# Patient Record
Sex: MALE | Race: WHITE | NOT HISPANIC OR LATINO | Employment: OTHER | URBAN - METROPOLITAN AREA
[De-identification: names, ages, dates, MRNs, and addresses within clinical notes are randomized per-mention and may not be internally consistent; named-entity substitution may affect disease eponyms.]

---

## 2017-01-17 ENCOUNTER — LAB VISIT (OUTPATIENT)
Dept: LAB | Facility: HOSPITAL | Age: 67
End: 2017-01-17
Attending: INTERNAL MEDICINE
Payer: COMMERCIAL

## 2017-01-17 DIAGNOSIS — I25.10 CORONARY ARTERY DISEASE INVOLVING NATIVE CORONARY ARTERY OF NATIVE HEART WITHOUT ANGINA PECTORIS: ICD-10-CM

## 2017-01-17 DIAGNOSIS — E78.00 HYPERCHOLESTEREMIA: Chronic | ICD-10-CM

## 2017-01-17 LAB
ALT SERPL W/O P-5'-P-CCNC: 24 U/L
ANION GAP SERPL CALC-SCNC: 8 MMOL/L
AST SERPL-CCNC: 17 U/L
BUN SERPL-MCNC: 26 MG/DL
CALCIUM SERPL-MCNC: 9.1 MG/DL
CHLORIDE SERPL-SCNC: 106 MMOL/L
CHOLEST/HDLC SERPL: 3.8 {RATIO}
CO2 SERPL-SCNC: 24 MMOL/L
CREAT SERPL-MCNC: 1 MG/DL
EST. GFR  (AFRICAN AMERICAN): >60 ML/MIN/1.73 M^2
EST. GFR  (NON AFRICAN AMERICAN): >60 ML/MIN/1.73 M^2
GLUCOSE SERPL-MCNC: 164 MG/DL
HDL/CHOLESTEROL RATIO: 26.5 %
HDLC SERPL-MCNC: 113 MG/DL
HDLC SERPL-MCNC: 30 MG/DL
LDLC SERPL CALC-MCNC: 68.2 MG/DL
NONHDLC SERPL-MCNC: 83 MG/DL
POTASSIUM SERPL-SCNC: 4.4 MMOL/L
SODIUM SERPL-SCNC: 138 MMOL/L
TRIGL SERPL-MCNC: 74 MG/DL

## 2017-01-17 PROCEDURE — 36415 COLL VENOUS BLD VENIPUNCTURE: CPT | Mod: PO

## 2017-01-17 PROCEDURE — 80061 LIPID PANEL: CPT

## 2017-01-17 PROCEDURE — 84450 TRANSFERASE (AST) (SGOT): CPT

## 2017-01-17 PROCEDURE — 84460 ALANINE AMINO (ALT) (SGPT): CPT

## 2017-01-17 PROCEDURE — 80048 BASIC METABOLIC PNL TOTAL CA: CPT

## 2017-01-24 ENCOUNTER — OFFICE VISIT (OUTPATIENT)
Dept: CARDIOLOGY | Facility: CLINIC | Age: 67
End: 2017-01-24
Payer: COMMERCIAL

## 2017-01-24 VITALS
DIASTOLIC BLOOD PRESSURE: 84 MMHG | SYSTOLIC BLOOD PRESSURE: 122 MMHG | WEIGHT: 220.25 LBS | HEIGHT: 69 IN | HEART RATE: 72 BPM | BODY MASS INDEX: 32.62 KG/M2

## 2017-01-24 DIAGNOSIS — R73.02 GLUCOSE INTOLERANCE (IMPAIRED GLUCOSE TOLERANCE): ICD-10-CM

## 2017-01-24 DIAGNOSIS — E78.00 HYPERCHOLESTEREMIA: ICD-10-CM

## 2017-01-24 DIAGNOSIS — I25.10 CORONARY ARTERY DISEASE INVOLVING NATIVE CORONARY ARTERY OF NATIVE HEART WITHOUT ANGINA PECTORIS: Primary | ICD-10-CM

## 2017-01-24 DIAGNOSIS — I10 ESSENTIAL HYPERTENSION: Chronic | ICD-10-CM

## 2017-01-24 PROCEDURE — 1157F ADVNC CARE PLAN IN RCRD: CPT | Mod: S$GLB,,, | Performed by: INTERNAL MEDICINE

## 2017-01-24 PROCEDURE — 99999 PR PBB SHADOW E&M-EST. PATIENT-LVL III: CPT | Mod: PBBFAC,,, | Performed by: INTERNAL MEDICINE

## 2017-01-24 PROCEDURE — 1126F AMNT PAIN NOTED NONE PRSNT: CPT | Mod: S$GLB,,, | Performed by: INTERNAL MEDICINE

## 2017-01-24 PROCEDURE — 3079F DIAST BP 80-89 MM HG: CPT | Mod: S$GLB,,, | Performed by: INTERNAL MEDICINE

## 2017-01-24 PROCEDURE — 99214 OFFICE O/P EST MOD 30 MIN: CPT | Mod: S$GLB,,, | Performed by: INTERNAL MEDICINE

## 2017-01-24 PROCEDURE — 3074F SYST BP LT 130 MM HG: CPT | Mod: S$GLB,,, | Performed by: INTERNAL MEDICINE

## 2017-01-24 PROCEDURE — 1159F MED LIST DOCD IN RCRD: CPT | Mod: S$GLB,,, | Performed by: INTERNAL MEDICINE

## 2017-01-24 PROCEDURE — 1160F RVW MEDS BY RX/DR IN RCRD: CPT | Mod: S$GLB,,, | Performed by: INTERNAL MEDICINE

## 2017-01-24 RX ORDER — OXYCODONE AND ACETAMINOPHEN 5; 325 MG/1; MG/1
1 TABLET ORAL
Refills: 0 | Status: ON HOLD | COMMUNITY
Start: 2017-01-04 | End: 2020-11-10 | Stop reason: HOSPADM

## 2017-01-24 RX ORDER — EZETIMIBE 10 MG/1
10 TABLET ORAL DAILY
Qty: 30 TABLET | Refills: 11 | Status: SHIPPED | OUTPATIENT
Start: 2017-01-24 | End: 2018-01-09 | Stop reason: SDUPTHER

## 2017-01-24 NOTE — PROGRESS NOTES
Subjective:   Patient ID:  Den Rick is a 66 y.o. male who presents for follow-up of Coronary Artery Disease      Problem List:  CAD   NSTEMI - OM1 YUE 10/11/12   YUE ostial and prox RCA 10/17/12   YUE mid LAD 1/18/07   YUE prox and mid PDA 1/18/07   Hypercholesterolemia   (arthralgias with Lipitor)   HTN  GI bleed 2014      HPI:   Den Rick feels generally well. He underwent shoulder surgery for torn rotator cuff surgery in 12/16. He remained on aspirin. No perioperative complications except oozing from the incision site 1-2 days after surgery.  He does not report angina or shortness of breath with exertion. He has not required S/L NTG.    He has not been exercising lately.  LDL is ~70 on 20 mg of rosuvastatin. HDL remains in the low 30s.   BP is within normal limits.      Review of Systems   Constitution: Negative for weakness, malaise/fatigue, weight gain and weight loss.   HENT: Positive for headaches.    Cardiovascular: Negative for chest pain, claudication, dyspnea on exertion, irregular heartbeat, leg swelling, orthopnea, palpitations, paroxysmal nocturnal dyspnea and syncope.   Respiratory: Negative for cough, sputum production and wheezing.    Musculoskeletal: Positive for arthritis and back pain. Negative for falls, joint pain, muscle cramps, muscle weakness and myalgias.   Gastrointestinal: Negative for abdominal pain, heartburn and melena.   Genitourinary: Negative for frequency, hematuria and nocturia.   Neurological: Negative for dizziness, light-headedness, loss of balance and paresthesias.   Psychiatric/Behavioral: Negative for depression.       Current Outpatient Prescriptions   Medication Sig    alfuzosin (UROXATRAL) 10 mg Tb24 Take 1 tablet (10 mg total) by mouth every evening.    aspirin (ECOTRIN) 81 MG EC tablet Take 81 mg by mouth once daily.    AXIRON 30 mg/actuation (1.5 mL) SlPm APPLY 1 PUMP TO SKIN EVERY DAY    CELEBREX 200 mg capsule Take 1 capsule by mouth as needed.     "CORDRAN TAPE LARGE ROLL 4 mcg/cm2 Tape Apply 1 application topically every evening. Apply to affected area    fesoterodine (TOVIAZ) 8 mg Tb24 Take 1 tablet by mouth once daily.    fish oil-omega-3 fatty acids 300-1,000 mg capsule Take 1 g by mouth once daily.    methocarbamol (ROBAXIN) 750 MG Tab Take 750 mg by mouth continuous prn.      methylsulfonylmethane (MSM) 500 mg Cap Take 1 capsule by mouth Daily.      metoprolol succinate (TOPROL-XL) 100 MG 24 hr tablet Take 1 tablet (100 mg total) by mouth once daily.    multivitamin (THERAGRAN) per tablet Take 1 tablet by mouth Daily.      nitroGLYCERIN (NITROSTAT) 0.4 MG SL tablet Place 1 tablet (0.4 mg total) under the tongue every 5 (five) minutes as needed.    oxycodone-acetaminophen (PERCOCET) 5-325 mg per tablet Take 1 tablet by mouth as needed.    pantoprazole (PROTONIX) 40 MG tablet Take 40 mg by mouth once daily.    PENNSAID 20 mg/gram /actuation(2 %) sopm APPLY TWO PUMPS TO AFFECTED AREA(S) TWICE DAILY    rosuvastatin (CRESTOR) 20 MG tablet Take 1 tablet (20 mg total) by mouth every evening.    ARGININE/ORNITHINE/LYSINE (ARGININE-LYSINE-ORNITHINE ORAL) Take by mouth once daily.         Social History   Substance Use Topics    Smoking status: Never Smoker    Smokeless tobacco: Never Used      Comment: smoked weed 27 yrs ago    Alcohol use No      Comment: quit at 25 yrs old         Objective:     Physical Exam   Constitutional: He is oriented to person, place, and time. He appears well-developed and well-nourished.   /84  Pulse 72  Ht 5' 9" (1.753 m)  Wt 99.9 kg (220 lb 3.8 oz)  BMI 32.52 kg/m2     HENT:   Head: Normocephalic.   Neck: No JVD present. Carotid bruit is not present.   Cardiovascular: Normal rate, regular rhythm, S1 normal and S2 normal.    No murmur heard.  Pulses:       Radial pulses are 2+ on the right side, and 2+ on the left side.        Posterior tibial pulses are 2+ on the right side, and 2+ on the left side. "   Pulmonary/Chest: Breath sounds normal. Chest wall is not dull to percussion.   Abdominal: Soft. He exhibits no mass. There is no splenomegaly or hepatomegaly.   Musculoskeletal:        Right lower leg: He exhibits no edema.        Left lower leg: He exhibits no edema.   Neurological: He is alert and oriented to person, place, and time. Gait normal.   Skin: No bruising noted. Nails show no clubbing.   Psychiatric: He has a normal mood and affect. His speech is normal and behavior is normal.           Lab Results   Component Value Date    CHOL 113 (L) 01/17/2017    HDL 30 (L) 01/17/2017    LDLCALC 68.2 01/17/2017    TRIG 74 01/17/2017    CHOLHDL 26.5 01/17/2017     Lab Results   Component Value Date     (H) 01/17/2017    CREATININE 1.0 01/17/2017    BUN 26 (H) 01/17/2017     01/17/2017    K 4.4 01/17/2017     01/17/2017    CO2 24 01/17/2017     Lab Results   Component Value Date    ALT 24 01/17/2017    AST 17 01/17/2017    GGT 35 05/28/2014    ALKPHOS 62 01/12/2016    BILITOT 0.7 01/12/2016         Assessment:     1. Coronary artery disease without angina pectoris    2. Hypercholesteremia    3. Glucose intolerance (impaired glucose tolerance)    4. Essential hypertension          Plan:     CAD (coronary artery disease)  Stable.  Continue same meds.    Hypercholesteremia  Cholesterol education.  LDL ~70, but HDL in the 30s. Add ezetimibe 10 mg.  Lipids in 3 months.    Glucose intolerance (impaired glucose tolerance)  Nutritional counseling. Detailed diet plan outlined.  Exercise counseling.  HbA1C in 3 months.    HTN (hypertension)  Stable.  Continue same meds.    RTC 1 yr.

## 2017-01-24 NOTE — MR AVS SNAPSHOT
[facility]   [facility]  [place] [address]  Phone: [phone]                  Den Rick   2017 9:30 AM   Office Visit    Description:  Male : 1950   Provider:  Jennifer Barlow MD   Department:  [facility]           Reason for Visit     Coronary Artery Disease           Diagnoses this Visit        Comments    Coronary artery disease involving native coronary artery of native heart without angina pectoris    -  Primary     Hypercholesteremia         Glucose intolerance (impaired glucose tolerance)                To Do List           Goals (5 Years of Data)     None      Follow-Up and Disposition     Return in about 1 year (around 2018).       These Medications        Disp Refills Start End    ezetimibe (ZETIA) 10 mg tablet 30 tablet 11 2017    Take 1 tablet (10 mg total) by mouth once daily. - Oral    Pharmacy: [facility]/pharm[facility] #[id number] - [place] - [address] [address] [facility] #: [phone]         [facility]     [facility] On Call Nurse Care Line -  Assistance  Registered nurses in the [facility] Center provide clinical advisement, health education, appointment booking, and other advisory services.  Call for this free service at [phone].             Medications           Message regarding Medications     Verify the changes and/or additions to your medication regime listed below are the same as discussed with your clinician today.  If any of these changes or additions are incorrect, please notify your healthcare provider.        START taking these NEW medications        Refills    ezetimibe (ZETIA) 10 mg tablet 11    Sig: Take 1 tablet (10 mg total) by mouth once daily.    Class: Normal    Route: Oral           Verify that the below list of medications is an accurate representation of the medications you are currently taking.  If none reported, the list may be blank. If incorrect, please contact your  "healthcare provider. Carry this list with you in case of emergency.           Current Medications     alfuzosin (UROXATRAL) 10 mg Tb24 Take 1 tablet (10 mg total) by mouth every evening.    aspirin (ECOTRIN) 81 MG EC tablet Take 81 mg by mouth once daily.    AXIRON 30 mg/actuation (1.5 mL) SlPm APPLY 1 PUMP TO SKIN EVERY DAY    CELEBREX 200 mg capsule Take 1 capsule by mouth as needed.    CORDRAN TAPE LARGE ROLL 4 mcg/cm2 Tape Apply 1 application topically every evening. Apply to affected area    fesoterodine (TOVIAZ) 8 mg Tb24 Take 1 tablet by mouth once daily.    fish oil-omega-3 fatty acids 300-1,000 mg capsule Take 1 g by mouth once daily.    methocarbamol (ROBAXIN) 750 MG Tab Take 750 mg by mouth continuous prn.      methylsulfonylmethane (MSM) 500 mg Cap Take 1 capsule by mouth Daily.      metoprolol succinate (TOPROL-XL) 100 MG 24 hr tablet Take 1 tablet (100 mg total) by mouth once daily.    multivitamin (THERAGRAN) per tablet Take 1 tablet by mouth Daily.      nitroGLYCERIN (NITROSTAT) 0.4 MG SL tablet Place 1 tablet (0.4 mg total) under the tongue every 5 (five) minutes as needed.    oxycodone-acetaminophen (PERCOCET) 5-325 mg per tablet Take 1 tablet by mouth as needed.    pantoprazole (PROTONIX) 40 MG tablet Take 40 mg by mouth once daily.    PENNSAID 20 mg/gram /actuation(2 %) sopm APPLY TWO PUMPS TO AFFECTED AREA(S) TWICE DAILY    rosuvastatin (CRESTOR) 20 MG tablet Take 1 tablet (20 mg total) by mouth every evening.    ARGININE/ORNITHINE/LYSINE (ARGININE-LYSINE-ORNITHINE ORAL) Take by mouth once daily.    ezetimibe (ZETIA) 10 mg tablet Take 1 tablet (10 mg total) by mouth once daily.           Clinical Reference Information           Vital Signs - Last Recorded  Most recent update: 1/24/2017 10:04 AM by Annia Francis LPN    BP Pulse Ht Wt BMI    122/84 72 5' 9" (1.753 m) 99.9 kg (220 lb 3.8 oz) 32.52 kg/m2      Blood Pressure          Most Recent Value    BP  122/84      Allergies as of 1/24/2017     " Atorvastatin    Niacin      Immunizations Administered on Date of Encounter - 1/24/2017     None      Orders Placed During Today's Visit      Normal Orders This Visit    EKG 12-lead     Future Labs/Procedures Expected by Expires    ALT (SGPT)  1/24/2017 1/31/2017    AST (SGOT)  1/24/2017 3/14/2017    Basic metabolic panel  1/24/2017 1/31/2017    Hemoglobin A1c  1/24/2017 3/25/2018    Lipid panel  As directed 1/31/2017      Instructions    Drink extra fluids while taking Celebrex or NSIADs  Start taking Zetia. I'd like to see your LDL ~50.    ============    LDL - bad type - improves with diet and medications: typically statins; most other medications can lower LDL but have not been proven to prevent heart attacks.             May not improve significantly with exercise alone.             Ideally less than 70     HDL - good type - improves with exercise             Ideally greater than 50    TGs (triglycerides) - also bad - can change very quickly and considerably with food - improves with diet and exercise            In some cases a low carbohydrate diet will lower TGs better than a low fat diet.            Ideal range       Sugar, fat and cholesterol in food:     Current dietary guidelines recommend drastically reducing our intake of sugar. There is less emphasis on fat. And cholesterol in our food is no longer a significant concern. However please do not confuse this with the role of cholesterol in our blood and arteries. It is still cholesterol that clogs up our arteries whether it comes from our food or is manufactured by our bodies.       Most foods that are high in cholesterol are also high in saturated fat. But there is way more saturated fat than cholesterol in these foods. On the other hand there are a handful of foods that are high in cholesterol but do not contain much saturated fat: eggs, shrimp, crab legs and crawfish; these are OK to eat.       Saturated fat is the bad fat - you should limit your  intake of this. Deep fried foods, meats and other animal fats are high in saturated fat. Cookies, donuts and most dessert and cakes are usually high in both saturated fat and sugar.       Unsaturated fat is the good fat. It contains the same number of calories as saturated fat but does not get deposited in our arteries. The Mediterranean style diet encourages the intake of unsaturated fat - olive oil, avocado and unsalted nuts.      You should eat a few servings of vegetables (and fruit as long as you are not diabetic) everyday. Substitute some plant proteins in place of meat: soy, beans, lentils, quinoa and oatmeal.     Do not use stick butter or stick margarine. Butter that comes in a tub is soft butter. It consists of 1/2 butter and 1/2 canola or another type of vegetable oil. It is fine to use that.       Trans fats should also be avoided. Most foods that are labelled as containing 0 gms of trans fat can still contain several hundred milligrams of trans fat: creamer, margarine, refrigerator dough, deep fried foods, ready made frosting, potato, corn and torilla chips, cakes, cookies, pie crusts and crackers containing shortening made with hydrogenated vegetable oil.    ==============  ================  ===============    <5 gm of sugar or <5 gm of saturated fat

## 2017-01-24 NOTE — PATIENT INSTRUCTIONS
Drink extra fluids while taking Celebrex or NSIADs  Start taking Zetia. I'd like to see your LDL ~50.    ============    LDL - bad type - improves with diet and medications: typically statins; most other medications can lower LDL but have not been proven to prevent heart attacks.             May not improve significantly with exercise alone.             Ideally less than 70     HDL - good type - improves with exercise             Ideally greater than 50    TGs (triglycerides) - also bad - can change very quickly and considerably with food - improves with diet and exercise            In some cases a low carbohydrate diet will lower TGs better than a low fat diet.            Ideal range       Sugar, fat and cholesterol in food:     Current dietary guidelines recommend drastically reducing our intake of sugar. There is less emphasis on fat. And cholesterol in our food is no longer a significant concern. However please do not confuse this with the role of cholesterol in our blood and arteries. It is still cholesterol that clogs up our arteries whether it comes from our food or is manufactured by our bodies.       Most foods that are high in cholesterol are also high in saturated fat. But there is way more saturated fat than cholesterol in these foods. On the other hand there are a handful of foods that are high in cholesterol but do not contain much saturated fat: eggs, shrimp, crab legs and crawfish; these are OK to eat.       Saturated fat is the bad fat - you should limit your intake of this. Deep fried foods, meats and other animal fats are high in saturated fat. Cookies, donuts and most dessert and cakes are usually high in both saturated fat and sugar.       Unsaturated fat is the good fat. It contains the same number of calories as saturated fat but does not get deposited in our arteries. The Mediterranean style diet encourages the intake of unsaturated fat - olive oil, avocado and unsalted nuts.      You  should eat a few servings of vegetables (and fruit as long as you are not diabetic) everyday. Substitute some plant proteins in place of meat: soy, beans, lentils, quinoa and oatmeal.     Do not use stick butter or stick margarine. Butter that comes in a tub is soft butter. It consists of 1/2 butter and 1/2 canola or another type of vegetable oil. It is fine to use that.       Trans fats should also be avoided. Most foods that are labelled as containing 0 gms of trans fat can still contain several hundred milligrams of trans fat: creamer, margarine, refrigerator dough, deep fried foods, ready made frosting, potato, corn and torilla chips, cakes, cookies, pie crusts and crackers containing shortening made with hydrogenated vegetable oil.    ==============  ================  ===============    <5 gm of sugar or <5 gm of saturated fat

## 2017-06-16 DIAGNOSIS — N31.8 FREQUENCY-URGENCY SYNDROME: ICD-10-CM

## 2017-06-16 NOTE — TELEPHONE ENCOUNTER
Patient of dr. Shahid -he is on the recall to see him in august. Can you approve #[phone] with no refills of toviaz, thanks

## 2017-06-19 RX ORDER — FESOTERODINE FUMARATE 8 MG/1
1 TABLET, EXTENDED RELEASE ORAL DAILY
Qty: 90 TABLET | Refills: 0 | Status: SHIPPED | OUTPATIENT
Start: 2017-06-19 | End: 2018-01-26 | Stop reason: SDUPTHER

## 2017-09-15 ENCOUNTER — TELEPHONE (OUTPATIENT)
Dept: UROLOGY | Facility: CLINIC | Age: 67
End: 2017-09-15

## 2017-09-15 DIAGNOSIS — N32.81 OAB (OVERACTIVE BLADDER): Primary | ICD-10-CM

## 2017-09-15 DIAGNOSIS — N31.8 FREQUENCY-URGENCY SYNDROME: ICD-10-CM

## 2017-09-15 RX ORDER — FESOTERODINE FUMARATE 8 MG/1
1 TABLET, FILM COATED, EXTENDED RELEASE ORAL DAILY
Qty: 90 TABLET | Refills: 0 | OUTPATIENT
Start: 2017-09-15 | End: 2018-09-15

## 2017-09-18 RX ORDER — FESOTERODINE FUMARATE 8 MG/1
1 TABLET, EXTENDED RELEASE ORAL DAILY
Qty: 30 TABLET | Refills: 11 | Status: SHIPPED | OUTPATIENT
Start: 2017-09-18 | End: 2018-02-19 | Stop reason: SDUPTHER

## 2017-09-18 NOTE — TELEPHONE ENCOUNTER
Diagnoses and all orders for this visit:    OAB (overactive bladder)  -     fesoterodine 8 mg Tb24; Take 1 tablet by mouth once daily.

## 2017-11-14 ENCOUNTER — TELEPHONE (OUTPATIENT)
Dept: CARDIOLOGY | Facility: CLINIC | Age: 67
End: 2017-11-14

## 2017-11-14 DIAGNOSIS — N40.1 BENIGN PROSTATIC HYPERPLASIA WITH URINARY OBSTRUCTION: ICD-10-CM

## 2017-11-14 DIAGNOSIS — N13.8 BENIGN PROSTATIC HYPERPLASIA WITH URINARY OBSTRUCTION: ICD-10-CM

## 2017-11-14 RX ORDER — ALFUZOSIN HYDROCHLORIDE 10 MG/1
10 TABLET, EXTENDED RELEASE ORAL NIGHTLY
Qty: 90 TABLET | Refills: 3 | Status: SHIPPED | OUTPATIENT
Start: 2017-11-14 | End: 2018-02-19 | Stop reason: SDUPTHER

## 2017-11-15 ENCOUNTER — PATIENT MESSAGE (OUTPATIENT)
Dept: CARDIOLOGY | Facility: CLINIC | Age: 67
End: 2017-11-15

## 2017-11-23 RX ORDER — METOPROLOL SUCCINATE 100 MG/1
TABLET, EXTENDED RELEASE ORAL
Qty: 90 TABLET | Refills: 3 | Status: SHIPPED | OUTPATIENT
Start: 2017-11-23 | End: 2018-11-16 | Stop reason: SDUPTHER

## 2017-11-27 ENCOUNTER — CLINICAL SUPPORT (OUTPATIENT)
Dept: CARDIOLOGY | Facility: CLINIC | Age: 67
End: 2017-11-27
Payer: COMMERCIAL

## 2017-11-27 DIAGNOSIS — R69 DIAGNOSIS DEFERRED: ICD-10-CM

## 2017-11-27 DIAGNOSIS — I25.10 CORONARY ARTERY DISEASE INVOLVING NATIVE CORONARY ARTERY OF NATIVE HEART WITHOUT ANGINA PECTORIS: ICD-10-CM

## 2017-11-27 PROCEDURE — 93000 ELECTROCARDIOGRAM COMPLETE: CPT | Mod: S$GLB,,, | Performed by: INTERNAL MEDICINE

## 2017-11-28 ENCOUNTER — PATIENT MESSAGE (OUTPATIENT)
Dept: CARDIOLOGY | Facility: CLINIC | Age: 67
End: 2017-11-28

## 2017-12-20 ENCOUNTER — OFFICE VISIT (OUTPATIENT)
Dept: INTERNAL MEDICINE | Facility: CLINIC | Age: 67
End: 2017-12-20
Payer: COMMERCIAL

## 2017-12-20 ENCOUNTER — PATIENT MESSAGE (OUTPATIENT)
Dept: INTERNAL MEDICINE | Facility: CLINIC | Age: 67
End: 2017-12-20

## 2017-12-20 ENCOUNTER — LAB VISIT (OUTPATIENT)
Dept: LAB | Facility: HOSPITAL | Age: 67
End: 2017-12-20
Attending: INTERNAL MEDICINE
Payer: COMMERCIAL

## 2017-12-20 VITALS
RESPIRATION RATE: 16 BRPM | SYSTOLIC BLOOD PRESSURE: 119 MMHG | HEART RATE: 57 BPM | DIASTOLIC BLOOD PRESSURE: 81 MMHG | TEMPERATURE: 99 F | WEIGHT: 212.31 LBS | HEIGHT: 69 IN | BODY MASS INDEX: 31.44 KG/M2

## 2017-12-20 DIAGNOSIS — E11.9 TYPE 2 DIABETES MELLITUS WITHOUT COMPLICATION, WITHOUT LONG-TERM CURRENT USE OF INSULIN: Primary | ICD-10-CM

## 2017-12-20 DIAGNOSIS — E11.9 TYPE 2 DIABETES MELLITUS WITHOUT COMPLICATION, WITHOUT LONG-TERM CURRENT USE OF INSULIN: ICD-10-CM

## 2017-12-20 LAB
ANION GAP SERPL CALC-SCNC: 5 MMOL/L
BUN SERPL-MCNC: 20 MG/DL
CALCIUM SERPL-MCNC: 8.8 MG/DL
CHLORIDE SERPL-SCNC: 106 MMOL/L
CO2 SERPL-SCNC: 24 MMOL/L
CREAT SERPL-MCNC: 0.9 MG/DL
EST. GFR  (AFRICAN AMERICAN): >60 ML/MIN/1.73 M^2
EST. GFR  (NON AFRICAN AMERICAN): >60 ML/MIN/1.73 M^2
ESTIMATED AVG GLUCOSE: 295 MG/DL
GLUCOSE SERPL-MCNC: 244 MG/DL
HBA1C MFR BLD HPLC: 11.9 %
POTASSIUM SERPL-SCNC: 4.4 MMOL/L
SODIUM SERPL-SCNC: 135 MMOL/L

## 2017-12-20 PROCEDURE — 36415 COLL VENOUS BLD VENIPUNCTURE: CPT | Mod: PO

## 2017-12-20 PROCEDURE — 99999 PR PBB SHADOW E&M-EST. PATIENT-LVL III: CPT | Mod: PBBFAC,,, | Performed by: INTERNAL MEDICINE

## 2017-12-20 PROCEDURE — 83036 HEMOGLOBIN GLYCOSYLATED A1C: CPT

## 2017-12-20 PROCEDURE — 80048 BASIC METABOLIC PNL TOTAL CA: CPT

## 2017-12-20 PROCEDURE — 99213 OFFICE O/P EST LOW 20 MIN: CPT | Mod: S$GLB,,, | Performed by: INTERNAL MEDICINE

## 2017-12-20 RX ORDER — METFORMIN HYDROCHLORIDE 500 MG/1
500 TABLET, EXTENDED RELEASE ORAL
Qty: 30 TABLET | Refills: 6 | Status: SHIPPED | OUTPATIENT
Start: 2017-12-20 | End: 2018-01-16 | Stop reason: SDUPTHER

## 2017-12-20 RX ORDER — EZETIMIBE 10 MG/1
10 TABLET ORAL DAILY
Refills: 11 | COMMUNITY
Start: 2017-11-11 | End: 2018-12-27 | Stop reason: SDUPTHER

## 2017-12-20 RX ORDER — CLINDAMYCIN PHOSPHATE 10 MG/G
GEL TOPICAL
Refills: 1 | Status: ON HOLD | COMMUNITY
Start: 2017-12-05 | End: 2020-11-10 | Stop reason: HOSPADM

## 2017-12-20 RX ORDER — MUPIROCIN 20 MG/G
OINTMENT TOPICAL
Refills: 3 | Status: ON HOLD | COMMUNITY
Start: 2017-12-05 | End: 2020-11-10 | Stop reason: HOSPADM

## 2017-12-20 NOTE — PROGRESS NOTES
Subjective:       Patient ID: Den Rick is a 67 y.o. male.    Chief Complaint: blood sugar was high    Patient presents for urgent visit because of having high blood glucose at [facility] yesterday and last week.  He was to have rotator cuff surgery yesterday but it was cancelled because of high blood glucose.  Patient has a history of glucose intolerance but not diagnosed with diabetes.  He reports blood sugar of over 300 random and 336 yesterday fasting.   His last glucose here in January, 2017 was 164.  He has lost some weight in effort to control the sugar.    Stable from cardiology standpoint.  He describes polyuria, polydipsia.    Grandmother with Type 2 diabetes.    Generally feels okay today.      Review of Systems   Constitutional: Negative for activity change, chills, fever and unexpected weight change.   HENT: Negative for hearing loss, rhinorrhea and trouble swallowing.    Eyes: Negative for discharge and visual disturbance.   Respiratory: Negative for chest tightness and wheezing.    Cardiovascular: Negative for chest pain and palpitations.   Gastrointestinal: Negative for blood in stool, constipation, diarrhea and vomiting.   Endocrine: Negative for polydipsia and polyuria.   Genitourinary: Positive for frequency and urgency. Negative for difficulty urinating and hematuria.   Musculoskeletal: Negative for arthralgias, joint swelling and neck pain.   Neurological: Negative for weakness and headaches.   Psychiatric/Behavioral: Negative for confusion and dysphoric mood.       Objective:      Physical Exam   Constitutional: He appears well-developed and well-nourished. No distress.   Eyes: No scleral icterus.   Cardiovascular: Normal rate, regular rhythm and normal heart sounds.    Pulmonary/Chest: Effort normal and breath sounds normal. No respiratory distress.   Abdominal: Soft. Bowel sounds are normal. He exhibits no distension. There is no tenderness.   Vitals reviewed.      Assessment:        1. Type 2 diabetes mellitus without complication, without long-term current use of insulin        Plan:   1. Will check BMP and A1C and let patient know results via email.  2. Start Metformin- mgm daily after breakfast.  Side effects discussed at length.  3. Continue all regular meds.  4. Cannot have surgery until blood glucose is under control.  5. Needs to see regular PCP in 2 weeks in followup.

## 2017-12-20 NOTE — PATIENT INSTRUCTIONS
1. Start Metformin-xr 500 mgm daily at breakfast time.  2. See Dr. Friedman in followup in 2 weeks.  3. Continue all regular meds.  4. A1C test today and I will let you know the results via email.

## 2017-12-21 RX ORDER — ROSUVASTATIN CALCIUM 20 MG/1
20 TABLET, COATED ORAL NIGHTLY
Qty: 90 TABLET | Refills: 3 | Status: SHIPPED | OUTPATIENT
Start: 2017-12-21 | End: 2018-12-08 | Stop reason: SDUPTHER

## 2018-01-09 RX ORDER — EZETIMIBE 10 MG/1
TABLET ORAL
Qty: 30 TABLET | Refills: 11 | Status: SHIPPED | OUTPATIENT
Start: 2018-01-09 | End: 2018-01-26 | Stop reason: SDUPTHER

## 2018-01-16 ENCOUNTER — OFFICE VISIT (OUTPATIENT)
Dept: INTERNAL MEDICINE | Facility: CLINIC | Age: 68
End: 2018-01-16
Payer: COMMERCIAL

## 2018-01-16 VITALS
BODY MASS INDEX: 31.15 KG/M2 | TEMPERATURE: 98 F | SYSTOLIC BLOOD PRESSURE: 130 MMHG | HEIGHT: 69 IN | RESPIRATION RATE: 18 BRPM | DIASTOLIC BLOOD PRESSURE: 77 MMHG | HEART RATE: 58 BPM | WEIGHT: 210.31 LBS

## 2018-01-16 DIAGNOSIS — M75.122 COMPLETE TEAR OF LEFT ROTATOR CUFF: ICD-10-CM

## 2018-01-16 DIAGNOSIS — N13.8 BENIGN PROSTATIC HYPERPLASIA WITH URINARY OBSTRUCTION: ICD-10-CM

## 2018-01-16 DIAGNOSIS — E78.00 HYPERCHOLESTEREMIA: ICD-10-CM

## 2018-01-16 DIAGNOSIS — Z98.61 S/P PTCA (PERCUTANEOUS TRANSLUMINAL CORONARY ANGIOPLASTY): ICD-10-CM

## 2018-01-16 DIAGNOSIS — E34.9 TESTOSTERONE DEFICIENCY: ICD-10-CM

## 2018-01-16 DIAGNOSIS — N40.1 BENIGN PROSTATIC HYPERPLASIA WITH URINARY OBSTRUCTION: ICD-10-CM

## 2018-01-16 DIAGNOSIS — E11.9 TYPE 2 DIABETES MELLITUS WITHOUT COMPLICATION, WITHOUT LONG-TERM CURRENT USE OF INSULIN: Primary | ICD-10-CM

## 2018-01-16 DIAGNOSIS — D75.1 POLYCYTHEMIA: ICD-10-CM

## 2018-01-16 DIAGNOSIS — I25.10 CORONARY ARTERY DISEASE INVOLVING NATIVE CORONARY ARTERY OF NATIVE HEART WITHOUT ANGINA PECTORIS: ICD-10-CM

## 2018-01-16 DIAGNOSIS — I10 ESSENTIAL HYPERTENSION: ICD-10-CM

## 2018-01-16 PROCEDURE — 99397 PER PM REEVAL EST PAT 65+ YR: CPT | Mod: S$GLB,,, | Performed by: INTERNAL MEDICINE

## 2018-01-16 PROCEDURE — 99999 PR PBB SHADOW E&M-EST. PATIENT-LVL IV: CPT | Mod: PBBFAC,,, | Performed by: INTERNAL MEDICINE

## 2018-01-16 RX ORDER — METFORMIN HYDROCHLORIDE 500 MG/1
500 TABLET, EXTENDED RELEASE ORAL
Qty: 30 TABLET | Refills: 6 | Status: SHIPPED | OUTPATIENT
Start: 2018-01-16 | End: 2018-02-05 | Stop reason: SDUPTHER

## 2018-01-20 NOTE — PROGRESS NOTES
Subjective:       Patient ID: Den Rick is a 67 y.o. male.    Chief Complaint: Follow-up (New Dx Diabetes)  HISTORY OF PRESENT ILLNESS:  A 67-year-old white male patient of mine who comes   in having a planned repair of his rotator cuff by Dr. Garland M.D. at [place], and   was found to have a high sugar.  Dr. Weller saw him on 12/20/2017 found that   his blood sugar was 244 and placed him on metformin.  He has not been monitoring   his blood sugars.  The patient also sees Dr. Shahid.  The patient says his   grandmother had diabetes, but onset late in life.  His blood sugars have been   mildly elevated.  For some time we talked about him being on a diet and the need   to be on medication if it went up but it has been in the low to mid 100s.  The   patient was seen by ophthalmologist Dr. Alford.  The patient is overdue his   annual physical, so that was done at the time of this visit, he has no other   complaints.  He also sees Dr. Barlow, Dr. William Ramirez for polycythemia and Dr. Shahid.    PHYSICAL EXAMINATION:  VITAL SIGNS:  Normal.  SKIN:  Fair and healthy.  HEENT:  Clear.  NECK:  Shows no stiffness, adenopathy or thyroid enlargement.  CHEST:  Clear.  HEART:  There is no murmur or gallop.  ABDOMEN:  Obese, nontender, no organomegaly.  RECTAL:  Not done.  EXTREMITIES:  Show normal muscles, joints, pulses.  We looked at that in detail.    He has intact tactile sensation in his feet, but he has decreased vibration in   his left foot.  His right is okay.  He has good pulses in both feet.  Good   capillary filling.  No break in the skin.  NEUROLOGIC:  Otherwise normal.    IMPRESSION:  1.  Diabetes with poor control.  I have arranged for him to get lab, which he   wants to do at [place].  2.  Torn rotator cuff on the left with delayed surgery to be done by Dr. Haque.  3.  Polycythemia, is seeing Dr. William Ramirez.  4.  Coronary artery disease, seeing Dr. Barlow.  5.  Prostatism, seeing Dr. Shahid.    I have  encouraged him to see his eye doctor, which he has not done the last year   and a half.      I made arrangements for him to see diabetic management to get a better   understanding of what he is dealing with, to get him on a testing program and   get the testing materials so he can learn how to use them.      I will see him again in one month.      JDC/HN  dd: 01/20/2018 14:42:49 (CST)  td: 01/20/2018 22:11:23 (CST)  Doc ID   #[id number]  Job ID #[id number]    CC:     Dict [id number]  HPI  Review of Systems   Constitutional: Negative for activity change, appetite change, fatigue and unexpected weight change.   HENT: Negative for dental problem, hearing loss, mouth sores, postnasal drip and sinus pressure.    Eyes: Negative for discharge and visual disturbance.   Respiratory: Negative for cough and shortness of breath.    Cardiovascular: Negative for chest pain and palpitations.   Gastrointestinal: Negative for abdominal pain, blood in stool, constipation, diarrhea and nausea.   Endocrine: Negative for polydipsia, polyphagia and polyuria.   Genitourinary: Negative for dysuria, hematuria and testicular pain.   Musculoskeletal: Positive for arthralgias. Negative for back pain, joint swelling and neck pain.   Skin: Negative for pallor and rash.   Neurological: Positive for headaches. Negative for dizziness, tremors, seizures, speech difficulty and weakness.   Psychiatric/Behavioral: Negative for agitation, confusion and sleep disturbance. The patient is not nervous/anxious.        Objective:      Physical Exam   Constitutional: He is oriented to person, place, and time. He appears well-developed and well-nourished.   HENT:   Head: Normocephalic.   Right Ear: External ear normal.   Left Ear: External ear normal.   Mouth/Throat: Oropharynx is clear and moist.   Eyes: EOM are normal. Pupils are equal, round, and reactive to light. Right eye exhibits no discharge.   Neck: Normal range of motion. No JVD present. No tracheal deviation  present. No thyromegaly present.   Cardiovascular: Normal rate, regular rhythm, normal heart sounds and intact distal pulses.  Exam reveals no gallop.    No murmur heard.  Pulmonary/Chest: Effort normal and breath sounds normal. He has no wheezes. He has no rales.   Abdominal: Soft. Bowel sounds are normal. He exhibits no mass. There is no tenderness.   Genitourinary: Prostate normal and penis normal. Rectal exam shows guaiac negative stool.   Musculoskeletal: Normal range of motion. He exhibits no edema.   Lymphadenopathy:     He has no cervical adenopathy.   Neurological: He is oriented to person, place, and time. He displays normal reflexes. A sensory deficit is present. No cranial nerve deficit. Coordination normal.   Skin: Skin is warm. No rash noted. No pallor.   Psychiatric: He has a normal mood and affect.       Assessment:       1. Type 2 diabetes mellitus without complication, without long-term current use of insulin    2. Polycythemia    3. Complete tear of left rotator cuff    4. Coronary artery disease involving native coronary artery of native heart without angina pectoris    5. Hypercholesteremia    6. Testosterone deficiency    7. S/P PTCA     8. Essential hypertension    9. Benign prostatic hyperplasia with urinary obstruction        Plan:

## 2018-01-22 ENCOUNTER — LAB VISIT (OUTPATIENT)
Dept: LAB | Facility: HOSPITAL | Age: 68
End: 2018-01-22
Attending: INTERNAL MEDICINE
Payer: COMMERCIAL

## 2018-01-22 DIAGNOSIS — I10 ESSENTIAL HYPERTENSION: ICD-10-CM

## 2018-01-22 DIAGNOSIS — N13.8 BENIGN PROSTATIC HYPERPLASIA WITH URINARY OBSTRUCTION: ICD-10-CM

## 2018-01-22 DIAGNOSIS — E78.00 HYPERCHOLESTEREMIA: ICD-10-CM

## 2018-01-22 DIAGNOSIS — N40.1 BENIGN PROSTATIC HYPERPLASIA WITH URINARY OBSTRUCTION: ICD-10-CM

## 2018-01-22 DIAGNOSIS — E11.9 TYPE 2 DIABETES MELLITUS WITHOUT COMPLICATION, WITHOUT LONG-TERM CURRENT USE OF INSULIN: ICD-10-CM

## 2018-01-22 DIAGNOSIS — I25.10 CORONARY ARTERY DISEASE INVOLVING NATIVE CORONARY ARTERY OF NATIVE HEART WITHOUT ANGINA PECTORIS: ICD-10-CM

## 2018-01-22 LAB
ALBUMIN SERPL BCP-MCNC: 3.5 G/DL
ALP SERPL-CCNC: 68 U/L
ALT SERPL W/O P-5'-P-CCNC: 45 U/L
ANION GAP SERPL CALC-SCNC: 6 MMOL/L
AST SERPL-CCNC: 26 U/L
BILIRUB SERPL-MCNC: 0.9 MG/DL
BUN SERPL-MCNC: 22 MG/DL
CALCIUM SERPL-MCNC: 8.8 MG/DL
CHLORIDE SERPL-SCNC: 104 MMOL/L
CHOLEST SERPL-MCNC: 70 MG/DL
CHOLEST/HDLC SERPL: 2.9 {RATIO}
CO2 SERPL-SCNC: 24 MMOL/L
COMPLEXED PSA SERPL-MCNC: 0.24 NG/ML
CREAT SERPL-MCNC: 0.9 MG/DL
EST. GFR  (AFRICAN AMERICAN): >60 ML/MIN/1.73 M^2
EST. GFR  (NON AFRICAN AMERICAN): >60 ML/MIN/1.73 M^2
ESTIMATED AVG GLUCOSE: 275 MG/DL
GLUCOSE SERPL-MCNC: 292 MG/DL
HBA1C MFR BLD HPLC: 11.2 %
HDLC SERPL-MCNC: 24 MG/DL
HDLC SERPL: 34.3 %
LDLC SERPL CALC-MCNC: 31 MG/DL
NONHDLC SERPL-MCNC: 46 MG/DL
POTASSIUM SERPL-SCNC: 4.4 MMOL/L
PROT SERPL-MCNC: 6 G/DL
SODIUM SERPL-SCNC: 134 MMOL/L
T4 FREE SERPL-MCNC: 0.93 NG/DL
TRIGL SERPL-MCNC: 75 MG/DL
TSH SERPL DL<=0.005 MIU/L-ACNC: 3.16 UIU/ML

## 2018-01-22 PROCEDURE — 83036 HEMOGLOBIN GLYCOSYLATED A1C: CPT

## 2018-01-22 PROCEDURE — 84443 ASSAY THYROID STIM HORMONE: CPT

## 2018-01-22 PROCEDURE — 80061 LIPID PANEL: CPT | Mod: 91

## 2018-01-22 PROCEDURE — 80053 COMPREHEN METABOLIC PANEL: CPT

## 2018-01-22 PROCEDURE — 84153 ASSAY OF PSA TOTAL: CPT

## 2018-01-22 PROCEDURE — 84439 ASSAY OF FREE THYROXINE: CPT

## 2018-01-22 PROCEDURE — 85025 COMPLETE CBC W/AUTO DIFF WBC: CPT

## 2018-01-23 LAB
BASOPHILS # BLD AUTO: 0.04 K/UL
BASOPHILS NFR BLD: 1 %
DIFFERENTIAL METHOD: ABNORMAL
EOSINOPHIL # BLD AUTO: 0.2 K/UL
EOSINOPHIL NFR BLD: 3.8 %
ERYTHROCYTE [DISTWIDTH] IN BLOOD BY AUTOMATED COUNT: 13.2 %
HCT VFR BLD AUTO: 52.5 %
HGB BLD-MCNC: 17.6 G/DL
IMM GRANULOCYTES # BLD AUTO: 0.02 K/UL
IMM GRANULOCYTES NFR BLD AUTO: 0.5 %
LYMPHOCYTES # BLD AUTO: 0.8 K/UL
LYMPHOCYTES NFR BLD: 19.6 %
MCH RBC QN AUTO: 27.1 PG
MCHC RBC AUTO-ENTMCNC: 33.5 G/DL
MCV RBC AUTO: 81 FL
MONOCYTES # BLD AUTO: 0.6 K/UL
MONOCYTES NFR BLD: 15.1 %
NEUTROPHILS # BLD AUTO: 2.5 K/UL
NEUTROPHILS NFR BLD: 60 %
NRBC BLD-RTO: 0 /100 WBC
PLATELET # BLD AUTO: 169 K/UL
PMV BLD AUTO: 9.7 FL
RBC # BLD AUTO: 6.49 M/UL
WBC # BLD AUTO: 4.18 K/UL

## 2018-01-26 ENCOUNTER — CLINICAL SUPPORT (OUTPATIENT)
Dept: DIABETES | Facility: CLINIC | Age: 68
End: 2018-01-26
Payer: COMMERCIAL

## 2018-01-26 ENCOUNTER — LAB VISIT (OUTPATIENT)
Dept: LAB | Facility: HOSPITAL | Age: 68
End: 2018-01-26
Attending: INTERNAL MEDICINE
Payer: COMMERCIAL

## 2018-01-26 VITALS
BODY MASS INDEX: 31.68 KG/M2 | DIASTOLIC BLOOD PRESSURE: 78 MMHG | HEIGHT: 69 IN | HEART RATE: 83 BPM | SYSTOLIC BLOOD PRESSURE: 122 MMHG | WEIGHT: 213.88 LBS

## 2018-01-26 DIAGNOSIS — E66.9 OBESITY (BMI 30-39.9): Chronic | ICD-10-CM

## 2018-01-26 DIAGNOSIS — E11.65 TYPE 2 DIABETES MELLITUS WITH HYPERGLYCEMIA, WITHOUT LONG-TERM CURRENT USE OF INSULIN: Primary | Chronic | ICD-10-CM

## 2018-01-26 DIAGNOSIS — E11.65 TYPE 2 DIABETES MELLITUS WITH HYPERGLYCEMIA, WITHOUT LONG-TERM CURRENT USE OF INSULIN: Chronic | ICD-10-CM

## 2018-01-26 DIAGNOSIS — E11.9 TYPE 2 DIABETES MELLITUS WITHOUT COMPLICATION, WITHOUT LONG-TERM CURRENT USE OF INSULIN: ICD-10-CM

## 2018-01-26 DIAGNOSIS — I10 ESSENTIAL HYPERTENSION: Chronic | ICD-10-CM

## 2018-01-26 DIAGNOSIS — K90.9 FATTY STOOL: ICD-10-CM

## 2018-01-26 DIAGNOSIS — E78.00 HYPERCHOLESTEREMIA: Chronic | ICD-10-CM

## 2018-01-26 LAB
C PEPTIDE SERPL-MCNC: 4 NG/ML
GLUCOSE SERPL-MCNC: 358 MG/DL

## 2018-01-26 PROCEDURE — 86341 ISLET CELL ANTIBODY: CPT

## 2018-01-26 PROCEDURE — 99204 OFFICE O/P NEW MOD 45 MIN: CPT | Mod: S$GLB,,, | Performed by: NURSE PRACTITIONER

## 2018-01-26 PROCEDURE — 82947 ASSAY GLUCOSE BLOOD QUANT: CPT

## 2018-01-26 PROCEDURE — 36415 COLL VENOUS BLD VENIPUNCTURE: CPT

## 2018-01-26 PROCEDURE — 84681 ASSAY OF C-PEPTIDE: CPT

## 2018-01-26 PROCEDURE — 99999 PR PBB SHADOW E&M-EST. PATIENT-LVL V: CPT | Mod: PBBFAC,,, | Performed by: NURSE PRACTITIONER

## 2018-01-26 PROCEDURE — 86341 ISLET CELL ANTIBODY: CPT | Mod: 91

## 2018-01-26 PROCEDURE — G0108 DIAB MANAGE TRN  PER INDIV: HCPCS | Mod: S$GLB,,, | Performed by: DIETITIAN, REGISTERED

## 2018-01-26 RX ORDER — INSULIN GLARGINE 100 [IU]/ML
20 INJECTION, SOLUTION SUBCUTANEOUS NIGHTLY
Qty: 15 ML | Refills: 6 | Status: SHIPPED | OUTPATIENT
Start: 2018-01-26 | End: 2018-02-05 | Stop reason: SDUPTHER

## 2018-01-26 RX ORDER — LANCETS
EACH MISCELLANEOUS
Qty: 350 EACH | Refills: 3 | Status: SHIPPED | OUTPATIENT
Start: 2018-01-26 | End: 2018-12-04

## 2018-01-26 RX ORDER — PEN NEEDLE, DIABETIC 31 GX5/16"
NEEDLE, DISPOSABLE MISCELLANEOUS
Qty: 100 EACH | Refills: 3 | Status: SHIPPED | OUTPATIENT
Start: 2018-01-26 | End: 2019-08-07

## 2018-01-26 RX ORDER — INSULIN PUMP SYRINGE, 3 ML
EACH MISCELLANEOUS
Qty: 1 EACH | Refills: 0 | Status: ON HOLD | OUTPATIENT
Start: 2018-01-26 | End: 2020-11-10

## 2018-01-26 NOTE — PROGRESS NOTES
CC:  Diabetes.     HPI: Den Rick is a 67 y.o. female presents for visit. The patient has had diabetes along with associated comorbidities indicated in the Visit Diagnosis section of this encounter. The patient was diagnosed with Type 2 (??) diabetes in 2017.     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 Pt diagnosed with DM in 2017 after A1c >11%. Reports his A1c was <7% at a health fair that he attended in July 2017. Was started on Metformin by PCP in December. No significant change in weight, mild weight loss but did change some things about diet and is walking. Does report intermittent fatty stools and bloating, unsure exactly when this started. No significantly high carb diet choices noted between July 2017 and January to result in significant A1c increase    DIABETES COMPLICATIONS: none     STANDARDS of CARE:  Statin:  Yes - crestor 20 mg   ACEI or ARB:  No  ASA:  Yes - 81 mg dialy   Last eye exam 1/2018 non [facility] MD denies retinopathy   Microalbumin/Creatinine ratio:  Lab Results   Component Value Date    MICALBCREAT 19.0 01/22/2018       CURRENT A1C:    Hemoglobin A1C   Date Value Ref Range Status   01/22/2018 11.2 (H) 4.0 - 5.6 % Final     Comment:     According to ADA guidelines, hemoglobin A1c <7.0% represents  optimal control in non-pregnant diabetic patients. Different  metrics may apply to specific patient populations.   Standards of Medical Care in Diabetes-2016.  For the purpose of screening for the presence of diabetes:  <5.7%     Consistent with the absence of diabetes  5.7-6.4%  Consistent with increasing risk for diabetes   (prediabetes)  >or=6.5%  Consistent with diabetes  Currently, no consensus exists for use of hemoglobin A1c  for diagnosis of diabetes for children.  This Hemoglobin A1c assay has significant interference with fetal   hemoglobin   (HbF). The results are invalid for patients with abnormal amounts of    HbF,   including those with known Hereditary Persistence   of Fetal Hemoglobin. Heterozygous hemoglobin variants (HbAS, HbAC,   HbAD, HbAE, HbA2) do not significantly interfere with this assay;   however, presence of multiple variants in a sample may impact the %   interference.     12/20/2017 11.9 (H) 4.0 - 5.6 % Final     Comment:     According to ADA guidelines, hemoglobin A1c <7.0% represents  optimal control in non-pregnant diabetic patients. Different  metrics may apply to specific patient populations.   Standards of Medical Care in Diabetes-2016.  For the purpose of screening for the presence of diabetes:  <5.7%     Consistent with the absence of diabetes  5.7-6.4%  Consistent with increasing risk for diabetes   (prediabetes)  >or=6.5%  Consistent with diabetes  Currently, no consensus exists for use of hemoglobin A1c  for diagnosis of diabetes for children.  This Hemoglobin A1c assay has significant interference with fetal   hemoglobin   (HbF). The results are invalid for patients with abnormal amounts of   HbF,   including those with known Hereditary Persistence   of Fetal Hemoglobin. Heterozygous hemoglobin variants (HbAS, HbAC,   HbAD, HbAE, HbA2) do not significantly interfere with this assay;   however, presence of multiple variants in a sample may impact the %   interference.     12/21/2015 6.2 4.5 - 6.2 % Final       GOAL A1C: <7%    DM MEDICATIONS USED IN THE PAST: Metformin    CURRENT DIABETES MEDICATIONS: Metformin 500 XR mg daily   Denies missing any doses of medication     BLOOD GLUCOSE MONITORING:  BG in office 350s    HYPOGLYCEMIA:  No    MEALS:   Eating 3 meals daily, wife follows ideal protein diet so patient mostly following same high protein, lower carb regiemn     Protein and vegetables for breakfast and dinner  Eating out for lunch - carb heavier (sushi 2x week)    Was previously eating fun size small candy approx 3 times per week - stopped altogether in December    CURRENT EXERCISE:   "Yes - walking dog 1.5 miles daily     MEDICATIONS, ALLERGIES, LABS, VS's, MEDICAL, SURGICAL, SOCIAL, AND FAMILY HISTORY reviewed and updated in the appropriate sections during this encounter    ROS:     Constitutional: + mild weight loss  Endocrine:  Denies polyuria, polydipsia, nocturia.  HENT: Denies neck swelling, lumps, horseness or trouble swallowing. Denies any personal or family history of thyroid cancer.    Eyes: no blurry vision.   Respiratory: Negative for cough or shortness or breath.  Cardiovascular: Negative for chest pain or claudication.   Gastrointestinal: Negative for nausea, diarrhea, vomiting.  No history of pancreatitis or gastroparesis. +GERD, hiatal hernia, intermittent loose stools with intermittent fatty stools, intermittent bloating   Genitourinary: Negative for urgency, frequency, frequent urinary tract infections.  Skin: Denies prolonged wound healing.  Neurological: Negative for syncope, numbness/burning of extremities.  Psychiatric/Behavioral: Negative for depression or anxiety      All other systems reviewed and are negative.    PE:  Constitutional: /78 (BP Location: Left arm, Patient Position: Sitting)   Pulse 83   Ht 5' 9" (1.753 m)   Wt 97 kg (213 lb 13.5 oz)   BMI 31.58 kg/m²    Well developed, well nourished, NAD.    HENT:   External ears, nose: no masses. No significant findings.   Hearing: normal     Neck:  No trachial deviation present, No neck masses noticed   Thyroid:  No thyromegaly present.  No thyroid tenderness.      Cardiovascular:    Auscultation:  No murmurs or abnormal sounds   Lower extremities:  No edema or cyanosis.     Respiratory:    Effort:  Normal, no use of accessory muscles.   Auscultation: breath sounds normal, no adventitious sounds.  Abdomen:     Exam:  Soft, non-tender, no masses.      No hernia noted.  Skin:    Inspection: no rashes, lesion or ulcers, no acanthosis nigracans.   Palpation: no induration or nodules.     Psychiatric:  Judgement and " insight good with normal mood and affect.  Musculoskeletal:  Gait steady. No gross abnormalities.        FOOT EXAMINATION:   Protective Sensation (w/ 10 gram monofilament):  Right: Intact  Left: Intact    Visual Inspection:  Callus -  Bilateral    Pedal Pulses:   Right: Present  Left: Present    Posterior tibialis:   Right:Present  Left: Present    Normal vibratory response to 128 Hz tuning fork bilaterally.       Lab Results   Component Value Date    TSH 3.159 01/22/2018         ______________________________________________________________________     ASSESSMENT and PLAN:    1- Type 2 diabetes (???) versus EDIE versus pancreatic insufficiency (unsure of etiology) - A1c elevated, BG markedly elevated in office. No significant dietary changes from July 2017 to present to justify significant elevation in A1c little/no improvement in BG control with diet changes. Reviewed A1c and BG goals.  Discussed diagnosis of DM, progression of disease, long term complications and tx options, including insulin due to BG significantly elevated.      Will test for T1DM/EDIE. If negative, will refer to PCP for evaluation for possible pancreatic etiology, given some s/s of exocrine pancreatic insufficiency noted (intermittent fatty stools, bloating)    ADDENDUM 2/5/18: All EDIE labs negative, concerning for significant increase in A1c without clear diet etiology since July. Also + intermittent fatty stools and bloating, message sent to PCP with recommendation to image pancreas to r/o any pancreatic etiology of accelerating DM     Instructed to monitor BG twice daily, document on BG logs, and bring complete BG logs to all visits - notify me for any BG <70 or consistently >200.     Follow-up in about 1 month (around 2/26/2018). with any provider with below labs before    Orders Placed This Encounter   Procedures    Basic metabolic panel    Hemoglobin A1c       2- Intermittent fatty stool, bloating - see above, message sent to PCP,  recommend imaging of pancreas to r/o any abnormality/cause increasing BG within short time frame without negative lifestyle changes    3- Hypertension - goal < 140/90 mmHg - at goal, recommend addition of ACE-I or ARB by PCP given new diabetes diagnosis  BP Readings from Last 3 Encounters:   01/26/18 122/78   01/16/18 130/77   12/20/17 119/81       4- Hyperlipidemia -  LFT's WNL. Continue statin.     Lab Results   Component Value Date    LDLCALC 31.0 (L) 01/22/2018    LDLCALC 29.6 (L) 01/22/2018       5- Body mass index is 31.58 kg/m². = Obesity. Modest weight loss (5-10%) may greatly improve BG control

## 2018-01-26 NOTE — Clinical Note
Dr. Friedman,   I saw Mr. Rick in diabetes clinic last week. I do have some concerns about the etiology/cause of his worsening DM/BG. There was no clear worsening diet or lifestyle that can contribute to why his A1c jeremiah signficantly from <7% in July to A1c of >11% now with BG in 350s. I have tested him for autoimmune diabetes and that was normal. There have been rare cases of worsening DM/new onset due to pancreatic abnormalities (i.e. Masses, cysts).  I do have some concerns and recommend to you that imaging of his pancreas be done, sharon with his markedly elevated BG and some exocrine pancreatic s/s (bloating, intermittent loose, fatty stools).   Thanks MAGED Walsh Endocrine Nurse Practitioner

## 2018-01-26 NOTE — PATIENT INSTRUCTIONS
Check your blood sugar before breakfast and before dinner    Continue Metformin 500 mg daily    Start Lantus 20 units nightly at the same time e very night    I will email you with your lab results next week

## 2018-01-30 ENCOUNTER — PATIENT MESSAGE (OUTPATIENT)
Dept: ENDOCRINOLOGY | Facility: HOSPITAL | Age: 68
End: 2018-01-30

## 2018-01-30 ENCOUNTER — TELEPHONE (OUTPATIENT)
Dept: INTERNAL MEDICINE | Facility: CLINIC | Age: 68
End: 2018-01-30

## 2018-01-30 LAB — PANC ISLET CELL IGG SER-ACNC: NORMAL

## 2018-01-31 PROBLEM — R73.02 GLUCOSE INTOLERANCE (IMPAIRED GLUCOSE TOLERANCE): Status: RESOLVED | Noted: 2017-01-24 | Resolved: 2018-01-31

## 2018-01-31 PROBLEM — E66.9 OBESITY (BMI 30-39.9): Chronic | Status: ACTIVE | Noted: 2018-01-31

## 2018-02-01 ENCOUNTER — OFFICE VISIT (OUTPATIENT)
Dept: CARDIOLOGY | Facility: CLINIC | Age: 68
End: 2018-02-01
Payer: COMMERCIAL

## 2018-02-01 VITALS
SYSTOLIC BLOOD PRESSURE: 127 MMHG | WEIGHT: 213.94 LBS | DIASTOLIC BLOOD PRESSURE: 71 MMHG | HEART RATE: 72 BPM | BODY MASS INDEX: 31.69 KG/M2 | HEIGHT: 69 IN

## 2018-02-01 DIAGNOSIS — I10 ESSENTIAL HYPERTENSION: ICD-10-CM

## 2018-02-01 DIAGNOSIS — I25.2 OLD MYOCARDIAL INFARCTION: ICD-10-CM

## 2018-02-01 DIAGNOSIS — E78.00 HYPERCHOLESTEREMIA: ICD-10-CM

## 2018-02-01 DIAGNOSIS — I25.10 CORONARY ARTERY DISEASE INVOLVING NATIVE CORONARY ARTERY OF NATIVE HEART WITHOUT ANGINA PECTORIS: Primary | ICD-10-CM

## 2018-02-01 PROCEDURE — 1125F AMNT PAIN NOTED PAIN PRSNT: CPT | Mod: S$GLB,,, | Performed by: INTERNAL MEDICINE

## 2018-02-01 PROCEDURE — 99214 OFFICE O/P EST MOD 30 MIN: CPT | Mod: S$GLB,,, | Performed by: INTERNAL MEDICINE

## 2018-02-01 PROCEDURE — 3008F BODY MASS INDEX DOCD: CPT | Mod: S$GLB,,, | Performed by: INTERNAL MEDICINE

## 2018-02-01 PROCEDURE — 99999 PR PBB SHADOW E&M-EST. PATIENT-LVL III: CPT | Mod: PBBFAC,,, | Performed by: INTERNAL MEDICINE

## 2018-02-01 PROCEDURE — 1159F MED LIST DOCD IN RCRD: CPT | Mod: S$GLB,,, | Performed by: INTERNAL MEDICINE

## 2018-02-01 RX ORDER — LANCETS 28 GAUGE
EACH MISCELLANEOUS
Refills: 3 | COMMUNITY
Start: 2018-01-26 | End: 2018-02-01 | Stop reason: SDUPTHER

## 2018-02-01 NOTE — PATIENT INSTRUCTIONS
Check BP in evening a few times a month  Check cholesterol levels in 3 months.  I may stop Zetia.  LDL goal <55  Interval training: intermittent acceleration during exercise - accelerate or turn up the resistance for about 1/2 a minute to a minute every few minutes.

## 2018-02-01 NOTE — PROGRESS NOTES
Subjective:   Patient ID:  Den Rick is a 67 y.o. male who presents for follow-up of Coronary Artery Disease      Problem List:  CAD   NSTEMI - OM1 YUE 10/11/12   YUE ostial and prox RCA 10/17/12   YUE mid LAD 1/18/07   YUE prox and mid PDA 1/18/07   Hypercholesterolemia   (arthralgias with Lipitor)   HTN  GI bleed 2014  DM - new onset 12/17    HPI:   Den Rick feels generally well. He does not report chest discomfort or shortness breath with exertion.    He recvd a steroid injection in the lower back in 12/17 and later found that he had developed diabetes. HbA1C was 11.9%.  He is scheduled to meet w Endocrinology.  On rosuvastatin 20 mg and ezetimibe 10 mg his total chol is <100, non-HDL chol is 45 mg/dl. Hepatic transaminases are within normal limits.     Review of Systems   Constitution: Negative for weakness, malaise/fatigue, weight gain and weight loss.   HENT: Negative for hearing loss and nosebleeds.    Eyes: Negative for visual disturbance.   Cardiovascular: Negative for chest pain, claudication, dyspnea on exertion, irregular heartbeat, leg swelling, orthopnea, palpitations, paroxysmal nocturnal dyspnea and syncope.   Respiratory: Negative for cough, sputum production and wheezing.    Hematologic/Lymphatic: Does not bruise/bleed easily.   Musculoskeletal: Positive for arthritis and back pain. Negative for falls, joint pain, muscle cramps, muscle weakness and myalgias.   Gastrointestinal: Negative for abdominal pain, heartburn and melena.   Genitourinary: Negative for frequency, hematuria and nocturia.   Neurological: Negative for dizziness, headaches, light-headedness, loss of balance, numbness and paresthesias.   Psychiatric/Behavioral: Negative for depression. The patient is not nervous/anxious.        Current Outpatient Prescriptions   Medication Sig    alfuzosin (UROXATRAL) 10 mg Tb24 TAKE 1 TABLET (10 MG TOTAL) BY MOUTH EVERY EVENING.    aspirin (ECOTRIN) 81 MG EC tablet Take 81 mg by  "mouth once daily.    BD ULTRA-FINE RICHARD PEN NEEDLES 32 gauge x 5/32" Ndle Uses daily, daily insulin injections    blood sugar diagnostic Strp To check BG 4 times daily, to use with insurance preferred meter    blood-glucose meter kit To check BG 4 times daily, to use with insurance preferred meter    CELEBREX 200 mg capsule Take 1 capsule by mouth as needed.    clindamycin phosphate 1% (CLINDAGEL) 1 % gel APPLY TWICE A DAY TO FACE    CORDRAN TAPE LARGE ROLL 4 mcg/cm2 Tape Apply 1 application topically every evening. Apply to affected area    ezetimibe (ZETIA) 10 mg tablet Take 10 mg by mouth once daily.    fish oil-omega-3 fatty acids 300-1,000 mg capsule Take 1 g by mouth once daily.    insulin glargine (LANTUS SOLOSTAR) 100 unit/mL (3 mL) InPn pen Inject 20 Units into the skin every evening. At the same time every night    lancets Misc To check BG 4 times daily, to use with insurance preferred meter    metFORMIN (GLUCOPHAGE-XR) 500 MG 24 hr tablet Take 1 tablet (500 mg total) by mouth daily with breakfast.    methocarbamol (ROBAXIN) 750 MG Tab Take 750 mg by mouth continuous prn.      methylsulfonylmethane (MSM) 500 mg Cap Take 1 capsule by mouth Daily.      metoprolol succinate (TOPROL-XL) 100 MG 24 hr tablet TAKE 1 TABLET EVERY DAY    multivitamin (THERAGRAN) per tablet Take 1 tablet by mouth Daily.      mupirocin (BACTROBAN) 2 % ointment APPLY TO AFFECTED AREA DAILY AS NEEDED    nitroGLYCERIN (NITROSTAT) 0.4 MG SL tablet Place 1 tablet (0.4 mg total) under the tongue every 5 (five) minutes as needed.    oxycodone-acetaminophen (PERCOCET) 5-325 mg per tablet Take 1 tablet by mouth as needed.    pantoprazole (PROTONIX) 40 MG tablet Take 40 mg by mouth once daily.    PENNSAID 20 mg/gram /actuation(2 %) sopm APPLY TWO PUMPS TO AFFECTED AREA(S) TWICE DAILY AS NEEDED    rosuvastatin (CRESTOR) 20 MG tablet TAKE 1 TABLET (20 MG TOTAL) BY MOUTH EVERY EVENING.    AXIRON 30 mg/actuation (1.5 mL) SlPm " "APPLY 1 PUMP TO SKIN EVERY DAY    fesoterodine 8 mg Tb24 Take 1 tablet by mouth once daily.         Social History   Substance Use Topics    Smoking status: Never Smoker    Smokeless tobacco: Never Used      Comment: smoked weed 27 yrs ago    Alcohol use No      Comment: quit at 25 yrs old         Objective:     Physical Exam   Constitutional: He is oriented to person, place, and time. He appears well-developed and well-nourished.   /71   Pulse 72   Ht 5' 9" (1.753 m)   Wt 97 kg (213 lb 15.3 oz)   BMI 31.60 kg/m²      HENT:   Head: Normocephalic.   Neck: No JVD present.   Cardiovascular: Normal rate, regular rhythm, S1 normal and S2 normal.    No murmur heard.  Pulses:       Radial pulses are 2+ on the right side, and 2+ on the left side.        Posterior tibial pulses are 2+ on the right side, and 2+ on the left side.   Pulmonary/Chest: Breath sounds normal. Chest wall is not dull to percussion.   Abdominal: Soft. He exhibits no mass. There is no splenomegaly or hepatomegaly.   Musculoskeletal:        Right lower leg: He exhibits no edema.        Left lower leg: He exhibits no edema.   Neurological: He is alert and oriented to person, place, and time. Gait normal.   Skin: No bruising noted. Nails show no clubbing.   Psychiatric: He has a normal mood and affect. His speech is normal and behavior is normal.     Component      Latest Ref Rng & Units 1/22/2018 1/17/2017 3/24/2016           7:50 AM     Cholesterol      120 - 199 mg/dL 71 (L) 113 (L) 118 (L)   Triglycerides      30 - 150 mg/dL 77 74 71   HDL      40 - 75 mg/dL 26 (L) 30 (L) 29 (L)   LDL Cholesterol      63.0 - 159.0 mg/dL 29.6 (L) 68.2 74.8   HDL/Chol Ratio      20.0 - 50.0 % 36.6 26.5 24.6   Total Cholesterol/HDL Ratio      2.0 - 5.0 2.7 3.8 4.1   Non-HDL Cholesterol      mg/dL 45 83 89       Component      Latest Ref Rng & Units 1/22/2018 12/20/2017           7:50 AM    BUN, Bld      8 - 23 mg/dL 21 20   Sodium      136 - 145 mmol/L 136 " 135 (L)   Potassium      3.5 - 5.1 mmol/L 4.6 4.4   Chloride      95 - 110 mmol/L 105 106   CO2      23 - 29 mmol/L 23 24   Glucose      70 - 110 mg/dL 294 (H) 244 (H)   Creatinine      0.5 - 1.4 mg/dL 0.9 0.9   Calcium      8.7 - 10.5 mg/dL 8.8 8.8   Anion Gap      8 - 16 mmol/L 8 5 (L)   eGFR if non African American      >60 mL/min/1.73 m:2 >60.0 >60.0             Assessment and Plan:     Coronary artery disease without angina pectoris  Comments:  Stable. Exercise counseling.  Continue same meds.  Orders:  -     Lipid panel; Future    Old myocardial infarction    Hypercholesteremia  Comments:  Continue same dose of rosuvastatin.  Will obtain a lipid profile in 3 months.  If LDL is unchanged consider discontinuing ezetimibe.  Orders:  -     Lipid panel; Future    Essential hypertension  Comments:  Stable. New AHA guidelines discussed.  No change in meds.    Uncontrolled diabetes mellitus type 2 without complications - new onset        Follow-up in about 7 months (around 9/10/2018).

## 2018-02-02 ENCOUNTER — PATIENT MESSAGE (OUTPATIENT)
Dept: ENDOCRINOLOGY | Facility: CLINIC | Age: 68
End: 2018-02-02

## 2018-02-03 NOTE — PROGRESS NOTES
"Diabetes Education  Author: Tammy Colindres RD  Date: 2/3/2018    Diabetes Education Visit  Diabetes Education Record Assessment/Progress: Initial    Diabetes Type  Diabetes Type : Type II (question of EDIE; will go to empowerment and do Type 1 labs today)    Diabetes History  Diabetes Diagnosis: 0-1 year    Nutrition  Meal Planning: 3 meals per day, eats out often  What type of sweetener do you use?: Splenda    Meal Plan 24 Hour Recall - Breakfast:  (boiled eggs with mckeon; coffee with splenda and half n half)  Meal Plan 24 Hour Recall - Lunch:  (oysters; crawfish bisque; 3" [place] bread; little rice)  Meal Plan 24 Hour Recall - Dinner:  (ideal protein: protein and veggies)  Meal Plan 24 Hour Recall - Snack:  (Quest bar)    Monitoring   Self Monitoring :  (not checking/ no meter)  Blood Glucose Logs: No    Exercise   Exercise Type: walking (walks dog daily)    Current Diabetes Treatment   Current Treatment: Oral Medication (metformin 500 mg daily started 1 month ago)    Social History  Preferred Learning Method: Face to Face  Primary Support: Self, Spouse (wife present)  Smoking Status: Never a Smoker    Barriers to Change  Barriers to Change: None  Learning Challenges : None    Readiness to Learn   Readiness to Learn : Acceptance    Cultural Influences  Cultural Influences: No      Diabetes Education Assessment/Progress  Diabetes Disease Process (diabetes disease process and treatment options): Discussion, Instructed, Individual Session, Written Materials Provided, Comprehends Key Points (With drastic increase in A1c in very short amount of time, in congruence with no dietary/lifestyle changes, question whether possible EDIE; will send to [facility] today. Discussed type 1 vs type 2 diabetes. Reviewed possible causes and etiologies of both types.)    Nutrition (Incorporating nutritional management into one's lifestyle): Discussion, Instructed, Individual Session, Written Materials Provided, Comprehends Key Points " (Dietary very carb controlled already; partly due to wife, who follows ideal protein diet and prepares these kinds of meals at home. Discussed carb vs non-carb foods. Reviewed appropriate amount of carb to have at meals; recommended no more than 60 gm carb at meals, no more than 20 gm carb at snacks; if type 1 confirmed, 0-5 gm carb at snacks.)    Physical Activity (incorporating physical activity into one's lifestyle): Discussion, Individual Session, Comprehends Key Points, Written Materials Provided (Currently walks dog about 1-1.5 miles daily. Encouraged continued routine PA.)    Medications (states correct name, dose, onset, peak, duration, side effects & timing of meds): Discussion, Instructed, Individual Session, Comprehends Key Points (Discussed timing and MOA of metformin. Will send to [facility] today for further medication additons and type 1 labs.)    ADDENDUM (post-empowerment appt 1/26/18) Starting basal insulin. Trained on pen use and long-acting insulin management.    Prescribed:  Lantus, 20 Units, subcutaneously daily.      Educated the patient on the following:  · Proper storage of the medication  · Attaching a pen needle to the pen.  The needle must be changed every injection.  · Dialing the prescribed dose of medication  · Subcutaneous injections   · Preferred sites  · Skin preparation  · Site rotation  · Proper administration technique  · Disposal of pen needles  · Signs/symptoms of hypoglycemia  · Treatment of hypoglycemia    Demonstrated:  · Attaching a pen needle to the pen  · Priming the pen  · Dialing the prescribed dose of medication   · Subcutaneous injection with demo pen    Patient performed successful return demonstration of preparation and administration of medication.        Monitoring (monitoring blood glucose/other parameters & using results): Discussion, Instructed, Individual Session, Comprehends Key Points (Rx for meter today. Trained on how to use meter. Discussed goal BG's.  Instructed to test BG's at least twice daily.)    Acute Complications (preventing, detecting, and treating acute complications): Discussion, Instructed, Individual Session, Written Materials Provided, Comprehends Key Points (Discussed hypo vs hyper symptoms. Reviewed appropriate hypo treatment.)    Chronic Complications (preventing, detecting, and treating chronic complications): Discussion, Demonstrates Understanding/Competency (verbalizes/demonstrates)    Cognitive (knowledge of self-management skills, functional health literacy): Discussion, Instructed, Individual Session, Demonstrates Understanding/Competency (verbalizes/demonstrates)    Psychosocial (emotional response to diabetes): Discussion (No shock when introduced with idea that possibly type 1; could need insulin. Seems capable of managing emotional side of diabetes care.)    Diabetes Distress and Support Systems: Discussion (Wife very supportive)    Behavioral (readiness for change, lifestyle practices, self-care behaviors): Discussion, Demonstrates Understanding/Competency (verbalizes/demonstrates)        Goals  Patient has selected/evaluated goals during today's session: Yes, selected    Monitoring: Set (SMBG 2 times daily)  Start Date: 01/26/18         Diabetes Care Plan/Intervention  Education Plan/Intervention: Individual Follow-Up DSMT, Endocrine Provider Visit Set Up, Diabetes Empowerment Program      Diabetes Meal Plan  Restrictions: Restricted Carbohydrate  Carbohydrate Per Meal: 45-60g  Carbohydrate Per Snack : 7-15g      Education Units of Time   Time Spent: 90 min      Health Maintenance Topics with due status: Not Due       Topic Last Completion Date    TETANUS VACCINE 05/28/2014    Colonoscopy 10/10/2016    Lipid Panel 01/22/2018    Hemoglobin A1c 01/22/2018    Urine Microalbumin 01/22/2018    Foot Exam 01/26/2018    High Dose Statin 02/01/2018     Health Maintenance Due   Topic Date Due    Hepatitis C Screening  1950    Zoster  Vaccine  03/02/2010    Pneumococcal (65+) (2 of 2 - PPSV23) 12/21/2016    Influenza Vaccine  08/01/2017    Eye Exam  12/19/2017

## 2018-02-05 ENCOUNTER — TELEPHONE (OUTPATIENT)
Dept: INTERNAL MEDICINE | Facility: CLINIC | Age: 68
End: 2018-02-05

## 2018-02-05 ENCOUNTER — TELEPHONE (OUTPATIENT)
Dept: ENDOCRINOLOGY | Facility: CLINIC | Age: 68
End: 2018-02-05

## 2018-02-05 ENCOUNTER — PATIENT MESSAGE (OUTPATIENT)
Dept: ENDOCRINOLOGY | Facility: CLINIC | Age: 68
End: 2018-02-05

## 2018-02-05 ENCOUNTER — PATIENT MESSAGE (OUTPATIENT)
Dept: ENDOCRINOLOGY | Facility: HOSPITAL | Age: 68
End: 2018-02-05

## 2018-02-05 DIAGNOSIS — E11.65 TYPE 2 DIABETES MELLITUS WITH HYPERGLYCEMIA, WITHOUT LONG-TERM CURRENT USE OF INSULIN: Chronic | ICD-10-CM

## 2018-02-05 DIAGNOSIS — R14.0 ABDOMINAL BLOATING: ICD-10-CM

## 2018-02-05 DIAGNOSIS — E11.9 TYPE 2 DIABETES MELLITUS WITHOUT COMPLICATION, WITHOUT LONG-TERM CURRENT USE OF INSULIN: ICD-10-CM

## 2018-02-05 DIAGNOSIS — E11.9 TYPE 2 DIABETES MELLITUS WITHOUT COMPLICATION, WITHOUT LONG-TERM CURRENT USE OF INSULIN: Primary | ICD-10-CM

## 2018-02-05 LAB — GAD65 AB SER-SCNC: 0 NMOL/L

## 2018-02-05 RX ORDER — METFORMIN HYDROCHLORIDE 500 MG/1
1000 TABLET, EXTENDED RELEASE ORAL DAILY
Qty: 60 TABLET | Refills: 6 | Status: SHIPPED | OUTPATIENT
Start: 2018-02-05 | End: 2018-02-21 | Stop reason: SDUPTHER

## 2018-02-05 RX ORDER — INSULIN GLARGINE 100 [IU]/ML
16 INJECTION, SOLUTION SUBCUTANEOUS NIGHTLY
Qty: 15 ML | Refills: 6
Start: 2018-02-05 | End: 2018-05-16

## 2018-02-05 NOTE — TELEPHONE ENCOUNTER
Returned pt call, BG was 88 this AM. Decrease Lantus 16 units daily. Increase Metformin XR to 1000 mg daily (goal for 2000 mg , to discuss at f/u visit). F/u with PCP in 2 weeks and then endo

## 2018-02-05 NOTE — TELEPHONE ENCOUNTER
----- Message from Kelsey Jackson sent at 2/5/2018 11:08 AM CST -----  Contact: pt  Please call pt   Concerning the recent emails   Needs to speak with Evita Kaufman  Ph    [phone]   Thanks

## 2018-02-07 NOTE — TELEPHONE ENCOUNTER
He is agreeable to getting ct done.  I told him to stop metformin 2 days ahead and arrive 2 hours early for ct.    I told him swapna farias will call him to set up.  He is agreeable to going to [facility].

## 2018-02-15 ENCOUNTER — LAB VISIT (OUTPATIENT)
Dept: LAB | Facility: HOSPITAL | Age: 68
End: 2018-02-15
Attending: NURSE PRACTITIONER
Payer: COMMERCIAL

## 2018-02-15 DIAGNOSIS — E11.65 TYPE 2 DIABETES MELLITUS WITH HYPERGLYCEMIA, WITHOUT LONG-TERM CURRENT USE OF INSULIN: Chronic | ICD-10-CM

## 2018-02-15 LAB
ANION GAP SERPL CALC-SCNC: 8 MMOL/L
BUN SERPL-MCNC: 28 MG/DL
CALCIUM SERPL-MCNC: 9 MG/DL
CHLORIDE SERPL-SCNC: 109 MMOL/L
CO2 SERPL-SCNC: 22 MMOL/L
CREAT SERPL-MCNC: 0.8 MG/DL
EST. GFR  (AFRICAN AMERICAN): >60 ML/MIN/1.73 M^2
EST. GFR  (NON AFRICAN AMERICAN): >60 ML/MIN/1.73 M^2
ESTIMATED AVG GLUCOSE: 240 MG/DL
GLUCOSE SERPL-MCNC: 191 MG/DL
HBA1C MFR BLD HPLC: 10 %
POTASSIUM SERPL-SCNC: 4.3 MMOL/L
SODIUM SERPL-SCNC: 139 MMOL/L

## 2018-02-15 PROCEDURE — 80048 BASIC METABOLIC PNL TOTAL CA: CPT

## 2018-02-15 PROCEDURE — 36415 COLL VENOUS BLD VENIPUNCTURE: CPT | Mod: PO

## 2018-02-15 PROCEDURE — 83036 HEMOGLOBIN GLYCOSYLATED A1C: CPT

## 2018-02-19 ENCOUNTER — OFFICE VISIT (OUTPATIENT)
Dept: UROLOGY | Facility: CLINIC | Age: 68
End: 2018-02-19
Payer: COMMERCIAL

## 2018-02-19 ENCOUNTER — OFFICE VISIT (OUTPATIENT)
Dept: INTERNAL MEDICINE | Facility: CLINIC | Age: 68
End: 2018-02-19
Payer: COMMERCIAL

## 2018-02-19 ENCOUNTER — LAB VISIT (OUTPATIENT)
Dept: LAB | Facility: HOSPITAL | Age: 68
End: 2018-02-19
Attending: UROLOGY
Payer: COMMERCIAL

## 2018-02-19 VITALS
HEART RATE: 73 BPM | SYSTOLIC BLOOD PRESSURE: 115 MMHG | HEIGHT: 69 IN | DIASTOLIC BLOOD PRESSURE: 69 MMHG | WEIGHT: 214.94 LBS | BODY MASS INDEX: 31.84 KG/M2

## 2018-02-19 VITALS
DIASTOLIC BLOOD PRESSURE: 73 MMHG | BODY MASS INDEX: 30.62 KG/M2 | WEIGHT: 213.88 LBS | HEART RATE: 68 BPM | TEMPERATURE: 98 F | SYSTOLIC BLOOD PRESSURE: 107 MMHG | HEIGHT: 70 IN | RESPIRATION RATE: 18 BRPM

## 2018-02-19 DIAGNOSIS — E34.9 TESTOSTERONE DEFICIENCY: ICD-10-CM

## 2018-02-19 DIAGNOSIS — E11.9 TYPE 2 DIABETES MELLITUS WITHOUT COMPLICATION, WITHOUT LONG-TERM CURRENT USE OF INSULIN: Primary | ICD-10-CM

## 2018-02-19 DIAGNOSIS — N32.81 OAB (OVERACTIVE BLADDER): ICD-10-CM

## 2018-02-19 DIAGNOSIS — N31.8 FREQUENCY-URGENCY SYNDROME: ICD-10-CM

## 2018-02-19 DIAGNOSIS — I25.10 CORONARY ARTERY DISEASE INVOLVING NATIVE CORONARY ARTERY OF NATIVE HEART WITHOUT ANGINA PECTORIS: ICD-10-CM

## 2018-02-19 DIAGNOSIS — E78.00 HYPERCHOLESTEREMIA: ICD-10-CM

## 2018-02-19 DIAGNOSIS — E66.9 OBESITY (BMI 30-39.9): ICD-10-CM

## 2018-02-19 DIAGNOSIS — E29.1 HYPOGONADISM IN MALE: ICD-10-CM

## 2018-02-19 DIAGNOSIS — D75.1 POLYCYTHEMIA: ICD-10-CM

## 2018-02-19 DIAGNOSIS — N40.1 BENIGN PROSTATIC HYPERPLASIA WITH URINARY OBSTRUCTION: Primary | ICD-10-CM

## 2018-02-19 DIAGNOSIS — N13.8 BENIGN PROSTATIC HYPERPLASIA WITH URINARY OBSTRUCTION: Primary | ICD-10-CM

## 2018-02-19 LAB — TESTOST SERPL-MCNC: 345 NG/DL

## 2018-02-19 PROCEDURE — 99999 PR PBB SHADOW E&M-EST. PATIENT-LVL III: CPT | Mod: PBBFAC,,, | Performed by: INTERNAL MEDICINE

## 2018-02-19 PROCEDURE — 99214 OFFICE O/P EST MOD 30 MIN: CPT | Mod: S$GLB,,, | Performed by: UROLOGY

## 2018-02-19 PROCEDURE — 3078F DIAST BP <80 MM HG: CPT | Mod: CPTII,S$GLB,, | Performed by: INTERNAL MEDICINE

## 2018-02-19 PROCEDURE — 1126F AMNT PAIN NOTED NONE PRSNT: CPT | Mod: S$GLB,,, | Performed by: UROLOGY

## 2018-02-19 PROCEDURE — 84403 ASSAY OF TOTAL TESTOSTERONE: CPT

## 2018-02-19 PROCEDURE — 3008F BODY MASS INDEX DOCD: CPT | Mod: S$GLB,,, | Performed by: UROLOGY

## 2018-02-19 PROCEDURE — 99214 OFFICE O/P EST MOD 30 MIN: CPT | Mod: S$GLB,,, | Performed by: INTERNAL MEDICINE

## 2018-02-19 PROCEDURE — 1159F MED LIST DOCD IN RCRD: CPT | Mod: S$GLB,,, | Performed by: UROLOGY

## 2018-02-19 PROCEDURE — 36415 COLL VENOUS BLD VENIPUNCTURE: CPT | Mod: PO

## 2018-02-19 PROCEDURE — 3074F SYST BP LT 130 MM HG: CPT | Mod: CPTII,S$GLB,, | Performed by: INTERNAL MEDICINE

## 2018-02-19 PROCEDURE — 99999 PR PBB SHADOW E&M-EST. PATIENT-LVL III: CPT | Mod: PBBFAC,,, | Performed by: UROLOGY

## 2018-02-19 RX ORDER — ALFUZOSIN HYDROCHLORIDE 10 MG/1
10 TABLET, EXTENDED RELEASE ORAL NIGHTLY
Qty: 30 TABLET | Refills: 11 | Status: SHIPPED | OUTPATIENT
Start: 2018-02-19 | End: 2018-10-29 | Stop reason: SDUPTHER

## 2018-02-19 RX ORDER — TESTOSTERONE 30 MG/1.5ML
2 SOLUTION TOPICAL DAILY
Qty: 1 BOTTLE | Refills: 5 | Status: SHIPPED | OUTPATIENT
Start: 2018-02-19 | End: 2018-02-21

## 2018-02-19 RX ORDER — FESOTERODINE FUMARATE 8 MG/1
1 TABLET, EXTENDED RELEASE ORAL DAILY
Qty: 30 TABLET | Refills: 11 | Status: SHIPPED | OUTPATIENT
Start: 2018-02-19 | End: 2018-11-07 | Stop reason: SDUPTHER

## 2018-02-19 NOTE — PROGRESS NOTES
CC:  BPH, OAB, low T    Den Rick is a 67 y.o. man who is here for the Follow-up  s/p resection of diverticulitis by Dr. Hopkins. Since then, his IC and bladder pain problems are resolved.  Currently he is on uroxatral and toviaz for LUTS.  Had an Abdominal U/S in Jan 2015, which showed a fat-containing anterior abdominal wall hernia just above the umbilicus.   He is also using Axiron for testosterone replacement therapy.  Nocturia x 1.  No voiding problems.    Last seen by me on 6/24/16.  Since then, he was diagnosed with Type II DM and is on Metformin.    Past Medical History:   Diagnosis Date    Anticoagulant long-term use     Arthritis     Atrial tachycardia, paroxysmal 1/12    during Cardiac Rehab    Colon polyp     Coronary artery disease     Diverticulitis     Diverticulosis     GERD (gastroesophageal reflux disease)     Glucose intolerance (impaired glucose tolerance) 1/24/2017    Hyperlipidemia     Hypertension     IFG (impaired fasting glucose) 12/21/2015    NSTEMI (non-ST elevated myocardial infarction) 10/11/12    Obesity (BMI 30-39.9) 1/31/2018    Post PTCA     Sleep apnea      Past Surgical History:   Procedure Laterality Date    boewl resection      CATARACT EXTRACTION, BILATERAL  8/2011    COLONOSCOPY  2012    COLONOSCOPY N/A 10/10/2016    Procedure: COLONOSCOPY;  Surgeon: Main Lehman MD;  Location: [facility] ([address] [place] [address]);  Service: Endoscopy;  Laterality: N/A;    COLONOSCOPY W/ POLYPECTOMY      CORONARY ANGIOPLASTY WITH STENT PLACEMENT      ESOPHAGOGASTRODUODENOSCOPY      HERNIA REPAIR      KNEE SURGERY  2003    needle bx on chest      SHOULDER SURGERY      SIGMOIDECTOMY       Social History   Substance Use Topics    Smoking status: Never Smoker    Smokeless tobacco: Never Used      Comment: smoked weed 27 yrs ago    Alcohol use No      Comment: quit at 25 yrs old     Family History   Problem Relation Age of Onset    Cancer Mother      uterine    Asthma  "Sister     Heart disease Father      valve    Diabetes Maternal Grandmother      Allergy:  Review of patient's allergies indicates:   Allergen Reactions    Atorvastatin      Other reaction(s):(lipitor) Muscle pain    Niacin      Other reaction(s): Flushing (skin)     Outpatient Encounter Prescriptions as of 2/19/2018   Medication Sig Dispense Refill    alfuzosin (UROXATRAL) 10 mg Tb24 Take 1 tablet (10 mg total) by mouth every evening. 30 tablet 11    aspirin (ECOTRIN) 81 MG EC tablet Take 81 mg by mouth once daily.      BD ULTRA-FINE RICHARD PEN NEEDLES 32 gauge x 5/32" Ndle Uses daily, daily insulin injections 100 each 3    blood sugar diagnostic Strp To check BG 4 times daily, to use with insurance preferred meter 350 strip 3    blood-glucose meter kit To check BG 4 times daily, to use with insurance preferred meter 1 each 0    CELEBREX 200 mg capsule Take 1 capsule by mouth as needed.  2    clindamycin phosphate 1% (CLINDAGEL) 1 % gel APPLY TWICE A DAY TO FACE  1    CORDRAN TAPE LARGE ROLL 4 mcg/cm2 Tape Apply 1 application topically every evening. Apply to affected area  1    ezetimibe (ZETIA) 10 mg tablet Take 10 mg by mouth once daily.  11    fesoterodine 8 mg Tb24 Take 1 tablet by mouth once daily. 30 tablet 11    fish oil-omega-3 fatty acids 300-1,000 mg capsule Take 1 g by mouth once daily.      insulin glargine (LANTUS SOLOSTAR) 100 unit/mL (3 mL) InPn pen Inject 16 Units into the skin every evening. At the same time every night. DOSE CHANGE 15 mL 6    lancets Misc To check BG 4 times daily, to use with insurance preferred meter 350 each 3    metFORMIN (GLUCOPHAGE-XR) 500 MG 24 hr tablet Take 2 tablets (1,000 mg total) by mouth once daily. 60 tablet 6    methocarbamol (ROBAXIN) 750 MG Tab Take 750 mg by mouth continuous prn.        methylsulfonylmethane (MSM) 500 mg Cap Take 1 capsule by mouth Daily.        metoprolol succinate (TOPROL-XL) 100 MG 24 hr tablet TAKE 1 TABLET EVERY DAY 90 " tablet 3    multivitamin (THERAGRAN) per tablet Take 1 tablet by mouth Daily.        mupirocin (BACTROBAN) 2 % ointment APPLY TO AFFECTED AREA DAILY AS NEEDED  3    nitroGLYCERIN (NITROSTAT) 0.4 MG SL tablet Place 1 tablet (0.4 mg total) under the tongue every 5 (five) minutes as needed. 100 tablet 3    oxycodone-acetaminophen (PERCOCET) 5-325 mg per tablet Take 1 tablet by mouth as needed.  0    pantoprazole (PROTONIX) 40 MG tablet Take 40 mg by mouth once daily.  2    PENNSAID 20 mg/gram /actuation(2 %) sopm APPLY TWO PUMPS TO AFFECTED AREA(S) TWICE DAILY AS NEEDED  3    rosuvastatin (CRESTOR) 20 MG tablet TAKE 1 TABLET (20 MG TOTAL) BY MOUTH EVERY EVENING. 90 tablet 3    testosterone (AXIRON) 30 mg/actuation (1.5 mL) SlPm Apply 2 Pump topically once daily. 1 Bottle 5    [DISCONTINUED] alfuzosin (UROXATRAL) 10 mg Tb24 TAKE 1 TABLET (10 MG TOTAL) BY MOUTH EVERY EVENING. 90 tablet 3    [DISCONTINUED] AXIRON 30 mg/actuation (1.5 mL) SlPm APPLY 1 PUMP TO SKIN EVERY DAY 3 Bottle 2    [DISCONTINUED] fesoterodine 8 mg Tb24 Take 1 tablet by mouth once daily. 30 tablet 11     No facility-administered encounter medications on file as of 2/19/2018.      Review of Systems   General ROS: GENERAL:  No weight gain or loss  SKIN:  No rashes or lacerations  HEAD:  No headaches  EYES:  No exophthalmos, jaundice or blindness  EARS:  No dizziness, tinnitus or hearing loss  NOSE:  No changes in smell  MOUTH & THROAT:  No dyskinetic movements or obvious goiter  CHEST:  No shortness of breath, hyperventilation or cough  CARDIOVASCULAR:  No tachycardia or chest pain  ABDOMEN:  No nausea, vomiting, pain, constipation or diarrhea  URINARY:  No frequency, dysuria or sexual dysfunction  ENDOCRINE:  No polydipsia, polyuria  MUSCULOSKELETAL:  No pain or stiffness of the joints  NEUROLOGIC:  No weakness, sensory changes, seizures, confusion, memory loss, tremor or other abnormal movements  Physical Exam     Vitals:    02/19/18 0928    BP: 115/69   Pulse: 73     General Appearance:  Alert, cooperative, no distress, appears stated age   Head:  Normocephalic, without obvious abnormality, atraumatic   Eyes:  PERRL, conjunctiva/corneas clear, EOM's intact, fundi benign, both eyes   Ears:  Normal TM's and external ear canals, both ears   Nose: Nares normal, septum midline, mucosa normal, no drainage or sinus tenderness   Throat: Lips, mucosa, and tongue normal; teeth and gums normal   Neck: Supple, symmetrical, trachea midline, no adenopathy, thyroid: not enlarged, symmetric, no tenderness/mass/nodules, no carotid bruit or JVD   Back:   Symmetric, no curvature, ROM normal, no CVA tenderness   Lungs:   Clear to auscultation bilaterally, respirations unlabored   Chest Wall:  No tenderness or deformity   Heart:  Regular rate and rhythm, S1, S2 normal, no murmur, rub or gallop   Abdomen:   Soft, non-tender, bowel sounds active all four quadrants,  no masses, no organomegaly   Genitalia:  Normal male, normal testicles, normal epididymis, normal vas palpated.   Rectal:  Normal tone, no masses or tenderness;   Extremities: Extremities normal, atraumatic, no cyanosis or edema   Pulses: 2+ and symmetric   Skin: Skin color, texture, turgor normal, no rashes or lesions   Lymph nodes: Cervical, supraclavicular, and axillary nodes normal   Neurologic: Normal     Prostate 35 grams smooth with no nodule or tenderness.    LABS:  Lab Results   Component Value Date    PSA 0.24 01/22/2018    PSA 0.28 07/18/2013    PSA 0.24 12/28/2012    PSA 0.27 12/15/2011    PSA 0.28 06/21/2011    PSA 0.32 01/04/2010    PSA 0.29 06/29/2009    PSA 0.23 08/04/2008    PSA 0.3 01/05/2007    PSADIAG 0.31 06/24/2016    PSADIAG 0.31 04/23/2015    PSADIAG 0.29 09/23/2014    PSADIAG 0.26 02/04/2014     Results for orders placed or performed in visit on 04/23/15   Prostate Specific Antigen, Diagnostic   Result Value Ref Range    PSA DIAGNOSTIC 0.31 0.00-4.00 ng/mL   Results for orders placed or  performed in visit on 09/23/14   Prostate Specific Antigen, Diagnostic   Result Value Ref Range    PSA DIAGNOSTIC 0.29 0.00-4.00 ng/mL   Results for orders placed or performed in visit on 02/04/14   Prostate Specific Antigen, Diagnostic   Result Value Ref Range    PSA DIAGNOSTIC 0.26 0.00-4.00 ng/mL     Lab Results   Component Value Date    CREATININE 0.8 02/15/2018    CREATININE 0.9 01/22/2018    CREATININE 0.9 01/22/2018     Results for orders placed or performed in visit on 04/23/15   Testosterone   Result Value Ref Range    Testosterone, Total 700 195.0-1138.0 ng/dL   Results for orders placed or performed in visit on 09/23/14   Testosterone   Result Value Ref Range    Testosterone, Total 581 195.0-1138.0 ng/dL   Results for orders placed or performed in visit on 02/04/14   Testosterone   Result Value Ref Range    Testosterone, Total 364 195.0-1138.0 ng/dL     Urine Culture, Routine   Date Value Ref Range Status   03/31/2014 No growth  Final     Assessment and Plan:  Den was seen today for follow-up.    Diagnoses and all orders for this visit:    Benign prostatic hyperplasia with urinary obstruction  -     alfuzosin (UROXATRAL) 10 mg Tb24; Take 1 tablet (10 mg total) by mouth every evening.  -     Prostate Specific Antigen, Diagnostic; Future    Frequency-urgency syndrome    OAB (overactive bladder)  -     fesoterodine 8 mg Tb24; Take 1 tablet by mouth once daily.    Hypogonadism in male  -     testosterone (AXIRON) 30 mg/actuation (1.5 mL) SlPm; Apply 2 Pump topically once daily.  -     Testosterone; Future  -     Testosterone; Future  -     CBC Without Differential; Future    continue uroxatral and toviaz for his LUTS.  30 days refills given.  Continue Axiron but if his testosterone level is within normal limit, I advise not to use Axiron anymore due to polycythermial  Will continue to monitor his H/H closely.    Recommend blood donation unless it is contra-indicated, in order to avoid possible polycythemia  while he is on testosterone replacement therapy.  I spent 25 minutes with the patient of which more than half was spent in direct consultation with the patient in regards to our treatment and plan.      Follow-up:  Follow-up in about 6 months (around 8/19/2018) for testosterone, CBC, PSA.

## 2018-02-19 NOTE — PATIENT INSTRUCTIONS
Lab Results   Component Value Date    PSA 0.24 01/22/2018    PSA 0.28 07/18/2013    PSA 0.24 12/28/2012    PSADIAG 0.31 06/24/2016    PSADIAG 0.31 04/23/2015    PSADIAG 0.29 09/23/2014

## 2018-02-19 NOTE — LETTER
February 19, 2018      Kieran Friedman MD  [address]  [place] [address]           [place] [facility]  [address]  [place] [address]  Phone: [phone]          Patient: Den Rick   MR Number: [id number]   YOB: 1950   Date of Visit: 2/19/2018       Dear Dr. Kieran Friedman:    Thank you for referring Den Rick to me for evaluation. Attached you will find relevant portions of my assessment and plan of care.    If you have questions, please do not hesitate to call me. I look forward to following Den Rick along with you.    Sincerely,    Adam Shahid MD    Enclosure  CC:  No Recipients    If you would like to receive this communication electronically, please contact [email or web address] or [phone] to request more information on [employer] Link access.    For providers and/or their staff who would like to refer a patient to [facility], please contact us through our one-stop-shop provider referral line, [facility], at [phone].    If you feel you have received this communication in error or would no longer like to receive these types of communications, please e-mail [email or web address]

## 2018-02-21 ENCOUNTER — OFFICE VISIT (OUTPATIENT)
Dept: ENDOCRINOLOGY | Facility: CLINIC | Age: 68
End: 2018-02-21
Payer: COMMERCIAL

## 2018-02-21 ENCOUNTER — TELEPHONE (OUTPATIENT)
Dept: INTERNAL MEDICINE | Facility: CLINIC | Age: 68
End: 2018-02-21

## 2018-02-21 ENCOUNTER — CLINICAL SUPPORT (OUTPATIENT)
Dept: DIABETES | Facility: CLINIC | Age: 68
End: 2018-02-21
Payer: COMMERCIAL

## 2018-02-21 VITALS
SYSTOLIC BLOOD PRESSURE: 98 MMHG | BODY MASS INDEX: 31.81 KG/M2 | DIASTOLIC BLOOD PRESSURE: 60 MMHG | HEART RATE: 66 BPM | HEIGHT: 69 IN | WEIGHT: 214.75 LBS

## 2018-02-21 DIAGNOSIS — E11.65 TYPE 2 DIABETES MELLITUS WITH HYPERGLYCEMIA, WITH LONG-TERM CURRENT USE OF INSULIN: ICD-10-CM

## 2018-02-21 DIAGNOSIS — Z79.4 TYPE 2 DIABETES MELLITUS WITH HYPERGLYCEMIA, WITH LONG-TERM CURRENT USE OF INSULIN: ICD-10-CM

## 2018-02-21 DIAGNOSIS — E11.65 TYPE 2 DIABETES MELLITUS WITH HYPERGLYCEMIA, WITHOUT LONG-TERM CURRENT USE OF INSULIN: Chronic | ICD-10-CM

## 2018-02-21 DIAGNOSIS — E29.1 HYPOGONADISM IN MALE: ICD-10-CM

## 2018-02-21 DIAGNOSIS — I25.10 CORONARY ARTERY DISEASE INVOLVING NATIVE CORONARY ARTERY OF NATIVE HEART WITHOUT ANGINA PECTORIS: Primary | Chronic | ICD-10-CM

## 2018-02-21 PROCEDURE — G0108 DIAB MANAGE TRN  PER INDIV: HCPCS | Mod: S$GLB,,, | Performed by: DIETITIAN, REGISTERED

## 2018-02-21 PROCEDURE — 99999 PR PBB SHADOW E&M-EST. PATIENT-LVL V: CPT | Mod: PBBFAC,,, | Performed by: INTERNAL MEDICINE

## 2018-02-21 PROCEDURE — 99214 OFFICE O/P EST MOD 30 MIN: CPT | Mod: S$GLB,,, | Performed by: INTERNAL MEDICINE

## 2018-02-21 PROCEDURE — 1159F MED LIST DOCD IN RCRD: CPT | Mod: S$GLB,,, | Performed by: INTERNAL MEDICINE

## 2018-02-21 PROCEDURE — 3008F BODY MASS INDEX DOCD: CPT | Mod: S$GLB,,, | Performed by: INTERNAL MEDICINE

## 2018-02-21 PROCEDURE — 1126F AMNT PAIN NOTED NONE PRSNT: CPT | Mod: S$GLB,,, | Performed by: INTERNAL MEDICINE

## 2018-02-21 RX ORDER — METFORMIN HYDROCHLORIDE 500 MG/1
1500 TABLET, EXTENDED RELEASE ORAL DAILY
Qty: 90 TABLET | Refills: 6 | Status: SHIPPED | OUTPATIENT
Start: 2018-02-21 | End: 2018-05-16 | Stop reason: SDUPTHER

## 2018-02-21 NOTE — ASSESSMENT & PLAN NOTE
On proper secondary prevention medications.    Will revisit changing to a diabetes medication with cardiovascular benefit (GLP-1, SGLT-2i) at next visit.

## 2018-02-21 NOTE — PROGRESS NOTES
Subjective:      Chief Complaint: Type 2 Diabetes mellitus    HPI: Den Rick is a 67 y.o. male who is here for a follow-up evaluation for type 2 diabetes mellitus    With regards to the diabetes:    Current symptoms/problems include none. He has been eating better, but still hasn't gotten started with an exercise routine. He notes his blood glucose has been significantly better than previous to starting insulin.    The patient was initially diagnosed with Type 2 diabetes mellitus based on the following criteria: Elevated fasting glucose and HbA1c - December, 2017    Known diabetic complications: None  Cardiovascular risk factors: advanced age (older than 55 for men, 65 for women), diabetes mellitus, dyslipidemia, hypertension, male gender, obesity (BMI >= 30 kg/m2) and previous history of CAD (MI in 2012 - PCI x 7)  Current diabetic medications include:   Metformin XR 1000 mg with supper - denies any GI symptoms related to metformin use.  Lantus 16 units nightly.    Prior visit with dietician: yes - going back to education today  Current diet: Does well with breakfast and dinner - low carb, high protein, fruits/vegetables. Sometimes has high carb options at lunch (sushi with rice, poyboys), but more recently has been cutting carb intake at lunch. He has noticed improved glycemic control with this diet change.  Current exercise: Nothing strenuous. Walks his dog a few times per week. He knows he needs to start exercising, but just hasn't started yet.    Current monitoring regimen: home blood tests - ~3 times daily  Home blood sugar records: AM Fasting - 90-140s; Pre-meal 100-170s mostly.  Any episodes of hypoglycemia? no    Diabetic Health Maintenance:        HbA1c < 7.0%: No, due in 3 months.        Eye exam within last year: Yes        Foot exam within last year: Yes        Urine microalbumin/creatinine within last year: Yes        Blood pressure <130/80:  Yes   Use of ACE/ARB: No, but will be next agent if  needed. On metoprolol due to history of CAD.        Checked Weight: Yes   Weight trend: stable        Checked lipids within last year: Yes   Statin drug:  Yes        Low dose ASA?: Yes    With regards to hypogonadism:    Had previously been on topical testosterone replacement per urology for treatment of ED. Was taken off and repeat levels a few months later were in the normal range.     Reviewed past medical, family, social history and updated as appropriate.    Review of Systems   Constitutional: Negative for activity change and unexpected weight change.   Respiratory: Negative for shortness of breath.    Cardiovascular: Negative for chest pain.   Gastrointestinal: Negative for abdominal pain.     Objective:     Vitals:    02/21/18 1039   BP: 98/60   Pulse: 66       Physical Exam   Constitutional: He is oriented to person, place, and time. He appears well-developed.   HENT:   Head: Normocephalic and atraumatic.   Eyes: Conjunctivae are normal. Right eye exhibits no discharge. Left eye exhibits no discharge.   Cardiovascular: Normal rate.    No murmur heard.  Pulmonary/Chest: Effort normal. No respiratory distress.   Musculoskeletal: He exhibits no edema.   No digital clubbing or extremity cyanosis   Neurological: He is alert and oriented to person, place, and time. Coordination normal.   Skin: Skin is warm and dry.   Nursing note and vitals reviewed.      Wt Readings from Last 10 Encounters:   02/21/18 1039 97.4 kg (214 lb 11.7 oz)   02/19/18 1019 97 kg (213 lb 13.5 oz)   02/19/18 0928 97.5 kg (214 lb 15.2 oz)   02/01/18 0929 97 kg (213 lb 15.3 oz)   01/26/18 1400 97 kg (213 lb 13.5 oz)   01/16/18 1112 95.4 kg (210 lb 5.1 oz)   12/20/17 0906 96.3 kg (212 lb 4.9 oz)   01/24/17 0958 99.9 kg (220 lb 3.8 oz)   11/30/16 1020 98.9 kg (218 lb)   11/30/16 0848 98.9 kg (218 lb 0.6 oz)       Lab Results   Component Value Date    HGBA1C 10.0 (H) 02/15/2018     Lab Results   Component Value Date    CHOL 70 (L) 01/22/2018     CHOL 71 (L) 01/22/2018    HDL 24 (L) 01/22/2018    HDL 26 (L) 01/22/2018    LDLCALC 31.0 (L) 01/22/2018    LDLCALC 29.6 (L) 01/22/2018    TRIG 75 01/22/2018    TRIG 77 01/22/2018    CHOLHDL 34.3 01/22/2018    CHOLHDL 36.6 01/22/2018     Lab Results   Component Value Date     02/15/2018    K 4.3 02/15/2018     02/15/2018    CO2 22 (L) 02/15/2018     (H) 02/15/2018    BUN 28 (H) 02/15/2018    CREATININE 0.8 02/15/2018    CALCIUM 9.0 02/15/2018    PROT 6.0 01/22/2018    ALBUMIN 3.5 01/22/2018    BILITOT 0.9 01/22/2018    ALKPHOS 68 01/22/2018    AST 26 01/22/2018    AST 27 01/22/2018    ALT 45 (H) 01/22/2018    ALT 45 (H) 01/22/2018    ANIONGAP 8 02/15/2018    ESTGFRAFRICA >60.0 02/15/2018    EGFRNONAA >60.0 02/15/2018    TSH 3.159 01/22/2018        Assessment/Plan:     Type 2 diabetes mellitus with hyperglycemia, with long-term current use of insulin  Reviewed goals of therapy are to get the best control we can without hypoglycemia    Education appointment this afternoon.    Medication changes:   Continue: Lantus 16 units nightly  Increase metformin XR to 1500 mg daily - told he can try to take them all at once, but if he has GI side-effects, try splitting 1 with breakfast and 2 with supper.    Ultimately, I'd like to get him off insulin and on to cardiovascular and weight-beneficial diabetes medications (GLP-1, SGLT-2i). I have a low suspicion for pancreatic endocrine deficiency given robust C-peptide response to hyperglycemia. However, will wait until after imaging of the pancreas before deciding on alternative non-insulin therapies. We will visit this more at the next visit in 3 months.     Reviewed patient's current insulin regimen. Will continue with same insulin dose. Instructed to send me logs through [facility] for titration of insulin (may need to decrease when further uptitrating metformin). Told to message me immediately for hypoglycemia so I can make prompt adjustments.    Advised  frequent self blood glucose monitoring.  Patient encouraged to document glucose results and bring them to every clinic visit      Hypoglycemia precautions discussed.    Close adherence to lifestyle changes recommended. Encouraged to continue to limit carb intake, especially at lunch, which is usually highest in carbs.    Eyes: UTD - No DR  Kidneys: Normal urine microalb/Cr  Lipids: UTD  Foot exam: UTD - normal sensation    Hypogonadism in male  Unsure if he was truly hypogonad initially or just had alterations in SHBG related to obesity, age. He is currently off testosterone (stopped several months ago due to increase in hematocrit), and a recent total testosterone was in the normal range.    Recommend continuing to hold testosterone replacement.    CAD (coronary artery disease)  On proper secondary prevention medications.    Will revisit changing to a diabetes medication with cardiovascular benefit (GLP-1, SGLT-2i) at next visit.      RTC in 3 months    I discussed the case with Dr. Galo and she is in agreement with the plan.

## 2018-02-21 NOTE — ASSESSMENT & PLAN NOTE
Reviewed goals of therapy are to get the best control we can without hypoglycemia    Education appointment this afternoon.    Medication changes:   Continue: Lantus 16 units nightly  Increase metformin XR to 1500 mg daily - told he can try to take them all at once, but if he has GI side-effects, try splitting 1 with breakfast and 2 with supper.    Ultimately, I'd like to get him off insulin and on to cardiovascular and weight-beneficial diabetes medications (GLP-1, SGLT-2i). I have a low suspicion for pancreatic endocrine deficiency given robust C-peptide response to hyperglycemia. However, will wait until after imaging of the pancreas before deciding on alternative non-insulin therapies. We will visit this more at the next visit in 3 months.     Reviewed patient's current insulin regimen. Will continue with same insulin dose. Instructed to send me logs through [facility] for titration of insulin (may need to decrease when further uptitrating metformin). Told to message me immediately for hypoglycemia so I can make prompt adjustments.    Advised frequent self blood glucose monitoring.  Patient encouraged to document glucose results and bring them to every clinic visit      Hypoglycemia precautions discussed.    Close adherence to lifestyle changes recommended. Encouraged to continue to limit carb intake, especially at lunch, which is usually highest in carbs.    Eyes: UTD - No DR  Kidneys: Normal urine microalb/Cr  Lipids: UTD  Foot exam: UTD - normal sensation

## 2018-02-21 NOTE — TELEPHONE ENCOUNTER
Dr healy put in external ct order when saw patient Monday.  Patient said has to pay out of pocket and [facility] was too expensive.    I called him to confirm I need to fax orders to [facility].  He said to hold that and he will confirm that is where he wants to go and get back with me.    I explained we need to schedule in next 30 days while recent lab/kidney function still good for ct testing.  We also need to set up labs in a month to recheck glucose (bmp and aic orders are in)

## 2018-02-21 NOTE — ASSESSMENT & PLAN NOTE
Unsure if he was truly hypogonad initially or just had alterations in SHBG related to obesity, age. He is currently off testosterone (stopped several months ago due to increase in hematocrit), and a recent total testosterone was in the normal range.    Recommend continuing to hold testosterone replacement.

## 2018-02-21 NOTE — PROGRESS NOTES
I have reviewed and concur with the fellow's history, physical, assessment, and plan.  I have personally interviewed and examined the patient.        Steffi Galo MD

## 2018-03-03 NOTE — PROGRESS NOTES
"Diabetes Education  Author: Tammy Colindres RD  Date: 3/3/2018    Diabetes Education Visit  Diabetes Education Record Assessment/Progress: Comprehensive/Group    Diabetes Type  Diabetes Type : Type II    Diabetes History  Diabetes Diagnosis: 0-1 year    Nutrition  What type of sweetener do you use?: Splenda  Meal Plan 24 Hour Recall - Breakfast:  (boiled eggs with mckeon; coffee with splenda and half n half)  Meal Plan 24 Hour Recall - Lunch:  (oysters; crawfish bisque; 3" Georgian bread; little rice)  Meal Plan 24 Hour Recall - Dinner:  (ideal protein: protein and veggies)  Meal Plan 24 Hour Recall - Snack:  (Quest bar)    Monitoring   Self Monitoring :  (SMBG 3 times daily)  Blood Glucose Logs: Yes (Fasting - 90-140s; Pre-meal 100-170s )  In the last month, how often have you had a low blood sugar reaction?: never    Exercise   Exercise Type: walking (walks dog 1-2 miles daily)    Current Diabetes Treatment   Current Treatment: Oral Medication, Insulin (lantus 16 units daily; metformin 1000 mg daily (to increase to 1500 per MD instructions today))    Social History  Preferred Learning Method: Face to Face  Primary Support: Self, Spouse (wife present)  Smoking Status: Never a Smoker    DDS-2 Score  ( > 3 = SIGNIFICANT DISTRESS): 1     Barriers to Change  Barriers to Change: None  Learning Challenges : None    Readiness to Learn   Readiness to Learn : Acceptance    Cultural Influences  Cultural Influences: No    Diabetes Education Assessment/Progress  Diabetes Disease Process (diabetes disease process and treatment options): Discussion, Individual Session, Comprehends Key Points (Type 2 diabetes confirmed; cpeptide and antibodies normal. Discussed causes of insulin resistance. Reviewed ways to help decrease insulin resistance and reviewed methods of long-term diabetes managment.)    Nutrition (Incorporating nutritional management into one's lifestyle): Discussion, Individual Session, Comprehends Key Points, Written " Materials Provided, Instructed (Breakfast and dinner were previously already low carb. Pt has cut out snacking on candy. He is trying to limit carbs at lunchtime. Reviewed appropriate amount of carbs to have at mealtimes. Pt and wife would like to attend diabetes class in [place] in the future.)    Physical Activity (incorporating physical activity into one's lifestyle): Discussion, Individual Session, Comprehends Key Points, Instructed (Currently walks dog about 1-1.5 miles daily. Encouraged continued routine PA. Discussed need to increase intensity of exercise and how this will help with insulin resistance.)    Medications (states correct name, dose, onset, peak, duration, side effects & timing of meds): Discussion, Instructed, Individual Session, Comprehends Key Points, Written Materials Provided (Reviewed timing and MOA of metformin and Lantus. [facility] Will likely try GLP or SLGT2 in the future to hopefully d/c insulin.)    Monitoring (monitoring blood glucose/other parameters & using results): Discussion, Individual Session, Comprehends Key Points (Reviewed goal BG's. Encouraged to continue monitoring BG 3-4 times daily.)    Acute Complications (preventing, detecting, and treating acute complications): Discussion, Instructed, Individual Session, Written Materials Provided, Comprehends Key Points (Discussed hypo vs hyper symptoms. Reviewed appropriate hypo treatment. Pt has not had any hypos since starting insulin.)    Chronic Complications (preventing, detecting, and treating chronic complications): Discussion, Demonstrates Understanding/Competency (verbalizes/demonstrates)    Diabetes Distress and Support Systems: Discussion (Wife very supportive)    Behavioral (readiness for change, lifestyle practices, self-care behaviors): Discussion, Demonstrates Understanding/Competency (verbalizes/demonstrates)        Goals  Patient has selected/evaluated goals during today's  session: Yes, selected    Monitoring: % Met (SMBG 2 times daily)  Start Date: 01/26/18  Target Date: 02/21/18            Diabetes Meal Plan  Restrictions: Restricted Carbohydrate      Education Units of Time   Time Spent: 60 min      Health Maintenance was reviewed today with patient. Discussed with patient importance of routine eye exams, foot exams/foot care, blood work (i.e.: A1c, microalbumin, and lipid), dental visits, yearly flu vaccine, and pneumonia vaccine as indicated by PCP. Patient verbalized understanding.     Health Maintenance Topics with due status: Not Due       Topic Last Completion Date    TETANUS VACCINE 05/28/2014    Colonoscopy 10/10/2016    Lipid Panel 01/22/2018    Urine Microalbumin 01/22/2018    Foot Exam 01/26/2018    Hemoglobin A1c 02/15/2018    High Dose Statin 02/21/2018     Health Maintenance Due   Topic Date Due    Hepatitis C Screening  1950    Zoster Vaccine  03/02/2010    Pneumococcal (65+) (2 of 2 - PPSV23) 12/21/2016    Influenza Vaccine  08/01/2017    Eye Exam  12/19/2017

## 2018-03-12 ENCOUNTER — TELEPHONE (OUTPATIENT)
Dept: INTERNAL MEDICINE | Facility: CLINIC | Age: 68
End: 2018-03-12

## 2018-03-12 DIAGNOSIS — E11.9 DIABETES MELLITUS WITHOUT COMPLICATION: Primary | ICD-10-CM

## 2018-03-12 DIAGNOSIS — K86.89 OTHER SPECIFIED DISEASES OF PANCREAS: ICD-10-CM

## 2018-03-12 NOTE — TELEPHONE ENCOUNTER
----- Message from Dustin Robin sent at 3/12/2018  3:57 PM CDT -----  Contact: self/[phone]  Pt is calling to reschedule his MRI. He states that he needs to talk to someone about it as well and did not want to give further information. Please call and advise.        Thank You

## 2018-03-13 ENCOUNTER — LAB VISIT (OUTPATIENT)
Dept: LAB | Facility: HOSPITAL | Age: 68
End: 2018-03-13
Attending: INTERNAL MEDICINE
Payer: COMMERCIAL

## 2018-03-13 DIAGNOSIS — E78.00 HYPERCHOLESTEREMIA: ICD-10-CM

## 2018-03-13 DIAGNOSIS — E11.9 TYPE 2 DIABETES MELLITUS WITHOUT COMPLICATION, WITHOUT LONG-TERM CURRENT USE OF INSULIN: ICD-10-CM

## 2018-03-13 LAB
ANION GAP SERPL CALC-SCNC: 6 MMOL/L
BUN SERPL-MCNC: 32 MG/DL
CALCIUM SERPL-MCNC: 9 MG/DL
CHLORIDE SERPL-SCNC: 108 MMOL/L
CO2 SERPL-SCNC: 26 MMOL/L
CREAT SERPL-MCNC: 0.9 MG/DL
EST. GFR  (AFRICAN AMERICAN): >60 ML/MIN/1.73 M^2
EST. GFR  (NON AFRICAN AMERICAN): >60 ML/MIN/1.73 M^2
ESTIMATED AVG GLUCOSE: 194 MG/DL
GLUCOSE SERPL-MCNC: 132 MG/DL
HBA1C MFR BLD HPLC: 8.4 %
POTASSIUM SERPL-SCNC: 4.7 MMOL/L
SODIUM SERPL-SCNC: 140 MMOL/L

## 2018-03-13 PROCEDURE — 80048 BASIC METABOLIC PNL TOTAL CA: CPT

## 2018-03-13 PROCEDURE — 83036 HEMOGLOBIN GLYCOSYLATED A1C: CPT

## 2018-03-13 PROCEDURE — 36415 COLL VENOUS BLD VENIPUNCTURE: CPT | Mod: PO

## 2018-03-22 ENCOUNTER — HOSPITAL ENCOUNTER (OUTPATIENT)
Dept: RADIOLOGY | Facility: HOSPITAL | Age: 68
Discharge: HOME | End: 2018-03-22
Attending: INTERNAL MEDICINE
Payer: COMMERCIAL

## 2018-03-22 DIAGNOSIS — K86.89 OTHER SPECIFIED DISEASES OF PANCREAS: ICD-10-CM

## 2018-03-22 DIAGNOSIS — E11.9 DIABETES MELLITUS WITHOUT COMPLICATION: ICD-10-CM

## 2018-03-22 PROCEDURE — 74150 CT ABDOMEN W/O CONTRAST: CPT | Mod: TC

## 2018-03-22 PROCEDURE — 74150 CT ABDOMEN W/O CONTRAST: CPT | Mod: 26,,, | Performed by: RADIOLOGY

## 2018-03-23 ENCOUNTER — CLINICAL SUPPORT (OUTPATIENT)
Dept: DIABETES | Facility: CLINIC | Age: 68
End: 2018-03-23
Payer: COMMERCIAL

## 2018-03-23 DIAGNOSIS — Z79.4 TYPE 2 DIABETES MELLITUS WITH HYPERGLYCEMIA, WITH LONG-TERM CURRENT USE OF INSULIN: ICD-10-CM

## 2018-03-23 DIAGNOSIS — E11.65 TYPE 2 DIABETES MELLITUS WITH HYPERGLYCEMIA, WITH LONG-TERM CURRENT USE OF INSULIN: ICD-10-CM

## 2018-03-23 PROCEDURE — G0109 DIAB MANAGE TRN IND/GROUP: HCPCS | Mod: S$GLB,,, | Performed by: INTERNAL MEDICINE

## 2018-03-23 NOTE — PROGRESS NOTES
Diabetes Education  Author: Austin Llanos RN  Date: 3/23/2018    Diabetes Education Visit  Diabetes Education Record Assessment/Progress: Comprehensive/Group  Diabetes Type  Diabetes Type : Type II                                                                             Diabetes Education Assessment/Progress  Diabetes Disease Process (diabetes disease process and treatment options): Discussion, Lecture, Instructed, Class, Demonstrates Understanding/Competency(verbalizes/demonstrates), Written Materials Provided  Nutrition (Incorporating nutritional management into one's lifestyle): Discussion, Demonstration, Lecture, Instructed, Class, Demonstrates Understanding/Competency (verbalizes/demonstrates), Written Materials Provided  Physical Activity (incorporating physical activity into one's lifestyle): Discussion, Lecture, Instructed, Class, Demonstrates Understanding/Competency (verbalizes/demonstrates), Written Materials Provided  Medications (states correct name, dose, onset, peak, duration, side effects & timing of meds): Not Covered/Deferred  Monitoring (monitoring blood glucose/other parameters & using results): Not Covered/Deferred  Chronic Complications (preventing, detecting, and treating chronic complications): Discussion, Lecture, Instructed, Class, Demonstrates Understanding/Competency (verbalizes/demonstrates), Written Materials Provided  Clinical (diabetes, other pertinent medical history, and relevant comorbidities reviewed during visit): Not Covered/Deferred  Cognitive (knowledge of self-management skills, functional health literacy): Instructed, Class, Demonstrates Understanding/Competency (verbalizes/demonstrates)  Psychosocial (emotional response to diabetes): Not Covered/Deferred  Diabetes Distress and Support Systems: Lecture, Discussion, Instructed, Class, Demonstrates Understanding/Competency (verbalizes/demonstrates)  Behavioral (readiness for change, lifestyle practices, self-care  behaviors): Discussion, Instructed, Class, Demonstrates Understanding/Competency (verbalizes/demonstrates)  Post test score 92  Education Units of Time   Time Spent: 180 min    Health Maintenance was reviewed today with patient. Discussed with patient importance of routine eye exams, foot exams/foot care, blood work (i.e.: A1c, microalbumin, and lipid), dental visits, yearly flu vaccine, and pneumonia vaccine as indicated by PCP. Patient verbalized understanding.     Health Maintenance Topics with due status: Not Due       Topic Last Completion Date    TETANUS VACCINE 05/28/2014    Colonoscopy 10/10/2016    Lipid Panel 01/22/2018    Urine Microalbumin 01/22/2018    Foot Exam 01/26/2018    High Dose Statin 02/21/2018    Hemoglobin A1c 03/13/2018     Health Maintenance Due   Topic Date Due    Hepatitis C Screening  1950    Zoster Vaccine  03/02/2010    Pneumococcal (65+) (2 of 2 - PPSV23) 12/21/2016    Influenza Vaccine  08/01/2017    Eye Exam  12/19/2017

## 2018-04-01 ENCOUNTER — TELEPHONE (OUTPATIENT)
Dept: INTERNAL MEDICINE | Facility: CLINIC | Age: 68
End: 2018-04-01

## 2018-04-01 NOTE — TELEPHONE ENCOUNTER
He has scarring of pancreas, plaques in lung base like exposure to asbestos?, ans he has some kidney stones.  His lab looked ok as well. Should see him is he is well in 2 mo

## 2018-04-03 ENCOUNTER — PATIENT MESSAGE (OUTPATIENT)
Dept: INTERNAL MEDICINE | Facility: CLINIC | Age: 68
End: 2018-04-03

## 2018-04-04 NOTE — TELEPHONE ENCOUNTER
Left msg I would send 's comments on test results by [facility] and call or msg me if any questions or concerns.  [place] sent.

## 2018-05-01 ENCOUNTER — LAB VISIT (OUTPATIENT)
Dept: LAB | Facility: HOSPITAL | Age: 68
End: 2018-05-01
Attending: INTERNAL MEDICINE
Payer: COMMERCIAL

## 2018-05-01 DIAGNOSIS — I25.10 CORONARY ARTERY DISEASE INVOLVING NATIVE CORONARY ARTERY OF NATIVE HEART WITHOUT ANGINA PECTORIS: ICD-10-CM

## 2018-05-01 DIAGNOSIS — E78.00 HYPERCHOLESTEREMIA: ICD-10-CM

## 2018-05-01 LAB
CHOLEST SERPL-MCNC: 74 MG/DL
CHOLEST/HDLC SERPL: 2.7 {RATIO}
HDLC SERPL-MCNC: 27 MG/DL
HDLC SERPL: 36.5 %
LDLC SERPL CALC-MCNC: 32.6 MG/DL
NONHDLC SERPL-MCNC: 47 MG/DL
TRIGL SERPL-MCNC: 72 MG/DL

## 2018-05-01 PROCEDURE — 80061 LIPID PANEL: CPT

## 2018-05-01 PROCEDURE — 36415 COLL VENOUS BLD VENIPUNCTURE: CPT | Mod: PO

## 2018-05-16 ENCOUNTER — OFFICE VISIT (OUTPATIENT)
Dept: ENDOCRINOLOGY | Facility: CLINIC | Age: 68
End: 2018-05-16
Payer: COMMERCIAL

## 2018-05-16 VITALS
HEART RATE: 64 BPM | BODY MASS INDEX: 32.52 KG/M2 | WEIGHT: 219.56 LBS | DIASTOLIC BLOOD PRESSURE: 68 MMHG | HEIGHT: 69 IN | SYSTOLIC BLOOD PRESSURE: 106 MMHG

## 2018-05-16 DIAGNOSIS — E34.9 TESTOSTERONE DEFICIENCY: ICD-10-CM

## 2018-05-16 DIAGNOSIS — E11.65 TYPE 2 DIABETES MELLITUS WITH HYPERGLYCEMIA, WITH LONG-TERM CURRENT USE OF INSULIN: Primary | Chronic | ICD-10-CM

## 2018-05-16 DIAGNOSIS — E66.9 OBESITY (BMI 30-39.9): Chronic | ICD-10-CM

## 2018-05-16 DIAGNOSIS — Z79.4 TYPE 2 DIABETES MELLITUS WITH HYPERGLYCEMIA, WITH LONG-TERM CURRENT USE OF INSULIN: Primary | Chronic | ICD-10-CM

## 2018-05-16 DIAGNOSIS — I25.10 CORONARY ARTERY DISEASE INVOLVING NATIVE CORONARY ARTERY OF NATIVE HEART WITHOUT ANGINA PECTORIS: Chronic | ICD-10-CM

## 2018-05-16 DIAGNOSIS — E11.65 TYPE 2 DIABETES MELLITUS WITH HYPERGLYCEMIA, WITHOUT LONG-TERM CURRENT USE OF INSULIN: Chronic | ICD-10-CM

## 2018-05-16 PROCEDURE — 99214 OFFICE O/P EST MOD 30 MIN: CPT | Mod: S$GLB,,, | Performed by: INTERNAL MEDICINE

## 2018-05-16 PROCEDURE — 3074F SYST BP LT 130 MM HG: CPT | Mod: CPTII,S$GLB,, | Performed by: INTERNAL MEDICINE

## 2018-05-16 PROCEDURE — 99999 PR PBB SHADOW E&M-EST. PATIENT-LVL V: CPT | Mod: PBBFAC,,, | Performed by: INTERNAL MEDICINE

## 2018-05-16 PROCEDURE — 3078F DIAST BP <80 MM HG: CPT | Mod: CPTII,S$GLB,, | Performed by: INTERNAL MEDICINE

## 2018-05-16 PROCEDURE — 3045F PR MOST RECENT HEMOGLOBIN A1C LEVEL 7.0-9.0%: CPT | Mod: CPTII,S$GLB,, | Performed by: INTERNAL MEDICINE

## 2018-05-16 RX ORDER — METFORMIN HYDROCHLORIDE 500 MG/1
2000 TABLET, EXTENDED RELEASE ORAL NIGHTLY
Qty: 360 TABLET | Refills: 3 | Status: SHIPPED | OUTPATIENT
Start: 2018-05-16 | End: 2019-05-07 | Stop reason: SDUPTHER

## 2018-05-16 NOTE — ASSESSMENT & PLAN NOTE
Reviewed goals of therapy are to get the best control we can without hypoglycemia    Medication changes:   Stop: Lantus 16 units nightly  Increase: Metformin XR to 2000 mg daily - told he can try to take them all at once, but if he has GI side-effects, try splitting 2 with breakfast and 2 with supper.  Start: Jardiance 10 mg daily    Discussed need for frequent hydration with jardiance. Discussed benefits (weight reduction, CVD risk reduction) and side-effects (frequent urination, dehydration, UTI), and he is agreeable to start the jardiance.    Advised frequent self blood glucose monitoring.  Patient encouraged to document glucose results and bring them to every clinic visit. He was advised to send me an email if his AM glucose is consistently >140.    Close adherence to lifestyle changes recommended. Encouraged to continue to limit carb intake, especially at lunch, which is usually highest in carbs.    Eyes: UTD - No DR  Kidneys: Normal urine microalb/Cr  Lipids: UTD  Foot exam: UTD at last visit.

## 2018-05-16 NOTE — ASSESSMENT & PLAN NOTE
Stop insulin and start jardiance, which should help with his weight. He is dieting appropriately. Needs more exercise.

## 2018-05-16 NOTE — ASSESSMENT & PLAN NOTE
On appropriate secondary prevention.    Jardiance has good data for reducing CVD risk in high risk patients.

## 2018-05-16 NOTE — PROGRESS NOTES
I have reviewed the case, examined the patient, discussed with the fellow, and concur with the fellow's history, physical, assessment, and plan.    Steffi Galo MD

## 2018-05-16 NOTE — PROGRESS NOTES
"Subjective:      Chief Complaint: Follow-up    HPI: Den Rick is a 67 y.o. male who is here for a follow-up evaluation for type 2 diabetes mellitus     With regards to the diabetes:     Current symptoms/problems include none. He has been eating better, but still hasn't gotten started with an exercise routine. He notes his blood glucose has been significantly better than previous to starting insulin.     The patient was initially diagnosed with Type 2 diabetes mellitus based on the following criteria: Elevated fasting glucose and HbA1c - December, 2017     Known diabetic complications: None   Cardiovascular risk factors: advanced age (older than 55 for men, 65 for women), diabetes mellitus, dyslipidemia, hypertension, male gender, obesity (BMI >= 30 kg/m2) and previous history of CAD (MI in 2012 - PCI x 7)  Current diabetic medications include:  Metformin XR 1500 mg with supper - denies any GI symptoms related to metformin use.  Lantus 16 units nightly.     Prior visit with dietician: yes  Current diet: Has been avoiding high carb foods, including bread (wheat); "I don't eat much at all"; plenty fluids.  Current exercise: Nothing strenuous. Walks his dog a few times per week.     Current monitoring regimen: home blood tests - ~3 times daily  Home blood sugar records: AM Fasting - 80-120s mostly - highest 140s; Pre-meal 90-130s mostly.  Any episodes of hypoglycemia? no     Diabetic Health Maintenance:        HbA1c < 7.0%: No, but gradually improving. Last one done outside [facility] 4-6 weeks ago was 7.6% at a health fair.         Eye exam within last year: Yes, outside provider        Foot exam within last year: Yes        Urine microalbumin/creatinine within last year: Yes        Blood pressure <130/80:  Yes              Use of ACE/ARB: No, but will be next agent if needed. On metoprolol due to history of CAD.        Checked Weight: Yes              Weight trend: gained ~6 pounds since last visit.        Checked " lipids within last year: Yes              Statin drug:  Yes        Low dose ASA?: Yes     With regards to hypogonadism:  Had previously been on topical testosterone replacement per urology for treatment of ED. Was taken off and repeat levels a few months later were in the normal range. Followed by urology for this issue.    Reviewed past medical, family, social history and updated as appropriate.    Review of Systems   Respiratory: Negative for shortness of breath.    Cardiovascular: Negative for chest pain.   Gastrointestinal: Negative for abdominal pain.   All other systems reviewed and are negative.    Objective:     Vitals:    05/16/18 0855   BP: 106/68   Pulse: 64      Physical Exam   Constitutional: He is oriented to person, place, and time. He appears well-developed and well-nourished. No distress.   HENT:   Head: Normocephalic and atraumatic.   Eyes: Conjunctivae are normal. Right eye exhibits no discharge. Left eye exhibits no discharge.   Cardiovascular: Normal rate.    No murmur heard.  Pulmonary/Chest: Effort normal and breath sounds normal. No respiratory distress.   Abdominal: Soft. There is no tenderness.   Musculoskeletal: He exhibits no edema.   No digital clubbing or extremity cyanosis   Neurological: He is alert and oriented to person, place, and time. Coordination normal.   Skin: Skin is warm and dry.   Psychiatric: He has a normal mood and affect. His behavior is normal.   Nursing note and vitals reviewed.    Wt Readings from Last 10 Encounters:   05/16/18 0855 99.6 kg (219 lb 9.3 oz)   02/21/18 1039 97.4 kg (214 lb 11.7 oz)   02/19/18 1019 97 kg (213 lb 13.5 oz)   02/19/18 0928 97.5 kg (214 lb 15.2 oz)   02/01/18 0929 97 kg (213 lb 15.3 oz)   01/26/18 1400 97 kg (213 lb 13.5 oz)   01/16/18 1112 95.4 kg (210 lb 5.1 oz)   12/20/17 0906 96.3 kg (212 lb 4.9 oz)   01/24/17 0958 99.9 kg (220 lb 3.8 oz)   11/30/16 1020 98.9 kg (218 lb)     Lab Results   Component Value Date    HGBA1C 8.4 (H)  03/13/2018     Lab Results   Component Value Date    CHOL 74 (L) 05/01/2018    HDL 27 (L) 05/01/2018    LDLCALC 32.6 (L) 05/01/2018    TRIG 72 05/01/2018    CHOLHDL 36.5 05/01/2018     Lab Results   Component Value Date     03/13/2018    K 4.7 03/13/2018     03/13/2018    CO2 26 03/13/2018     (H) 03/13/2018    BUN 32 (H) 03/13/2018    CREATININE 0.9 03/13/2018    CALCIUM 9.0 03/13/2018    PROT 6.0 01/22/2018    ALBUMIN 3.5 01/22/2018    BILITOT 0.9 01/22/2018    ALKPHOS 68 01/22/2018    AST 26 01/22/2018    AST 27 01/22/2018    ALT 45 (H) 01/22/2018    ALT 45 (H) 01/22/2018    ANIONGAP 6 (L) 03/13/2018    ESTGFRAFRICA >60.0 03/13/2018    EGFRNONAA >60.0 03/13/2018    TSH 3.159 01/22/2018      Lab Results   Component Value Date    MICALBCREAT 19.0 01/22/2018       Assessment/Plan:     Type 2 diabetes mellitus with hyperglycemia, with long-term current use of insulin  Reviewed goals of therapy are to get the best control we can without hypoglycemia    Medication changes:   Stop: Lantus 16 units nightly  Increase: Metformin XR to 2000 mg daily - told he can try to take them all at once, but if he has GI side-effects, try splitting 2 with breakfast and 2 with supper.  Start: Jardiance 10 mg daily    Discussed need for frequent hydration with jardiance. Discussed benefits (weight reduction, CVD risk reduction) and side-effects (frequent urination, dehydration, UTI), and he is agreeable to start the jardiance.    Advised frequent self blood glucose monitoring.  Patient encouraged to document glucose results and bring them to every clinic visit. He was advised to send me an email if his AM glucose is consistently >140.    Close adherence to lifestyle changes recommended. Encouraged to continue to limit carb intake, especially at lunch, which is usually highest in carbs.    Eyes: UTD - No DR  Kidneys: Normal urine microalb/Cr  Lipids: UTD  Foot exam: UTD at last visit.    Testosterone deficiency  Defer  to urology.    Last level was WNL.    Obesity (BMI 30-39.9)  Stop insulin and start jardiance, which should help with his weight. He is dieting appropriately. Needs more exercise.    CAD (coronary artery disease)  On appropriate secondary prevention.    Jardiance has good data for reducing CVD risk in high risk patients.    RTC in 6 months. Recheck A1c in 3 months.    I discussed the case with Dr. Galo and she is in agreement with the plan.

## 2018-07-23 ENCOUNTER — TELEPHONE (OUTPATIENT)
Dept: ENDOCRINOLOGY | Facility: CLINIC | Age: 68
End: 2018-07-23

## 2018-07-23 DIAGNOSIS — E11.65 TYPE 2 DIABETES MELLITUS WITH HYPERGLYCEMIA, WITH LONG-TERM CURRENT USE OF INSULIN: Primary | Chronic | ICD-10-CM

## 2018-07-23 DIAGNOSIS — Z79.4 TYPE 2 DIABETES MELLITUS WITH HYPERGLYCEMIA, WITH LONG-TERM CURRENT USE OF INSULIN: Primary | Chronic | ICD-10-CM

## 2018-07-23 DIAGNOSIS — E34.9 TESTOSTERONE DEFICIENCY: ICD-10-CM

## 2018-07-26 ENCOUNTER — PATIENT MESSAGE (OUTPATIENT)
Dept: ENDOCRINOLOGY | Facility: CLINIC | Age: 68
End: 2018-07-26

## 2018-08-10 ENCOUNTER — OFFICE VISIT (OUTPATIENT)
Dept: URGENT CARE | Facility: CLINIC | Age: 68
End: 2018-08-10
Payer: COMMERCIAL

## 2018-08-10 VITALS — SYSTOLIC BLOOD PRESSURE: 137 MMHG | TEMPERATURE: 99 F | HEART RATE: 94 BPM | DIASTOLIC BLOOD PRESSURE: 78 MMHG

## 2018-08-10 DIAGNOSIS — J00 ACUTE IRRITANT RHINITIS: ICD-10-CM

## 2018-08-10 DIAGNOSIS — T59.91XA TOXIC EFFECT OF GAS EXPOSURE, ACCIDENTAL OR UNINTENTIONAL, INITIAL ENCOUNTER: Primary | ICD-10-CM

## 2018-08-10 PROCEDURE — 99203 OFFICE O/P NEW LOW 30 MIN: CPT | Mod: S$GLB,,,

## 2018-08-10 RX ORDER — LANCETS 28 GAUGE
EACH MISCELLANEOUS
Refills: 3 | Status: ON HOLD | COMMUNITY
Start: 2018-05-21 | End: 2020-11-10 | Stop reason: SDUPTHER

## 2018-08-10 RX ORDER — PROMETHAZINE HYDROCHLORIDE AND CODEINE PHOSPHATE 6.25; 1 MG/5ML; MG/5ML
SOLUTION ORAL
Refills: 0 | COMMUNITY
Start: 2018-06-18 | End: 2018-12-27 | Stop reason: ALTCHOICE

## 2018-08-10 RX ORDER — DESOXIMETASONE 2.5 MG/G
CREAM TOPICAL
Refills: 5 | Status: ON HOLD | COMMUNITY
Start: 2018-07-18 | End: 2020-11-10 | Stop reason: SDUPTHER

## 2018-08-10 RX ORDER — PREDNISONE 20 MG/1
TABLET ORAL
Refills: 0 | COMMUNITY
Start: 2018-06-18 | End: 2018-12-27 | Stop reason: ALTCHOICE

## 2018-08-10 RX ORDER — LEVOFLOXACIN 500 MG/1
500 TABLET, FILM COATED ORAL DAILY
Refills: 0 | COMMUNITY
Start: 2018-06-18 | End: 2018-12-27 | Stop reason: ALTCHOICE

## 2018-08-10 NOTE — LETTER
[facility] [place]  [address], [address]  [place] [address]  Phone: [phone]  Fax: [phone]    Pt Name: Den Rick  Injury Date: 08/07/2018   Employee ID: [id number] Date of First Treatment: 08/10/2018   Company: [employer]            Appointment Time:  Arrived:  1:44 PM CDT   Physician: Shaka Hamilton MD Time out: 3:30 PM       Office Treatment: Den was seen today for chemical exposure.    Diagnoses and all orders for this visit:    Toxic effect of gas exposure, accidental or unintentional, initial encounter  Acute irritant rhinitis  -     sodium chloride (OCEAN NASAL) 0.65 % nasal spray; 1 spray by Nasal route as needed for Congestion.       Restrictions: NONE  Regular Duty  Patient is discharged from Occupational Health.

## 2018-08-10 NOTE — PROGRESS NOTES
Subjective:       Patient ID: Den Rick is a 68 y.o. male.    Chief Complaint: Chemical Exposure    New work injury (8/7/2018) Pt reports a possible exposure to hydrogen sulfide. Pt c/o daily nose bleeds, post nasal drip, and throat irritation. Pt reports being at a certain land field on 7[place]31 and experienced a nose bleed, but thought nothing of it. He returned to the same land[place], and began experiencing the same nose bleeds with additional symptoms. grecia cc of nosebleeds. [employer] industrial hygienist. 3 separate site visits:     7/31 went to [facility] landfil and [email or web address] landfill and gas reclamation area. Experienced no onsite symptoms. Had a nosebleed that evening, no other symptoms.    8/2 went to [facility] again \    8/7 [facility] landfill, 1.5 hours of exposure to landfill gases. Had a post nasal drip that afternoon, then nose bled. 10PM that evening, bright red blood. Applied Afrin (instructions from his ENT for previous nose bleed) and this stopped the  Bleed.    Past history of perennial allergies, sees ENT annually for steroid and antibiotics.    According to the patient, the [facility]  landfill gas was measured on 7/31 at 110-1300 ppm H2S (?), [employer] 150ppm    Landfill gas is usually 50-60% methane and the rest is CO2 with 0.1% NH3 and some H2S.      Review of Systems   Constitution: Negative for chills and fever.   HENT: Positive for nosebleeds and sore throat.    Eyes: Negative for blurred vision.   Cardiovascular: Negative for chest pain.   Respiratory: Negative for shortness of breath.    Skin: Negative for rash.   Musculoskeletal: Negative for back pain and joint pain.   Gastrointestinal: Negative for abdominal pain, diarrhea, nausea and vomiting.   Neurological: Negative for headaches.   Psychiatric/Behavioral: The patient is not nervous/anxious.    All other systems reviewed and are negative.      Objective:      Physical Exam   Constitutional: He is oriented to person, place,  and time. He appears well-developed and well-nourished. He is cooperative.  Non-toxic appearance. He does not appear ill. No distress.   HENT:   Head: Normocephalic and atraumatic.   Right Ear: Hearing, tympanic membrane, external ear and ear canal normal.   Left Ear: Hearing, tympanic membrane, external ear and ear canal normal.   Nose: Nose normal. No mucosal edema, rhinorrhea or nasal deformity. No epistaxis. Right sinus exhibits no maxillary sinus tenderness and no frontal sinus tenderness. Left sinus exhibits no maxillary sinus tenderness and no frontal sinus tenderness.   Mouth/Throat: Uvula is midline, oropharynx is clear and moist and mucous membranes are normal. No trismus in the jaw. Normal dentition. No uvula swelling. No posterior oropharyngeal erythema.   Nasal exam revealed no bleeding or obstruction. Superficial anterior nasal mucosa hyperemia/irritation   Eyes: Conjunctivae and lids are normal. Right eye exhibits no discharge. Left eye exhibits no discharge. No scleral icterus.   Sclera clear bilat   Neck: Trachea normal, normal range of motion, full passive range of motion without pain and phonation normal. Neck supple.   Cardiovascular: Normal rate, regular rhythm, normal heart sounds, intact distal pulses and normal pulses.    Pulmonary/Chest: Effort normal and breath sounds normal. No respiratory distress.   Abdominal: Soft. Normal appearance and bowel sounds are normal. He exhibits no distension, no pulsatile midline mass and no mass. There is no tenderness.   Midline infraumbilical scar   Musculoskeletal: He exhibits no edema or deformity.        Right shoulder: He exhibits decreased range of motion and tenderness.        Left shoulder: He exhibits decreased range of motion and tenderness.   Neurological: He is alert and oriented to person, place, and time. He has normal strength and normal reflexes. No cranial nerve deficit or sensory deficit. He exhibits normal muscle tone. He displays a  negative Romberg sign. Coordination normal.   Skin: Skin is warm, dry and intact. He is not diaphoretic. No pallor.   Psychiatric: He has a normal mood and affect. His speech is normal and behavior is normal. Judgment and thought content normal. Cognition and memory are normal.   Nursing note and vitals reviewed.      Assessment:       1. Toxic effect of gas exposure, accidental or unintentional, initial encounter    2. Acute irritant rhinitis        Plan:       CBC, U/A, CMP    Medications Ordered This Encounter      sodium chloride (OCEAN NASAL) 0.65 % nasal spray          Si spray by Nasal route as needed for Congestion.          Dispense:  45 mL          Refill:  3      Restrictions: Regular Duty  Follow-up if symptoms worsen or fail to improve.

## 2018-08-12 ENCOUNTER — HOSPITAL ENCOUNTER (EMERGENCY)
Facility: HOSPITAL | Age: 68
Discharge: HOME | End: 2018-08-13
Attending: EMERGENCY MEDICINE
Payer: COMMERCIAL

## 2018-08-12 DIAGNOSIS — R04.0 EPISTAXIS: Primary | ICD-10-CM

## 2018-08-12 PROCEDURE — 30901 CONTROL OF NOSEBLEED: CPT | Mod: RT

## 2018-08-12 PROCEDURE — 25000003 PHARM REV CODE 250: Performed by: EMERGENCY MEDICINE

## 2018-08-12 PROCEDURE — 30901 CONTROL OF NOSEBLEED: CPT | Mod: RT,,, | Performed by: EMERGENCY MEDICINE

## 2018-08-12 PROCEDURE — 99283 EMERGENCY DEPT VISIT LOW MDM: CPT | Mod: 25

## 2018-08-12 PROCEDURE — 99283 EMERGENCY DEPT VISIT LOW MDM: CPT | Mod: 25,,, | Performed by: EMERGENCY MEDICINE

## 2018-08-12 RX ORDER — OXYMETAZOLINE HCL 0.05 %
1 SPRAY, NON-AEROSOL (ML) NASAL
Status: COMPLETED | OUTPATIENT
Start: 2018-08-12 | End: 2018-08-12

## 2018-08-12 RX ADMIN — OXYMETAZOLINE HYDROCHLORIDE 1 SPRAY: 5 SPRAY NASAL at 11:08

## 2018-08-13 VITALS
HEIGHT: 69 IN | SYSTOLIC BLOOD PRESSURE: 116 MMHG | HEART RATE: 80 BPM | TEMPERATURE: 98 F | OXYGEN SATURATION: 92 % | WEIGHT: 210 LBS | DIASTOLIC BLOOD PRESSURE: 83 MMHG | RESPIRATION RATE: 21 BRPM | BODY MASS INDEX: 31.1 KG/M2

## 2018-08-13 NOTE — ED TRIAGE NOTES
68 yr old male pt presents to the ed with complaints of a nosebleed x  2 weeks after being exposed to sulfur gas. Pt is aox 4. Pt denies dizziness, headache, nausea, or chest pain/vision changes.

## 2018-08-13 NOTE — ED PROVIDER NOTES
Encounter Date: 8/12/2018    SCRIBE #1 NOTE: I, Blanca Flaherty, am scribing for, and in the presence of,  Dr. Mullen. I have scribed the entire note.       History     Chief Complaint   Patient presents with    Epistaxis     Pt states bilateral nostril nose bleed started last Wed. Was seen by PCP and ENT on Friday. No cauterization done. He takes 81 mg asa daily.      Time seen by provider: 10:46 PM    This is a 68 y.o. male with medical conditions including HTN, HLD, CAD, NSTEMI, diverticulitis, diverticulosis, GERD, and colon polyp, who presents with complaint of epistaxis. 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 Patient states he inhaled the fumes and [place] to notice epist[place], and has been experiencing random episodes since. Patient states he noticed congestion a week prior to epistaxis episodes. Patient states he also noticed his rhinorrhea contained traces of blood prior to episodes. Patient reports tonights episode began when he leaned over to retrieve an item from his laundry basket. Patient states he has not had bleeding for the past 3 days but sx suddenly returned. Patient had an appointment with ENT Frriday afternoon after leaving [place], where he was given some sort of saline regiment, which did not help. Patient endorses feeling clots in the back of his throat a few days ago. Patient denies cp, SOB, diaphoresis, n/v/d. Patient reports similar sx a long time ago, where he was clotorized. Patient reports hx of stents.      The history is provided by the patient.     Review of patient's allergies indicates:   Allergen Reactions    Atorvastatin      Other reaction(s):(lipitor) Muscle pain    Niacin      Other reaction(s): Flushing (skin)     Past Medical History:   Diagnosis Date    Anticoagulant long-term use     Arthritis     Atrial tachycardia, paroxysmal 1/12    during Cardiac Rehab     Colon polyp     Coronary artery disease     Diverticulitis     Diverticulosis     GERD (gastroesophageal reflux disease)     Glucose intolerance (impaired glucose tolerance) 1/24/2017    Hyperlipidemia     Hypertension     IFG (impaired fasting glucose) 12/21/2015    NSTEMI (non-ST elevated myocardial infarction) 10/11/12    Obesity (BMI 30-39.9) 1/31/2018    Post PTCA     Sleep apnea      Past Surgical History:   Procedure Laterality Date    boewl resection      CATARACT EXTRACTION, BILATERAL  8/2011    COLONOSCOPY  2012    COLONOSCOPY W/ POLYPECTOMY      CORONARY ANGIOPLASTY WITH STENT PLACEMENT      ESOPHAGOGASTRODUODENOSCOPY      HERNIA REPAIR      KNEE SURGERY  2003    needle bx on chest      SHOULDER SURGERY      SIGMOIDECTOMY       Family History   Problem Relation Age of Onset    Cancer Mother         uterine    Asthma Sister     Heart disease Father         valve    Diabetes Maternal Grandmother      Social History     Tobacco Use    Smoking status: Never Smoker    Smokeless tobacco: Never Used    Tobacco comment: smoked weed 27 yrs ago   Substance Use Topics    Alcohol use: No     Alcohol/week: 0.0 oz     Comment: quit at 25 yrs old    Drug use: No     Review of Systems   Constitutional: Negative for chills, diaphoresis, fatigue and fever.   HENT: Positive for nosebleeds. Negative for facial swelling, rhinorrhea, trouble swallowing and voice change.    Eyes: Negative for visual disturbance.   Respiratory: Negative for cough, chest tightness and shortness of breath.    Cardiovascular: Negative for chest pain.   Gastrointestinal: Negative for abdominal pain, diarrhea, nausea and vomiting.   Genitourinary: Negative for dysuria, flank pain and hematuria.   Musculoskeletal: Negative for back pain and gait problem.   Neurological: Negative for weakness, light-headedness, numbness and headaches.   Psychiatric/Behavioral: Negative for behavioral problems and confusion.        Physical Exam     Initial Vitals [08/12/18 2233]   BP Pulse Resp Temp SpO2   (!) 145/85 95 20 97.6 °F (36.4 °C) (!) 92 %      MAP       --         Physical Exam    Nursing note and vitals reviewed.  Constitutional: He appears well-developed and well-nourished. He is not diaphoretic. No distress.   HENT:   Head: Normocephalic and atraumatic.   Mouth/Throat: Oropharynx is clear and moist.   Mucosal erosion of the nasal septum.   No active bleeding   Neck: Normal range of motion. Neck supple. No JVD present.   Cardiovascular: Normal rate, regular rhythm, normal heart sounds and intact distal pulses.   Pulmonary/Chest: Breath sounds normal. No respiratory distress. He has no wheezes. He has no rhonchi. He has no rales.   Abdominal: Soft. He exhibits no distension. There is no tenderness.   Musculoskeletal: Normal range of motion. He exhibits no edema.   Lymphadenopathy:     He has no cervical adenopathy.   Neurological: He is alert and oriented to person, place, and time. He has normal strength. No cranial nerve deficit or sensory deficit.   Skin: Skin is warm and dry.         ED Course   Epistaxis Mgmt  Date/Time: 8/15/2018 12:32 PM  Performed by: Nadja Mullen MD  Authorized by: Nadja Mullen MD   Consent Done: Not Needed  Patient sedated: no  Treatment site: right anterior  Repair method: Rhino Rocket  Post-procedure assessment: bleeding stopped  Treatment complexity: simple  Patient tolerance: Patient tolerated the procedure well with no immediate complications        Labs Reviewed - No data to display       Imaging Results    None          Medical Decision Making:   History:   Old Medical Records: I decided to obtain old medical records.  Initial Assessment:   Urgent evaluation of 68 y.o.male presenting with bilateral epistaxis.   Differential Diagnosis:   Anterior bleed, posterior bleed, septal hematoma  ED Management:  Bleeding currently resolved. Will observe for further bleeding.     11:45 PM  Pt  re-evaluated. Brisk bleeding from right nare.  Afrin attempted without success.  Pressure placed for 10 mins without success.  Rhino rocket inserted.  Bleeding controlled.  Will monitor.     12:30 AM  Bleeding now controlled.  Pt stable for discharge to follow up with ENT in 2-3 days.   Dictation #1  MRN:[id number]  CSN:[id number]            Scribe Attestation:   Scribe #1: I performed the above scribed service and the documentation accurately describes the services I performed. I attest to the accuracy of the note.    Attending Attestation:           Physician Attestation for Scribe:      Comments: I, Dr. Nadja Mullen, personally performed the services described in this documentation. All medical record entries made by the scribe were at my direction and in my presence.  I have reviewed the chart and agree that the record reflects my personal performance and is accurate and complete. Nadja Mullen MD.                 Clinical Impression:   The encounter diagnosis was Epistaxis.      Disposition:   Disposition: Discharged  Condition: Stable                        Nadja Mullen MD  08/15/18 1233

## 2018-08-14 ENCOUNTER — PATIENT MESSAGE (OUTPATIENT)
Dept: URGENT CARE | Facility: CLINIC | Age: 68
End: 2018-08-14

## 2018-08-15 PROCEDURE — 30901 CONTROL OF NOSEBLEED: CPT | Mod: RT

## 2018-08-16 ENCOUNTER — LAB VISIT (OUTPATIENT)
Dept: LAB | Facility: HOSPITAL | Age: 68
End: 2018-08-16
Attending: INTERNAL MEDICINE
Payer: COMMERCIAL

## 2018-08-16 DIAGNOSIS — E11.65 TYPE 2 DIABETES MELLITUS WITH HYPERGLYCEMIA, WITHOUT LONG-TERM CURRENT USE OF INSULIN: Chronic | ICD-10-CM

## 2018-08-16 DIAGNOSIS — E34.9 TESTOSTERONE DEFICIENCY: ICD-10-CM

## 2018-08-16 DIAGNOSIS — Z79.4 TYPE 2 DIABETES MELLITUS WITH HYPERGLYCEMIA, WITH LONG-TERM CURRENT USE OF INSULIN: Chronic | ICD-10-CM

## 2018-08-16 DIAGNOSIS — E11.65 TYPE 2 DIABETES MELLITUS WITH HYPERGLYCEMIA, WITH LONG-TERM CURRENT USE OF INSULIN: Chronic | ICD-10-CM

## 2018-08-16 LAB
ESTIMATED AVG GLUCOSE: 137 MG/DL
HBA1C MFR BLD HPLC: 6.4 %

## 2018-08-16 PROCEDURE — 83036 HEMOGLOBIN GLYCOSYLATED A1C: CPT

## 2018-08-16 PROCEDURE — 36415 COLL VENOUS BLD VENIPUNCTURE: CPT | Mod: PO

## 2018-08-16 PROCEDURE — 84305 ASSAY OF SOMATOMEDIN: CPT

## 2018-08-20 LAB
IGF-I SERPL-MCNC: 64 NG/ML (ref 32–209)
IGF-I Z-SCORE SERPL: -1.04 SD

## 2018-08-30 ENCOUNTER — PATIENT MESSAGE (OUTPATIENT)
Dept: ENDOCRINOLOGY | Facility: CLINIC | Age: 68
End: 2018-08-30

## 2018-08-30 DIAGNOSIS — Z79.4 TYPE 2 DIABETES MELLITUS WITH HYPERGLYCEMIA, WITH LONG-TERM CURRENT USE OF INSULIN: Primary | Chronic | ICD-10-CM

## 2018-08-30 DIAGNOSIS — E11.65 TYPE 2 DIABETES MELLITUS WITH HYPERGLYCEMIA, WITH LONG-TERM CURRENT USE OF INSULIN: Primary | Chronic | ICD-10-CM

## 2018-09-20 RX ORDER — FESOTERODINE FUMARATE 8 MG/1
8 TABLET, EXTENDED RELEASE ORAL DAILY
Qty: 30 EACH | Refills: 6 | Status: SHIPPED | OUTPATIENT
Start: 2018-09-20 | End: 2018-12-04 | Stop reason: SDUPTHER

## 2018-09-20 RX ORDER — FESOTERODINE FUMARATE 8 MG/1
8 TABLET, EXTENDED RELEASE ORAL DAILY
COMMUNITY
End: 2018-09-20 | Stop reason: SDUPTHER

## 2018-10-29 DIAGNOSIS — N40.1 BENIGN PROSTATIC HYPERPLASIA WITH URINARY OBSTRUCTION: ICD-10-CM

## 2018-10-29 DIAGNOSIS — N13.8 BENIGN PROSTATIC HYPERPLASIA WITH URINARY OBSTRUCTION: ICD-10-CM

## 2018-10-29 RX ORDER — ALFUZOSIN HYDROCHLORIDE 10 MG/1
10 TABLET, EXTENDED RELEASE ORAL NIGHTLY
Qty: 90 TABLET | Refills: 3 | Status: SHIPPED | OUTPATIENT
Start: 2018-10-29 | End: 2018-12-04 | Stop reason: SDUPTHER

## 2018-11-02 ENCOUNTER — LAB VISIT (OUTPATIENT)
Dept: LAB | Facility: HOSPITAL | Age: 68
End: 2018-11-02
Attending: NURSE PRACTITIONER
Payer: COMMERCIAL

## 2018-11-02 DIAGNOSIS — Z79.4 TYPE 2 DIABETES MELLITUS WITH HYPERGLYCEMIA, WITH LONG-TERM CURRENT USE OF INSULIN: Chronic | ICD-10-CM

## 2018-11-02 DIAGNOSIS — E11.65 TYPE 2 DIABETES MELLITUS WITH HYPERGLYCEMIA, WITH LONG-TERM CURRENT USE OF INSULIN: Chronic | ICD-10-CM

## 2018-11-02 LAB
ESTIMATED AVG GLUCOSE: 140 MG/DL
HBA1C MFR BLD HPLC: 6.5 %

## 2018-11-02 PROCEDURE — 84305 ASSAY OF SOMATOMEDIN: CPT

## 2018-11-02 PROCEDURE — 83036 HEMOGLOBIN GLYCOSYLATED A1C: CPT

## 2018-11-05 NOTE — PROGRESS NOTES
"Subjective:      Patient ID: Den Rick is a 68 y.o. male.    Chief Complaint:  Hypogonadism      History of Present Illness  Den Rick presents today for follow up of type 2 DM. Previous patient of Dr. Galo. Last seen in 5/2018. This is his first visit with me.     With regards to the diabetes:     Current symptoms/problems include none. He has been eating better, but still hasn't gotten started with an exercise routine. He notes his blood glucose has been significantly better than previous to starting insulin.     The patient was initially diagnosed with Type 2 diabetes mellitus based on the following criteria: Elevated fasting glucose and HbA1c - December, 2017     Known diabetic complications: None   Cardiovascular risk factors: advanced age (older than 55 for men, 65 for women), diabetes mellitus, dyslipidemia, hypertension, male gender, obesity (BMI >= 30 kg/m2) and previous history of CAD (MI in 2012 - PCI x 7)    At his last visit Dr. Ennis stopped his basal insulin.     Current diabetic medications include:  Metformin XR 2000 mg daily - denies any GI symptoms related to metformin use.  Jardiance 10 mg daily   - stays hydrated     Prior visit with dietician: yes  Current diet: Has been avoiding high carb foods, including bread (wheat); "I don't eat much at all"; plenty fluids.  Current exercise: Nothing strenuous. Walks his dog a few times per week.     Current monitoring regimen: home blood tests - ~3 times daily  Home blood sugar records: AM Fasting - 105-120s mostly  Any episodes of hypoglycemia? No  Knows how to correct with 15 grams of carbs- juice, coke, or a peppermint.      Diabetes Management Status  Statin: Taking  ACE/ARB: Not taking  Screening or Prevention Patient's value Goal Complete/Controlled?   HgA1C Testing and Control   Lab Results   Component Value Date    HGBA1C 6.5 (H) 11/02/2018      Annually/Less than 8% Yes   Lipid profile : 05/01/2018 Annually Yes   LDL control Lab " Results   Component Value Date    LDLCALC 32.6 (L) 05/01/2018    Annually/Less than 100 mg/dl  Yes   Nephropathy screening Lab Results   Component Value Date    LABMICR 23.0 01/22/2018     Lab Results   Component Value Date    PROTEINUA Negative 01/22/2018    Annually Yes   Blood pressure BP Readings from Last 1 Encounters:   11/07/18 120/60    Less than 140/90 Yes   Dilated retinal exam : 01/29/2018 Annually Yes   Foot exam   : 01/26/2018 Annually Yes     Review of Systems   Constitutional: Negative for unexpected weight change.   Eyes: Negative for visual disturbance.   Respiratory: Negative for shortness of breath.    Cardiovascular: Negative for chest pain.   Gastrointestinal: Negative for abdominal pain.   Endocrine: Negative for cold intolerance, heat intolerance, polydipsia, polyphagia and polyuria.   Musculoskeletal: Negative for arthralgias.   Skin: Negative for wound.   Neurological: Negative for headaches.   Hematological: Does not bruise/bleed easily.   Psychiatric/Behavioral: Negative for sleep disturbance.     Objective:   Physical Exam   Neck: No thyromegaly present.   Cardiovascular: Normal rate.   No edema present   Pulmonary/Chest: Effort normal.   Abdominal: Soft.   Vitals reviewed.  Appropriate footwear, Foot exam deferred, done 1/2018.     Body mass index is 31.03 kg/m².    Lab Review:   Lab Results   Component Value Date    HGBA1C 6.5 (H) 11/02/2018     Lab Results   Component Value Date    CHOL 74 (L) 05/01/2018    HDL 27 (L) 05/01/2018    LDLCALC 32.6 (L) 05/01/2018    TRIG 72 05/01/2018    CHOLHDL 36.5 05/01/2018     Lab Results   Component Value Date     03/13/2018    K 4.7 03/13/2018     03/13/2018    CO2 26 03/13/2018     (H) 03/13/2018    BUN 32 (H) 03/13/2018    CREATININE 0.9 03/13/2018    CALCIUM 9.0 03/13/2018    PROT 6.0 01/22/2018    ALBUMIN 3.5 01/22/2018    BILITOT 0.9 01/22/2018    ALKPHOS 68 01/22/2018    AST 26 01/22/2018    AST 27 01/22/2018    ALT 45 (H)  01/22/2018    ALT 45 (H) 01/22/2018    ANIONGAP 6 (L) 03/13/2018    ESTGFRAFRICA >60.0 03/13/2018    EGFRNONAA >60.0 03/13/2018    TSH 3.159 01/22/2018     Assessment and Plan     1. Type 2 diabetes mellitus with hyperglycemia, with long-term current use of insulin  Comprehensive metabolic panel    Hemoglobin A1c    Microalbumin/creatinine urine ratio   2. Essential hypertension     3. Hypercholesteremia     4. Hypogonadism in male     5. Obesity (BMI 30-39.9)       Type 2 diabetes mellitus with hyperglycemia, with long-term current use of insulin  -- Reviewed goals of therapy are to get the best control we can without hypoglycemia  Continue current medication regimen   -- Reviewed patient's current insulin regimen. Clarified proper insulin dose and timing in relation to meals, etc. Insulin injection sites and proper rotation instructed.    -- Advised frequent self blood glucose monitoring.  Patient encouraged to document glucose results and bring them to every clinic visit    -- Hypoglycemia precautions discussed. Instructed on precautions before driving.    -- Call for Bg repeatedly < 90 or > 180.   -- Close adherence to lifestyle changes recommended.   -- Periodic follow ups for eye evaluations, foot care and dental care suggested.      Essential hypertension  -- Controlled on current medication regimen   -- Blood pressure goals discussed with patient    Hypercholesteremia  Controlled   On statin per ADA recommendations      Hypogonadism in male  Defer to urology.    Last level was WNL.    Obesity (BMI 30-39.9)  -- encouraged dietary and lifestyle modifications   -- emphasized weight loss goals   -- weight loss will help insulin resistance         Follow-up in about 6 months (around 5/7/2019).  Labs prior to next appointment with me

## 2018-11-07 ENCOUNTER — OFFICE VISIT (OUTPATIENT)
Dept: ENDOCRINOLOGY | Facility: CLINIC | Age: 68
End: 2018-11-07
Payer: COMMERCIAL

## 2018-11-07 VITALS
DIASTOLIC BLOOD PRESSURE: 60 MMHG | HEIGHT: 69 IN | HEART RATE: 68 BPM | SYSTOLIC BLOOD PRESSURE: 120 MMHG | BODY MASS INDEX: 31.12 KG/M2 | WEIGHT: 210.13 LBS

## 2018-11-07 DIAGNOSIS — E66.9 OBESITY (BMI 30-39.9): Chronic | ICD-10-CM

## 2018-11-07 DIAGNOSIS — I10 ESSENTIAL HYPERTENSION: Chronic | ICD-10-CM

## 2018-11-07 DIAGNOSIS — Z79.4 TYPE 2 DIABETES MELLITUS WITH HYPERGLYCEMIA, WITH LONG-TERM CURRENT USE OF INSULIN: Primary | Chronic | ICD-10-CM

## 2018-11-07 DIAGNOSIS — E78.00 HYPERCHOLESTEREMIA: Chronic | ICD-10-CM

## 2018-11-07 DIAGNOSIS — E29.1 HYPOGONADISM IN MALE: ICD-10-CM

## 2018-11-07 DIAGNOSIS — E11.65 TYPE 2 DIABETES MELLITUS WITH HYPERGLYCEMIA, WITH LONG-TERM CURRENT USE OF INSULIN: Primary | Chronic | ICD-10-CM

## 2018-11-07 LAB
IGF-I SERPL-MCNC: 57 NG/ML (ref 32–209)
IGF-I Z-SCORE SERPL: -1.28 SD

## 2018-11-07 PROCEDURE — 1101F PT FALLS ASSESS-DOCD LE1/YR: CPT | Mod: CPTII,S$GLB,, | Performed by: NURSE PRACTITIONER

## 2018-11-07 PROCEDURE — 3074F SYST BP LT 130 MM HG: CPT | Mod: CPTII,S$GLB,, | Performed by: NURSE PRACTITIONER

## 2018-11-07 PROCEDURE — 3078F DIAST BP <80 MM HG: CPT | Mod: CPTII,S$GLB,, | Performed by: NURSE PRACTITIONER

## 2018-11-07 PROCEDURE — 99999 PR PBB SHADOW E&M-EST. PATIENT-LVL III: CPT | Mod: PBBFAC,,, | Performed by: NURSE PRACTITIONER

## 2018-11-07 PROCEDURE — 3044F HG A1C LEVEL LT 7.0%: CPT | Mod: CPTII,S$GLB,, | Performed by: NURSE PRACTITIONER

## 2018-11-07 PROCEDURE — 99214 OFFICE O/P EST MOD 30 MIN: CPT | Mod: S$GLB,,, | Performed by: NURSE PRACTITIONER

## 2018-11-07 NOTE — ASSESSMENT & PLAN NOTE
-- encouraged dietary and lifestyle modifications   -- emphasized weight loss goals   -- weight loss will help insulin resistance

## 2018-11-07 NOTE — ASSESSMENT & PLAN NOTE
-- Reviewed goals of therapy are to get the best control we can without hypoglycemia  Continue current medication regimen   -- Reviewed patient's current insulin regimen. Clarified proper insulin dose and timing in relation to meals, etc. Insulin injection sites and proper rotation instructed.    -- Advised frequent self blood glucose monitoring.  Patient encouraged to document glucose results and bring them to every clinic visit    -- Hypoglycemia precautions discussed. Instructed on precautions before driving.    -- Call for Bg repeatedly < 90 or > 180.   -- Close adherence to lifestyle changes recommended.   -- Periodic follow ups for eye evaluations, foot care and dental care suggested.

## 2018-11-16 RX ORDER — METOPROLOL SUCCINATE 100 MG/1
TABLET, EXTENDED RELEASE ORAL
Qty: 90 TABLET | Refills: 3 | Status: SHIPPED | OUTPATIENT
Start: 2018-11-16 | End: 2019-11-08 | Stop reason: SDUPTHER

## 2018-12-04 ENCOUNTER — OFFICE VISIT (OUTPATIENT)
Dept: UROLOGY | Facility: CLINIC | Age: 68
End: 2018-12-04
Payer: COMMERCIAL

## 2018-12-04 VITALS
BODY MASS INDEX: 29.98 KG/M2 | HEIGHT: 70 IN | HEART RATE: 66 BPM | SYSTOLIC BLOOD PRESSURE: 110 MMHG | WEIGHT: 209.44 LBS | DIASTOLIC BLOOD PRESSURE: 65 MMHG

## 2018-12-04 DIAGNOSIS — N40.1 BENIGN PROSTATIC HYPERPLASIA WITH URINARY OBSTRUCTION: Primary | ICD-10-CM

## 2018-12-04 DIAGNOSIS — N13.8 BENIGN PROSTATIC HYPERPLASIA WITH URINARY OBSTRUCTION: Primary | ICD-10-CM

## 2018-12-04 DIAGNOSIS — N32.81 OAB (OVERACTIVE BLADDER): ICD-10-CM

## 2018-12-04 DIAGNOSIS — E34.9 TESTOSTERONE DEFICIENCY: ICD-10-CM

## 2018-12-04 DIAGNOSIS — N52.9 ED (ERECTILE DYSFUNCTION) OF ORGANIC ORIGIN: ICD-10-CM

## 2018-12-04 PROCEDURE — 99214 OFFICE O/P EST MOD 30 MIN: CPT | Mod: S$GLB,,, | Performed by: UROLOGY

## 2018-12-04 PROCEDURE — 3078F DIAST BP <80 MM HG: CPT | Mod: CPTII,S$GLB,, | Performed by: UROLOGY

## 2018-12-04 PROCEDURE — 1101F PT FALLS ASSESS-DOCD LE1/YR: CPT | Mod: CPTII,S$GLB,, | Performed by: UROLOGY

## 2018-12-04 PROCEDURE — 3074F SYST BP LT 130 MM HG: CPT | Mod: CPTII,S$GLB,, | Performed by: UROLOGY

## 2018-12-04 PROCEDURE — 99999 PR PBB SHADOW E&M-EST. PATIENT-LVL V: CPT | Mod: PBBFAC,,, | Performed by: UROLOGY

## 2018-12-04 RX ORDER — FESOTERODINE FUMARATE 8 MG/1
8 TABLET, EXTENDED RELEASE ORAL DAILY
Qty: 90 EACH | Refills: 3 | Status: SHIPPED | OUTPATIENT
Start: 2018-12-04 | End: 2019-12-17 | Stop reason: SDUPTHER

## 2018-12-04 RX ORDER — TADALAFIL 20 MG/1
20 TABLET ORAL
Qty: 6 TABLET | Refills: 12 | Status: SHIPPED | OUTPATIENT
Start: 2018-12-04 | End: 2018-12-11 | Stop reason: SDUPTHER

## 2018-12-04 RX ORDER — ALFUZOSIN HYDROCHLORIDE 10 MG/1
10 TABLET, EXTENDED RELEASE ORAL NIGHTLY
Qty: 90 TABLET | Refills: 3 | Status: SHIPPED | OUTPATIENT
Start: 2018-12-04 | End: 2020-02-17

## 2018-12-04 NOTE — PROGRESS NOTES
CC:  BPH, OAB, low T    Den Rick is a 68 y.o. man who is here for the follow up.  Has not taken Axiron testosterone supplement over 1 year.  It did help him when he had low energy due to low T.  Nowaday, he feels fine with no problem with energy level.    Has been on uroxatral and toviaz for his LUTS.  s/p resection of diverticulitis by Dr. Hopkins. Since then, his IC and bladder pain problems are resolved.  Currently he is on uroxatral and toviaz for LUTS.  Had an Abdominal U/S in Jan 2015, which showed a fat-containing anterior abdominal wall hernia just above the umbilicus.     Nocturia x 1, usually 4 hours after he goes to bed.  No voiding problems.    Since 2016, he was diagnosed with Type II DM and is on Metformin.    Past Medical History:   Diagnosis Date    Anticoagulant long-term use     Arthritis     Atrial tachycardia, paroxysmal 1/12    during Cardiac Rehab    Colon polyp     Coronary artery disease     Diverticulitis     Diverticulosis     GERD (gastroesophageal reflux disease)     Glucose intolerance (impaired glucose tolerance) 1/24/2017    Hyperlipidemia     Hypertension     IFG (impaired fasting glucose) 12/21/2015    NSTEMI (non-ST elevated myocardial infarction) 10/11/12    Obesity (BMI 30-39.9) 1/31/2018    Post PTCA     Sleep apnea      Past Surgical History:   Procedure Laterality Date    ASPIRATION-FINE NEEDLE (FNA) Left 6/6/2013    Performed by [facility] Diagnostic Provider at [facility] OR 2ND FLR    boewl resection      CATARACT EXTRACTION, BILATERAL  8/2011    CATHETERIZATION, URETER Left 3/19/2014    Performed by Adam Shahid MD at [facility] OR 2ND FLR    COLECTOMY, SIGMOID, LAPAROSCOPIC N/A 3/19/2014    Performed by Main Lehman MD at [facility] OR 2ND FLR    COLONOSCOPY  2012    COLONOSCOPY N/A 10/10/2016    Procedure: COLONOSCOPY;  Surgeon: Main Lehman MD;  [facility]: [facility] ([facility]);  Service: Endoscopy;  Laterality: N/A;    COLONOSCOPY N/A 10/10/2016     "Performed by Main Lehman MD at [facility] ENDO (4TH FLR)    COLONOSCOPY N/A 3/19/2014    Performed by Main Lehman MD at [facility] OR 2ND FLR    COLONOSCOPY W/ POLYPECTOMY      CORONARY ANGIOPLASTY WITH STENT PLACEMENT      EGD (ESOPHAGOGASTRODUODENOSCOPY) N/A 4/3/2014    Performed by Kieran Nguyen MD at [facility] ENDO (2ND FLR)    EGD (ESOPHAGOGASTRODUODENOSCOPY) Left 4/1/2014    Performed by Kieran Nguyen MD at [facility] ENDO (2ND FLR)    ESOPHAGOGASTRODUODENOSCOPY      HERNIA REPAIR      KNEE SURGERY  2003    needle bx on chest      REPAIR-HERNIA-LAPAROSCOPIC-INCISIONAL N/A 2/17/2016    Performed by Main Lehman MD at [facility] OR 2ND FLR    SHOULDER SURGERY      SIGMOIDECTOMY       Social History     Tobacco Use    Smoking status: Never Smoker    Smokeless tobacco: Never Used    Tobacco comment: smoked weed 27 yrs ago   Substance Use Topics    Alcohol use: No     Alcohol/week: 0.0 oz     Comment: quit at 25 yrs old    Drug use: No     Family History   Problem Relation Age of Onset    Cancer Mother         uterine    Asthma Sister     Heart disease Father         valve    Diabetes Maternal Grandmother      Allergy:  Review of patient's allergies indicates:   Allergen Reactions    Atorvastatin      Other reaction(s):(lipitor) Muscle pain    Niacin      Other reaction(s): Flushing (skin)     Outpatient Encounter Medications as of 12/4/2018   Medication Sig Dispense Refill    alfuzosin (UROXATRAL) 10 mg Tb24 Take 1 tablet (10 mg total) by mouth every evening. 90 tablet 3    aspirin (ECOTRIN) 81 MG EC tablet Take 81 mg by mouth once daily.      BD ULTRA-FINE RICHARD PEN NEEDLES 32 gauge x 5/32" Ndle Uses daily, daily insulin injections 100 each 3    blood sugar diagnostic Strp To check BG 4 times daily, to use with insurance preferred meter 350 strip 3    blood-glucose meter kit To check BG 4 times daily, to use with insurance preferred meter 1 each 0    CELEBREX 200 mg capsule Take 1 capsule " by mouth as needed.  2    clindamycin phosphate 1% (CLINDAGEL) 1 % gel APPLY TWICE A DAY TO FACE  1    CORDRAN TAPE LARGE ROLL 4 mcg/cm2 Tape Apply 1 application topically every evening. Apply to affected area  1    desoximetasone (TOPICORT) 0.25 % cream APPLY TO AFFECTED AREA TWICE A DAY FOR PSORIASIS  5    empagliflozin (JARDIANCE) 10 mg Tab Take 1 tablet by mouth once daily. 90 tablet 3    ezetimibe (ZETIA) 10 mg tablet Take 10 mg by mouth once daily.  11    fesoterodine (TOVIAZ) 8 mg Tb24 Take 8 mg by mouth once daily. 90 each 3    fish oil-omega-3 fatty acids 300-1,000 mg capsule Take 1 g by mouth once daily.      FREESTYLE LANCETS 28 gauge lancets TO CHECK BG 4 TIMES DAILY, TO USE WITH INSURANCE PREFERRED METER  3    levoFLOXacin (LEVAQUIN) 500 MG tablet Take 500 mg by mouth once daily.  0    metFORMIN (GLUCOPHAGE-XR) 500 MG 24 hr tablet Take 4 tablets (2,000 mg total) by mouth every evening. 360 tablet 3    methocarbamol (ROBAXIN) 750 MG Tab Take 750 mg by mouth continuous prn.        methylsulfonylmethane (MSM) 500 mg Cap Take 1 capsule by mouth Daily.        metoprolol succinate (TOPROL-XL) 100 MG 24 hr tablet TAKE 1 TABLET EVERY DAY 90 tablet 3    multivitamin (THERAGRAN) per tablet Take 1 tablet by mouth Daily.        mupirocin (BACTROBAN) 2 % ointment APPLY TO AFFECTED AREA DAILY AS NEEDED  3    nitroGLYCERIN (NITROSTAT) 0.4 MG SL tablet Place 1 tablet (0.4 mg total) under the tongue every 5 (five) minutes as needed. 100 tablet 3    oxycodone-acetaminophen (PERCOCET) 5-325 mg per tablet Take 1 tablet by mouth as needed.  0    pantoprazole (PROTONIX) 40 MG tablet Take 40 mg by mouth once daily.  2    PENNSAID 20 mg/gram /actuation(2 %) sopm APPLY TWO PUMPS TO AFFECTED AREA(S) TWICE DAILY AS NEEDED  3    predniSONE (DELTASONE) 20 MG tablet TAKE 2 TABLET DAY 1, 1 & 1/2 TABS DAILY FOR 2 DAYS, 1 TAB DAILY FOR 2 DAYS, 1/2 TAB DAILY FOR 2 DAYS  0    promethazine-codeine 6.25-10 mg/5 ml  (PHENERGAN WITH CODEINE) 6.25-10 mg/5 mL syrup TAKE 5-10 ML BY MOUTH EVERY 8 HOURS  0    rosuvastatin (CRESTOR) 20 MG tablet TAKE 1 TABLET (20 MG TOTAL) BY MOUTH EVERY EVENING. 90 tablet 3    sodium chloride (OCEAN NASAL) 0.65 % nasal spray 1 spray by Nasal route as needed for Congestion. 45 mL 3    [DISCONTINUED] alfuzosin (UROXATRAL) 10 mg Tb24 TAKE 1 TABLET (10 MG TOTAL) BY MOUTH EVERY EVENING. 90 tablet 3    [DISCONTINUED] fesoterodine (TOVIAZ) 8 mg Tb24 Take 8 mg by mouth once daily. 30 each 6    tadalafil (CIALIS) 20 MG Tab Take 1 tablet (20 mg total) by mouth as needed. 6 tablet 12    [DISCONTINUED] lancets Misc To check BG 4 times daily, to use with insurance preferred meter 350 each 3     No facility-administered encounter medications on file as of 12/4/2018.      Review of Systems   General ROS: GENERAL:  No weight gain or loss  SKIN:  No rashes or lacerations  HEAD:  No headaches  EYES:  No exophthalmos, jaundice or blindness  EARS:  No dizziness, tinnitus or hearing loss  NOSE:  No changes in smell  MOUTH & THROAT:  No dyskinetic movements or obvious goiter  CHEST:  No shortness of breath, hyperventilation or cough  CARDIOVASCULAR:  No tachycardia or chest pain  ABDOMEN:  No nausea, vomiting, pain, constipation or diarrhea  URINARY:  No frequency, dysuria or sexual dysfunction  ENDOCRINE:  No polydipsia, polyuria  MUSCULOSKELETAL:  No pain or stiffness of the joints  NEUROLOGIC:  No weakness, sensory changes, seizures, confusion, memory loss, tremor or other abnormal movements  Physical Exam     Vitals:    12/04/18 0856   BP: 110/65   Pulse: 66     General Appearance:  Alert, cooperative, no distress, appears stated age   Head:  Normocephalic, without obvious abnormality, atraumatic   Eyes:  PERRL, conjunctiva/corneas clear, EOM's intact, fundi benign, both eyes   Ears:  Normal TM's and external ear canals, both ears   Nose: Nares normal, septum midline, mucosa normal, no drainage or sinus  tenderness   Throat: Lips, mucosa, and tongue normal; teeth and gums normal   Neck: Supple, symmetrical, trachea midline, no adenopathy, thyroid: not enlarged, symmetric, no tenderness/mass/nodules, no carotid bruit or JVD   Back:   Symmetric, no curvature, ROM normal, no CVA tenderness   Lungs:   Clear to auscultation bilaterally, respirations unlabored   Chest Wall:  No tenderness or deformity   Heart:  Regular rate and rhythm, S1, S2 normal, no murmur, rub or gallop   Abdomen:   Soft, non-tender, bowel sounds active all four quadrants,  no masses, no organomegaly   Genitalia:  Normal male, normal testicles, normal epididymis, normal vas palpated.   Rectal:  Normal tone, no masses or tenderness;   Extremities: Extremities normal, atraumatic, no cyanosis or edema   Pulses: 2+ and symmetric   Skin: Skin color, texture, turgor normal, no rashes or lesions   Lymph nodes: Cervical, supraclavicular, and axillary nodes normal   Neurologic: Normal     Prostate 35 grams smooth with no nodule or tenderness.    LABS:  Lab Results   Component Value Date    PSA 0.24 01/22/2018    PSA 0.28 07/18/2013    PSA 0.24 12/28/2012    PSA 0.27 12/15/2011    PSA 0.28 06/21/2011    PSA 0.32 01/04/2010    PSA 0.29 06/29/2009    PSA 0.23 08/04/2008    PSA 0.3 01/05/2007    PSADIAG 0.31 06/24/2016    PSADIAG 0.31 04/23/2015    PSADIAG 0.29 09/23/2014    PSADIAG 0.26 02/04/2014     Results for orders placed or performed in visit on 04/23/15   Prostate Specific Antigen, Diagnostic   Result Value Ref Range    PSA DIAGNOSTIC 0.31 0.00-4.00 ng/mL   Results for orders placed or performed in visit on 09/23/14   Prostate Specific Antigen, Diagnostic   Result Value Ref Range    PSA DIAGNOSTIC 0.29 0.00-4.00 ng/mL   Results for orders placed or performed in visit on 02/04/14   Prostate Specific Antigen, Diagnostic   Result Value Ref Range    PSA DIAGNOSTIC 0.26 0.00-4.00 ng/mL     Lab Results   Component Value Date    CREATININE 0.9 03/13/2018     CREATININE 0.8 02/15/2018    CREATININE 0.9 01/22/2018    CREATININE 0.9 01/22/2018     Results for orders placed or performed in visit on 04/23/15   Testosterone   Result Value Ref Range    Testosterone, Total 700 195.0-1138.0 ng/dL   Results for orders placed or performed in visit on 09/23/14   Testosterone   Result Value Ref Range    Testosterone, Total 581 195.0-1138.0 ng/dL   Results for orders placed or performed in visit on 02/04/14   Testosterone   Result Value Ref Range    Testosterone, Total 364 195.0-1138.0 ng/dL     Urine Culture, Routine   Date Value Ref Range Status   03/31/2014 No growth  Final     Assessment and Plan:  Diagnoses and all orders for this visit:    Benign prostatic hyperplasia with urinary obstruction  -     alfuzosin (UROXATRAL) 10 mg Tb24; Take 1 tablet (10 mg total) by mouth every evening.    ED (erectile dysfunction) of organic origin  -     tadalafil (CIALIS) 20 MG Tab; Take 1 tablet (20 mg total) by mouth as needed.    Testosterone deficiency  -     Testosterone; Future  -     CBC Without Differential; Future    OAB (overactive bladder)  -     fesoterodine (TOVIAZ) 8 mg Tb24; Take 8 mg by mouth once daily.    no more TRT.    I won't check his T level today.  He is taking 9 meds daily and wants to cut down the number of meds he is taking.  Discussed that he can stop Toviaz first and see whether or not his OAB symptoms are stable.  If so, he can stop Toviaz and continue uroxatral only.    discussed Rezum therapy as an alternative therapy for BPH with obstruction.  Its brochure given along with its website.   He watched the educational video.  He understands that his urinary symptoms may not be better for the first month, and even  up to 3 months when the denaturing and absorption of the prostate tissues will be completed.  Expect to keep an indwelling catheter up to 1 wk.  He needs to stop blood thinning medications 1 wk before the procedure.  Needs to have someone to drive in and  out for your procedure.  Nothing by mouth for anesthesia for 8 hours before the procedure.    I spent 25 minutes with the patient of which more than half was spent in direct consultation with the patient in regards to our treatment and plan.      Follow-up:  Follow-up in about 1 year (around 12/4/2019) for PSA with an CAROLYN.

## 2018-12-04 NOTE — PATIENT INSTRUCTIONS
[email or web address]    discussed Rezum therapy as an alternative therapy for BPH with obstruction.  Its brochure given along with its website.   He watched the educational video.  He understands that his urinary symptoms may not be better for the first month, and even  up to 3 months when the denaturing and absorption of the prostate tissues will be completed.  Expect to keep an indwelling catheter up to 1 wk.  He needs to stop blood thinning medications 1 wk before the procedure.  Needs to have someone to drive in and out for your procedure.  Nothing by mouth for anesthesia for 8 hours before the procedure.

## 2018-12-11 ENCOUNTER — PATIENT MESSAGE (OUTPATIENT)
Dept: UROLOGY | Facility: CLINIC | Age: 68
End: 2018-12-11

## 2018-12-11 DIAGNOSIS — N52.9 ED (ERECTILE DYSFUNCTION) OF ORGANIC ORIGIN: ICD-10-CM

## 2018-12-11 RX ORDER — TADALAFIL 20 MG/1
20 TABLET ORAL
Qty: 6 TABLET | Refills: 12 | Status: SHIPPED | OUTPATIENT
Start: 2018-12-11 | End: 2019-11-07

## 2018-12-11 NOTE — TELEPHONE ENCOUNTER
[facility]

## 2018-12-12 RX ORDER — ROSUVASTATIN CALCIUM 20 MG/1
20 TABLET, COATED ORAL NIGHTLY
Qty: 90 TABLET | Refills: 3 | Status: SHIPPED | OUTPATIENT
Start: 2018-12-12 | End: 2019-12-12 | Stop reason: SDUPTHER

## 2018-12-18 ENCOUNTER — LAB VISIT (OUTPATIENT)
Dept: LAB | Facility: HOSPITAL | Age: 68
End: 2018-12-18
Attending: INTERNAL MEDICINE
Payer: COMMERCIAL

## 2018-12-18 DIAGNOSIS — I10 ESSENTIAL HYPERTENSION: ICD-10-CM

## 2018-12-18 DIAGNOSIS — E78.00 HYPERCHOLESTEREMIA: ICD-10-CM

## 2018-12-18 LAB
ALT SERPL W/O P-5'-P-CCNC: 38 U/L
ANION GAP SERPL CALC-SCNC: 8 MMOL/L
AST SERPL-CCNC: 30 U/L
BUN SERPL-MCNC: 22 MG/DL
CALCIUM SERPL-MCNC: 9.1 MG/DL
CHLORIDE SERPL-SCNC: 109 MMOL/L
CHOLEST SERPL-MCNC: 65 MG/DL
CHOLEST/HDLC SERPL: 2.2 {RATIO}
CO2 SERPL-SCNC: 21 MMOL/L
CREAT SERPL-MCNC: 0.9 MG/DL
EST. GFR  (AFRICAN AMERICAN): >60 ML/MIN/1.73 M^2
EST. GFR  (NON AFRICAN AMERICAN): >60 ML/MIN/1.73 M^2
GLUCOSE SERPL-MCNC: 123 MG/DL
HDLC SERPL-MCNC: 29 MG/DL
HDLC SERPL: 44.6 %
LDLC SERPL CALC-MCNC: 22.2 MG/DL
NONHDLC SERPL-MCNC: 36 MG/DL
POTASSIUM SERPL-SCNC: 5 MMOL/L
SODIUM SERPL-SCNC: 138 MMOL/L
TRIGL SERPL-MCNC: 69 MG/DL

## 2018-12-18 PROCEDURE — 84460 ALANINE AMINO (ALT) (SGPT): CPT

## 2018-12-18 PROCEDURE — 80061 LIPID PANEL: CPT

## 2018-12-18 PROCEDURE — 80048 BASIC METABOLIC PNL TOTAL CA: CPT

## 2018-12-18 PROCEDURE — 84450 TRANSFERASE (AST) (SGOT): CPT

## 2018-12-18 PROCEDURE — 36415 COLL VENOUS BLD VENIPUNCTURE: CPT | Mod: PO

## 2018-12-24 RX ORDER — EZETIMIBE 10 MG/1
TABLET ORAL
Qty: 30 TABLET | Refills: 11 | Status: SHIPPED | OUTPATIENT
Start: 2018-12-24 | End: 2018-12-27

## 2018-12-27 ENCOUNTER — OFFICE VISIT (OUTPATIENT)
Dept: CARDIOLOGY | Facility: CLINIC | Age: 68
End: 2018-12-27
Payer: COMMERCIAL

## 2018-12-27 VITALS
SYSTOLIC BLOOD PRESSURE: 118 MMHG | DIASTOLIC BLOOD PRESSURE: 76 MMHG | WEIGHT: 208.75 LBS | HEIGHT: 69 IN | HEART RATE: 70 BPM | BODY MASS INDEX: 30.92 KG/M2

## 2018-12-27 DIAGNOSIS — Q21.10 ASD (ATRIAL SEPTAL DEFECT): ICD-10-CM

## 2018-12-27 DIAGNOSIS — E66.9 OBESITY (BMI 30-39.9): Chronic | ICD-10-CM

## 2018-12-27 DIAGNOSIS — I10 ESSENTIAL HYPERTENSION: Chronic | ICD-10-CM

## 2018-12-27 DIAGNOSIS — I21.3 ST ELEVATION MYOCARDIAL INFARCTION (STEMI), UNSPECIFIED ARTERY: Chronic | ICD-10-CM

## 2018-12-27 DIAGNOSIS — E78.00 HYPERCHOLESTEREMIA: Chronic | ICD-10-CM

## 2018-12-27 DIAGNOSIS — Z98.61 S/P PTCA (PERCUTANEOUS TRANSLUMINAL CORONARY ANGIOPLASTY): Chronic | ICD-10-CM

## 2018-12-27 DIAGNOSIS — I25.10 CORONARY ARTERY DISEASE INVOLVING NATIVE CORONARY ARTERY OF NATIVE HEART WITHOUT ANGINA PECTORIS: Primary | ICD-10-CM

## 2018-12-27 DIAGNOSIS — Z79.4 TYPE 2 DIABETES MELLITUS WITH HYPERGLYCEMIA, WITH LONG-TERM CURRENT USE OF INSULIN: Chronic | ICD-10-CM

## 2018-12-27 DIAGNOSIS — E11.65 TYPE 2 DIABETES MELLITUS WITH HYPERGLYCEMIA, WITH LONG-TERM CURRENT USE OF INSULIN: Chronic | ICD-10-CM

## 2018-12-27 PROCEDURE — 99214 OFFICE O/P EST MOD 30 MIN: CPT | Mod: S$GLB,,, | Performed by: NURSE PRACTITIONER

## 2018-12-27 PROCEDURE — 1101F PT FALLS ASSESS-DOCD LE1/YR: CPT | Mod: CPTII,S$GLB,, | Performed by: NURSE PRACTITIONER

## 2018-12-27 PROCEDURE — 93005 ELECTROCARDIOGRAM TRACING: CPT | Mod: S$GLB,,, | Performed by: INTERNAL MEDICINE

## 2018-12-27 PROCEDURE — 3078F DIAST BP <80 MM HG: CPT | Mod: CPTII,S$GLB,, | Performed by: NURSE PRACTITIONER

## 2018-12-27 PROCEDURE — 3044F HG A1C LEVEL LT 7.0%: CPT | Mod: CPTII,S$GLB,, | Performed by: NURSE PRACTITIONER

## 2018-12-27 PROCEDURE — 3074F SYST BP LT 130 MM HG: CPT | Mod: CPTII,S$GLB,, | Performed by: NURSE PRACTITIONER

## 2018-12-27 PROCEDURE — 99999 PR PBB SHADOW E&M-EST. PATIENT-LVL III: CPT | Mod: PBBFAC,,, | Performed by: NURSE PRACTITIONER

## 2018-12-27 PROCEDURE — 93010 ELECTROCARDIOGRAM REPORT: CPT | Mod: S$GLB,,, | Performed by: INTERNAL MEDICINE

## 2018-12-27 RX ORDER — EZETIMIBE 10 MG/1
5 TABLET ORAL DAILY
Qty: 90 TABLET | Refills: 3 | Status: ON HOLD | OUTPATIENT
Start: 2018-12-27 | End: 2020-11-10 | Stop reason: HOSPADM

## 2018-12-27 RX ORDER — NITROGLYCERIN 0.4 MG/1
0.4 TABLET SUBLINGUAL EVERY 5 MIN PRN
Qty: 100 TABLET | Refills: 3 | Status: SHIPPED | OUTPATIENT
Start: 2018-12-27 | End: 2021-03-09

## 2018-12-27 NOTE — LETTER
December 27, 2018      Jennifer Barlow MD  [address]  8th Floor - Cardiology  [place] [address]           [place] - Cardiology  [address]  [place] [address]  Phone: [phone]          Patient: Den Rick   MR Number: [id number]   YOB: 1950   Date of Visit: 12/27/2018       Dear Dr. Jennifer Barlow:    Thank you for referring Den Rick to me for evaluation. Attached you will find relevant portions of my assessment and plan of care.    If you have questions, please do not hesitate to call me. I look forward to following Den Rick along with you.    Sincerely,    Latisha Tavares, NP    Enclosure  CC:  No Recipients    If you would like to receive this communication electronically, please contact [email or web address] or [phone] to request more information on [employer] access.    For providers and/or their staff who would like to refer a patient to [facility], please contact us through our one-stop-shop provider referral line, [facility], at [phone].    If you feel you have received this communication in error or would no longer like to receive these types of communications, please e-mail [email or web address]

## 2018-12-27 NOTE — PROGRESS NOTES
Mr. Rick is a patient of Dr. Barlow and was last seen in [facility] [place] [facility] Cardiology Visit 2/1/18.    Subjective:   Patient ID:  Den Rick is a 68 y.o. male who presents for follow-up of Coronary Artery Disease    Problem List:  CAD   NSTEMI - OM1 YUE 10/11/12   YUE ostial and prox RCA 10/17/12   YUE mid LAD 1/18/07   YUE prox and mid PDA 1/18/07   Hypercholesterolemia   (arthralgias with Lipitor)   HTN  GI bleed 2014 (on DAPT)  DM - new onset 12/17    HPI:   Den Rick is in clinic today for routine follow up.  Patient denies chest pain with exertion or at rest, palpitations, SOB, VALENCIA, dizziness, syncope, edema, orthopnea, PND, or claudication.  Reports routine exercise, walking about a mile a day.  He is treated with low dose ASA and high intensity statin.  Patient is taking rosuvastatin 20mg and zetia 10mg. LDL is 22.     Review of Systems   Constitution: Negative for decreased appetite, diaphoresis, weakness, malaise/fatigue, weight gain and weight loss.   Eyes: Negative for visual disturbance.   Cardiovascular: Negative for chest pain, claudication, dyspnea on exertion, irregular heartbeat, leg swelling, near-syncope, orthopnea, palpitations, paroxysmal nocturnal dyspnea and syncope.        Denies chest pressure   Respiratory: Negative for cough, hemoptysis, shortness of breath, sleep disturbances due to breathing and snoring.    Endocrine: Negative for cold intolerance and heat intolerance.   Hematologic/Lymphatic: Negative for bleeding problem. Does not bruise/bleed easily.   Musculoskeletal: Negative for myalgias.   Gastrointestinal: Negative for bloating, abdominal pain, anorexia, change in bowel habit, constipation, diarrhea, nausea and vomiting.   Neurological: Negative for difficulty with concentration, disturbances in coordination, excessive daytime sleepiness, dizziness, headaches, light-headedness, loss of balance and numbness.   Psychiatric/Behavioral: The patient does not have insomnia.   "      Allergies and current medications updated and reviewed:  Review of patient's allergies indicates:   Allergen Reactions    Atorvastatin      Other reaction(s):(lipitor) Muscle pain    Niacin      Other reaction(s): Flushing (skin)     Current Outpatient Medications   Medication Sig    alfuzosin (UROXATRAL) 10 mg Tb24 Take 1 tablet (10 mg total) by mouth every evening.    aspirin (ECOTRIN) 81 MG EC tablet Take 81 mg by mouth once daily.    BD ULTRA-FINE RICHARD PEN NEEDLES 32 gauge x 5/32" Ndle Uses daily, daily insulin injections    blood sugar diagnostic Strp To check BG 4 times daily, to use with insurance preferred meter    blood-glucose meter kit To check BG 4 times daily, to use with insurance preferred meter    CELEBREX 200 mg capsule Take 1 capsule by mouth as needed.    clindamycin phosphate 1% (CLINDAGEL) 1 % gel APPLY TWICE A DAY TO FACE    CORDRAN TAPE LARGE ROLL 4 mcg/cm2 Tape Apply 1 application topically every evening. Apply to affected area    desoximetasone (TOPICORT) 0.25 % cream APPLY TO AFFECTED AREA TWICE A DAY FOR PSORIASIS    empagliflozin (JARDIANCE) 10 mg Tab Take 1 tablet by mouth once daily.    ezetimibe (ZETIA) 10 mg tablet TAKE 1 TABLET EVERY DAY    fesoterodine (TOVIAZ) 8 mg Tb24 Take 8 mg by mouth once daily.    fish oil-omega-3 fatty acids 300-1,000 mg capsule Take 1 g by mouth once daily.    FREESTYLE LANCETS 28 gauge lancets TO CHECK BG 4 TIMES DAILY, TO USE WITH INSURANCE PREFERRED METER    metFORMIN (GLUCOPHAGE-XR) 500 MG 24 hr tablet Take 4 tablets (2,000 mg total) by mouth every evening.    methocarbamol (ROBAXIN) 750 MG Tab Take 750 mg by mouth continuous prn.      methylsulfonylmethane (MSM) 500 mg Cap Take 1 capsule by mouth Daily.      metoprolol succinate (TOPROL-XL) 100 MG 24 hr tablet TAKE 1 TABLET EVERY DAY    multivitamin (THERAGRAN) per tablet Take 1 tablet by mouth Daily.      mupirocin (BACTROBAN) 2 % ointment APPLY TO AFFECTED AREA DAILY AS " "NEEDED    nitroGLYCERIN (NITROSTAT) 0.4 MG SL tablet Place 1 tablet (0.4 mg total) under the tongue every 5 (five) minutes as needed.    oxycodone-acetaminophen (PERCOCET) 5-325 mg per tablet Take 1 tablet by mouth as needed.    pantoprazole (PROTONIX) 40 MG tablet Take 40 mg by mouth daily as needed.     PENNSAID 20 mg/gram /actuation(2 %) sopm APPLY TWO PUMPS TO AFFECTED AREA(S) TWICE DAILY AS NEEDED    rosuvastatin (CRESTOR) 20 MG tablet TAKE 1 TABLET (20 MG TOTAL) BY MOUTH EVERY EVENING.    sodium chloride (OCEAN NASAL) 0.65 % nasal spray 1 spray by Nasal route as needed for Congestion.    tadalafil (CIALIS) 20 MG Tab Take 1 tablet (20 mg total) by mouth as needed.     No current facility-administered medications for this visit.      Objective:        /76 (BP Location: Right arm, Patient Position: Sitting, BP Method: Medium (Manual))   Pulse 70   Ht 5' 9" (1.753 m)   Wt 94.7 kg (208 lb 12.4 oz)   BMI 30.83 kg/m²       Physical Exam   Constitutional: He is oriented to person, place, and time. Vital signs are normal. He appears well-developed and well-nourished. He is active. No distress.   HENT:   Head: Normocephalic and atraumatic.   Eyes: Conjunctivae and lids are normal. No scleral icterus.   Neck: Neck supple. Normal carotid pulses, no hepatojugular reflux and no JVD present. Carotid bruit is not present.   Cardiovascular: Normal rate, regular rhythm, S1 normal, S2 normal and intact distal pulses. PMI is not displaced. Exam reveals no gallop and no friction rub.   No murmur heard.  Pulses:       Carotid pulses are 2+ on the right side, and 2+ on the left side.       Radial pulses are 2+ on the right side, and 2+ on the left side.        Dorsalis pedis pulses are 2+ on the right side, and 2+ on the left side.        Posterior tibial pulses are 1+ on the right side, and 1+ on the left side.   Pulmonary/Chest: Effort normal and breath sounds normal. No respiratory distress. He has no decreased " breath sounds. He has no wheezes. He has no rhonchi. He has no rales. He exhibits no tenderness.   Abdominal: Soft. Normal appearance and bowel sounds are normal. He exhibits no distension, no fluid wave, no abdominal bruit, no ascites and no pulsatile midline mass. There is no hepatosplenomegaly. There is no tenderness.   Musculoskeletal: He exhibits no edema.   Neurological: He is alert and oriented to person, place, and time. Gait normal.   Skin: Skin is warm, dry and intact. No rash noted. He is not diaphoretic. Nails show no clubbing.   Psychiatric: He has a normal mood and affect. His speech is normal and behavior is normal. Judgment and thought content normal. Cognition and memory are normal.   Nursing note and vitals reviewed.      Chemistry        Component Value Date/Time     12/18/2018 0931    K 5.0 12/18/2018 0931     12/18/2018 0931    CO2 21 (L) 12/18/2018 0931    BUN 22 12/18/2018 0931    CREATININE 0.9 12/18/2018 0931     (H) 12/18/2018 0931        Component Value Date/Time    CALCIUM 9.1 12/18/2018 0931    ALKPHOS 68 01/22/2018 0750    AST 30 12/18/2018 0931    ALT 38 12/18/2018 0931    BILITOT 0.9 01/22/2018 0750    ESTGFRAFRICA >60.0 12/18/2018 0931    EGFRNONAA >60.0 12/18/2018 0931        Lab Results   Component Value Date    HGBA1C 6.5 (H) 11/02/2018     Recent Labs   Lab 01/22/18  0750  12/18/18  0931   WHITE BLOOD CELL COUNT 4.18  --   --    HEMOGLOBIN 17.6  --   --    HEMATOCRIT 52.5  --   --    MCV 81 L  --   --    PLATELETS 169  --   --    TSH 3.159  --   --    CHOLESTEROL 70 L  71 L   < > 65 L   HDL 24 L  26 L   < > 29 L   LDL CHOLESTEROL 31.0 L  29.6 L   < > 22.2 L   TRIGLYCERIDES 75  77   < > 69   HDL/CHOLESTEROL RATIO 34.3  36.6   < > 44.6    < > = values in this interval not displayed.          Lab Results   Component Value Date    IRON 112 11/01/2016    TIBC 234 (L) 11/01/2016    FERRITIN 141 11/01/2016       Test(s) Reviewed  I have reviewed the following in  detail:  [x] Stress test   [x] Angiography   [x] Echocardiogram   [x] Labs   [] Other:         Assessment/Plan:   1. Coronary artery disease without angina pectoris  Asymptomatic. Taking high intensity statin and ASA. No changes.    - EKG 12-lead  - nitroGLYCERIN (NITROSTAT) 0.4 MG SL tablet; Place 1 tablet (0.4 mg total) under the tongue every 5 (five) minutes as needed.  Dispense: 100 tablet; Refill: 3    2. S/P PTCA       3. ST elevation myocardial infarction (STEMI), unspecified artery      4. Hypercholesteremia  LDL at goal <70. Decrease zetia to 5mg daily. Repeat lipids at next visit.     5. ASD (atrial septal defect) - small vs PFO      6. Essential hypertension  BP at goal <130/80. Continue current regimen.      7. Type 2 diabetes mellitus with hyperglycemia, with long-term current use of insulin  A1C at goal <7. F/U with PCP as planned      8. Obesity (BMI 30-39.9)  BMI 30.8 Encouraged increased CV exercise to 30 minutes a day for 5 days a week.        Patient was discussed with but not examined by Dr. Barlow    Follow-up in about 8 months (around 8/27/2019).

## 2019-02-25 LAB
LEFT EYE DM RETINOPATHY: NEGATIVE
RIGHT EYE DM RETINOPATHY: NEGATIVE

## 2019-05-07 DIAGNOSIS — E11.65 TYPE 2 DIABETES MELLITUS WITH HYPERGLYCEMIA, WITHOUT LONG-TERM CURRENT USE OF INSULIN: Chronic | ICD-10-CM

## 2019-05-07 DIAGNOSIS — E11.65 TYPE 2 DIABETES MELLITUS WITH HYPERGLYCEMIA, WITH LONG-TERM CURRENT USE OF INSULIN: Chronic | ICD-10-CM

## 2019-05-07 DIAGNOSIS — Z79.4 TYPE 2 DIABETES MELLITUS WITH HYPERGLYCEMIA, WITH LONG-TERM CURRENT USE OF INSULIN: Chronic | ICD-10-CM

## 2019-05-07 RX ORDER — METFORMIN HYDROCHLORIDE 500 MG/1
2000 TABLET, EXTENDED RELEASE ORAL NIGHTLY
Qty: 360 TABLET | Refills: 3 | Status: SHIPPED | OUTPATIENT
Start: 2019-05-07 | End: 2020-04-29 | Stop reason: SDUPTHER

## 2019-05-08 DIAGNOSIS — E11.65 TYPE 2 DIABETES MELLITUS WITH HYPERGLYCEMIA, WITHOUT LONG-TERM CURRENT USE OF INSULIN: Chronic | ICD-10-CM

## 2019-05-08 DIAGNOSIS — E11.65 TYPE 2 DIABETES MELLITUS WITH HYPERGLYCEMIA, WITH LONG-TERM CURRENT USE OF INSULIN: Chronic | ICD-10-CM

## 2019-05-08 DIAGNOSIS — Z79.4 TYPE 2 DIABETES MELLITUS WITH HYPERGLYCEMIA, WITH LONG-TERM CURRENT USE OF INSULIN: Chronic | ICD-10-CM

## 2019-05-08 RX ORDER — METFORMIN HYDROCHLORIDE 500 MG/1
2000 TABLET, EXTENDED RELEASE ORAL NIGHTLY
Qty: 360 TABLET | Refills: 3 | Status: CANCELLED | OUTPATIENT
Start: 2019-05-08

## 2019-07-25 ENCOUNTER — LAB VISIT (OUTPATIENT)
Dept: LAB | Facility: HOSPITAL | Age: 69
End: 2019-07-25
Attending: INTERNAL MEDICINE
Payer: COMMERCIAL

## 2019-07-25 ENCOUNTER — OFFICE VISIT (OUTPATIENT)
Dept: INTERNAL MEDICINE | Facility: CLINIC | Age: 69
End: 2019-07-25
Payer: COMMERCIAL

## 2019-07-25 VITALS
WEIGHT: 207.69 LBS | TEMPERATURE: 98 F | RESPIRATION RATE: 16 BRPM | HEIGHT: 69 IN | BODY MASS INDEX: 30.76 KG/M2 | HEART RATE: 87 BPM | DIASTOLIC BLOOD PRESSURE: 70 MMHG | SYSTOLIC BLOOD PRESSURE: 114 MMHG

## 2019-07-25 DIAGNOSIS — I25.10 CORONARY ARTERY DISEASE INVOLVING NATIVE CORONARY ARTERY OF NATIVE HEART WITHOUT ANGINA PECTORIS: ICD-10-CM

## 2019-07-25 DIAGNOSIS — Z00.00 ANNUAL PHYSICAL EXAM: Primary | ICD-10-CM

## 2019-07-25 DIAGNOSIS — E11.9 TYPE 2 DIABETES MELLITUS WITHOUT COMPLICATION, WITHOUT LONG-TERM CURRENT USE OF INSULIN: ICD-10-CM

## 2019-07-25 DIAGNOSIS — Z98.61 S/P PTCA (PERCUTANEOUS TRANSLUMINAL CORONARY ANGIOPLASTY): ICD-10-CM

## 2019-07-25 DIAGNOSIS — I10 ESSENTIAL HYPERTENSION: ICD-10-CM

## 2019-07-25 DIAGNOSIS — N13.8 BENIGN PROSTATIC HYPERPLASIA WITH URINARY OBSTRUCTION: ICD-10-CM

## 2019-07-25 DIAGNOSIS — Z00.00 ANNUAL PHYSICAL EXAM: ICD-10-CM

## 2019-07-25 DIAGNOSIS — E34.9 TESTOSTERONE DEFICIENCY: ICD-10-CM

## 2019-07-25 DIAGNOSIS — E78.00 HYPERCHOLESTEREMIA: ICD-10-CM

## 2019-07-25 DIAGNOSIS — M79.2 NEUROPATHIC PAIN OF THIGH, UNSPECIFIED LATERALITY: ICD-10-CM

## 2019-07-25 DIAGNOSIS — D75.1 POLYCYTHEMIA: ICD-10-CM

## 2019-07-25 DIAGNOSIS — N40.1 BENIGN PROSTATIC HYPERPLASIA WITH URINARY OBSTRUCTION: ICD-10-CM

## 2019-07-25 DIAGNOSIS — Q21.10 ASD (ATRIAL SEPTAL DEFECT): ICD-10-CM

## 2019-07-25 LAB
BASOPHILS # BLD AUTO: 0.03 K/UL (ref 0–0.2)
BASOPHILS NFR BLD: 0.6 % (ref 0–1.9)
DIFFERENTIAL METHOD: ABNORMAL
EOSINOPHIL # BLD AUTO: 0.1 K/UL (ref 0–0.5)
EOSINOPHIL NFR BLD: 2.4 % (ref 0–8)
ERYTHROCYTE [DISTWIDTH] IN BLOOD BY AUTOMATED COUNT: 17.8 % (ref 11.5–14.5)
ERYTHROCYTE [SEDIMENTATION RATE] IN BLOOD BY WESTERGREN METHOD: 3 MM/HR (ref 0–23)
HCT VFR BLD AUTO: 58.2 % (ref 40–54)
HGB BLD-MCNC: 18.4 G/DL (ref 14–18)
IMM GRANULOCYTES # BLD AUTO: 0.04 K/UL (ref 0–0.04)
IMM GRANULOCYTES NFR BLD AUTO: 0.8 % (ref 0–0.5)
LYMPHOCYTES # BLD AUTO: 0.9 K/UL (ref 1–4.8)
LYMPHOCYTES NFR BLD: 18.1 % (ref 18–48)
MCH RBC QN AUTO: 26.9 PG (ref 27–31)
MCHC RBC AUTO-ENTMCNC: 31.6 G/DL (ref 32–36)
MCV RBC AUTO: 85 FL (ref 82–98)
MONOCYTES # BLD AUTO: 0.6 K/UL (ref 0.3–1)
MONOCYTES NFR BLD: 10.8 % (ref 4–15)
NEUTROPHILS # BLD AUTO: 3.4 K/UL (ref 1.8–7.7)
NEUTROPHILS NFR BLD: 67.3 % (ref 38–73)
NRBC BLD-RTO: 0 /100 WBC
PLATELET # BLD AUTO: 188 K/UL (ref 150–350)
PMV BLD AUTO: 9.5 FL (ref 9.2–12.9)
RBC # BLD AUTO: 6.85 M/UL (ref 4.6–6.2)
T4 FREE SERPL-MCNC: 1.07 NG/DL (ref 0.71–1.51)
TSH SERPL DL<=0.005 MIU/L-ACNC: 1.8 UIU/ML (ref 0.4–4)
WBC # BLD AUTO: 5.08 K/UL (ref 3.9–12.7)

## 2019-07-25 PROCEDURE — 85652 RBC SED RATE AUTOMATED: CPT

## 2019-07-25 PROCEDURE — 85025 COMPLETE CBC W/AUTO DIFF WBC: CPT

## 2019-07-25 PROCEDURE — 84153 ASSAY OF PSA TOTAL: CPT

## 2019-07-25 PROCEDURE — 90471 PNEUMOCOCCAL POLYSACCHARIDE VACCINE 23-VALENT =>2YO SQ IM: ICD-10-PCS | Mod: S$GLB,,, | Performed by: INTERNAL MEDICINE

## 2019-07-25 PROCEDURE — 3078F PR MOST RECENT DIASTOLIC BLOOD PRESSURE < 80 MM HG: ICD-10-PCS | Mod: CPTII,S$GLB,, | Performed by: INTERNAL MEDICINE

## 2019-07-25 PROCEDURE — 86803 HEPATITIS C AB TEST: CPT

## 2019-07-25 PROCEDURE — 90732 PNEUMOCOCCAL POLYSACCHARIDE VACCINE 23-VALENT =>2YO SQ IM: ICD-10-PCS | Mod: S$GLB,,, | Performed by: INTERNAL MEDICINE

## 2019-07-25 PROCEDURE — 3044F PR MOST RECENT HEMOGLOBIN A1C LEVEL <7.0%: ICD-10-PCS | Mod: CPTII,S$GLB,, | Performed by: INTERNAL MEDICINE

## 2019-07-25 PROCEDURE — 99397 PER PM REEVAL EST PAT 65+ YR: CPT | Mod: S$GLB,,, | Performed by: INTERNAL MEDICINE

## 2019-07-25 PROCEDURE — 99999 PR PBB SHADOW E&M-EST. PATIENT-LVL V: CPT | Mod: PBBFAC,,, | Performed by: INTERNAL MEDICINE

## 2019-07-25 PROCEDURE — 82607 VITAMIN B-12: CPT

## 2019-07-25 PROCEDURE — 3044F HG A1C LEVEL LT 7.0%: CPT | Mod: CPTII,S$GLB,, | Performed by: INTERNAL MEDICINE

## 2019-07-25 PROCEDURE — 99397 PR PREVENTIVE VISIT,EST,65 & OVER: ICD-10-PCS | Mod: S$GLB,,, | Performed by: INTERNAL MEDICINE

## 2019-07-25 PROCEDURE — 3074F PR MOST RECENT SYSTOLIC BLOOD PRESSURE < 130 MM HG: ICD-10-PCS | Mod: CPTII,S$GLB,, | Performed by: INTERNAL MEDICINE

## 2019-07-25 PROCEDURE — 3078F DIAST BP <80 MM HG: CPT | Mod: CPTII,S$GLB,, | Performed by: INTERNAL MEDICINE

## 2019-07-25 PROCEDURE — 90732 PPSV23 VACC 2 YRS+ SUBQ/IM: CPT | Mod: S$GLB,,, | Performed by: INTERNAL MEDICINE

## 2019-07-25 PROCEDURE — 84439 ASSAY OF FREE THYROXINE: CPT

## 2019-07-25 PROCEDURE — 36415 COLL VENOUS BLD VENIPUNCTURE: CPT | Mod: PO

## 2019-07-25 PROCEDURE — 84443 ASSAY THYROID STIM HORMONE: CPT

## 2019-07-25 PROCEDURE — 3074F SYST BP LT 130 MM HG: CPT | Mod: CPTII,S$GLB,, | Performed by: INTERNAL MEDICINE

## 2019-07-25 PROCEDURE — 90471 IMMUNIZATION ADMIN: CPT | Mod: S$GLB,,, | Performed by: INTERNAL MEDICINE

## 2019-07-25 PROCEDURE — 99999 PR PBB SHADOW E&M-EST. PATIENT-LVL V: ICD-10-PCS | Mod: PBBFAC,,, | Performed by: INTERNAL MEDICINE

## 2019-07-25 NOTE — PROGRESS NOTES
Subjective:       Patient ID: Den Rick is a 69 y.o. male.    Chief Complaint: Annual Exam  HISTORY OF PRESENT ILLNESS:  A 69-year-old white male comes in for annual review   and has had several problems, but overall he is feeling well.  He had sinusitis   a week ago, saw Dr. Stephens, received injections and antibiotics and he is now   better.  The patient has had two months of headaches prior to that in the   temples that is now better as well with the treatment.  The patient is followed   by a number of doctors including Cardiology, Dr. Shahid and Urology, Endocrine.    The patient has history of polycythemia, had a full workup done, which was   negative for underlying illness and was felt to be due to his testosterone   treatment for his testosterone deficiency.  The patient had x-rays done of his   spine showing bulging disks and several levels in the lower spine.  He otherwise   is feeling well.  We did a chart review.  He is up-to-date on colonoscopy, eye   exam.  He had a bout of shingles when he was 40 years old, does not want to get   the shingles vaccine.  He was given tetanus and Pneumovax at the time of this   visit, which were due.  The patient also needs hepatitis C, which was ordered   and will be done with the next lab.  He had lab following this visit, which   showed negative microalbumin.  Hepatitis C was negative.  Normal thyroid, PSA,   B12, sed rate of 3.  CBC once again showed polycythemia with hematocrit of 58,   but his white count is 5000 and his platelet count is 188,000.  The patient had   his work up by Dr. William Ramirez in 2016 for polycythemia.    PHYSICAL EXAMINATION:  VITAL SIGNS:  Normal.  SKIN:  Fair, healthy.  HEENT:  Clear.  NECK:  Shows no stiffness, adenopathy or thyroid enlargement.  CHEST:  Clear.  HEART:  There is no murmur or gallop.  ABDOMEN:  Nontender without any organomegaly.  RECTAL:  Not done since he sees Urology.  EXTREMITIES:  Show normal muscles, joints, pulses.   Feet were examined.  He has   intact sensation to his foot, but decreased sensation to about the mid foot,   right a little bit better than the left.  NEUROLOGIC:  Otherwise normal.    IMPRESSION:  1.  Diabetes, controlled.  2.  Polycythemia, previously evaluated.  3.  Coronary artery disease.  4.  Hyperlipidemia, on medication.  5.  History of testosterone deficiency.  6.  Status post PTCA.  7.  BPH, on medication.  8.  Prior ASD.  9.  Hypertension, controlled.  10.  Diabetic polyneuropathy involving his feet.  I will see him again in six   months.      JAGATA/VIRGILIO  dd: 07/31/2019 14:25:28 (CDT)  td: 08/01/2019 03:06:09 (CDT)  Doc ID   #[id number]  Job ID #[id number]    CC:     Dict [id number]  HPI  Review of Systems   Constitutional: Negative for activity change, appetite change, fatigue and unexpected weight change.   HENT: Positive for congestion, postnasal drip and sinus pressure. Negative for dental problem, hearing loss and mouth sores.    Eyes: Negative for discharge and visual disturbance.   Respiratory: Negative for cough and shortness of breath.    Cardiovascular: Negative for chest pain and palpitations.   Gastrointestinal: Negative for abdominal pain, blood in stool, constipation, diarrhea and nausea.   Genitourinary: Negative for dysuria, hematuria and testicular pain.   Musculoskeletal: Positive for back pain. Negative for arthralgias, joint swelling and neck pain.   Skin: Negative for rash.   Neurological: Positive for numbness and headaches. Negative for weakness.   Psychiatric/Behavioral: Negative for agitation and sleep disturbance.       Objective:      Physical Exam   Constitutional: He is oriented to person, place, and time. He appears well-developed and well-nourished.   HENT:   Head: Normocephalic.   Right Ear: External ear normal.   Left Ear: External ear normal.   Mouth/Throat: Oropharynx is clear and moist.   Eyes: Pupils are equal, round, and reactive to light. EOM are normal. Right eye exhibits no  discharge.   Neck: Normal range of motion. No JVD present. No tracheal deviation present. No thyromegaly present.   Cardiovascular: Normal rate, regular rhythm, normal heart sounds and intact distal pulses. Exam reveals no gallop.   No murmur heard.  Pulses:       Dorsalis pedis pulses are 3+ on the right side, and 3+ on the left side.        Posterior tibial pulses are 3+ on the right side, and 3+ on the left side.   Pulmonary/Chest: Effort normal and breath sounds normal. He has no wheezes. He has no rales.   Abdominal: Soft. Bowel sounds are normal. He exhibits no mass. There is no tenderness.   Genitourinary: Prostate normal and penis normal. Rectal exam shows guaiac negative stool.   Musculoskeletal: Normal range of motion. He exhibits no edema.   Feet:   Right Foot:   Protective Sensation: 6 sites tested. 6 sites sensed.   Skin Integrity: Negative for ulcer, blister, skin breakdown, erythema, warmth, callus or dry skin.   Left Foot:   Protective Sensation: 8 sites tested. 8 sites sensed.   Skin Integrity: Negative for ulcer, blister, skin breakdown, erythema, warmth, callus or dry skin.   Lymphadenopathy:     He has no cervical adenopathy.   Neurological: He is oriented to person, place, and time. He displays normal reflexes. No cranial nerve deficit. Coordination normal.   Skin: Skin is warm. No rash noted. No pallor.   Psychiatric: He has a normal mood and affect.       Assessment:       1. Annual physical exam    2. Type 2 diabetes mellitus without complication, without long-term current use of insulin    3. Polycythemia    4. Coronary artery disease involving native coronary artery of native heart without angina pectoris    5. Hypercholesteremia    6. Testosterone deficiency    7. S/P PTCA     8. Benign prostatic hyperplasia with urinary obstruction    9. ASD (atrial septal defect) - small vs PFO    10. Essential hypertension    11. Neuropathic pain of thigh, unspecified laterality        Plan:

## 2019-07-26 LAB
COMPLEXED PSA SERPL-MCNC: 0.22 NG/ML (ref 0–4)
HCV AB SERPL QL IA: NEGATIVE
VIT B12 SERPL-MCNC: 668 PG/ML (ref 210–950)

## 2019-07-31 ENCOUNTER — TELEPHONE (OUTPATIENT)
Dept: INTERNAL MEDICINE | Facility: CLINIC | Age: 69
End: 2019-07-31

## 2019-07-31 NOTE — TELEPHONE ENCOUNTER
His lab is good but still has elevated RBC for which he was evaluated before, and hepatitis is neg

## 2019-08-01 ENCOUNTER — PATIENT MESSAGE (OUTPATIENT)
Dept: INTERNAL MEDICINE | Facility: CLINIC | Age: 69
End: 2019-08-01

## 2019-08-01 DIAGNOSIS — D75.1 POLYCYTHEMIA: Primary | ICD-10-CM

## 2019-08-02 NOTE — TELEPHONE ENCOUNTER
He was last seen by William Ramirez for his polycythemia and phlebotomy is appropriate and for that he needs to get connected with hematology again.  So ordered

## 2019-08-05 ENCOUNTER — TELEPHONE (OUTPATIENT)
Dept: HEMATOLOGY/ONCOLOGY | Facility: CLINIC | Age: 69
End: 2019-08-05

## 2019-08-05 NOTE — TELEPHONE ENCOUNTER
----- Message from Jose De Jesus Ramirez RN sent at 8/5/2019 11:35 AM CDT -----  Contact: pt      ----- Message -----  From: Niki Bird  Sent: 8/5/2019  10:17 AM  To: , #    Type:  Sooner Apoointment Request    Caller is requesting a sooner appointment.  Caller declined first available appointment listed below.  Caller will not accept being placed on the waitlist and is requesting a message be sent to doctor.  Name of Caller Pt  When is the first available appointment?Symptoms:Would the patient rather a call back or a response via [facility]? [facility]  Best Call Back Number [phone].  Additional Information: pt  would like a call back trying to schedule appt

## 2019-08-06 NOTE — PROGRESS NOTES
Hematology and Medical Oncology   New Patient Consult     08/07/2019  Referred by:  Dr. Kieran Friedman    Primary Hematologic Diagnosis: Polycythemia    History of Present Ilness:   Den Rick (Den) is a pleasant 69 y.o.male with known polycythemia, he was previously under the care of Dr. William Ramirez prior to his [facility].  A review of his blood counts shows that he occasionally had hematocrits slightly above 50% in 2012 and 2013.    He had abdominal surgery in 2014, and he became anemic.  Since that time, his   hematocrit measurements have been in the range of 52%-58%.     He was placed on testosterone therapy many years ago.  His blood testosterone  level was in the lower portion of the normal range and he tried several   testosterone preparations, but since 2013, he has been using Axiron cream daily.     He does not have generalized itching, although occasionally his ankle inches.    He had peptic ulcer disease in 2012 and required red cell transfusions for   bleeding associated with this.  He has coronary artery disease.  He had 3 stents   placed in 2007.  In 2012, he had a myocardial infarction and had four   additional stents placed.     He has not had venous thrombotic disease and he has not had peripheral artery   disease symptoms.  He has had some pulmonary nodules followed in the past.  It   was thought that these were likely due to a fungal infection.  He has no history of   hepatic or renal disease.      Interval History:  Mr. Rick continues to work full time for the [employer] as a . He did one phlebotomy several years ago with a good response, but has not had another performed in 2 + years. Recently he began noticing a severe constant headache as well as body itching when he is in a hot environment.      PAST MEDICAL HISTORY:   Past Medical History:   Diagnosis Date    Anticoagulant long-term use     Arthritis     Atrial tachycardia, paroxysmal 1/12     during Cardiac Rehab    Colon polyp     Coronary artery disease     Diverticulitis     Diverticulosis     GERD (gastroesophageal reflux disease)     Glucose intolerance (impaired glucose tolerance) 1/24/2017    Hyperlipidemia     Hypertension     IFG (impaired fasting glucose) 12/21/2015    NSTEMI (non-ST elevated myocardial infarction) 10/11/12    Obesity (BMI 30-39.9) 1/31/2018    Post PTCA     Sleep apnea        PAST SURGICAL HISTORY:   Past Surgical History:   Procedure Laterality Date    ASPIRATION-FINE NEEDLE (FNA) Left 6/6/2013    Performed by [facility] at [facility] OR 2ND [facility]    boewl resection      CATARACT EXTRACTION, BILATERAL  8/2011    CATHETERIZATION, URETER Left 3/19/2014    Performed by Adam Shahid MD at [facility] OR 2ND [facility]    COLECTOMY, SIGMOID, LAPAROSCOPIC N/A 3/19/2014    Performed by Main Lehman MD at [facility] OR 2ND [facility]    COLONOSCOPY  2012    COLONOSCOPY N/A 10/10/2016    Performed by [facility]hman [facility] at [facility] ENDO (4TH FLR)    COLONOSCOPY N/A 3/19/2014    Performed by Main [facility]hman [facility] at [facility] OR 2ND FLR    COLONOSCOPY W/ POLYPECTOMY      CORONARY ANGIOPLASTY WITH STENT PLACEMENT      EGD (ESOPHAGOGASTRODUODENOSCOPY) N/A 4/3/2014    Performed by Kieran Nguyen MD at [facility] ENDO (2ND FLR)    EGD (ESOPHAGOGASTRODUODENOSCOPY) Left 4/1/2014    Performed by [facility]an Nguyen [facility] at [facility] ENDO (2ND FLR)    ESOPHAGOGASTRODUODENOSCOPY      HERNIA REPAIR      KNEE SURGERY  2003    needle bx on chest      REPAIR-HERNIA-LAPAROSCOPIC-INCISIONAL N/A 2/17/2016    Performed by Main Lehman MD at [facility] OR 2ND FLR    SHOULDER SURGERY      SIGMOIDECTOMY         PAST SOCIAL HISTORY:   reports that he has never smoked. He has never used smokeless tobacco. He reports that he does not drink alcohol or use drugs.    FAMILY HISTORY:  Family History   Problem Relation Age of Onset    Cancer Mother         uterine    Asthma Sister     Heart disease Father          "valve    Diabetes Maternal Grandmother        CURRENT MEDICATIONS:   Current Outpatient Medications   Medication Sig    alfuzosin (UROXATRAL) 10 mg Tb24 Take 1 tablet (10 mg total) by mouth every evening.    aspirin (ECOTRIN) 81 MG EC tablet Take 81 mg by mouth once daily.    BD ULTRA-FINE RICHARD PEN NEEDLES 32 gauge x 5/32" Ndle Uses daily, daily insulin injections    blood sugar diagnostic Strp To check BG 4 times daily, to use with insurance preferred meter    blood-glucose meter kit To check BG 4 times daily, to use with insurance preferred meter    CELEBREX 200 mg capsule Take 1 capsule by mouth as needed.    clindamycin phosphate 1% (CLINDAGEL) 1 % gel APPLY TWICE A DAY TO FACE    CORDRAN TAPE LARGE ROLL 4 mcg/cm2 Tape Apply 1 application topically every evening. Apply to affected area    desoximetasone (TOPICORT) 0.25 % cream APPLY TO AFFECTED AREA TWICE A DAY FOR PSORIASIS    empagliflozin (JARDIANCE) 10 mg Tab Take 1 tablet by mouth once daily.    ezetimibe (ZETIA) 10 mg tablet Take 0.5 tablets (5 mg total) by mouth once daily.    fesoterodine (TOVIAZ) 8 mg Tb24 Take 8 mg by mouth once daily.    fish oil-omega-3 fatty acids 300-1,000 mg capsule Take 1 g by mouth once daily.    FREESTYLE LANCETS 28 gauge lancets TO CHECK BG 4 TIMES DAILY, TO USE WITH INSURANCE PREFERRED METER    metFORMIN (GLUCOPHAGE-XR) 500 MG 24 hr tablet Take 4 tablets (2,000 mg total) by mouth every evening.    methocarbamol (ROBAXIN) 750 MG Tab Take 750 mg by mouth continuous prn.      methylsulfonylmethane (MSM) 500 mg Cap Take 1 capsule by mouth Daily.      metoprolol succinate (TOPROL-XL) 100 MG 24 hr tablet TAKE 1 TABLET EVERY DAY    multivitamin (THERAGRAN) per tablet Take 1 tablet by mouth Daily.      mupirocin (BACTROBAN) 2 % ointment APPLY TO AFFECTED AREA DAILY AS NEEDED    nitroGLYCERIN (NITROSTAT) 0.4 MG SL tablet Place 1 tablet (0.4 mg total) under the tongue every 5 (five) minutes as needed.    " oxycodone-acetaminophen (PERCOCET) 5-325 mg per tablet Take 1 tablet by mouth as needed.    pantoprazole (PROTONIX) 40 MG tablet Take 40 mg by mouth daily as needed.     PENNSAID 20 mg/gram /actuation(2 %) sopm APPLY TWO PUMPS TO AFFECTED AREA(S) TWICE DAILY AS NEEDED    rosuvastatin (CRESTOR) 20 MG tablet TAKE 1 TABLET (20 MG TOTAL) BY MOUTH EVERY EVENING.    sodium chloride (OCEAN NASAL) 0.65 % nasal spray 1 spray by Nasal route as needed for Congestion.    tadalafil (CIALIS) 20 MG Tab Take 1 tablet (20 mg total) by mouth as needed.     No current facility-administered medications for this visit.      ALLERGIES:   Review of patient's allergies indicates:   Allergen Reactions    Atorvastatin      Other reaction(s):(lipitor) Muscle pain    Niacin      Other reaction(s): Flushing (skin)         Review of Systems:     Review of Systems   Constitutional: Negative for appetite change and unexpected weight change.   Respiratory: Negative for cough and shortness of breath.    Cardiovascular: Negative for chest pain.   Gastrointestinal: Negative for abdominal pain and diarrhea.   Genitourinary: Negative for frequency.    Musculoskeletal: Negative for back pain.   Skin: Positive for itching. Negative for rash.   Neurological: Positive for headaches.   Hematological: Negative for adenopathy.   Psychiatric/Behavioral: The patient is not nervous/anxious.           Physical Exam:     Vitals:    08/07/19 0906   BP: 119/70   Pulse: 74   Resp: 16   Temp: 97.9 °F (36.6 °C)     Physical Exam   Constitutional: He is oriented to person, place, and time. He appears well-developed and well-nourished. No distress.   HENT:   Head: Normocephalic and atraumatic.   Mouth/Throat: Oropharynx is clear and moist. No oropharyngeal exudate.   Eyes: Pupils are equal, round, and reactive to light. Conjunctivae and EOM are normal.   Neck: Normal range of motion. Neck supple. No JVD present. No tracheal deviation present. No thyromegaly  present.   Cardiovascular: Normal rate, regular rhythm and normal heart sounds. Exam reveals no friction rub.   No murmur heard.  Pulmonary/Chest: Effort normal and breath sounds normal. No stridor. No respiratory distress. He has no wheezes. He has no rales. He exhibits no tenderness.   Abdominal: Soft. Bowel sounds are normal. He exhibits no distension. There is no tenderness. There is no rebound and no guarding.   Musculoskeletal: Normal range of motion. He exhibits no edema, tenderness or deformity.   Lymphadenopathy:     He has no axillary adenopathy.   Neurological: He is alert and oriented to person, place, and time. He displays normal reflexes. No cranial nerve deficit or sensory deficit. He exhibits normal muscle tone. Coordination normal.   Skin: Skin is warm and dry. Capillary refill takes less than 2 seconds. No rash noted. He is not diaphoretic. No erythema. No pallor.   Numerous papules on upper extremities   Psychiatric: He has a normal mood and affect. His behavior is normal. Judgment and thought content normal.       ECOG Performance Status: (foot note - ECOG PS provided by Eastern Cooperative Oncology Group) 0 - Asymptomatic    Karnofsky Performance Score:  100%- Normal, No Complaints, No Evidence of Disease    Labs:   Lab Results   Component Value Date    WBC 5.08 07/25/2019    HGB 18.4 (H) 07/25/2019    HCT 58.2 (H) 07/25/2019     07/25/2019    CHOL 65 (L) 12/18/2018    TRIG 69 12/18/2018    HDL 29 (L) 12/18/2018    ALT 38 12/18/2018    AST 30 12/18/2018     12/18/2018    K 5.0 12/18/2018     12/18/2018    CREATININE 0.9 12/18/2018    BUN 22 12/18/2018    CO2 21 (L) 12/18/2018    TSH 1.804 07/25/2019    PSA 0.22 07/25/2019    INR 1.1 04/01/2014    GLUF 111 (H) 03/07/2013    HGBA1C 6.5 (H) 11/02/2018         Imaging: Previous imaging has been reviewed     Assessment and Plan:     Mr. Rick is a 69 year old male with polycytemia    Polycythemia  --Initially presented to   [facility] and underwent an extensive workup 2-3 years ago. Testing was negative for P.Vera  --A bone marrow was deferred at that time  --We will schedule 4 weekly therapeutic phlebotomies in an attempt to lower his hematocrit.  --Recheck labs in 5 weeks to assess progress    60 minutes were spent face to face with the patient to discuss the disease, natural history, treatment options. I have provided the patient with an opportunity to ask questions and have all questions answered to his satisfaction.       he will return to clinic in 5-6 weeks, but knows to call in the interim if symptoms change or should a problem arise.        Марина Nogueira MD  Hematology and Medical Oncology  Bone Marrow Transplant  [facility]

## 2019-08-07 ENCOUNTER — TELEPHONE (OUTPATIENT)
Dept: HEMATOLOGY/ONCOLOGY | Facility: CLINIC | Age: 69
End: 2019-08-07

## 2019-08-07 ENCOUNTER — OFFICE VISIT (OUTPATIENT)
Dept: HEMATOLOGY/ONCOLOGY | Facility: CLINIC | Age: 69
End: 2019-08-07
Payer: COMMERCIAL

## 2019-08-07 VITALS
TEMPERATURE: 98 F | DIASTOLIC BLOOD PRESSURE: 70 MMHG | BODY MASS INDEX: 30.96 KG/M2 | HEIGHT: 69 IN | HEART RATE: 74 BPM | RESPIRATION RATE: 16 BRPM | WEIGHT: 209 LBS | SYSTOLIC BLOOD PRESSURE: 119 MMHG | OXYGEN SATURATION: 88 %

## 2019-08-07 DIAGNOSIS — D75.1 POLYCYTHEMIA: Primary | ICD-10-CM

## 2019-08-07 DIAGNOSIS — I10 ESSENTIAL HYPERTENSION: Chronic | ICD-10-CM

## 2019-08-07 DIAGNOSIS — I25.10 CORONARY ARTERY DISEASE INVOLVING NATIVE CORONARY ARTERY OF NATIVE HEART WITHOUT ANGINA PECTORIS: Chronic | ICD-10-CM

## 2019-08-07 PROCEDURE — 99245 OFF/OP CONSLTJ NEW/EST HI 55: CPT | Mod: S$GLB,,, | Performed by: INTERNAL MEDICINE

## 2019-08-07 PROCEDURE — 99999 PR PBB SHADOW E&M-EST. PATIENT-LVL V: CPT | Mod: PBBFAC,,, | Performed by: INTERNAL MEDICINE

## 2019-08-07 PROCEDURE — 99245 PR OFFICE CONSULTATION,LEVEL V: ICD-10-PCS | Mod: S$GLB,,, | Performed by: INTERNAL MEDICINE

## 2019-08-07 PROCEDURE — 99999 PR PBB SHADOW E&M-EST. PATIENT-LVL V: ICD-10-PCS | Mod: PBBFAC,,, | Performed by: INTERNAL MEDICINE

## 2019-08-07 RX ORDER — PREDNISONE 20 MG/1
TABLET ORAL
Refills: 0 | COMMUNITY
Start: 2019-07-15 | End: 2019-11-07 | Stop reason: ALTCHOICE

## 2019-08-07 NOTE — Clinical Note
1. Please schedule therapeutic phlebotomies weekly x 4 [he would like to start Friday if possible]2. See me in 5-6 weeks with cbc,cmp, ferritin

## 2019-08-07 NOTE — TELEPHONE ENCOUNTER
----- Message from Kamala Bansal, RN sent at 8/7/2019 11:13 AM CDT -----  I think I put them in correctly. If not, let me know. I put it in x4 standing so they can release it 4 times when he is scheduled.   ----- Message -----  From: Regina Dejesus  Sent: 8/7/2019  10:45 AM  To: [facility] Staff    Please put in the orders for the 4 therapeutic phlebotomies and I will call to schedule    Message Contents   MD Vilma Wright    1. Please schedule therapeutic phlebotomies weekly x 4 [he would like to start Friday if possible]   2. See me in 5-6 weeks with cbc,cmp, ferritin    
Called patient to schedule his phlebotomies, no answer left message asking him to call back   
OBHx:            2013 RADHA-Max@ 37wks           2011- PLTCS- IUGR           3/31/2008- NVD          sABx 1

## 2019-08-07 NOTE — TELEPHONE ENCOUNTER
----- Message from Regina Dejesus sent at 8/7/2019 11:51 AM CDT -----  Regarding: THERAPEUTIC PHLEB.  Message   Received: Today   Message Contents   Kamala Bansal, KAYY Dejesus         I think I put them in correctly. If not, let me know. I put it in x4 standing so they can release it 4 times when he is scheduled.   Previous Messages        ----- Message -----   From: Regina Dejesus   Sent: 8/7/2019  10:45 AM   To: [facility] Staff     Please put in the orders for the 4 therapeutic phlebotomies and I will call to schedule     Message Contents   MD Vilma Wright     1. Please schedule therapeutic phlebotomies weekly x 4 [he would like to start Friday if possible]   2. See me in 5-6 weeks with cbc,cmp, ferritin

## 2019-08-07 NOTE — LETTER
August 7, 2019      Kieran Friedman MD  [address] [facility] [address] [place] [address]           [facility] Hematology Oncology  [address]  [place] [address]  Phone: [phone]          Patient: Den Rick   MR Number: [id number]   YOB: 1950   Date of Visit: 8/7/2019       Dear Dr. Kieran Friedman:    Thank you for referring Den Rick to me for evaluation. Attached you will find relevant portions of my assessment and plan of care.    If you have questions, please do not hesitate to call me. I look forward to following Den Rick along with you.    Sincerely,    Марина Nogueira MD    Enclosure  CC:  No Recipients    If you would like to receive this communication electronically, please contact [email or web address] or [phone] to request more information on [employer] access.    For providers and/or their staff who would like to refer a patient to [facility], please contact us through our one-stop-shop provider referral line, [facility] , at [phone].    If you feel you have received this communication in error or would no longer like to receive these types of communications, please e-mail [email or web address]

## 2019-08-14 ENCOUNTER — HOSPITAL ENCOUNTER (OUTPATIENT)
Dept: TRANSFUSION MEDICINE | Facility: HOSPITAL | Age: 69
Discharge: HOME | End: 2019-08-14
Attending: INTERNAL MEDICINE
Payer: COMMERCIAL

## 2019-08-14 PROCEDURE — 99195 PHLEBOTOMY: CPT

## 2019-08-21 ENCOUNTER — HOSPITAL ENCOUNTER (OUTPATIENT)
Dept: TRANSFUSION MEDICINE | Facility: HOSPITAL | Age: 69
Discharge: HOME | End: 2019-08-21
Attending: INTERNAL MEDICINE
Payer: COMMERCIAL

## 2019-08-28 ENCOUNTER — HOSPITAL ENCOUNTER (OUTPATIENT)
Dept: TRANSFUSION MEDICINE | Facility: HOSPITAL | Age: 69
Discharge: HOME | End: 2019-08-28
Attending: INTERNAL MEDICINE
Payer: COMMERCIAL

## 2019-08-28 PROCEDURE — 99195 PHLEBOTOMY: CPT

## 2019-08-31 NOTE — PROGRESS NOTES
Pt presented to donor room ambulatory for a therapeutic phlebotomy.  Vital signs; b/p 107/72, pulse 84, temp 97.2, and hematocrit 50.  490 mls of whole blood was drawn from right ac vein.  Pressure dressing applied after hemostasis was achieved.  Tolerated well.  Pt exited dept ambulatory by himself.

## 2019-09-04 ENCOUNTER — HOSPITAL ENCOUNTER (OUTPATIENT)
Dept: TRANSFUSION MEDICINE | Facility: HOSPITAL | Age: 69
Discharge: HOME | End: 2019-09-04
Attending: INTERNAL MEDICINE
Payer: COMMERCIAL

## 2019-09-04 PROCEDURE — 99195 PHLEBOTOMY: CPT

## 2019-09-06 NOTE — PROGRESS NOTES
Pt to Blood Bank for therapeutic phlebotomy.  /64  P 66  T 97.5  Hct 41.  1 unit WB removed from  L arm.  Tolerated well.  Written/verbal instructions given.  Pressure wrap applied when hemostasis achieved.  Refreshments accepted.  Ambulatory to and from BB per self.

## 2019-09-12 ENCOUNTER — LAB VISIT (OUTPATIENT)
Dept: LAB | Facility: HOSPITAL | Age: 69
End: 2019-09-12
Attending: INTERNAL MEDICINE
Payer: COMMERCIAL

## 2019-09-12 DIAGNOSIS — D75.1 POLYCYTHEMIA: ICD-10-CM

## 2019-09-12 LAB
ALBUMIN SERPL BCP-MCNC: 3.7 G/DL (ref 3.5–5.2)
ALP SERPL-CCNC: 55 U/L (ref 55–135)
ALT SERPL W/O P-5'-P-CCNC: 28 U/L (ref 10–44)
ANION GAP SERPL CALC-SCNC: 7 MMOL/L (ref 8–16)
AST SERPL-CCNC: 24 U/L (ref 10–40)
BASOPHILS # BLD AUTO: 0.03 K/UL (ref 0–0.2)
BASOPHILS NFR BLD: 0.7 % (ref 0–1.9)
BILIRUB SERPL-MCNC: 0.9 MG/DL (ref 0.1–1)
BUN SERPL-MCNC: 28 MG/DL (ref 8–23)
CALCIUM SERPL-MCNC: 9.1 MG/DL (ref 8.7–10.5)
CHLORIDE SERPL-SCNC: 109 MMOL/L (ref 95–110)
CO2 SERPL-SCNC: 23 MMOL/L (ref 23–29)
CREAT SERPL-MCNC: 0.9 MG/DL (ref 0.5–1.4)
DIFFERENTIAL METHOD: ABNORMAL
EOSINOPHIL # BLD AUTO: 0.1 K/UL (ref 0–0.5)
EOSINOPHIL NFR BLD: 2.9 % (ref 0–8)
ERYTHROCYTE [DISTWIDTH] IN BLOOD BY AUTOMATED COUNT: 14.3 % (ref 11.5–14.5)
EST. GFR  (AFRICAN AMERICAN): >60 ML/MIN/1.73 M^2
EST. GFR  (NON AFRICAN AMERICAN): >60 ML/MIN/1.73 M^2
FERRITIN SERPL-MCNC: 7 NG/ML (ref 20–300)
GLUCOSE SERPL-MCNC: 108 MG/DL (ref 70–110)
HCT VFR BLD AUTO: 44.2 % (ref 40–54)
HGB BLD-MCNC: 13.5 G/DL (ref 14–18)
IMM GRANULOCYTES # BLD AUTO: 0.01 K/UL (ref 0–0.04)
IMM GRANULOCYTES NFR BLD AUTO: 0.2 % (ref 0–0.5)
LYMPHOCYTES # BLD AUTO: 0.8 K/UL (ref 1–4.8)
LYMPHOCYTES NFR BLD: 18.5 % (ref 18–48)
MCH RBC QN AUTO: 25.9 PG (ref 27–31)
MCHC RBC AUTO-ENTMCNC: 30.5 G/DL (ref 32–36)
MCV RBC AUTO: 85 FL (ref 82–98)
MONOCYTES # BLD AUTO: 0.6 K/UL (ref 0.3–1)
MONOCYTES NFR BLD: 13.3 % (ref 4–15)
NEUTROPHILS # BLD AUTO: 2.7 K/UL (ref 1.8–7.7)
NEUTROPHILS NFR BLD: 64.4 % (ref 38–73)
NRBC BLD-RTO: 0 /100 WBC
PLATELET # BLD AUTO: 244 K/UL (ref 150–350)
PMV BLD AUTO: 9.3 FL (ref 9.2–12.9)
POTASSIUM SERPL-SCNC: 4.8 MMOL/L (ref 3.5–5.1)
PROT SERPL-MCNC: 5.9 G/DL (ref 6–8.4)
RBC # BLD AUTO: 5.22 M/UL (ref 4.6–6.2)
SODIUM SERPL-SCNC: 139 MMOL/L (ref 136–145)
WBC # BLD AUTO: 4.21 K/UL (ref 3.9–12.7)

## 2019-09-12 PROCEDURE — 80053 COMPREHEN METABOLIC PANEL: CPT

## 2019-09-12 PROCEDURE — 36415 COLL VENOUS BLD VENIPUNCTURE: CPT

## 2019-09-12 PROCEDURE — 85025 COMPLETE CBC W/AUTO DIFF WBC: CPT

## 2019-09-12 PROCEDURE — 82728 ASSAY OF FERRITIN: CPT

## 2019-09-16 ENCOUNTER — OFFICE VISIT (OUTPATIENT)
Dept: HEMATOLOGY/ONCOLOGY | Facility: CLINIC | Age: 69
End: 2019-09-16
Payer: COMMERCIAL

## 2019-09-16 VITALS
BODY MASS INDEX: 30.54 KG/M2 | RESPIRATION RATE: 16 BRPM | SYSTOLIC BLOOD PRESSURE: 117 MMHG | TEMPERATURE: 98 F | WEIGHT: 206.81 LBS | DIASTOLIC BLOOD PRESSURE: 77 MMHG | HEART RATE: 68 BPM

## 2019-09-16 DIAGNOSIS — E11.9 TYPE 2 DIABETES MELLITUS WITHOUT COMPLICATION, WITHOUT LONG-TERM CURRENT USE OF INSULIN: Chronic | ICD-10-CM

## 2019-09-16 DIAGNOSIS — D75.1 POLYCYTHEMIA: Primary | ICD-10-CM

## 2019-09-16 DIAGNOSIS — I10 ESSENTIAL HYPERTENSION: Chronic | ICD-10-CM

## 2019-09-16 DIAGNOSIS — E78.00 HYPERCHOLESTEREMIA: Chronic | ICD-10-CM

## 2019-09-16 PROCEDURE — 99999 PR PBB SHADOW E&M-EST. PATIENT-LVL III: CPT | Mod: PBBFAC,,, | Performed by: INTERNAL MEDICINE

## 2019-09-16 PROCEDURE — 99215 OFFICE O/P EST HI 40 MIN: CPT | Mod: S$GLB,,, | Performed by: INTERNAL MEDICINE

## 2019-09-16 PROCEDURE — 3044F HG A1C LEVEL LT 7.0%: CPT | Mod: CPTII,S$GLB,, | Performed by: INTERNAL MEDICINE

## 2019-09-16 PROCEDURE — 3078F DIAST BP <80 MM HG: CPT | Mod: CPTII,S$GLB,, | Performed by: INTERNAL MEDICINE

## 2019-09-16 PROCEDURE — 3074F SYST BP LT 130 MM HG: CPT | Mod: CPTII,S$GLB,, | Performed by: INTERNAL MEDICINE

## 2019-09-16 PROCEDURE — 1101F PT FALLS ASSESS-DOCD LE1/YR: CPT | Mod: CPTII,S$GLB,, | Performed by: INTERNAL MEDICINE

## 2019-09-16 PROCEDURE — 3044F PR MOST RECENT HEMOGLOBIN A1C LEVEL <7.0%: ICD-10-PCS | Mod: CPTII,S$GLB,, | Performed by: INTERNAL MEDICINE

## 2019-09-16 PROCEDURE — 3078F PR MOST RECENT DIASTOLIC BLOOD PRESSURE < 80 MM HG: ICD-10-PCS | Mod: CPTII,S$GLB,, | Performed by: INTERNAL MEDICINE

## 2019-09-16 PROCEDURE — 1101F PR PT FALLS ASSESS DOC 0-1 FALLS W/OUT INJ PAST YR: ICD-10-PCS | Mod: CPTII,S$GLB,, | Performed by: INTERNAL MEDICINE

## 2019-09-16 PROCEDURE — 99999 PR PBB SHADOW E&M-EST. PATIENT-LVL III: ICD-10-PCS | Mod: PBBFAC,,, | Performed by: INTERNAL MEDICINE

## 2019-09-16 PROCEDURE — 3074F PR MOST RECENT SYSTOLIC BLOOD PRESSURE < 130 MM HG: ICD-10-PCS | Mod: CPTII,S$GLB,, | Performed by: INTERNAL MEDICINE

## 2019-09-16 PROCEDURE — 99215 PR OFFICE/OUTPT VISIT, EST, LEVL V, 40-54 MIN: ICD-10-PCS | Mod: S$GLB,,, | Performed by: INTERNAL MEDICINE

## 2019-09-16 NOTE — Clinical Note
1. Cbc, iron, ferritin added on to labs on 10/302. Cbc, iron, ferritin end of nov3. Cbc, iron, ferritin, and see me end of december

## 2019-09-16 NOTE — PROGRESS NOTES
Hematology and Medical Oncology   Follow Up     09/16/2019    Primary Hematologic Diagnosis: Polycythemia    History of Present Ilness:   Den Mckinley) is a pleasant 69 y.o.male with known polycythemia, he was previously under the care of Dr. William Ramirez prior to his USP.  A review of his blood counts shows that he occasionally had hematocrits slightly above 50% in 2012 and 2013.    He had abdominal surgery in 2014, and he became anemic.  Since that time, his   hematocrit measurements have been in the range of 52%-58%.     He was placed on testosterone therapy many years ago.  His blood testosterone  level was in the lower portion of the normal range and he tried several   testosterone preparations, but since 2013, he has been using Axiron cream daily.     He does not have generalized itching, although occasionally his ankle inches.    He had peptic ulcer disease in 2012 and required red cell transfusions for   bleeding associated with this.  He has coronary artery disease.  He had 3 stents   placed in 2007.  In 2012, he had a myocardial infarction and had four   additional stents placed.     He has not had venous thrombotic disease and he has not had peripheral artery   disease symptoms.  He has had some pulmonary nodules followed in the past.  It   was thought that these were likely due to a fungal infection.  He has no history of   hepatic or renal disease.      Interval History:  Mr. Rick recently completed 4 therapeutic phlebotomies, with resolution of his symptoms. He did notice that on the last donation left him a bit light headed when he stood up.    He also endorses a recent bout of gout. He does not use medications, but prefers more natural solutions.     PAST MEDICAL HISTORY:   Past Medical History:   Diagnosis Date    Anticoagulant long-term use     Arthritis     Atrial tachycardia, paroxysmal 1/12    during Cardiac Rehab    Colon polyp     Coronary artery disease      Diverticulitis     Diverticulosis     GERD (gastroesophageal reflux disease)     Glucose intolerance (impaired glucose tolerance) 1/24/2017    Hyperlipidemia     Hypertension     IFG (impaired fasting glucose) 12/21/2015    NSTEMI (non-ST elevated myocardial infarction) 10/11/12    Obesity (BMI 30-39.9) 1/31/2018    Post PTCA     Sleep apnea        PAST SURGICAL HISTORY:   Past Surgical History:   Procedure Laterality Date    ASPIRATION-FINE NEEDLE (FNA) Left 6/6/2013    Performed by [facility] at [facility] OR 2ND FLR    boewl resection      CATARACT EXTRACTION, BILATERAL  8/2011    CATHETERIZATION, URETER Left 3/19/2014    Performed by Adam Shahid MD at [facility] OR 2ND [facility]    COLECTOMY, SIGMOID, LAPAROSCOPIC N/A 3/19/2014    Performed by Main Lehman MD at [facility] OR 2ND [facility]    COLONOSCOPY  2012    COLONOSCOPY N/A 10/10/2016    Performed by Main Lehman MD at [facility] ENDO (4TH FLR)    COLONOSCOPY N/A 3/19/2014    Performed by Main Lehman MD at [facility] OR 2ND FLR    COLONOSCOPY W/ POLYPECTOMY      CORONARY ANGIOPLASTY WITH STENT PLACEMENT      EGD (ESOPHAGOGASTRODUODENOSCOPY) N/A 4/3/2014    Performed by Kieran Nguyen MD at [facility] ENDO (2ND FLR)    EGD (ESOPHAGOGASTRODUODENOSCOPY) Left 4/1/2014    Performed by Kieran Nguyen MD at [facility] ENDO (2ND FLR)    ESOPHAGOGASTRODUODENOSCOPY      HERNIA REPAIR      KNEE SURGERY  2003    needle bx on chest      REPAIR-HERNIA-LAPAROSCOPIC-INCISIONAL N/A 2/17/2016    Performed by Main Lehman MD at [facility] OR 2ND FLR    SHOULDER SURGERY      SIGMOIDECTOMY         PAST SOCIAL HISTORY:   reports that he has never smoked. He has never used smokeless tobacco. He reports that he does not drink alcohol or use drugs.    FAMILY HISTORY:  Family History   Problem Relation Age of Onset    Cancer Mother         uterine    Asthma Sister     Heart disease Father         valve    Diabetes Maternal Grandmother        CURRENT MEDICATIONS:    Current Outpatient Medications   Medication Sig    alfuzosin (UROXATRAL) 10 mg Tb24 Take 1 tablet (10 mg total) by mouth every evening.    aspirin (ECOTRIN) 81 MG EC tablet Take 81 mg by mouth once daily.    blood sugar diagnostic Strp To check BG 4 times daily, to use with insurance preferred meter    blood-glucose meter kit To check BG 4 times daily, to use with insurance preferred meter    CELEBREX 200 mg capsule Take 1 capsule by mouth as needed.    clindamycin phosphate 1% (CLINDAGEL) 1 % gel APPLY TWICE A DAY TO FACE    CORDRAN TAPE LARGE ROLL 4 mcg/cm2 Tape Apply 1 application topically every evening. Apply to affected area    desoximetasone (TOPICORT) 0.25 % cream APPLY TO AFFECTED AREA TWICE A DAY FOR PSORIASIS    empagliflozin (JARDIANCE) 10 mg Tab Take 1 tablet by mouth once daily.    ezetimibe (ZETIA) 10 mg tablet Take 0.5 tablets (5 mg total) by mouth once daily.    fesoterodine (TOVIAZ) 8 mg Tb24 Take 8 mg by mouth once daily.    fish oil-omega-3 fatty acids 300-1,000 mg capsule Take 1 g by mouth once daily.    FREESTYLE LANCETS 28 gauge lancets TO CHECK BG 4 TIMES DAILY, TO USE WITH INSURANCE PREFERRED METER    metFORMIN (GLUCOPHAGE-XR) 500 MG 24 hr tablet Take 4 tablets (2,000 mg total) by mouth every evening.    methocarbamol (ROBAXIN) 750 MG Tab Take 750 mg by mouth continuous prn.      methylsulfonylmethane (MSM) 500 mg Cap Take 1 capsule by mouth Daily.      metoprolol succinate (TOPROL-XL) 100 MG 24 hr tablet TAKE 1 TABLET EVERY DAY    multivitamin (THERAGRAN) per tablet Take 1 tablet by mouth Daily.      mupirocin (BACTROBAN) 2 % ointment APPLY TO AFFECTED AREA DAILY AS NEEDED    nitroGLYCERIN (NITROSTAT) 0.4 MG SL tablet Place 1 tablet (0.4 mg total) under the tongue every 5 (five) minutes as needed.    oxycodone-acetaminophen (PERCOCET) 5-325 mg per tablet Take 1 tablet by mouth as needed.    pantoprazole (PROTONIX) 40 MG tablet Take 40 mg by mouth daily as needed.      PENNSAID 20 mg/gram /actuation(2 %) sopm APPLY TWO PUMPS TO AFFECTED AREA(S) TWICE DAILY AS NEEDED    predniSONE (DELTASONE) 20 MG tablet TAKE 2 TABS ON DAY 1, THEN 1 1/2 TAB ON DAYS 2-3,THEN 1 TAB ON DAYS 4-5, THEN 1/2 TAB ON DAY 6    rosuvastatin (CRESTOR) 20 MG tablet TAKE 1 TABLET (20 MG TOTAL) BY MOUTH EVERY EVENING.    tadalafil (CIALIS) 20 MG Tab Take 1 tablet (20 mg total) by mouth as needed.     No current facility-administered medications for this visit.      ALLERGIES:   Review of patient's allergies indicates:   Allergen Reactions    Atorvastatin      Other reaction(s):(lipitor) Muscle pain    Niacin      Other reaction(s): Flushing (skin)         Review of Systems:     Review of Systems   Constitutional: Negative for appetite change and unexpected weight change.   Respiratory: Negative for cough and shortness of breath.    Cardiovascular: Negative for chest pain.   Gastrointestinal: Negative for abdominal pain and diarrhea.   Genitourinary: Negative for frequency.    Musculoskeletal: Positive for arthralgias (gout around the achiles tendon). Negative for back pain.   Skin: Negative for itching and rash.   Neurological: Negative for headaches.   Hematological: Negative for adenopathy.   Psychiatric/Behavioral: The patient is not nervous/anxious.           Physical Exam:     Vitals:    09/16/19 1139   BP: 117/77   Pulse: 68   Resp: 16   Temp: 97.7 °F (36.5 °C)     Physical Exam   Constitutional: He is oriented to person, place, and time. He appears well-developed and well-nourished. No distress.   HENT:   Head: Normocephalic and atraumatic.   Mouth/Throat: Oropharynx is clear and moist. No oropharyngeal exudate.   Eyes: Pupils are equal, round, and reactive to light. Conjunctivae and EOM are normal.   Neck: Normal range of motion. Neck supple. No JVD present. No tracheal deviation present. No thyromegaly present.   Cardiovascular: Normal rate, regular rhythm and normal heart sounds. Exam reveals  no friction rub.   No murmur heard.  Pulmonary/Chest: Effort normal and breath sounds normal. No stridor. No respiratory distress. He has no wheezes. He has no rales. He exhibits no tenderness.   Abdominal: Soft. Bowel sounds are normal. He exhibits no distension. There is no tenderness. There is no rebound and no guarding.   Musculoskeletal: Normal range of motion. He exhibits no edema, tenderness or deformity.   Lymphadenopathy:     He has no axillary adenopathy.   Neurological: He is alert and oriented to person, place, and time. He displays normal reflexes. No cranial nerve deficit or sensory deficit. He exhibits normal muscle tone. Coordination normal.   Skin: Skin is warm and dry. Capillary refill takes less than 2 seconds. No rash noted. He is not diaphoretic. No erythema. No pallor.   Numerous papules on upper extremities   Psychiatric: He has a normal mood and affect. His behavior is normal. Judgment and thought content normal.       ECOG Performance Status: (foot note - ECOG PS provided by Eastern Cooperative Oncology Group) 0 - Asymptomatic    Karnofsky Performance Score:  100%- Normal, No Complaints, No Evidence of Disease    Labs:   Lab Results   Component Value Date    WBC 4.21 09/12/2019    HGB 13.5 (L) 09/12/2019    HCT 44.2 09/12/2019     09/12/2019    CHOL 65 (L) 12/18/2018    TRIG 69 12/18/2018    HDL 29 (L) 12/18/2018    ALT 28 09/12/2019    AST 24 09/12/2019     09/12/2019    K 4.8 09/12/2019     09/12/2019    CREATININE 0.9 09/12/2019    BUN 28 (H) 09/12/2019    CO2 23 09/12/2019    TSH 1.804 07/25/2019    PSA 0.22 07/25/2019    INR 1.1 04/01/2014    GLUF 111 (H) 03/07/2013    HGBA1C 6.5 (H) 11/02/2018     Ferritin: 7    Imaging: Previous imaging has been reviewed     Assessment and Plan:     Mr. Rick is a 69 year old male with polycytemia    Polycythemia  --Initially presented to Dr. Ramirez and underwent an extensive workup 2-3 years ago. Testing was negative for  P.Vera  --A bone marrow was deferred at that time  --Completed 4 weekly therapeutic phlebotomies   --Will follow monthly cbc x 3 months to evaluate duration of response      30 minutes were spent face to face with the patient to discuss the disease, natural history, treatment options. I have provided the patient with an opportunity to ask questions and have all questions answered to his satisfaction.       he will return to clinic in 3 months with monthly labs checks, but knows to call in the interim if symptoms change or should a problem arise.        Марина Nogueira MD  Hematology and Medical Oncology  Bone Marrow Transplant  [facility]

## 2019-09-22 DIAGNOSIS — D75.1 POLYCYTHEMIA: Primary | ICD-10-CM

## 2019-10-10 ENCOUNTER — PATIENT OUTREACH (OUTPATIENT)
Dept: ADMINISTRATIVE | Facility: OTHER | Age: 69
End: 2019-10-10

## 2019-10-14 ENCOUNTER — OFFICE VISIT (OUTPATIENT)
Dept: ENDOCRINOLOGY | Facility: CLINIC | Age: 69
End: 2019-10-14
Payer: COMMERCIAL

## 2019-10-14 VITALS
HEART RATE: 75 BPM | DIASTOLIC BLOOD PRESSURE: 68 MMHG | BODY MASS INDEX: 30.7 KG/M2 | SYSTOLIC BLOOD PRESSURE: 120 MMHG | WEIGHT: 207.25 LBS | HEIGHT: 69 IN

## 2019-10-14 DIAGNOSIS — E78.00 HYPERCHOLESTEREMIA: Chronic | ICD-10-CM

## 2019-10-14 DIAGNOSIS — I25.10 CORONARY ARTERY DISEASE INVOLVING NATIVE CORONARY ARTERY OF NATIVE HEART WITHOUT ANGINA PECTORIS: Chronic | ICD-10-CM

## 2019-10-14 DIAGNOSIS — E11.65 TYPE 2 DIABETES MELLITUS WITH HYPERGLYCEMIA, WITHOUT LONG-TERM CURRENT USE OF INSULIN: Primary | ICD-10-CM

## 2019-10-14 PROCEDURE — 3078F PR MOST RECENT DIASTOLIC BLOOD PRESSURE < 80 MM HG: ICD-10-PCS | Mod: CPTII,S$GLB,, | Performed by: INTERNAL MEDICINE

## 2019-10-14 PROCEDURE — 99999 PR PBB SHADOW E&M-EST. PATIENT-LVL IV: ICD-10-PCS | Mod: PBBFAC,,, | Performed by: INTERNAL MEDICINE

## 2019-10-14 PROCEDURE — 99213 OFFICE O/P EST LOW 20 MIN: CPT | Mod: S$GLB,,, | Performed by: INTERNAL MEDICINE

## 2019-10-14 PROCEDURE — 3078F DIAST BP <80 MM HG: CPT | Mod: CPTII,S$GLB,, | Performed by: INTERNAL MEDICINE

## 2019-10-14 PROCEDURE — 3044F PR MOST RECENT HEMOGLOBIN A1C LEVEL <7.0%: ICD-10-PCS | Mod: CPTII,S$GLB,, | Performed by: INTERNAL MEDICINE

## 2019-10-14 PROCEDURE — 1101F PT FALLS ASSESS-DOCD LE1/YR: CPT | Mod: CPTII,S$GLB,, | Performed by: INTERNAL MEDICINE

## 2019-10-14 PROCEDURE — 3074F SYST BP LT 130 MM HG: CPT | Mod: CPTII,S$GLB,, | Performed by: INTERNAL MEDICINE

## 2019-10-14 PROCEDURE — 3044F HG A1C LEVEL LT 7.0%: CPT | Mod: CPTII,S$GLB,, | Performed by: INTERNAL MEDICINE

## 2019-10-14 PROCEDURE — 1101F PR PT FALLS ASSESS DOC 0-1 FALLS W/OUT INJ PAST YR: ICD-10-PCS | Mod: CPTII,S$GLB,, | Performed by: INTERNAL MEDICINE

## 2019-10-14 PROCEDURE — 99999 PR PBB SHADOW E&M-EST. PATIENT-LVL IV: CPT | Mod: PBBFAC,,, | Performed by: INTERNAL MEDICINE

## 2019-10-14 PROCEDURE — 99213 PR OFFICE/OUTPT VISIT, EST, LEVL III, 20-29 MIN: ICD-10-PCS | Mod: S$GLB,,, | Performed by: INTERNAL MEDICINE

## 2019-10-14 PROCEDURE — 3074F PR MOST RECENT SYSTOLIC BLOOD PRESSURE < 130 MM HG: ICD-10-PCS | Mod: CPTII,S$GLB,, | Performed by: INTERNAL MEDICINE

## 2019-10-14 NOTE — ASSESSMENT & PLAN NOTE
Reviewed goals of therapy are to get the best control we can without hypoglycemia    Medication changes:   No changes    Doing well with Jardiance. Staying hydrated. No side-effects.    Advised frequent self blood glucose monitoring.  Patient encouraged to document glucose results and bring them to every clinic visit.     Close adherence to lifestyle changes recommended. Admits to some dietary indiscretions, but overall has been doing well.    Eyes: UTD - No DR  Kidneys: Normal urine microalb/Cr  Lipids: UTD; ordered 10/30  Foot exam: UTD  ASA: Yes

## 2019-10-14 NOTE — PROGRESS NOTES
"Subjective:      Chief Complaint: Diabetes    HPI: Den Rick is a 67 y.o. male who is here for a follow-up evaluation for type 2 diabetes mellitus    Last seen by ROCK Godwin in 11/2018.     With regards to the diabetes:     Current symptoms/problems include none currently. Had an upper respiratory infection requiring a short course of steroids - completed about a week ago. Had some higher sugars during the steroids, but back to normal now.     The patient was initially diagnosed with Type 2 diabetes mellitus based on the following criteria: Elevated fasting glucose and HbA1c - December, 2017     Known diabetic complications: None   Cardiovascular risk factors: advanced age (older than 55 for men, 65 for women), diabetes mellitus, dyslipidemia, hypertension, male gender, obesity (BMI >= 30 kg/m2) and previous history of CAD (MI in 2012 - PCI x 7)  Current diabetic medications include:  Metformin XR 2000 mg with supper - denies any GI symptoms related to metformin use.  Jardiance 10 mg daily     Prior visit with dietician: yes  Current diet: Has been avoiding high carb foods, including bread (wheat); "I don't eat much at all"; plenty fluids.  Current exercise: Nothing strenuous. Walks his dog a few times per week.     Current monitoring regimen: home blood tests - ~3 times daily  Home blood sugar records: AM Fasting - 80-120s mostly - highest 140s; Pre-meal 90-130s mostly.  Any episodes of hypoglycemia? no     Diabetic Health Maintenance:        HbA1c < 7.0%: Last in our system in 2018 was 6.5; self-[facility] 3 [facility] ago was 6.7%; scheduled 10/30 for repeat.        Blood pressure <130/80:  Yes              Use of ACE/ARB: No, but will be next agent if needed. On metoprolol due to history of CAD.         Checked Weight: Yes              Weight trend: Steadily trending down.        Checked lipids within last year: Yes              Statin drug:  Yes        Low dose ASA?: Yes    Diabetes " Management Status    Statin: Crestor 20 + Zetia 5  ACE/ARB: Not taking    Screening or Prevention Patient's value Goal Complete/Controlled?   HgA1C Testing and Control   Lab Results   Component Value Date    HGBA1C 6.5 (H) 11/02/2018      Annually/Less than 8% Yes   Lipid profile : 12/18/2018 Annually Yes   LDL control Lab Results   Component Value Date    LDLCALC 22.2 (L) 12/18/2018    Annually/Less than 100 mg/dl  Yes   Nephropathy screening Lab Results   Component Value Date    LABMICR 6.0 07/25/2019     Lab Results   Component Value Date    PROTEINUA Negative 01/22/2018    Annually Yes   Blood pressure BP Readings from Last 1 Encounters:   10/14/19 120/68    Less than 140/90 Yes   Dilated retinal exam 1/2019 - goes every January Annually Yes   Foot exam   : 07/25/2019 Annually Yes     Reviewed past medical, family, social history and updated as appropriate.    Review of Systems   Respiratory: Negative for shortness of breath.    Cardiovascular: Negative for chest pain.   Gastrointestinal: Negative for abdominal pain.   All other systems reviewed and are negative.    Objective:     Vitals:    10/14/19 1321   BP: 120/68   Pulse: 75      Physical Exam   Constitutional: He is oriented to person, place, and time. He appears well-developed and well-nourished. No distress.   HENT:   Head: Normocephalic and atraumatic.   Eyes: Conjunctivae are normal. Right eye exhibits no discharge. Left eye exhibits no discharge.   Cardiovascular: Normal rate.   No murmur heard.  Pulmonary/Chest: Effort normal and breath sounds normal. No respiratory distress.   Abdominal: Soft. There is no tenderness.   Musculoskeletal: He exhibits no edema.   No digital clubbing or extremity cyanosis   Neurological: He is alert and oriented to person, place, and time. Coordination normal.   Skin: Skin is warm and dry.   Psychiatric: He has a normal mood and affect. His behavior is normal.   Nursing note and vitals reviewed.    Wt Readings from Last  10 Encounters:   10/14/19 1321 94 kg (207 lb 3.7 oz)   09/16/19 1139 93.8 kg (206 lb 12.7 oz)   08/07/19 0906 94.8 kg (208 lb 15.9 oz)   07/25/19 1307 94.2 kg (207 lb 10.8 oz)   12/27/18 1014 94.7 kg (208 lb 12.4 oz)   12/04/18 0856 95 kg (209 lb 7 oz)   11/07/18 0906 95.3 kg (210 lb 1.6 oz)   08/12/18 2233 95.3 kg (210 lb)   05/16/18 0855 99.6 kg (219 lb 9.3 oz)   02/21/18 1039 97.4 kg (214 lb 11.7 oz)     Lab Results   Component Value Date    HGBA1C 6.5 (H) 11/02/2018     Lab Results   Component Value Date    CHOL 65 (L) 12/18/2018    HDL 29 (L) 12/18/2018    LDLCALC 22.2 (L) 12/18/2018    TRIG 69 12/18/2018    CHOLHDL 44.6 12/18/2018     Lab Results   Component Value Date     09/12/2019    K 4.8 09/12/2019     09/12/2019    CO2 23 09/12/2019     09/12/2019    BUN 28 (H) 09/12/2019    CREATININE 0.9 09/12/2019    CALCIUM 9.1 09/12/2019    PROT 5.9 (L) 09/12/2019    ALBUMIN 3.7 09/12/2019    BILITOT 0.9 09/12/2019    ALKPHOS 55 09/12/2019    AST 24 09/12/2019    ALT 28 09/12/2019    ANIONGAP 7 (L) 09/12/2019    ESTGFRAFRICA >60.0 09/12/2019    EGFRNONAA >60.0 09/12/2019    TSH 1.804 07/25/2019      Lab Results   Component Value Date    MICALBCREAT 10.0 07/25/2019       Assessment/Plan:     Type 2 diabetes mellitus with hyperglycemia, without long-term current use of insulin  Reviewed goals of therapy are to get the best control we can without hypoglycemia    Medication changes:   No changes    Doing well with Jardiance. Staying hydrated. No side-effects.    Advised frequent self blood glucose monitoring.  Patient encouraged to document glucose results and bring them to every clinic visit.     Close adherence to lifestyle changes recommended. Admits to some dietary indiscretions, but overall has been doing well.    Eyes: UTD - No DR  Kidneys: Normal urine microalb/Cr  Lipids: UTD; ordered 10/30  Foot exam: UTD  ASA: Yes    Coronary artery disease involving native coronary artery of native heart  without angina pectoris  On statin + Zetia.    Jardiance has secondary prevention benefit in CAD.    Hypercholesteremia  See above.    I gave him the option of continuing to see us vs. getting diabetes care through his PCP. If he wants to keep seeing us, he can come back in 1 year.

## 2019-10-28 ENCOUNTER — PATIENT MESSAGE (OUTPATIENT)
Dept: HEMATOLOGY/ONCOLOGY | Facility: CLINIC | Age: 69
End: 2019-10-28

## 2019-10-31 ENCOUNTER — LAB VISIT (OUTPATIENT)
Dept: LAB | Facility: HOSPITAL | Age: 69
End: 2019-10-31
Attending: NURSE PRACTITIONER
Payer: COMMERCIAL

## 2019-10-31 DIAGNOSIS — I25.10 CORONARY ARTERY DISEASE INVOLVING NATIVE CORONARY ARTERY OF NATIVE HEART WITHOUT ANGINA PECTORIS: ICD-10-CM

## 2019-10-31 DIAGNOSIS — D75.1 POLYCYTHEMIA: ICD-10-CM

## 2019-10-31 LAB
ALBUMIN SERPL BCP-MCNC: 3.5 G/DL (ref 3.5–5.2)
ALBUMIN SERPL BCP-MCNC: 3.5 G/DL (ref 3.5–5.2)
ALP SERPL-CCNC: 61 U/L (ref 55–135)
ALP SERPL-CCNC: 61 U/L (ref 55–135)
ALT SERPL W/O P-5'-P-CCNC: 27 U/L (ref 10–44)
ALT SERPL W/O P-5'-P-CCNC: 27 U/L (ref 10–44)
ANION GAP SERPL CALC-SCNC: 4 MMOL/L (ref 8–16)
ANION GAP SERPL CALC-SCNC: 4 MMOL/L (ref 8–16)
AST SERPL-CCNC: 25 U/L (ref 10–40)
AST SERPL-CCNC: 25 U/L (ref 10–40)
BASOPHILS # BLD AUTO: 0.04 K/UL (ref 0–0.2)
BASOPHILS NFR BLD: 1.1 % (ref 0–1.9)
BILIRUB SERPL-MCNC: 0.7 MG/DL (ref 0.1–1)
BILIRUB SERPL-MCNC: 0.7 MG/DL (ref 0.1–1)
BUN SERPL-MCNC: 20 MG/DL (ref 8–23)
BUN SERPL-MCNC: 20 MG/DL (ref 8–23)
CALCIUM SERPL-MCNC: 8.6 MG/DL (ref 8.7–10.5)
CALCIUM SERPL-MCNC: 8.6 MG/DL (ref 8.7–10.5)
CHLORIDE SERPL-SCNC: 113 MMOL/L (ref 95–110)
CHLORIDE SERPL-SCNC: 113 MMOL/L (ref 95–110)
CHOLEST SERPL-MCNC: 69 MG/DL (ref 120–199)
CHOLEST/HDLC SERPL: 2.2 {RATIO} (ref 2–5)
CO2 SERPL-SCNC: 24 MMOL/L (ref 23–29)
CO2 SERPL-SCNC: 24 MMOL/L (ref 23–29)
CREAT SERPL-MCNC: 0.9 MG/DL (ref 0.5–1.4)
CREAT SERPL-MCNC: 0.9 MG/DL (ref 0.5–1.4)
DIFFERENTIAL METHOD: ABNORMAL
EOSINOPHIL # BLD AUTO: 0.1 K/UL (ref 0–0.5)
EOSINOPHIL NFR BLD: 3.4 % (ref 0–8)
ERYTHROCYTE [DISTWIDTH] IN BLOOD BY AUTOMATED COUNT: 15.9 % (ref 11.5–14.5)
EST. GFR  (AFRICAN AMERICAN): >60 ML/MIN/1.73 M^2
EST. GFR  (AFRICAN AMERICAN): >60 ML/MIN/1.73 M^2
EST. GFR  (NON AFRICAN AMERICAN): >60 ML/MIN/1.73 M^2
EST. GFR  (NON AFRICAN AMERICAN): >60 ML/MIN/1.73 M^2
FERRITIN SERPL-MCNC: 6 NG/ML (ref 20–300)
GLUCOSE SERPL-MCNC: 142 MG/DL (ref 70–110)
GLUCOSE SERPL-MCNC: 142 MG/DL (ref 70–110)
HCT VFR BLD AUTO: 47.4 % (ref 40–54)
HDLC SERPL-MCNC: 32 MG/DL (ref 40–75)
HDLC SERPL: 46.4 % (ref 20–50)
HGB BLD-MCNC: 13.6 G/DL (ref 14–18)
IMM GRANULOCYTES # BLD AUTO: 0.01 K/UL (ref 0–0.04)
IMM GRANULOCYTES NFR BLD AUTO: 0.3 % (ref 0–0.5)
IRON SERPL-MCNC: 31 UG/DL (ref 45–160)
LDLC SERPL CALC-MCNC: 24.6 MG/DL (ref 63–159)
LYMPHOCYTES # BLD AUTO: 0.7 K/UL (ref 1–4.8)
LYMPHOCYTES NFR BLD: 18.8 % (ref 18–48)
MAGNESIUM SERPL-MCNC: 1.9 MG/DL (ref 1.6–2.6)
MCH RBC QN AUTO: 23.4 PG (ref 27–31)
MCHC RBC AUTO-ENTMCNC: 28.7 G/DL (ref 32–36)
MCV RBC AUTO: 82 FL (ref 82–98)
MONOCYTES # BLD AUTO: 0.7 K/UL (ref 0.3–1)
MONOCYTES NFR BLD: 17.2 % (ref 4–15)
NEUTROPHILS # BLD AUTO: 2.2 K/UL (ref 1.8–7.7)
NEUTROPHILS NFR BLD: 59.2 % (ref 38–73)
NONHDLC SERPL-MCNC: 37 MG/DL
NRBC BLD-RTO: 0 /100 WBC
PLATELET # BLD AUTO: 212 K/UL (ref 150–350)
PMV BLD AUTO: 9.6 FL (ref 9.2–12.9)
POTASSIUM SERPL-SCNC: 4.1 MMOL/L (ref 3.5–5.1)
POTASSIUM SERPL-SCNC: 4.1 MMOL/L (ref 3.5–5.1)
PROT SERPL-MCNC: 5.7 G/DL (ref 6–8.4)
PROT SERPL-MCNC: 5.7 G/DL (ref 6–8.4)
RBC # BLD AUTO: 5.81 M/UL (ref 4.6–6.2)
SATURATED IRON: 9 % (ref 20–50)
SODIUM SERPL-SCNC: 141 MMOL/L (ref 136–145)
SODIUM SERPL-SCNC: 141 MMOL/L (ref 136–145)
TOTAL IRON BINDING CAPACITY: 327 UG/DL (ref 250–450)
TRANSFERRIN SERPL-MCNC: 221 MG/DL (ref 200–375)
TRIGL SERPL-MCNC: 62 MG/DL (ref 30–150)
WBC # BLD AUTO: 3.78 K/UL (ref 3.9–12.7)

## 2019-10-31 PROCEDURE — 36415 COLL VENOUS BLD VENIPUNCTURE: CPT | Mod: PO

## 2019-10-31 PROCEDURE — 83540 ASSAY OF IRON: CPT

## 2019-10-31 PROCEDURE — 82728 ASSAY OF FERRITIN: CPT

## 2019-10-31 PROCEDURE — 83735 ASSAY OF MAGNESIUM: CPT

## 2019-10-31 PROCEDURE — 80061 LIPID PANEL: CPT

## 2019-10-31 PROCEDURE — 85025 COMPLETE CBC W/AUTO DIFF WBC: CPT

## 2019-10-31 PROCEDURE — 80053 COMPREHEN METABOLIC PANEL: CPT

## 2019-11-04 ENCOUNTER — PATIENT OUTREACH (OUTPATIENT)
Dept: ADMINISTRATIVE | Facility: OTHER | Age: 69
End: 2019-11-04

## 2019-11-07 ENCOUNTER — PATIENT MESSAGE (OUTPATIENT)
Dept: ENDOCRINOLOGY | Facility: CLINIC | Age: 69
End: 2019-11-07

## 2019-11-07 ENCOUNTER — OFFICE VISIT (OUTPATIENT)
Dept: CARDIOLOGY | Facility: CLINIC | Age: 69
End: 2019-11-07
Payer: COMMERCIAL

## 2019-11-07 VITALS
WEIGHT: 208.56 LBS | HEART RATE: 72 BPM | DIASTOLIC BLOOD PRESSURE: 70 MMHG | BODY MASS INDEX: 30.89 KG/M2 | SYSTOLIC BLOOD PRESSURE: 124 MMHG | HEIGHT: 69 IN

## 2019-11-07 DIAGNOSIS — E78.00 HYPERCHOLESTEREMIA: Chronic | ICD-10-CM

## 2019-11-07 DIAGNOSIS — I15.2 HYPERTENSION ASSOCIATED WITH DIABETES: ICD-10-CM

## 2019-11-07 DIAGNOSIS — I25.2 OLD MYOCARDIAL INFARCTION: ICD-10-CM

## 2019-11-07 DIAGNOSIS — E11.59 HYPERTENSION ASSOCIATED WITH DIABETES: ICD-10-CM

## 2019-11-07 DIAGNOSIS — D75.1 POLYCYTHEMIA: ICD-10-CM

## 2019-11-07 DIAGNOSIS — I25.10 CORONARY ARTERY DISEASE INVOLVING NATIVE CORONARY ARTERY OF NATIVE HEART WITHOUT ANGINA PECTORIS: Primary | ICD-10-CM

## 2019-11-07 DIAGNOSIS — Z98.61 S/P PTCA (PERCUTANEOUS TRANSLUMINAL CORONARY ANGIOPLASTY): Chronic | ICD-10-CM

## 2019-11-07 DIAGNOSIS — Q21.10 ASD (ATRIAL SEPTAL DEFECT): ICD-10-CM

## 2019-11-07 PROCEDURE — 3074F SYST BP LT 130 MM HG: CPT | Mod: CPTII,S$GLB,, | Performed by: INTERNAL MEDICINE

## 2019-11-07 PROCEDURE — 99214 PR OFFICE/OUTPT VISIT, EST, LEVL IV, 30-39 MIN: ICD-10-PCS | Mod: S$GLB,,, | Performed by: INTERNAL MEDICINE

## 2019-11-07 PROCEDURE — 1101F PT FALLS ASSESS-DOCD LE1/YR: CPT | Mod: CPTII,S$GLB,, | Performed by: INTERNAL MEDICINE

## 2019-11-07 PROCEDURE — 3078F PR MOST RECENT DIASTOLIC BLOOD PRESSURE < 80 MM HG: ICD-10-PCS | Mod: CPTII,S$GLB,, | Performed by: INTERNAL MEDICINE

## 2019-11-07 PROCEDURE — 99214 OFFICE O/P EST MOD 30 MIN: CPT | Mod: S$GLB,,, | Performed by: INTERNAL MEDICINE

## 2019-11-07 PROCEDURE — 99999 PR PBB SHADOW E&M-EST. PATIENT-LVL III: ICD-10-PCS | Mod: PBBFAC,,, | Performed by: INTERNAL MEDICINE

## 2019-11-07 PROCEDURE — 99999 PR PBB SHADOW E&M-EST. PATIENT-LVL III: CPT | Mod: PBBFAC,,, | Performed by: INTERNAL MEDICINE

## 2019-11-07 PROCEDURE — 3074F PR MOST RECENT SYSTOLIC BLOOD PRESSURE < 130 MM HG: ICD-10-PCS | Mod: CPTII,S$GLB,, | Performed by: INTERNAL MEDICINE

## 2019-11-07 PROCEDURE — 3078F DIAST BP <80 MM HG: CPT | Mod: CPTII,S$GLB,, | Performed by: INTERNAL MEDICINE

## 2019-11-07 PROCEDURE — 1101F PR PT FALLS ASSESS DOC 0-1 FALLS W/OUT INJ PAST YR: ICD-10-PCS | Mod: CPTII,S$GLB,, | Performed by: INTERNAL MEDICINE

## 2019-11-07 NOTE — PATIENT INSTRUCTIONS
LDL - bad type - improves with diet and medications: typically statins; most other medications that lower LDL have not been proven to prevent heart attacks.  May not improve significantly with exercise alone.  Ideally less than 100 mg/dl.   But the lower the better. Statins (cholesterol lowering meds) lower LDL. If you have coronary artery disease or blockages anywhere else in the body, it should be well below 70 mg/dl.    HDL - good type - improves with exercise.  Ideally greater than 50 mg/dl. The proportion of HDL to the total cholesterol is more important than the absolute HDL.  This means a HDL of 45 out of a total cholesterol of 130 mg'dl is pretty good, but the same HDL out of a total of  200 mg/dl is not quite as good. A level of 30% or higher is ideal.    TGs (triglycerides) - also bad - can change very quickly and considerably with certain foods. Improve with diet, exercise and high dose fish oil.  In some cases a low carbohydrate diet will lower TGs better than a low fat diet.  Ideal range  mg/dl.    Sugar, fat and cholesterol in food:     A sensible diet that limits the intake of sugars, saturated (bad) fats and trans fats while increasing the intake of unsaturated (good) fats and plant proteins is the basis of the current dietary recommendations.      We now recommend drastically reducing the intake of sugar. There is less emphasis on excluding all fat and more emphasis on the types of different fats.    Cholesterol in our food is generally present in relatively small amounts. New dietary guidelines are less obsessed with the amount of cholesterol. However please do not confuse this with the role of cholesterol in our blood and arteries. The liver converts certain foods into cholesterol.  It is this cholesterol and other fats that clogs up our arteries.       Most foods that are high in cholesterol are also high in saturated fat. But there is much more saturated fat than cholesterol in these  foods. Researchers believe that the saturated fat content matters more than cholesterol. On the other hand, there are a handful of foods that are high in cholesterol but do not contain much saturated fat: eggs, shrimp, crab legs and crawfish are OK to eat in modest quantities as long as you do not deep salgado them. So a few eggs a week are fine the white and the yolk, but you can eat as many egg whites as you want.      Saturated fat is the bad fat - you should limit your intake of this. Deep fried foods, meats and other animal fats are high in saturated fat. Cookies, donuts and most dessert and cakes are usually high in both saturated fat and sugar.       Unsaturated fat is the good fat. It contains the same number of calories as saturated fat but these fats do not get deposited in our arteries. The Mediterranean style diet encourages the intake of unsaturated fat - olive oil, avocado and unsalted nuts. So instead of baking a piece of fish, consider pan-frying it using olive oil.     You should eat a few servings of vegetables (and fruit as long as you are not diabetic) everyday. Substitute some plant proteins in place of meat: beans, lentils, quinoa and oatmeal. They are lean proteins.     Do not use stick butter or stick margarine. Butter that comes in a tub is soft butter. It consists of 1/2 butter and 1/2 vegetable oil., either canola or olive oil. It is fine to use that.       Trans fats should definitely be avoided. Most foods that are labelled as containing 0 gms of trans fat may still contain several hundred milligrams of trans fat: creamer, margarine, dough, deep fried foods, ready made frosting, potato, corn and torilla chips, cakes, cookies, pie crusts and crackers containing shortening made with hydrogenated vegetable oil.

## 2019-11-07 NOTE — PROGRESS NOTES
Subjective:   Patient ID:  Den Rick is a 69 y.o. male who presents for follow-up of Coronary Artery Disease      Problem List:  CAD   NSTEMI - OM1 YUE 10/11/12   YUE ostial and prox RCA 10/17/12   YUE mid LAD 1/18/07   YUE prox and mid PDA 1/18/07   Hypercholesterolemia   (arthralgias with Lipitor)   HTN  GI bleed 2014  DM - onset 12/17    HPI:   Den Rick is here for fup of CAD. He feels well and does not report angina or shortness of breath with exertion.     He was diagnosed with primary polycythemia and had 4 phlebotomies.  On 20 mg of rosuvastatin his total chol is about 70 w a LDL(c) of 22-33 mg/dl.         Review of Systems   Constitution: Positive for malaise/fatigue. Negative for weight gain and weight loss.   Cardiovascular: Negative for chest pain, dyspnea on exertion, leg swelling and palpitations.   Respiratory: Negative for cough and hemoptysis.    Hematologic/Lymphatic: Does not bruise/bleed easily.   Musculoskeletal: Negative for arthritis and muscle cramps.   Gastrointestinal: Negative for abdominal pain, heartburn and melena.   Genitourinary: Negative for hematuria and nocturia.   Neurological: Negative for headaches, light-headedness and seizures.   Psychiatric/Behavioral: Negative for depression.       Current Outpatient Medications   Medication Sig    alfuzosin (UROXATRAL) 10 mg Tb24 Take 1 tablet (10 mg total) by mouth every evening.    aspirin (ECOTRIN) 81 MG EC tablet Take 81 mg by mouth once daily.    blood sugar diagnostic Strp To check BG 4 times daily, to use with insurance preferred meter    blood-glucose meter kit To check BG 4 times daily, to use with insurance preferred meter    CELEBREX 200 mg capsule Take 1 capsule by mouth as needed.    clindamycin phosphate 1% (CLINDAGEL) 1 % gel APPLY TWICE A DAY TO FACE    desoximetasone (TOPICORT) 0.25 % cream APPLY TO AFFECTED AREA TWICE A DAY FOR PSORIASIS    empagliflozin (JARDIANCE) 10 mg Tab Take 1 tablet by mouth  "once daily.    ezetimibe (ZETIA) 10 mg tablet Take 0.5 tablets (5 mg total) by mouth once daily.    fesoterodine (TOVIAZ) 8 mg Tb24 Take 8 mg by mouth once daily.    fish oil-omega-3 fatty acids 300-1,000 mg capsule Take 1 g by mouth once daily.    FREESTYLE LANCETS 28 gauge lancets TO CHECK BG 4 TIMES DAILY, TO USE WITH INSURANCE PREFERRED METER    metFORMIN (GLUCOPHAGE-XR) 500 MG 24 hr tablet Take 4 tablets (2,000 mg total) by mouth every evening.    methocarbamol (ROBAXIN) 750 MG Tab Take 750 mg by mouth continuous prn.      methylsulfonylmethane (MSM) 500 mg Cap Take 1 capsule by mouth Daily.      metoprolol succinate (TOPROL-XL) 100 MG 24 hr tablet TAKE 1 TABLET EVERY DAY    multivitamin (THERAGRAN) per tablet Take 1 tablet by mouth Daily.      mupirocin (BACTROBAN) 2 % ointment APPLY TO AFFECTED AREA DAILY AS NEEDED    nitroGLYCERIN (NITROSTAT) 0.4 MG SL tablet Place 1 tablet (0.4 mg total) under the tongue every 5 (five) minutes as needed.    oxycodone-acetaminophen (PERCOCET) 5-325 mg per tablet Take 1 tablet by mouth as needed.    pantoprazole (PROTONIX) 40 MG tablet Take 40 mg by mouth daily as needed.     rosuvastatin (CRESTOR) 20 MG tablet TAKE 1 TABLET (20 MG TOTAL) BY MOUTH EVERY EVENING.    CORDRAN TAPE LARGE ROLL 4 mcg/cm2 Tape Apply 1 application topically every evening. Apply to affected area    PENNSAID 20 mg/gram /actuation(2 %) sopm APPLY TWO PUMPS TO AFFECTED AREA(S) TWICE DAILY AS NEEDED         Social history:  Den Kochdedraálvaro  reports that he has never smoked. He has never used smokeless tobacco. He reports that he does not drink alcohol or use drugs.      Objective:     Physical Exam   Constitutional: He is oriented to person, place, and time. He appears well-developed and well-nourished.   /70   Pulse 72   Ht 5' 9" (1.753 m)   Wt 94.6 kg (208 lb 8.9 oz)   BMI 30.80 kg/m²      HENT:   Head: Normocephalic.   Neck: No JVD present.   Cardiovascular: Normal rate, " regular rhythm, S1 normal and S2 normal.   No murmur heard.  Pulses:       Radial pulses are 2+ on the right side, and 2+ on the left side.        Posterior tibial pulses are 1+ on the right side, and 1+ on the left side.   Pulmonary/Chest: Breath sounds normal. Chest wall is not dull to percussion.   Abdominal: Soft. He exhibits no mass. There is no splenomegaly or hepatomegaly.   Musculoskeletal:        Right lower leg: He exhibits no edema.        Left lower leg: He exhibits no edema.   Neurological: He is alert and oriented to person, place, and time. Gait normal.   Skin: No bruising noted. Nails show no clubbing.   Psychiatric: He has a normal mood and affect. His speech is normal and behavior is normal.           Lab Results   Component Value Date    CHOL 69 (L) 10/31/2019    HDL 32 (L) 10/31/2019    LDLCALC 24.6 (L) 10/31/2019    TRIG 62 10/31/2019    CHOLHDL 46.4 10/31/2019               Assessment and Plan:     Coronary artery disease involving native coronary artery of native heart without angina pectoris  Old myocardial infarction  S/P PTCA   Stable. Continue same meds.   Cholesterol education.  -     Lipid panel; Future; Expected date: 11/11/2020    Hypercholesteremia  Stable. Continue same meds.   -     Lipid panel; Future; Expected date: 11/11/2020    Hypertension associated with diabetes  Stable. Continue same meds.     -     Comprehensive metabolic panel; Future; Expected date: 11/10/2020    ASD (atrial septal defect) - small vs PFO    Polycythemia         Follow up in about 1 year (around 11/7/2020).

## 2019-11-10 RX ORDER — METOPROLOL SUCCINATE 100 MG/1
TABLET, EXTENDED RELEASE ORAL
Qty: 90 TABLET | Refills: 3 | Status: ON HOLD | OUTPATIENT
Start: 2019-11-10 | End: 2020-11-10 | Stop reason: HOSPADM

## 2019-11-15 DIAGNOSIS — E11.9 TYPE 2 DIABETES MELLITUS WITHOUT COMPLICATION: ICD-10-CM

## 2019-11-25 ENCOUNTER — PATIENT MESSAGE (OUTPATIENT)
Dept: ADMINISTRATIVE | Facility: HOSPITAL | Age: 69
End: 2019-11-25

## 2019-11-25 ENCOUNTER — PATIENT MESSAGE (OUTPATIENT)
Dept: HEMATOLOGY/ONCOLOGY | Facility: CLINIC | Age: 69
End: 2019-11-25

## 2019-11-25 ENCOUNTER — PATIENT MESSAGE (OUTPATIENT)
Dept: ENDOCRINOLOGY | Facility: CLINIC | Age: 69
End: 2019-11-25

## 2019-11-25 ENCOUNTER — PATIENT OUTREACH (OUTPATIENT)
Dept: ADMINISTRATIVE | Facility: HOSPITAL | Age: 69
End: 2019-11-25

## 2019-11-25 NOTE — LETTER
AUTHORIZATION FOR RELEASE OF   CONFIDENTIAL INFORMATION    Dear Dr. Alford,    We are seeing Den Rick, date of birth 1950, in the clinic at [facility]. Kieran Friedman MD is the patient's PCP. Den Rick has an outstanding lab/procedure at the time we reviewed his chart. In order to help keep his health information updated, he has authorized us to request the following medical record(s):        (  )  MAMMOGRAM                                      (  )  COLONOSCOPY      (  )  PAP SMEAR                                          (  )  OUTSIDE LAB RESULTS     (  )  DEXA SCAN                                          ( x )  EYE EXAM            (  )  FOOT EXAM                                          (  )  ENTIRE RECORD     (  )  OUTSIDE IMMUNIZATIONS                 (  )  _______________         Please fax records to [facility], James D Conway, MD, [phone]     If you have any questions, please contact KIRSTEN Ruggiero at [phone]          Patient Name: Den Rick  : 1950  Patient Phone #: [phone]

## 2019-11-27 ENCOUNTER — LAB VISIT (OUTPATIENT)
Dept: LAB | Facility: HOSPITAL | Age: 69
End: 2019-11-27
Attending: INTERNAL MEDICINE
Payer: COMMERCIAL

## 2019-11-27 ENCOUNTER — PATIENT OUTREACH (OUTPATIENT)
Dept: ADMINISTRATIVE | Facility: HOSPITAL | Age: 69
End: 2019-11-27

## 2019-11-27 DIAGNOSIS — D75.1 POLYCYTHEMIA: ICD-10-CM

## 2019-11-27 LAB
BASOPHILS # BLD AUTO: 0.03 K/UL (ref 0–0.2)
BASOPHILS NFR BLD: 0.8 % (ref 0–1.9)
DIFFERENTIAL METHOD: ABNORMAL
EOSINOPHIL # BLD AUTO: 0.1 K/UL (ref 0–0.5)
EOSINOPHIL NFR BLD: 2.2 % (ref 0–8)
ERYTHROCYTE [DISTWIDTH] IN BLOOD BY AUTOMATED COUNT: 17.8 % (ref 11.5–14.5)
FERRITIN SERPL-MCNC: 7 NG/ML (ref 20–300)
HCT VFR BLD AUTO: 47.5 % (ref 40–54)
HGB BLD-MCNC: 13.7 G/DL (ref 14–18)
IMM GRANULOCYTES # BLD AUTO: 0.01 K/UL (ref 0–0.04)
IMM GRANULOCYTES NFR BLD AUTO: 0.3 % (ref 0–0.5)
IRON SERPL-MCNC: 34 UG/DL (ref 45–160)
LYMPHOCYTES # BLD AUTO: 0.7 K/UL (ref 1–4.8)
LYMPHOCYTES NFR BLD: 18.8 % (ref 18–48)
MCH RBC QN AUTO: 22.4 PG (ref 27–31)
MCHC RBC AUTO-ENTMCNC: 28.8 G/DL (ref 32–36)
MCV RBC AUTO: 78 FL (ref 82–98)
MONOCYTES # BLD AUTO: 0.5 K/UL (ref 0.3–1)
MONOCYTES NFR BLD: 14.6 % (ref 4–15)
NEUTROPHILS # BLD AUTO: 2.3 K/UL (ref 1.8–7.7)
NEUTROPHILS NFR BLD: 63.3 % (ref 38–73)
NRBC BLD-RTO: 0 /100 WBC
PLATELET # BLD AUTO: 252 K/UL (ref 150–350)
PMV BLD AUTO: 10.2 FL (ref 9.2–12.9)
RBC # BLD AUTO: 6.12 M/UL (ref 4.6–6.2)
SATURATED IRON: 11 % (ref 20–50)
TOTAL IRON BINDING CAPACITY: 312 UG/DL (ref 250–450)
TRANSFERRIN SERPL-MCNC: 211 MG/DL (ref 200–375)
WBC # BLD AUTO: 3.62 K/UL (ref 3.9–12.7)

## 2019-11-27 PROCEDURE — 36415 COLL VENOUS BLD VENIPUNCTURE: CPT | Mod: PO

## 2019-11-27 PROCEDURE — 85025 COMPLETE CBC W/AUTO DIFF WBC: CPT

## 2019-11-27 PROCEDURE — 83540 ASSAY OF IRON: CPT

## 2019-11-27 PROCEDURE — 82728 ASSAY OF FERRITIN: CPT

## 2019-12-12 RX ORDER — ROSUVASTATIN CALCIUM 20 MG/1
20 TABLET, COATED ORAL NIGHTLY
Qty: 90 TABLET | Refills: 3 | Status: SHIPPED | OUTPATIENT
Start: 2019-12-12 | End: 2020-12-17 | Stop reason: SDUPTHER

## 2019-12-15 RX ORDER — EZETIMIBE 10 MG/1
TABLET ORAL
Qty: 90 TABLET | Refills: 3 | Status: ON HOLD | OUTPATIENT
Start: 2019-12-15 | End: 2020-11-10 | Stop reason: HOSPADM

## 2019-12-17 ENCOUNTER — PATIENT MESSAGE (OUTPATIENT)
Dept: INTERNAL MEDICINE | Facility: CLINIC | Age: 69
End: 2019-12-17

## 2019-12-17 ENCOUNTER — PATIENT MESSAGE (OUTPATIENT)
Dept: HEMATOLOGY/ONCOLOGY | Facility: CLINIC | Age: 69
End: 2019-12-17

## 2019-12-17 DIAGNOSIS — N32.81 OAB (OVERACTIVE BLADDER): ICD-10-CM

## 2019-12-17 RX ORDER — FESOTERODINE FUMARATE 8 MG/1
TABLET, FILM COATED, EXTENDED RELEASE ORAL
Qty: 90 TABLET | Refills: 3 | Status: SHIPPED | OUTPATIENT
Start: 2019-12-17 | End: 2020-12-17

## 2019-12-19 ENCOUNTER — LAB VISIT (OUTPATIENT)
Dept: LAB | Facility: HOSPITAL | Age: 69
End: 2019-12-19
Attending: INTERNAL MEDICINE
Payer: COMMERCIAL

## 2019-12-19 DIAGNOSIS — D75.1 POLYCYTHEMIA: ICD-10-CM

## 2019-12-19 DIAGNOSIS — E11.9 TYPE 2 DIABETES MELLITUS WITHOUT COMPLICATION: ICD-10-CM

## 2019-12-19 LAB
BASOPHILS # BLD AUTO: 0.04 K/UL (ref 0–0.2)
BASOPHILS NFR BLD: 0.8 % (ref 0–1.9)
DIFFERENTIAL METHOD: ABNORMAL
EOSINOPHIL # BLD AUTO: 0.1 K/UL (ref 0–0.5)
EOSINOPHIL NFR BLD: 2.3 % (ref 0–8)
ERYTHROCYTE [DISTWIDTH] IN BLOOD BY AUTOMATED COUNT: 19.5 % (ref 11.5–14.5)
ESTIMATED AVG GLUCOSE: 157 MG/DL (ref 68–131)
HBA1C MFR BLD HPLC: 7.1 % (ref 4–5.6)
HCT VFR BLD AUTO: 51.4 % (ref 40–54)
HGB BLD-MCNC: 14.4 G/DL (ref 14–18)
IMM GRANULOCYTES # BLD AUTO: 0.01 K/UL (ref 0–0.04)
IMM GRANULOCYTES NFR BLD AUTO: 0.2 % (ref 0–0.5)
LYMPHOCYTES # BLD AUTO: 0.9 K/UL (ref 1–4.8)
LYMPHOCYTES NFR BLD: 16.4 % (ref 18–48)
MCH RBC QN AUTO: 21.5 PG (ref 27–31)
MCHC RBC AUTO-ENTMCNC: 28 G/DL (ref 32–36)
MCV RBC AUTO: 77 FL (ref 82–98)
MONOCYTES # BLD AUTO: 0.7 K/UL (ref 0.3–1)
MONOCYTES NFR BLD: 12.5 % (ref 4–15)
NEUTROPHILS # BLD AUTO: 3.6 K/UL (ref 1.8–7.7)
NEUTROPHILS NFR BLD: 67.8 % (ref 38–73)
NRBC BLD-RTO: 0 /100 WBC
PLATELET # BLD AUTO: 260 K/UL (ref 150–350)
PMV BLD AUTO: 9.4 FL (ref 9.2–12.9)
RBC # BLD AUTO: 6.71 M/UL (ref 4.6–6.2)
WBC # BLD AUTO: 5.3 K/UL (ref 3.9–12.7)

## 2019-12-19 PROCEDURE — 82728 ASSAY OF FERRITIN: CPT

## 2019-12-19 PROCEDURE — 83540 ASSAY OF IRON: CPT

## 2019-12-19 PROCEDURE — 36415 COLL VENOUS BLD VENIPUNCTURE: CPT | Mod: PO

## 2019-12-19 PROCEDURE — 85025 COMPLETE CBC W/AUTO DIFF WBC: CPT

## 2019-12-19 PROCEDURE — 83036 HEMOGLOBIN GLYCOSYLATED A1C: CPT

## 2019-12-20 LAB
FERRITIN SERPL-MCNC: 2 NG/ML (ref 20–300)
IRON SERPL-MCNC: 20 UG/DL (ref 45–160)
SATURATED IRON: 6 % (ref 20–50)
TOTAL IRON BINDING CAPACITY: 348 UG/DL (ref 250–450)
TRANSFERRIN SERPL-MCNC: 235 MG/DL (ref 200–375)

## 2019-12-21 NOTE — PROGRESS NOTES
Hematology and Medical Oncology   Follow Up     12/23/19    Primary Hematologic Diagnosis: Polycythemia    History of Present Ilness:   Den Mckinley) is a pleasant 69 y.o.male with known polycythemia, he was previously under the care of Dr. William Ramirez prior to his shelter.  A review of his blood counts shows that he occasionally had hematocrits slightly above 50% in 2012 and 2013.    He had abdominal surgery in 2014, and he became anemic.  Since that time, his   hematocrit measurements have been in the range of 52%-58%.     He was placed on testosterone therapy many years ago.  His blood testosterone  level was in the lower portion of the normal range and he tried several   testosterone preparations, but since 2013, he has been using Axiron cream daily.     He does not have generalized itching, although occasionally his ankle inches.    He had peptic ulcer disease in 2012 and required red cell transfusions for   bleeding associated with this.  He has coronary artery disease.  He had 3 stents   placed in 2007.  In 2012, he had a myocardial infarction and had four   additional stents placed.     He has not had venous thrombotic disease and he has not had peripheral artery   disease symptoms.  He has had some pulmonary nodules followed in the past.  It   was thought that these were likely due to a fungal infection.  He has no history of   hepatic or renal disease.      Interval History:  Mr. Rick completed 4 therapeutic phlebotomies about months ago. He has been doing well. Continues to work a full job with no limitations..     He had one episode of brief itching while in the shower 3 weeks ago. .     PAST MEDICAL HISTORY:   Past Medical History:   Diagnosis Date    Anticoagulant long-term use     Arthritis     Atrial tachycardia, paroxysmal 1/12    during Cardiac Rehab    Colon polyp     Coronary artery disease     Diverticulitis     Diverticulosis     GERD (gastroesophageal reflux disease)      Glucose intolerance (impaired glucose tolerance) 1/24/2017    Hyperlipidemia     Hypertension     IFG (impaired fasting glucose) 12/21/2015    NSTEMI (non-ST elevated myocardial infarction) 10/11/12    Obesity (BMI 30-39.9) 1/31/2018    Post PTCA     Sleep apnea        PAST SURGICAL HISTORY:   Past Surgical History:   Procedure Laterality Date    boewl resection      CATARACT EXTRACTION, BILATERAL  8/2011    COLONOSCOPY  2012    COLONOSCOPY N/A 10/10/2016    Procedure: COLONOSCOPY;  Surgeon: Main Lehman MD;  Location: [facility] ([address] [place] [address]);  Service: Endoscopy;  Laterality: N/A;    COLONOSCOPY W/ POLYPECTOMY      CORONARY ANGIOPLASTY WITH STENT PLACEMENT      ESOPHAGOGASTRODUODENOSCOPY      HERNIA REPAIR      KNEE SURGERY  2003    needle bx on chest      SHOULDER SURGERY      SIGMOIDECTOMY         PAST SOCIAL HISTORY:   reports that he has never smoked. He has never used smokeless tobacco. He reports that he does not drink alcohol or use drugs.    FAMILY HISTORY:  Family History   Problem Relation Age of Onset    Cancer Mother         uterine    Asthma Sister     Heart disease Father         valve    Diabetes Maternal Grandmother        CURRENT MEDICATIONS:   Current Outpatient Medications   Medication Sig    alfuzosin (UROXATRAL) 10 mg Tb24 Take 1 tablet (10 mg total) by mouth every evening.    aspirin (ECOTRIN) 81 MG EC tablet Take 81 mg by mouth once daily.    blood sugar diagnostic Strp To check BG 4 times daily, to use with insurance preferred meter    blood-glucose meter kit To check BG 4 times daily, to use with insurance preferred meter    CELEBREX 200 mg capsule Take 1 capsule by mouth as needed.    clindamycin phosphate 1% (CLINDAGEL) 1 % gel APPLY TWICE A DAY TO FACE    CORDRAN TAPE LARGE ROLL 4 mcg/cm2 Tape Apply 1 application topically every evening. Apply to affected area    desoximetasone (TOPICORT) 0.25 % cream APPLY TO AFFECTED AREA TWICE A DAY FOR PSORIASIS     empagliflozin (JARDIANCE) 10 mg Tab Take 1 tablet by mouth once daily.    ezetimibe (ZETIA) 10 mg tablet Take 0.5 tablets (5 mg total) by mouth once daily.    ezetimibe (ZETIA) 10 mg tablet TAKE 1 TABLET EVERY DAY    fish oil-omega-3 fatty acids 300-1,000 mg capsule Take 1 g by mouth once daily.    FREESTYLE LANCETS 28 gauge lancets TO CHECK BG 4 TIMES DAILY, TO USE WITH INSURANCE PREFERRED METER    metFORMIN (GLUCOPHAGE-XR) 500 MG 24 hr tablet Take 4 tablets (2,000 mg total) by mouth every evening.    methocarbamol (ROBAXIN) 750 MG Tab Take 750 mg by mouth continuous prn.      methylsulfonylmethane (MSM) 500 mg Cap Take 1 capsule by mouth Daily.      metoprolol succinate (TOPROL-XL) 100 MG 24 hr tablet TAKE 1 TABLET EVERY DAY    multivitamin (THERAGRAN) per tablet Take 1 tablet by mouth Daily.      mupirocin (BACTROBAN) 2 % ointment APPLY TO AFFECTED AREA DAILY AS NEEDED    nitroGLYCERIN (NITROSTAT) 0.4 MG SL tablet Place 1 tablet (0.4 mg total) under the tongue every 5 (five) minutes as needed.    oxycodone-acetaminophen (PERCOCET) 5-325 mg per tablet Take 1 tablet by mouth as needed.    pantoprazole (PROTONIX) 40 MG tablet Take 40 mg by mouth daily as needed.     PENNSAID 20 mg/gram /actuation(2 %) sopm APPLY TWO PUMPS TO AFFECTED AREA(S) TWICE DAILY AS NEEDED    rosuvastatin (CRESTOR) 20 MG tablet TAKE 1 TABLET (20 MG TOTAL) BY MOUTH EVERY EVENING.    TOVIAZ 8 mg Tb24 TAKE ONE TABLET BY MOUTH ONCE DAILY.     No current facility-administered medications for this visit.      ALLERGIES:   Review of patient's allergies indicates:   Allergen Reactions    Atorvastatin      Other reaction(s):(lipitor) Muscle pain    Niacin      Other reaction(s): Flushing (skin)         Review of Systems:     Review of Systems   Constitutional: Negative for appetite change and unexpected weight change.   Respiratory: Negative for cough and shortness of breath.    Cardiovascular: Negative for chest pain.    Gastrointestinal: Negative for abdominal pain and diarrhea.   Genitourinary: Negative for frequency.    Musculoskeletal: Negative for arthralgias and back pain.   Skin: Negative for itching and rash.   Neurological: Negative for headaches.   Hematological: Negative for adenopathy.   Psychiatric/Behavioral: The patient is not nervous/anxious.           Physical Exam:     Vitals:    12/23/19 1132   BP: 131/70   Pulse: 70   Resp: 16   Temp: 98 °F (36.7 °C)     Physical Exam   Constitutional: He is oriented to person, place, and time. He appears well-developed and well-nourished. No distress.   HENT:   Head: Normocephalic and atraumatic.   Mouth/Throat: Oropharynx is clear and moist. No oropharyngeal exudate.   Eyes: Pupils are equal, round, and reactive to light. Conjunctivae and EOM are normal.   Neck: Normal range of motion. Neck supple. No JVD present. No tracheal deviation present. No thyromegaly present.   Cardiovascular: Normal rate, regular rhythm and normal heart sounds. Exam reveals no friction rub.   No murmur heard.  Pulmonary/Chest: Effort normal and breath sounds normal. No stridor. No respiratory distress. He has no wheezes. He has no rales. He exhibits no tenderness.   Abdominal: Soft. Bowel sounds are normal. He exhibits no distension. There is no tenderness. There is no rebound and no guarding.   Musculoskeletal: Normal range of motion. He exhibits no edema, tenderness or deformity.   Lymphadenopathy:     He has no axillary adenopathy.   Neurological: He is alert and oriented to person, place, and time. He displays normal reflexes. No cranial nerve deficit or sensory deficit. He exhibits normal muscle tone. Coordination normal.   Skin: Skin is warm and dry. Capillary refill takes less than 2 seconds. No rash noted. He is not diaphoretic. No erythema. No pallor.   Numerous papules on upper extremities   Psychiatric: He has a normal mood and affect. His behavior is normal. Judgment and thought content  normal.       ECOG Performance Status: (foot note - ECOG PS provided by Eastern Cooperative Oncology Group) 0 - Asymptomatic    Karnofsky Performance Score:  100%- Normal, No Complaints, No Evidence of Disease    Labs:   Lab Results   Component Value Date    WBC 5.30 12/19/2019    HGB 14.4 12/19/2019    HCT 51.4 12/19/2019     12/19/2019    CHOL 69 (L) 10/31/2019    TRIG 62 10/31/2019    HDL 32 (L) 10/31/2019    ALT 27 10/31/2019    ALT 27 10/31/2019    AST 25 10/31/2019    AST 25 10/31/2019     10/31/2019     10/31/2019    K 4.1 10/31/2019    K 4.1 10/31/2019     (H) 10/31/2019     (H) 10/31/2019    CREATININE 0.9 10/31/2019    CREATININE 0.9 10/31/2019    BUN 20 10/31/2019    BUN 20 10/31/2019    CO2 24 10/31/2019    CO2 24 10/31/2019    TSH 1.804 07/25/2019    PSA 0.22 07/25/2019    INR 1.1 04/01/2014    GLUF 111 (H) 03/07/2013    HGBA1C 7.1 (H) 12/19/2019     Iron: 20  TIBC: 348  Ferritin: 2    Imaging: Previous imaging has been reviewed     Assessment and Plan:     Mr. Rick is a 69 year old male with polycytemia    Polycythemia  --Initially presented to Dr. Ramirez and underwent an extensive workup 2-3 years ago. Testing was negative for P.Vera  --A bone marrow was deferred at that time  --Completed 4 weekly therapeutic phlebotomies   --Given slow rise in hct we will do every other month phlebotomy  --Will follow monthly cbc x 4 months to evaluate duration of response      30 minutes were spent face to face with the patient to discuss the disease, natural history, treatment options. I have provided the patient with an opportunity to ask questions and have all questions answered to his satisfaction.       he will return to clinic in 4 months with monthly labs checks, but knows to call in the interim if symptoms change or should a problem arise.        Марина Nogueira MD  Hematology and Medical Oncology  Bone Marrow Transplant  [facility]

## 2019-12-23 ENCOUNTER — OFFICE VISIT (OUTPATIENT)
Dept: HEMATOLOGY/ONCOLOGY | Facility: CLINIC | Age: 69
End: 2019-12-23
Payer: COMMERCIAL

## 2019-12-23 VITALS
RESPIRATION RATE: 16 BRPM | WEIGHT: 205.5 LBS | DIASTOLIC BLOOD PRESSURE: 70 MMHG | HEART RATE: 70 BPM | OXYGEN SATURATION: 86 % | HEIGHT: 70 IN | TEMPERATURE: 98 F | BODY MASS INDEX: 29.42 KG/M2 | SYSTOLIC BLOOD PRESSURE: 131 MMHG

## 2019-12-23 DIAGNOSIS — E11.65 TYPE 2 DIABETES MELLITUS WITH HYPERGLYCEMIA, WITHOUT LONG-TERM CURRENT USE OF INSULIN: Primary | ICD-10-CM

## 2019-12-23 DIAGNOSIS — I25.10 CORONARY ARTERY DISEASE INVOLVING NATIVE CORONARY ARTERY OF NATIVE HEART WITHOUT ANGINA PECTORIS: Chronic | ICD-10-CM

## 2019-12-23 DIAGNOSIS — D75.1 POLYCYTHEMIA: ICD-10-CM

## 2019-12-23 PROCEDURE — 99215 OFFICE O/P EST HI 40 MIN: CPT | Mod: S$GLB,,, | Performed by: INTERNAL MEDICINE

## 2019-12-23 PROCEDURE — 1126F AMNT PAIN NOTED NONE PRSNT: CPT | Mod: S$GLB,,, | Performed by: INTERNAL MEDICINE

## 2019-12-23 PROCEDURE — 3075F SYST BP GE 130 - 139MM HG: CPT | Mod: CPTII,S$GLB,, | Performed by: INTERNAL MEDICINE

## 2019-12-23 PROCEDURE — 1159F PR MEDICATION LIST DOCUMENTED IN MEDICAL RECORD: ICD-10-PCS | Mod: S$GLB,,, | Performed by: INTERNAL MEDICINE

## 2019-12-23 PROCEDURE — 99999 PR PBB SHADOW E&M-EST. PATIENT-LVL V: CPT | Mod: PBBFAC,,, | Performed by: INTERNAL MEDICINE

## 2019-12-23 PROCEDURE — 3078F DIAST BP <80 MM HG: CPT | Mod: CPTII,S$GLB,, | Performed by: INTERNAL MEDICINE

## 2019-12-23 PROCEDURE — 1101F PR PT FALLS ASSESS DOC 0-1 FALLS W/OUT INJ PAST YR: ICD-10-PCS | Mod: CPTII,S$GLB,, | Performed by: INTERNAL MEDICINE

## 2019-12-23 PROCEDURE — 1101F PT FALLS ASSESS-DOCD LE1/YR: CPT | Mod: CPTII,S$GLB,, | Performed by: INTERNAL MEDICINE

## 2019-12-23 PROCEDURE — 1126F PR PAIN SEVERITY QUANTIFIED, NO PAIN PRESENT: ICD-10-PCS | Mod: S$GLB,,, | Performed by: INTERNAL MEDICINE

## 2019-12-23 PROCEDURE — 99215 PR OFFICE/OUTPT VISIT, EST, LEVL V, 40-54 MIN: ICD-10-PCS | Mod: S$GLB,,, | Performed by: INTERNAL MEDICINE

## 2019-12-23 PROCEDURE — 3078F PR MOST RECENT DIASTOLIC BLOOD PRESSURE < 80 MM HG: ICD-10-PCS | Mod: CPTII,S$GLB,, | Performed by: INTERNAL MEDICINE

## 2019-12-23 PROCEDURE — 99999 PR PBB SHADOW E&M-EST. PATIENT-LVL V: ICD-10-PCS | Mod: PBBFAC,,, | Performed by: INTERNAL MEDICINE

## 2019-12-23 PROCEDURE — 3075F PR MOST RECENT SYSTOLIC BLOOD PRESS GE 130-139MM HG: ICD-10-PCS | Mod: CPTII,S$GLB,, | Performed by: INTERNAL MEDICINE

## 2019-12-23 PROCEDURE — 1159F MED LIST DOCD IN RCRD: CPT | Mod: S$GLB,,, | Performed by: INTERNAL MEDICINE

## 2019-12-23 NOTE — Clinical Note
1. Phlebotomy beginning of January and beginning of march2. Monthly labs x 4 cbc, cmp, iron, ferritin3. See me in 4 months with cbc,cmp, iron, ferritin

## 2019-12-24 ENCOUNTER — TELEPHONE (OUTPATIENT)
Dept: HEMATOLOGY/ONCOLOGY | Facility: CLINIC | Age: 69
End: 2019-12-24

## 2019-12-24 NOTE — TELEPHONE ENCOUNTER
Called patient there was no answer and no voice mail to leave a message. Mailed appt slips    ----- Message from Марина Nogueira MD sent at 12/24/2019 10:54 AM CST -----  1. Phlebotomy beginning of January and beginning of march  2. Monthly labs x 4 cbc, cmp, iron, ferritin  3. See me in 4 months with cbc,cmp, iron, ferritin

## 2019-12-30 ENCOUNTER — TELEPHONE (OUTPATIENT)
Dept: HEMATOLOGY/ONCOLOGY | Facility: CLINIC | Age: 69
End: 2019-12-30

## 2020-01-03 ENCOUNTER — HOSPITAL ENCOUNTER (OUTPATIENT)
Dept: TRANSFUSION MEDICINE | Facility: HOSPITAL | Age: 70
Discharge: HOME | End: 2020-01-03
Attending: INTERNAL MEDICINE
Payer: COMMERCIAL

## 2020-01-16 ENCOUNTER — HOSPITAL ENCOUNTER (OUTPATIENT)
Dept: RADIOLOGY | Facility: HOSPITAL | Age: 70
Discharge: HOME | End: 2020-01-16
Attending: ORTHOPAEDIC SURGERY
Payer: COMMERCIAL

## 2020-01-16 ENCOUNTER — OFFICE VISIT (OUTPATIENT)
Dept: SPORTS MEDICINE | Facility: CLINIC | Age: 70
End: 2020-01-16
Payer: COMMERCIAL

## 2020-01-16 VITALS
WEIGHT: 205 LBS | BODY MASS INDEX: 29.35 KG/M2 | HEART RATE: 67 BPM | HEIGHT: 70 IN | DIASTOLIC BLOOD PRESSURE: 64 MMHG | SYSTOLIC BLOOD PRESSURE: 104 MMHG

## 2020-01-16 DIAGNOSIS — M25.512 LEFT SHOULDER PAIN, UNSPECIFIED CHRONICITY: Primary | ICD-10-CM

## 2020-01-16 DIAGNOSIS — M25.512 LEFT SHOULDER PAIN, UNSPECIFIED CHRONICITY: ICD-10-CM

## 2020-01-16 PROCEDURE — 3074F PR MOST RECENT SYSTOLIC BLOOD PRESSURE < 130 MM HG: ICD-10-PCS | Mod: CPTII,S$GLB,, | Performed by: ORTHOPAEDIC SURGERY

## 2020-01-16 PROCEDURE — 1159F PR MEDICATION LIST DOCUMENTED IN MEDICAL RECORD: ICD-10-PCS | Mod: S$GLB,,, | Performed by: ORTHOPAEDIC SURGERY

## 2020-01-16 PROCEDURE — 99999 PR PBB SHADOW E&M-EST. PATIENT-LVL III: ICD-10-PCS | Mod: PBBFAC,,, | Performed by: ORTHOPAEDIC SURGERY

## 2020-01-16 PROCEDURE — 99999 PR PBB SHADOW E&M-EST. PATIENT-LVL III: CPT | Mod: PBBFAC,,, | Performed by: ORTHOPAEDIC SURGERY

## 2020-01-16 PROCEDURE — 3078F DIAST BP <80 MM HG: CPT | Mod: CPTII,S$GLB,, | Performed by: ORTHOPAEDIC SURGERY

## 2020-01-16 PROCEDURE — 99203 PR OFFICE/OUTPT VISIT, NEW, LEVL III, 30-44 MIN: ICD-10-PCS | Mod: S$GLB,,, | Performed by: ORTHOPAEDIC SURGERY

## 2020-01-16 PROCEDURE — 73030 X-RAY EXAM OF SHOULDER: CPT | Mod: 26,LT,, | Performed by: RADIOLOGY

## 2020-01-16 PROCEDURE — 1101F PR PT FALLS ASSESS DOC 0-1 FALLS W/OUT INJ PAST YR: ICD-10-PCS | Mod: CPTII,S$GLB,, | Performed by: ORTHOPAEDIC SURGERY

## 2020-01-16 PROCEDURE — 73030 X-RAY EXAM OF SHOULDER: CPT | Mod: TC,LT

## 2020-01-16 PROCEDURE — 1125F AMNT PAIN NOTED PAIN PRSNT: CPT | Mod: S$GLB,,, | Performed by: ORTHOPAEDIC SURGERY

## 2020-01-16 PROCEDURE — 1125F PR PAIN SEVERITY QUANTIFIED, PAIN PRESENT: ICD-10-PCS | Mod: S$GLB,,, | Performed by: ORTHOPAEDIC SURGERY

## 2020-01-16 PROCEDURE — 3078F PR MOST RECENT DIASTOLIC BLOOD PRESSURE < 80 MM HG: ICD-10-PCS | Mod: CPTII,S$GLB,, | Performed by: ORTHOPAEDIC SURGERY

## 2020-01-16 PROCEDURE — 73030 XR SHOULDER COMPLETE 2 OR MORE VIEWS LEFT: ICD-10-PCS | Mod: 26,LT,, | Performed by: RADIOLOGY

## 2020-01-16 PROCEDURE — 1159F MED LIST DOCD IN RCRD: CPT | Mod: S$GLB,,, | Performed by: ORTHOPAEDIC SURGERY

## 2020-01-16 PROCEDURE — 1101F PT FALLS ASSESS-DOCD LE1/YR: CPT | Mod: CPTII,S$GLB,, | Performed by: ORTHOPAEDIC SURGERY

## 2020-01-16 PROCEDURE — 99203 OFFICE O/P NEW LOW 30 MIN: CPT | Mod: S$GLB,,, | Performed by: ORTHOPAEDIC SURGERY

## 2020-01-16 PROCEDURE — 3074F SYST BP LT 130 MM HG: CPT | Mod: CPTII,S$GLB,, | Performed by: ORTHOPAEDIC SURGERY

## 2020-01-16 NOTE — PROGRESS NOTES
CC: Left shoulder pain     69 y.o. Male presents as a new patient to me. He  works as an environmental regulator. LEFT shoulder pain x many years with aggravation in the last 4 months. Atraumatic onset.  Pain localizes over ther the anterolateral shoulder. Worse with activity, lifting. He reports that the pain and weakness is worse with overhead activity. It also bothers him at night. Better with rest. Denies neck pain or radicular symptoms. Treatment thus far has included activity modifications, rest, and oral medication.  No recent injections or physical therapy on the left shoulder.  He has a history of rotator cuff repair and revision performed on the right shoulder many years ago.  No recent traumas or falls  Here today to discuss diagnosis and treatment options.     PMHx notable for significant cardiac history with stents however is not currently on blood thinners.   Negative for tobacco.   Positive for diabetes. Last A1C; 7.1 on 12/1919    Pain Score:   2    PAST MEDICAL HISTORY:   Past Medical History:   Diagnosis Date    Anticoagulant long-term use     Arthritis     Atrial tachycardia, paroxysmal 1/12    during Cardiac Rehab    Colon polyp     Coronary artery disease     Diverticulitis     Diverticulosis     GERD (gastroesophageal reflux disease)     Glucose intolerance (impaired glucose tolerance) 1/24/2017    Hyperlipidemia     Hypertension     IFG (impaired fasting glucose) 12/21/2015    NSTEMI (non-ST elevated myocardial infarction) 10/11/12    Obesity (BMI 30-39.9) 1/31/2018    Post PTCA     Sleep apnea        PAST SURGICAL HISTORY:  Past Surgical History:   Procedure Laterality Date    boewl resection      CATARACT EXTRACTION, BILATERAL  8/2011    COLONOSCOPY  2012    COLONOSCOPY N/A 10/10/2016    Procedure: COLONOSCOPY;  Surgeon: Main Lehman MD;  Location: [facility] ([address] [place] [address]);  Service: Endoscopy;  Laterality: N/A;    COLONOSCOPY W/ POLYPECTOMY      CORONARY ANGIOPLASTY  WITH STENT PLACEMENT      ESOPHAGOGASTRODUODENOSCOPY      HERNIA REPAIR      KNEE SURGERY  2003    needle bx on chest      SHOULDER SURGERY      SIGMOIDECTOMY         FAMILY HISTORY:  Family History   Problem Relation Age of Onset    Cancer Mother         uterine    Asthma Sister     Heart disease Father         valve    Diabetes Maternal Grandmother        MEDICATIONS:    Current Outpatient Medications:     alfuzosin (UROXATRAL) 10 mg Tb24, Take 1 tablet (10 mg total) by mouth every evening., Disp: 90 tablet, Rfl: 3    aspirin (ECOTRIN) 81 MG EC tablet, Take 81 mg by mouth once daily., Disp: , Rfl:     blood sugar diagnostic Strp, To check BG 4 times daily, to use with insurance preferred meter, Disp: 350 strip, Rfl: 3    CELEBREX 200 mg capsule, Take 1 capsule by mouth as needed., Disp: , Rfl: 2    clindamycin phosphate 1% (CLINDAGEL) 1 % gel, APPLY TWICE A DAY TO FACE, Disp: , Rfl: 1    CORDRAN TAPE LARGE ROLL 4 mcg/cm2 Tape, Apply 1 application topically every evening. Apply to affected area, Disp: , Rfl: 1    desoximetasone (TOPICORT) 0.25 % cream, APPLY TO AFFECTED AREA TWICE A DAY FOR PSORIASIS, Disp: , Rfl: 5    empagliflozin (JARDIANCE) 10 mg Tab, Take 1 tablet by mouth once daily., Disp: 90 tablet, Rfl: 3    ezetimibe (ZETIA) 10 mg tablet, Take 0.5 tablets (5 mg total) by mouth once daily., Disp: 90 tablet, Rfl: 3    ezetimibe (ZETIA) 10 mg tablet, TAKE 1 TABLET EVERY DAY, Disp: 90 tablet, Rfl: 3    fish oil-omega-3 fatty acids 300-1,000 mg capsule, Take 1 g by mouth once daily., Disp: , Rfl:     FREESTYLE LANCETS 28 gauge lancets, TO CHECK BG 4 TIMES DAILY, TO USE WITH INSURANCE PREFERRED METER, Disp: , Rfl: 3    metFORMIN (GLUCOPHAGE-XR) 500 MG 24 hr tablet, Take 4 tablets (2,000 mg total) by mouth every evening., Disp: 360 tablet, Rfl: 3    methocarbamol (ROBAXIN) 750 MG Tab, Take 750 mg by mouth continuous prn.  , Disp: , Rfl:     methylsulfonylmethane (MSM) 500 mg Cap, Take 1  "capsule by mouth Daily.  , Disp: , Rfl:     metoprolol succinate (TOPROL-XL) 100 MG 24 hr tablet, TAKE 1 TABLET EVERY DAY, Disp: 90 tablet, Rfl: 3    multivitamin (THERAGRAN) per tablet, Take 1 tablet by mouth Daily.  , Disp: , Rfl:     mupirocin (BACTROBAN) 2 % ointment, APPLY TO AFFECTED AREA DAILY AS NEEDED, Disp: , Rfl: 3    nitroGLYCERIN (NITROSTAT) 0.4 MG SL tablet, Place 1 tablet (0.4 mg total) under the tongue every 5 (five) minutes as needed., Disp: 100 tablet, Rfl: 3    oxycodone-acetaminophen (PERCOCET) 5-325 mg per tablet, Take 1 tablet by mouth as needed., Disp: , Rfl: 0    pantoprazole (PROTONIX) 40 MG tablet, Take 40 mg by mouth daily as needed. , Disp: , Rfl: 2    PENNSAID 20 mg/gram /actuation(2 %) sopm, APPLY TWO PUMPS TO AFFECTED AREA(S) TWICE DAILY AS NEEDED, Disp: , Rfl: 3    rosuvastatin (CRESTOR) 20 MG tablet, TAKE 1 TABLET (20 MG TOTAL) BY MOUTH EVERY EVENING., Disp: 90 tablet, Rfl: 3    TOVIAZ 8 mg Tb24, TAKE ONE TABLET BY MOUTH ONCE DAILY., Disp: 90 tablet, Rfl: 3    blood-glucose meter kit, To check BG 4 times daily, to use with insurance preferred meter, Disp: 1 each, Rfl: 0    ALLERGIES:  Review of patient's allergies indicates:   Allergen Reactions    Atorvastatin      Other reaction(s):(lipitor) Muscle pain    Niacin      Other reaction(s): Flushing (skin)     REVIEW OF SYSTEMS:  Constitution: Negative. Negative for chills, fever and night sweats.    Hematologic/Lymphatic: Negative for bleeding problem. Does not bruise/bleed easily.   Skin: Negative for dry skin, itching and rash.   Musculoskeletal: Negative for falls. Positive for left shoulder pain and  muscle weakness.     All other review of symptoms were reviewed and found to be noncontributory.    PHYSICAL EXAMINATION:  Vitals:  /64   Pulse 67   Ht 5' 9.5" (1.765 m)   Wt 93 kg (205 lb)   BMI 29.84 kg/m²    General: Well-developed well-nourished 69 y.o. malein no acute distress   Cardiovascular: Regular " rhythm by palpation of distal pulse, normal color and temperature, no concerning varicosities on symptomatic side   Lungs: No labored breathing or wheezing appreciated   Neuro: Alert and oriented ×3   Psychiatric: well oriented to person, place and time, demonstrates normal mood and affect   Skin: No rashes, lesions or ulcers, normal temperature, turgor, and texture on uninvolved extremity    Ortho/SPM Exam  Examination of the left shoulder demonstrates no swelling or effusion. +TTP about the shoulder.  degrees, ER 50, IR T10 symmetric. No appreciable force de-coupling.  4-/5 supraspinatus and ER strength, belly press negative. + Neer/Buck.  No midline neck tenderness.    IMAGING:  Xrays including AP, Outlet and Axillary Lateral of Left shoulder are ordered / images reviewed by me:   CHRISTIAN.  Proximal humeral migration      ASSESSMENT:      ICD-10-CM ICD-9-CM   1. Left shoulder pain, unspecified chronicity M25.512 719.41     L shoulder early CTA    PLAN:     Clinical and imaging evidence of a massive rotator cuff tear with chronicity.  MRI is indicated for further assessment.  Return to clinic thereafter to discuss results and treatment options.    Procedures

## 2020-01-21 ENCOUNTER — HOSPITAL ENCOUNTER (OUTPATIENT)
Dept: RADIOLOGY | Facility: HOSPITAL | Age: 70
Discharge: HOME | End: 2020-01-21
Attending: ORTHOPAEDIC SURGERY
Payer: COMMERCIAL

## 2020-01-21 DIAGNOSIS — M25.512 LEFT SHOULDER PAIN, UNSPECIFIED CHRONICITY: ICD-10-CM

## 2020-01-21 PROCEDURE — 73221 MRI SHOULDER WITHOUT CONTRAST LEFT: ICD-10-PCS | Mod: 26,LT,, | Performed by: RADIOLOGY

## 2020-01-21 PROCEDURE — 73221 MRI JOINT UPR EXTREM W/O DYE: CPT | Mod: TC,LT

## 2020-01-21 PROCEDURE — 73221 MRI JOINT UPR EXTREM W/O DYE: CPT | Mod: 26,LT,, | Performed by: RADIOLOGY

## 2020-01-23 ENCOUNTER — OFFICE VISIT (OUTPATIENT)
Dept: SPORTS MEDICINE | Facility: CLINIC | Age: 70
End: 2020-01-23
Payer: COMMERCIAL

## 2020-01-23 VITALS
BODY MASS INDEX: 29.35 KG/M2 | WEIGHT: 205 LBS | DIASTOLIC BLOOD PRESSURE: 71 MMHG | HEART RATE: 71 BPM | SYSTOLIC BLOOD PRESSURE: 103 MMHG | HEIGHT: 70 IN

## 2020-01-23 DIAGNOSIS — M75.122 NONTRAUMATIC COMPLETE TEAR OF LEFT ROTATOR CUFF: Primary | ICD-10-CM

## 2020-01-23 DIAGNOSIS — S46.219A TEAR OF BICEPS TENDON: ICD-10-CM

## 2020-01-23 PROCEDURE — 1159F PR MEDICATION LIST DOCUMENTED IN MEDICAL RECORD: ICD-10-PCS | Mod: S$GLB,,, | Performed by: ORTHOPAEDIC SURGERY

## 2020-01-23 PROCEDURE — 99213 PR OFFICE/OUTPT VISIT, EST, LEVL III, 20-29 MIN: ICD-10-PCS | Mod: 25,S$GLB,, | Performed by: ORTHOPAEDIC SURGERY

## 2020-01-23 PROCEDURE — 1159F MED LIST DOCD IN RCRD: CPT | Mod: S$GLB,,, | Performed by: ORTHOPAEDIC SURGERY

## 2020-01-23 PROCEDURE — 99999 PR PBB SHADOW E&M-EST. PATIENT-LVL III: CPT | Mod: PBBFAC,,, | Performed by: ORTHOPAEDIC SURGERY

## 2020-01-23 PROCEDURE — 1101F PR PT FALLS ASSESS DOC 0-1 FALLS W/OUT INJ PAST YR: ICD-10-PCS | Mod: CPTII,S$GLB,, | Performed by: ORTHOPAEDIC SURGERY

## 2020-01-23 PROCEDURE — 1101F PT FALLS ASSESS-DOCD LE1/YR: CPT | Mod: CPTII,S$GLB,, | Performed by: ORTHOPAEDIC SURGERY

## 2020-01-23 PROCEDURE — 1125F PR PAIN SEVERITY QUANTIFIED, PAIN PRESENT: ICD-10-PCS | Mod: S$GLB,,, | Performed by: ORTHOPAEDIC SURGERY

## 2020-01-23 PROCEDURE — 99213 OFFICE O/P EST LOW 20 MIN: CPT | Mod: 25,S$GLB,, | Performed by: ORTHOPAEDIC SURGERY

## 2020-01-23 PROCEDURE — 3074F PR MOST RECENT SYSTOLIC BLOOD PRESSURE < 130 MM HG: ICD-10-PCS | Mod: CPTII,S$GLB,, | Performed by: ORTHOPAEDIC SURGERY

## 2020-01-23 PROCEDURE — 20610 DRAIN/INJ JOINT/BURSA W/O US: CPT | Mod: LT,S$GLB,, | Performed by: ORTHOPAEDIC SURGERY

## 2020-01-23 PROCEDURE — 3074F SYST BP LT 130 MM HG: CPT | Mod: CPTII,S$GLB,, | Performed by: ORTHOPAEDIC SURGERY

## 2020-01-23 PROCEDURE — 1125F AMNT PAIN NOTED PAIN PRSNT: CPT | Mod: S$GLB,,, | Performed by: ORTHOPAEDIC SURGERY

## 2020-01-23 PROCEDURE — 99999 PR PBB SHADOW E&M-EST. PATIENT-LVL III: ICD-10-PCS | Mod: PBBFAC,,, | Performed by: ORTHOPAEDIC SURGERY

## 2020-01-23 PROCEDURE — 3078F PR MOST RECENT DIASTOLIC BLOOD PRESSURE < 80 MM HG: ICD-10-PCS | Mod: CPTII,S$GLB,, | Performed by: ORTHOPAEDIC SURGERY

## 2020-01-23 PROCEDURE — 3078F DIAST BP <80 MM HG: CPT | Mod: CPTII,S$GLB,, | Performed by: ORTHOPAEDIC SURGERY

## 2020-01-23 PROCEDURE — 20610 LARGE JOINT ASPIRATION/INJECTION: L SUBACROMIAL BURSA: ICD-10-PCS | Mod: LT,S$GLB,, | Performed by: ORTHOPAEDIC SURGERY

## 2020-01-23 RX ADMIN — TRIAMCINOLONE ACETONIDE 40 MG: 40 INJECTION, SUSPENSION INTRA-ARTICULAR; INTRAMUSCULAR at 09:01

## 2020-01-23 NOTE — PROGRESS NOTES
CC: Left shoulder pain     69 y.o. Male who returns today to review the MRI of his L Shoulder. No new complaints.    Previous History: He  works as an environmental regulator. LEFT shoulder pain x many years with aggravation in the last 4 months. Atraumatic onset.  Pain localizes over ther the anterolateral shoulder. Worse with activity, lifting. He reports that the pain and weakness is worse with overhead activity. It also bothers him at night. Better with rest. Denies neck pain or radicular symptoms. Treatment thus far has included activity modifications, rest, and oral medication.  No recent injections or physical therapy on the left shoulder.  He has a history of rotator cuff repair and revision performed on the right shoulder many years ago.  No recent traumas or falls  Here today to discuss diagnosis and treatment options.     PMHx notable for significant cardiac history with stents however is not currently on blood thinners.   Negative for tobacco.   Positive for diabetes. Last A1C; 7.1 on 12/1919    Pain Score:   2    PAST MEDICAL HISTORY:   Past Medical History:   Diagnosis Date    Anticoagulant long-term use     Arthritis     Atrial tachycardia, paroxysmal 1/12    during Cardiac Rehab    Colon polyp     Coronary artery disease     Diverticulitis     Diverticulosis     GERD (gastroesophageal reflux disease)     Glucose intolerance (impaired glucose tolerance) 1/24/2017    Hyperlipidemia     Hypertension     IFG (impaired fasting glucose) 12/21/2015    NSTEMI (non-ST elevated myocardial infarction) 10/11/12    Obesity (BMI 30-39.9) 1/31/2018    Post PTCA     Sleep apnea        PAST SURGICAL HISTORY:  Past Surgical History:   Procedure Laterality Date    boewl resection      CATARACT EXTRACTION, BILATERAL  8/2011    COLONOSCOPY  2012    COLONOSCOPY N/A 10/10/2016    Procedure: COLONOSCOPY;  Surgeon: Main eLhman MD;  Location: [facility] ([address] [place] [address]);  Service: Endoscopy;  Laterality:  N/A;    COLONOSCOPY W/ POLYPECTOMY      CORONARY ANGIOPLASTY WITH STENT PLACEMENT      ESOPHAGOGASTRODUODENOSCOPY      HERNIA REPAIR      KNEE SURGERY  2003    needle bx on chest      SHOULDER SURGERY      SIGMOIDECTOMY         FAMILY HISTORY:  Family History   Problem Relation Age of Onset    Cancer Mother         uterine    Asthma Sister     Heart disease Father         valve    Diabetes Maternal Grandmother        MEDICATIONS:    Current Outpatient Medications:     alfuzosin (UROXATRAL) 10 mg Tb24, Take 1 tablet (10 mg total) by mouth every evening., Disp: 90 tablet, Rfl: 3    aspirin (ECOTRIN) 81 MG EC tablet, Take 81 mg by mouth once daily., Disp: , Rfl:     blood sugar diagnostic Strp, To check BG 4 times daily, to use with insurance preferred meter, Disp: 350 strip, Rfl: 3    CELEBREX 200 mg capsule, Take 1 capsule by mouth as needed., Disp: , Rfl: 2    clindamycin phosphate 1% (CLINDAGEL) 1 % gel, APPLY TWICE A DAY TO FACE, Disp: , Rfl: 1    CORDRAN TAPE LARGE ROLL 4 mcg/cm2 Tape, Apply 1 application topically every evening. Apply to affected area, Disp: , Rfl: 1    desoximetasone (TOPICORT) 0.25 % cream, APPLY TO AFFECTED AREA TWICE A DAY FOR PSORIASIS, Disp: , Rfl: 5    empagliflozin (JARDIANCE) 10 mg Tab, Take 1 tablet by mouth once daily., Disp: 90 tablet, Rfl: 3    ezetimibe (ZETIA) 10 mg tablet, Take 0.5 tablets (5 mg total) by mouth once daily., Disp: 90 tablet, Rfl: 3    ezetimibe (ZETIA) 10 mg tablet, TAKE 1 TABLET EVERY DAY, Disp: 90 tablet, Rfl: 3    fish oil-omega-3 fatty acids 300-1,000 mg capsule, Take 1 g by mouth once daily., Disp: , Rfl:     FREESTYLE LANCETS 28 gauge lancets, TO CHECK BG 4 TIMES DAILY, TO USE WITH INSURANCE PREFERRED METER, Disp: , Rfl: 3    metFORMIN (GLUCOPHAGE-XR) 500 MG 24 hr tablet, Take 4 tablets (2,000 mg total) by mouth every evening., Disp: 360 tablet, Rfl: 3    methocarbamol (ROBAXIN) 750 MG Tab, Take 750 mg by mouth continuous prn.  ,  "Disp: , Rfl:     methylsulfonylmethane (MSM) 500 mg Cap, Take 1 capsule by mouth Daily.  , Disp: , Rfl:     metoprolol succinate (TOPROL-XL) 100 MG 24 hr tablet, TAKE 1 TABLET EVERY DAY, Disp: 90 tablet, Rfl: 3    multivitamin (THERAGRAN) per tablet, Take 1 tablet by mouth Daily.  , Disp: , Rfl:     mupirocin (BACTROBAN) 2 % ointment, APPLY TO AFFECTED AREA DAILY AS NEEDED, Disp: , Rfl: 3    nitroGLYCERIN (NITROSTAT) 0.4 MG SL tablet, Place 1 tablet (0.4 mg total) under the tongue every 5 (five) minutes as needed., Disp: 100 tablet, Rfl: 3    oxycodone-acetaminophen (PERCOCET) 5-325 mg per tablet, Take 1 tablet by mouth as needed., Disp: , Rfl: 0    pantoprazole (PROTONIX) 40 MG tablet, Take 40 mg by mouth daily as needed. , Disp: , Rfl: 2    PENNSAID 20 mg/gram /actuation(2 %) sopm, APPLY TWO PUMPS TO AFFECTED AREA(S) TWICE DAILY AS NEEDED, Disp: , Rfl: 3    rosuvastatin (CRESTOR) 20 MG tablet, TAKE 1 TABLET (20 MG TOTAL) BY MOUTH EVERY EVENING., Disp: 90 tablet, Rfl: 3    TOVIAZ 8 mg Tb24, TAKE ONE TABLET BY MOUTH ONCE DAILY., Disp: 90 tablet, Rfl: 3    blood-glucose meter kit, To check BG 4 times daily, to use with insurance preferred meter, Disp: 1 each, Rfl: 0    ALLERGIES:  Review of patient's allergies indicates:   Allergen Reactions    Atorvastatin      Other reaction(s):(lipitor) Muscle pain    Niacin      Other reaction(s): Flushing (skin)     REVIEW OF SYSTEMS:  Constitution: Negative. Negative for chills, fever and night sweats.    Hematologic/Lymphatic: Negative for bleeding problem. Does not bruise/bleed easily.   Skin: Negative for dry skin, itching and rash.   Musculoskeletal: Negative for falls. Positive for left shoulder pain and  muscle weakness.     All other review of symptoms were reviewed and found to be noncontributory.    PHYSICAL EXAMINATION:  Vitals:  /71   Pulse 71   Ht 5' 9.5" (1.765 m)   Wt 93 kg (205 lb)   BMI 29.84 kg/m²    General: Well-developed well-nourished " 69 y.o. malein no acute distress   Cardiovascular: Regular rhythm by palpation of distal pulse, normal color and temperature, no concerning varicosities on symptomatic side   Lungs: No labored breathing or wheezing appreciated   Neuro: Alert and oriented ×3   Psychiatric: well oriented to person, place and time, demonstrates normal mood and affect   Skin: No rashes, lesions or ulcers, normal temperature, turgor, and texture on uninvolved extremity    Ortho/SPM Exam  Examination of the left shoulder demonstrates no swelling or effusion. +TTP about the shoulder.  degrees with some shoulder hiking, ER 50, IR T10 symmetric. 4-/5 supraspinatus and ER strength, belly press negative. + Neer/Buck.  No midline neck tenderness.    IMAGING:  Xrays including AP, Outlet and Axillary Lateral of Left shoulder are ordered / images reviewed by me:   CHRISTIAN.  Proximal humeral migration    MRI L Shoulder:   1. Findings consistent with massive rotator cuff tear with full-thickness, complete tears of the supraspinatus and infraspinatus, and full-thickness tear of the superior subscapularis.   2. Moderate atrophy of the subscapularis and moderate atrophy of the supraspinatus and infraspinatus.   3. Complete tear and retraction of the long of the biceps tendon.   4. Moderate acromioclavicular joint arthrosis.    On my read:  Positive tangent sign.  Goutallier grade 2 of the infraspinatus.  Acetabularization of the acromion    ASSESSMENT:      ICD-10-CM ICD-9-CM   1. Nontraumatic complete tear of left rotator cuff M75.122 727.61   2. Tear of biceps tendon S46.219A 840.8     Massive rotator cuff tear with signs of significant muscular degenerative changes    PLAN:     -Findings and treatment options were discussed with the patient, both operative and non operative.  Patient has fairly well-maintained overhead range of motion and function despite his chronic tear. From a surgical perspective, this does appear repairable however the  patient has signs of significant muscular degenerative changes which can complicate healing. Reverse shoulder arthroplasty would also be a consideration.  -Reasonably well maintained motion with tolerable pain. Uninterested in surgery at this time.   -He elected for a SA steroid injection and HEP   -If he fails conservative treatment, next step would be to reconsider surgical options.  -RTC as needed     Large Joint Aspiration/Injection: L subacromial bursa  Date/Time: 1/23/2020 9:45 AM  Performed by: TAMMY Coy MD  Authorized by: TAMMY Coy MD     Consent Done?:  Yes (Verbal)  Indications:  Pain  Procedure site marked: Yes    Timeout: Prior to procedure the correct patient, procedure, and site was verified    Anesthesia  Local anesthesia used  Anesthesia: local infiltration  Local anesthetic: 0.2% Naropin.  Anesthetic total: 4mL    Location:  Shoulder  Site:  L subacromial bursa  Prep: Patient was prepped and draped in usual sterile fashion    Needle size:  22 G  Ultrasonic Guidance for needle placement: No  Approach:  Posterior  Medications:  40 mg triamcinolone acetonide 40 mg/mL  Patient tolerance:  Patient tolerated the procedure well with no immediate complications

## 2020-02-02 RX ORDER — TRIAMCINOLONE ACETONIDE 40 MG/ML
40 INJECTION, SUSPENSION INTRA-ARTICULAR; INTRAMUSCULAR
Status: DISCONTINUED | OUTPATIENT
Start: 2020-01-23 | End: 2020-02-02 | Stop reason: HOSPADM

## 2020-02-04 ENCOUNTER — TELEPHONE (OUTPATIENT)
Dept: INTERNAL MEDICINE | Facility: CLINIC | Age: 70
End: 2020-02-04

## 2020-02-04 PROBLEM — G89.29 CHRONIC LEFT SHOULDER PAIN: Status: ACTIVE | Noted: 2020-02-04

## 2020-02-04 PROBLEM — R29.898 SHOULDER WEAKNESS: Status: ACTIVE | Noted: 2020-02-04

## 2020-02-04 PROBLEM — M25.512 CHRONIC LEFT SHOULDER PAIN: Status: ACTIVE | Noted: 2020-02-04

## 2020-02-05 ENCOUNTER — PATIENT MESSAGE (OUTPATIENT)
Dept: SPORTS MEDICINE | Facility: CLINIC | Age: 70
End: 2020-02-05

## 2020-02-11 ENCOUNTER — PATIENT MESSAGE (OUTPATIENT)
Dept: SPORTS MEDICINE | Facility: CLINIC | Age: 70
End: 2020-02-11

## 2020-02-15 DIAGNOSIS — N40.1 BENIGN PROSTATIC HYPERPLASIA WITH URINARY OBSTRUCTION: ICD-10-CM

## 2020-02-15 DIAGNOSIS — N13.8 BENIGN PROSTATIC HYPERPLASIA WITH URINARY OBSTRUCTION: ICD-10-CM

## 2020-02-17 RX ORDER — ALFUZOSIN HYDROCHLORIDE 10 MG/1
TABLET, EXTENDED RELEASE ORAL
Qty: 90 TABLET | Refills: 3 | Status: ON HOLD | OUTPATIENT
Start: 2020-02-17 | End: 2020-11-10 | Stop reason: SDUPTHER

## 2020-02-20 ENCOUNTER — LAB VISIT (OUTPATIENT)
Dept: LAB | Facility: HOSPITAL | Age: 70
End: 2020-02-20
Attending: INTERNAL MEDICINE
Payer: COMMERCIAL

## 2020-02-20 DIAGNOSIS — D75.1 POLYCYTHEMIA: ICD-10-CM

## 2020-02-20 LAB
BASOPHILS # BLD AUTO: 0.02 K/UL (ref 0–0.2)
BASOPHILS NFR BLD: 0.6 % (ref 0–1.9)
DIFFERENTIAL METHOD: ABNORMAL
EOSINOPHIL # BLD AUTO: 0.1 K/UL (ref 0–0.5)
EOSINOPHIL NFR BLD: 2.6 % (ref 0–8)
ERYTHROCYTE [DISTWIDTH] IN BLOOD BY AUTOMATED COUNT: 21.7 % (ref 11.5–14.5)
FERRITIN SERPL-MCNC: 6 NG/ML (ref 20–300)
HCT VFR BLD AUTO: 49.6 % (ref 40–54)
HGB BLD-MCNC: 14.1 G/DL (ref 14–18)
IMM GRANULOCYTES # BLD AUTO: 0.02 K/UL (ref 0–0.04)
IMM GRANULOCYTES NFR BLD AUTO: 0.6 % (ref 0–0.5)
IRON SERPL-MCNC: 30 UG/DL (ref 45–160)
LYMPHOCYTES # BLD AUTO: 0.6 K/UL (ref 1–4.8)
LYMPHOCYTES NFR BLD: 17.6 % (ref 18–48)
MCH RBC QN AUTO: 21.9 PG (ref 27–31)
MCHC RBC AUTO-ENTMCNC: 28.4 G/DL (ref 32–36)
MCV RBC AUTO: 77 FL (ref 82–98)
MONOCYTES # BLD AUTO: 0.5 K/UL (ref 0.3–1)
MONOCYTES NFR BLD: 14.7 % (ref 4–15)
NEUTROPHILS # BLD AUTO: 2.2 K/UL (ref 1.8–7.7)
NEUTROPHILS NFR BLD: 63.9 % (ref 38–73)
NRBC BLD-RTO: 0 /100 WBC
PLATELET # BLD AUTO: 243 K/UL (ref 150–350)
PMV BLD AUTO: 9.9 FL (ref 9.2–12.9)
RBC # BLD AUTO: 6.45 M/UL (ref 4.6–6.2)
SATURATED IRON: 10 % (ref 20–50)
TOTAL IRON BINDING CAPACITY: 312 UG/DL (ref 250–450)
TRANSFERRIN SERPL-MCNC: 211 MG/DL (ref 200–375)
WBC # BLD AUTO: 3.4 K/UL (ref 3.9–12.7)

## 2020-02-20 PROCEDURE — 83540 ASSAY OF IRON: CPT

## 2020-02-20 PROCEDURE — 85025 COMPLETE CBC W/AUTO DIFF WBC: CPT

## 2020-02-20 PROCEDURE — 36415 COLL VENOUS BLD VENIPUNCTURE: CPT | Mod: PO

## 2020-02-20 PROCEDURE — 82728 ASSAY OF FERRITIN: CPT

## 2020-02-26 ENCOUNTER — PATIENT MESSAGE (OUTPATIENT)
Dept: HEMATOLOGY/ONCOLOGY | Facility: CLINIC | Age: 70
End: 2020-02-26

## 2020-02-27 NOTE — TELEPHONE ENCOUNTER
He has polycythemia. We were trying to make him iron deficient to make less blood. He still needs to have the labs at the end of March, his hematocrit is creeping back up.

## 2020-03-02 NOTE — PROGRESS NOTES
CC: F/u left shoulder pain     70 y.o. Male returns today for a follow up for his left shoulder. He works as an environmental regulator. History of rotator cuff repair and revision on the right shoulder performed many years ago. MRI findings reviewed last office visit consistent with massive rotator cuff tear with full-thickness, complete tears of the supraspinatus and infraspinatus, and full-thickness tear of the superior subscapularis.      INTERVAL HISTORY (03/03/20):  Patient reports that his injection did not help much.  He reports he has been doing therapy for the past 6 weeks with minimal improvement in his weakness PE reports that he has pain with certain motion of the shoulder, more specifically when he abducts or forward flexes at a distance from his body.  Patient reports that his wife is scheduled to have a hip replacement beginning of April, and this is prohibiting for having surgery at this point time. Would like to know if he is a candidate for reverse shoulder arthroplasty. Denies any neck pain or radicular symptoms.  No other concerns or complaints.    PMHx notable for significant cardiac history with stents however is not currently on blood thinners.   Negative for tobacco.   Positive for diabetes. Last A1C; 7.1 on 12/1919    PAST MEDICAL HISTORY:   Past Medical History:   Diagnosis Date    Anticoagulant long-term use     Arthritis     Atrial tachycardia, paroxysmal 1/12    during Cardiac Rehab    Colon polyp     Coronary artery disease     Diverticulitis     Diverticulosis     GERD (gastroesophageal reflux disease)     Glucose intolerance (impaired glucose tolerance) 1/24/2017    Hyperlipidemia     Hypertension     IFG (impaired fasting glucose) 12/21/2015    NSTEMI (non-ST elevated myocardial infarction) 10/11/12    Obesity (BMI 30-39.9) 1/31/2018    Post PTCA     Sleep apnea      PAST SURGICAL HISTORY:  Past Surgical History:   Procedure Laterality Date    boewl resection       CATARACT EXTRACTION, BILATERAL  8/2011    COLONOSCOPY  2012    COLONOSCOPY N/A 10/10/2016    Procedure: COLONOSCOPY;  Surgeon: Main Lehman MD;  Location: [facility] ([address] [place] [address]);  Service: Endoscopy;  Laterality: N/A;    COLONOSCOPY W/ POLYPECTOMY      CORONARY ANGIOPLASTY WITH STENT PLACEMENT      ESOPHAGOGASTRODUODENOSCOPY      HERNIA REPAIR      KNEE SURGERY  2003    needle bx on chest      SHOULDER SURGERY      SIGMOIDECTOMY       FAMILY HISTORY:  Family History   Problem Relation Age of Onset    Cancer Mother         uterine    Asthma Sister     Heart disease Father         valve    Diabetes Maternal Grandmother      MEDICATIONS:    Current Outpatient Medications:     alfuzosin (UROXATRAL) 10 mg Tb24, TAKE 1 TABLET BY MOUTH EVERY DAY IN THE EVENING, Disp: 90 tablet, Rfl: 3    aspirin (ECOTRIN) 81 MG EC tablet, Take 81 mg by mouth once daily., Disp: , Rfl:     blood sugar diagnostic Strp, To check BG 4 times daily, to use with insurance preferred meter, Disp: 350 strip, Rfl: 3    blood-glucose meter kit, To check BG 4 times daily, to use with insurance preferred meter, Disp: 1 each, Rfl: 0    CELEBREX 200 mg capsule, Take 1 capsule by mouth as needed., Disp: , Rfl: 2    clindamycin phosphate 1% (CLINDAGEL) 1 % gel, APPLY TWICE A DAY TO FACE, Disp: , Rfl: 1    CORDRAN TAPE LARGE ROLL 4 mcg/cm2 Tape, Apply 1 application topically every evening. Apply to affected area, Disp: , Rfl: 1    desoximetasone (TOPICORT) 0.25 % cream, APPLY TO AFFECTED AREA TWICE A DAY FOR PSORIASIS, Disp: , Rfl: 5    empagliflozin (JARDIANCE) 10 mg Tab, Take 1 tablet by mouth once daily., Disp: 90 tablet, Rfl: 3    ezetimibe (ZETIA) 10 mg tablet, Take 0.5 tablets (5 mg total) by mouth once daily., Disp: 90 tablet, Rfl: 3    ezetimibe (ZETIA) 10 mg tablet, TAKE 1 TABLET EVERY DAY, Disp: 90 tablet, Rfl: 3    fish oil-omega-3 fatty acids 300-1,000 mg capsule, Take 1 g by mouth once daily., Disp: , Rfl:      FREESTYLE LANCETS 28 gauge lancets, TO CHECK BG 4 TIMES DAILY, TO USE WITH INSURANCE PREFERRED METER, Disp: , Rfl: 3    metFORMIN (GLUCOPHAGE-XR) 500 MG 24 hr tablet, Take 4 tablets (2,000 mg total) by mouth every evening., Disp: 360 tablet, Rfl: 3    methocarbamol (ROBAXIN) 750 MG Tab, Take 750 mg by mouth continuous prn.  , Disp: , Rfl:     methylsulfonylmethane (MSM) 500 mg Cap, Take 1 capsule by mouth Daily.  , Disp: , Rfl:     metoprolol succinate (TOPROL-XL) 100 MG 24 hr tablet, TAKE 1 TABLET EVERY DAY, Disp: 90 tablet, Rfl: 3    multivitamin (THERAGRAN) per tablet, Take 1 tablet by mouth Daily.  , Disp: , Rfl:     mupirocin (BACTROBAN) 2 % ointment, APPLY TO AFFECTED AREA DAILY AS NEEDED, Disp: , Rfl: 3    nitroGLYCERIN (NITROSTAT) 0.4 MG SL tablet, Place 1 tablet (0.4 mg total) under the tongue every 5 (five) minutes as needed., Disp: 100 tablet, Rfl: 3    oxycodone-acetaminophen (PERCOCET) 5-325 mg per tablet, Take 1 tablet by mouth as needed., Disp: , Rfl: 0    pantoprazole (PROTONIX) 40 MG tablet, Take 40 mg by mouth daily as needed. , Disp: , Rfl: 2    PENNSAID 20 mg/gram /actuation(2 %) sopm, APPLY TWO PUMPS TO AFFECTED AREA(S) TWICE DAILY AS NEEDED, Disp: , Rfl: 3    rosuvastatin (CRESTOR) 20 MG tablet, TAKE 1 TABLET (20 MG TOTAL) BY MOUTH EVERY EVENING., Disp: 90 tablet, Rfl: 3    TOVIAZ 8 mg Tb24, TAKE ONE TABLET BY MOUTH ONCE DAILY., Disp: 90 tablet, Rfl: 3    ALLERGIES:  Review of patient's allergies indicates:   Allergen Reactions    Atorvastatin      Other reaction(s):(lipitor) Muscle pain    Niacin      Other reaction(s): Flushing (skin)     REVIEW OF SYSTEMS:  Constitution: Negative. Negative for chills, fever and night sweats.    Hematologic/Lymphatic: Negative for bleeding problem. Does not bruise/bleed easily.   Skin: Negative for dry skin, itching and rash.   Musculoskeletal: Negative for falls. Positive for left shoulder pain and  muscle weakness.     All other review of  symptoms were reviewed and found to be noncontributory.    PHYSICAL EXAMINATION:  Vitals:  There were no vitals taken for this visit.   General: Well-developed well-nourished 70 y.o. malein no acute distress   Cardiovascular: Regular rhythm by palpation of distal pulse, normal color and temperature, no concerning varicosities on symptomatic side   Lungs: No labored breathing or wheezing appreciated   Neuro: Alert and oriented ×3   Psychiatric: well oriented to person, place and time, demonstrates normal mood and affect   Skin: No rashes, lesions or ulcers, normal temperature, turgor, and texture on uninvolved extremity    Ortho/SPM Exam  Examination of the left shoulder demonstrates no swelling or effusion. +TTP about the shoulder diffusely.  degrees with some shoulder hiking, ER at side to 50, IR T10 symmetric. 4-/5 supraspinatus and ER strength, belly press negative. + Neer/Buck.  No midline neck tenderness.    IMAGING:  Xrays including AP, Outlet and Axillary Lateral of Left shoulder are ordered / images reviewed by me:   CHRISTIAN.  Proximal humeral migration    MRI L Shoulder:   1. Findings consistent with massive rotator cuff tear with full-thickness, complete tears of the supraspinatus and infraspinatus, and full-thickness tear of the superior subscapularis.   2. Moderate atrophy of the subscapularis and moderate atrophy of the supraspinatus and infraspinatus.   3. Complete tear and retraction of the long of the biceps tendon.   4. Moderate acromioclavicular joint arthrosis.    On my read:  Positive tangent sign.  Goutallier grade 2 of the infraspinatus.  Acetabularization of the acromion    ASSESSMENT:      ICD-10-CM ICD-9-CM   1. Nontraumatic complete tear of left rotator cuff M75.122 727.61   2. Tear of biceps tendon S46.219A 840.8   3. Left shoulder pain, unspecified chronicity M25.512 719.41     Massive rotator cuff tear with signs of significant muscular degenerative changes. Well-maintained force  couples otherwise.    PLAN:     Findings were discussed with the patient. Diagnosis Is massive rotator cuff tearing with secondary significant muscular degenerative changes. He has well-maintained his overhead range of motion and function. Primary complaint is pain. Given the totality of all factors, I think reverse shoulder arthroplasty is his best option. We did discuss that he could lose some overhead range of motion as a result from this.  We discussed the usual postoperative outcomes regarding function with reverse shoulder arthroplasty. The primary indication for surgery would be pain relief.  He verbalized understanding.  The expected postop rehab and recovery course also was discussed. He would need preoperative medical and clearances.  His wife is scheduled to have a total hip arthroplasty in the coming weeks.  He is going to wait until after she recovers a bit from that to proceed with the shoulder replacement surgery. Plan would be for a [place] reverse shoulder prosthesis.    Informed Consent:    The details of the surgical procedure were explained, including the location of probable incisions and a description of possible hardware and/or grafts to be used. Alternatives to both operative and non-operative options with associated risks and benefits were discussed. The patient understands the likely convalescence after surgery and, in particular, the expected postop rehab and recovery course. The outlined risks and potential complications of the proposed procedure include but are not limited to: infection, poor wound healing, scarring, deformity, stiffness, swelling, continued or recurrent pain, instability, hardware or prosthetic failure if implanted, symptomatic hardware requiring removal, dislocation, weakness, neurovascular injury, numbness, chronic regional pain disorder, tissue nonhealing/irreparability/retear, subsequent contralateral limb injury or pathology, chondral injury, arthritis, fracture,  blood clot formation, inability to return to previous level of activity, anesthetic or regional block complication up to death, need for additional procedure as indicated intraoperatively, and potential need for further surgery.    The patient was also informed and understands that the risks of surgery are greater for patients with a current condition or history of heart disease, obesity, clotting disorders, recurrent infections, steroid use, current or past smoking, and factors such as sedentary lifestyle and noncompliance with medications, therapy or follow-up. The degree of the increased risk is hard to estimate with any degree of precision. If applicable, smoking cessation was discussed.     All questions were answered. The patient has verbalized understanding of these issues and wishes to proceed with the surgery as discussed.

## 2020-03-03 ENCOUNTER — OFFICE VISIT (OUTPATIENT)
Dept: SPORTS MEDICINE | Facility: CLINIC | Age: 70
End: 2020-03-03
Payer: COMMERCIAL

## 2020-03-03 VITALS
DIASTOLIC BLOOD PRESSURE: 70 MMHG | BODY MASS INDEX: 29.35 KG/M2 | WEIGHT: 205 LBS | SYSTOLIC BLOOD PRESSURE: 111 MMHG | HEIGHT: 70 IN | HEART RATE: 80 BPM

## 2020-03-03 DIAGNOSIS — M12.812 ROTATOR CUFF ARTHROPATHY OF LEFT SHOULDER: ICD-10-CM

## 2020-03-03 DIAGNOSIS — S46.219A TEAR OF BICEPS TENDON: ICD-10-CM

## 2020-03-03 DIAGNOSIS — M25.512 LEFT SHOULDER PAIN, UNSPECIFIED CHRONICITY: ICD-10-CM

## 2020-03-03 DIAGNOSIS — M75.122 NONTRAUMATIC COMPLETE TEAR OF LEFT ROTATOR CUFF: Primary | ICD-10-CM

## 2020-03-03 PROCEDURE — 1126F AMNT PAIN NOTED NONE PRSNT: CPT | Mod: S$GLB,,, | Performed by: ORTHOPAEDIC SURGERY

## 2020-03-03 PROCEDURE — 99999 PR PBB SHADOW E&M-EST. PATIENT-LVL III: CPT | Mod: PBBFAC,,, | Performed by: ORTHOPAEDIC SURGERY

## 2020-03-03 PROCEDURE — 3074F SYST BP LT 130 MM HG: CPT | Mod: CPTII,S$GLB,, | Performed by: ORTHOPAEDIC SURGERY

## 2020-03-03 PROCEDURE — 99214 OFFICE O/P EST MOD 30 MIN: CPT | Mod: S$GLB,,, | Performed by: ORTHOPAEDIC SURGERY

## 2020-03-03 PROCEDURE — 1159F PR MEDICATION LIST DOCUMENTED IN MEDICAL RECORD: ICD-10-PCS | Mod: S$GLB,,, | Performed by: ORTHOPAEDIC SURGERY

## 2020-03-03 PROCEDURE — 1126F PR PAIN SEVERITY QUANTIFIED, NO PAIN PRESENT: ICD-10-PCS | Mod: S$GLB,,, | Performed by: ORTHOPAEDIC SURGERY

## 2020-03-03 PROCEDURE — 99214 PR OFFICE/OUTPT VISIT, EST, LEVL IV, 30-39 MIN: ICD-10-PCS | Mod: S$GLB,,, | Performed by: ORTHOPAEDIC SURGERY

## 2020-03-03 PROCEDURE — 3074F PR MOST RECENT SYSTOLIC BLOOD PRESSURE < 130 MM HG: ICD-10-PCS | Mod: CPTII,S$GLB,, | Performed by: ORTHOPAEDIC SURGERY

## 2020-03-03 PROCEDURE — 99999 PR PBB SHADOW E&M-EST. PATIENT-LVL III: ICD-10-PCS | Mod: PBBFAC,,, | Performed by: ORTHOPAEDIC SURGERY

## 2020-03-03 PROCEDURE — 3078F PR MOST RECENT DIASTOLIC BLOOD PRESSURE < 80 MM HG: ICD-10-PCS | Mod: CPTII,S$GLB,, | Performed by: ORTHOPAEDIC SURGERY

## 2020-03-03 PROCEDURE — 1159F MED LIST DOCD IN RCRD: CPT | Mod: S$GLB,,, | Performed by: ORTHOPAEDIC SURGERY

## 2020-03-03 PROCEDURE — 1101F PT FALLS ASSESS-DOCD LE1/YR: CPT | Mod: CPTII,S$GLB,, | Performed by: ORTHOPAEDIC SURGERY

## 2020-03-03 PROCEDURE — 1101F PR PT FALLS ASSESS DOC 0-1 FALLS W/OUT INJ PAST YR: ICD-10-PCS | Mod: CPTII,S$GLB,, | Performed by: ORTHOPAEDIC SURGERY

## 2020-03-03 PROCEDURE — 3078F DIAST BP <80 MM HG: CPT | Mod: CPTII,S$GLB,, | Performed by: ORTHOPAEDIC SURGERY

## 2020-03-06 ENCOUNTER — HOSPITAL ENCOUNTER (OUTPATIENT)
Dept: TRANSFUSION MEDICINE | Facility: HOSPITAL | Age: 70
Discharge: HOME | End: 2020-03-06
Payer: COMMERCIAL

## 2020-03-06 PROCEDURE — 99195 PHLEBOTOMY: CPT

## 2020-03-09 NOTE — PROGRESS NOTES
Pt to Blood Bank for therapeutic phlebotomy.  /79  P 67  T 97.8  Hct 41.  1 unit WB removed from  L arm.  Tolerated well.  Written/verbal instructions given.  Pressure wrap applied when hemostasis achieved.  Refreshments accepted.  Ambulatory to and from BB per self.

## 2020-04-03 ENCOUNTER — TELEPHONE (OUTPATIENT)
Dept: HEMATOLOGY/ONCOLOGY | Facility: CLINIC | Age: 70
End: 2020-04-03

## 2020-04-29 ENCOUNTER — PATIENT MESSAGE (OUTPATIENT)
Dept: ENDOCRINOLOGY | Facility: CLINIC | Age: 70
End: 2020-04-29

## 2020-04-29 ENCOUNTER — PATIENT MESSAGE (OUTPATIENT)
Dept: HEMATOLOGY/ONCOLOGY | Facility: CLINIC | Age: 70
End: 2020-04-29

## 2020-04-29 DIAGNOSIS — E11.65 TYPE 2 DIABETES MELLITUS WITH HYPERGLYCEMIA, WITH LONG-TERM CURRENT USE OF INSULIN: Chronic | ICD-10-CM

## 2020-04-29 DIAGNOSIS — Z79.4 TYPE 2 DIABETES MELLITUS WITH HYPERGLYCEMIA, WITH LONG-TERM CURRENT USE OF INSULIN: Chronic | ICD-10-CM

## 2020-04-29 DIAGNOSIS — E11.65 TYPE 2 DIABETES MELLITUS WITH HYPERGLYCEMIA, WITHOUT LONG-TERM CURRENT USE OF INSULIN: Chronic | ICD-10-CM

## 2020-04-29 RX ORDER — METFORMIN HYDROCHLORIDE 500 MG/1
2000 TABLET, EXTENDED RELEASE ORAL NIGHTLY
Qty: 360 TABLET | Refills: 3 | Status: SHIPPED | OUTPATIENT
Start: 2020-04-29 | End: 2020-11-17 | Stop reason: ALTCHOICE

## 2020-04-30 ENCOUNTER — LAB VISIT (OUTPATIENT)
Dept: LAB | Facility: HOSPITAL | Age: 70
End: 2020-04-30
Attending: INTERNAL MEDICINE
Payer: COMMERCIAL

## 2020-04-30 DIAGNOSIS — D75.1 POLYCYTHEMIA: ICD-10-CM

## 2020-04-30 LAB
BASOPHILS # BLD AUTO: 0.03 K/UL (ref 0–0.2)
BASOPHILS NFR BLD: 0.4 % (ref 0–1.9)
DIFFERENTIAL METHOD: ABNORMAL
EOSINOPHIL # BLD AUTO: 0.1 K/UL (ref 0–0.5)
EOSINOPHIL NFR BLD: 1 % (ref 0–8)
ERYTHROCYTE [DISTWIDTH] IN BLOOD BY AUTOMATED COUNT: 20.9 % (ref 11.5–14.5)
FERRITIN SERPL-MCNC: 6 NG/ML (ref 20–300)
HCT VFR BLD AUTO: 50.5 % (ref 40–54)
HGB BLD-MCNC: 14.1 G/DL (ref 14–18)
IMM GRANULOCYTES # BLD AUTO: 0.02 K/UL (ref 0–0.04)
IMM GRANULOCYTES NFR BLD AUTO: 0.3 % (ref 0–0.5)
IRON SERPL-MCNC: 28 UG/DL (ref 45–160)
LYMPHOCYTES # BLD AUTO: 0.8 K/UL (ref 1–4.8)
LYMPHOCYTES NFR BLD: 10.9 % (ref 18–48)
MCH RBC QN AUTO: 21.8 PG (ref 27–31)
MCHC RBC AUTO-ENTMCNC: 27.9 G/DL (ref 32–36)
MCV RBC AUTO: 78 FL (ref 82–98)
MONOCYTES # BLD AUTO: 0.8 K/UL (ref 0.3–1)
MONOCYTES NFR BLD: 11.1 % (ref 4–15)
NEUTROPHILS # BLD AUTO: 5.2 K/UL (ref 1.8–7.7)
NEUTROPHILS NFR BLD: 76.3 % (ref 38–73)
NRBC BLD-RTO: 0 /100 WBC
PLATELET # BLD AUTO: 230 K/UL (ref 150–350)
PMV BLD AUTO: 9.4 FL (ref 9.2–12.9)
RBC # BLD AUTO: 6.48 M/UL (ref 4.6–6.2)
SATURATED IRON: 8 % (ref 20–50)
TOTAL IRON BINDING CAPACITY: 333 UG/DL (ref 250–450)
TRANSFERRIN SERPL-MCNC: 225 MG/DL (ref 200–375)
WBC # BLD AUTO: 6.86 K/UL (ref 3.9–12.7)

## 2020-04-30 PROCEDURE — 83540 ASSAY OF IRON: CPT

## 2020-04-30 PROCEDURE — 85025 COMPLETE CBC W/AUTO DIFF WBC: CPT

## 2020-04-30 PROCEDURE — 36415 COLL VENOUS BLD VENIPUNCTURE: CPT | Mod: PO

## 2020-04-30 PROCEDURE — 82728 ASSAY OF FERRITIN: CPT

## 2020-05-14 ENCOUNTER — PATIENT MESSAGE (OUTPATIENT)
Dept: INTERNAL MEDICINE | Facility: CLINIC | Age: 70
End: 2020-05-14

## 2020-05-14 DIAGNOSIS — E34.9 TESTOSTERONE DEFICIENCY: ICD-10-CM

## 2020-05-14 DIAGNOSIS — I21.9 MYOCARDIAL INFARCTION, UNSPECIFIED MI TYPE, UNSPECIFIED ARTERY: Chronic | ICD-10-CM

## 2020-05-14 DIAGNOSIS — E66.9 OBESITY (BMI 30-39.9): Chronic | ICD-10-CM

## 2020-05-14 DIAGNOSIS — Z98.61 S/P PTCA (PERCUTANEOUS TRANSLUMINAL CORONARY ANGIOPLASTY): Chronic | ICD-10-CM

## 2020-05-14 DIAGNOSIS — E78.00 HYPERCHOLESTEREMIA: Chronic | ICD-10-CM

## 2020-05-14 DIAGNOSIS — I10 ESSENTIAL HYPERTENSION: Chronic | ICD-10-CM

## 2020-05-14 DIAGNOSIS — Z12.11 SPECIAL SCREENING FOR MALIGNANT NEOPLASMS, COLON: ICD-10-CM

## 2020-05-14 DIAGNOSIS — D75.1 POLYCYTHEMIA: ICD-10-CM

## 2020-05-14 DIAGNOSIS — E11.65 TYPE 2 DIABETES MELLITUS WITH HYPERGLYCEMIA, WITHOUT LONG-TERM CURRENT USE OF INSULIN: ICD-10-CM

## 2020-05-14 DIAGNOSIS — I25.10 CORONARY ARTERY DISEASE INVOLVING NATIVE CORONARY ARTERY OF NATIVE HEART WITHOUT ANGINA PECTORIS: Primary | Chronic | ICD-10-CM

## 2020-05-15 ENCOUNTER — TELEPHONE (OUTPATIENT)
Dept: INTERNAL MEDICINE | Facility: CLINIC | Age: 70
End: 2020-05-15

## 2020-05-15 NOTE — TELEPHONE ENCOUNTER
This is something he should discuss with his employer, and if he needs confirmation of his age and illness for his employer to comply then I will write confirmation of his claim.  If possible let Faby do this

## 2020-06-01 ENCOUNTER — PATIENT OUTREACH (OUTPATIENT)
Dept: ADMINISTRATIVE | Facility: HOSPITAL | Age: 70
End: 2020-06-01

## 2020-06-01 LAB
LEFT EYE DM RETINOPATHY: NEGATIVE
RIGHT EYE DM RETINOPATHY: NEGATIVE

## 2020-07-02 ENCOUNTER — LAB VISIT (OUTPATIENT)
Dept: LAB | Facility: HOSPITAL | Age: 70
End: 2020-07-02
Attending: INTERNAL MEDICINE
Payer: COMMERCIAL

## 2020-07-02 DIAGNOSIS — I21.9 MYOCARDIAL INFARCTION, UNSPECIFIED MI TYPE, UNSPECIFIED ARTERY: Chronic | ICD-10-CM

## 2020-07-02 DIAGNOSIS — I10 ESSENTIAL HYPERTENSION: Chronic | ICD-10-CM

## 2020-07-02 DIAGNOSIS — D75.1 POLYCYTHEMIA: ICD-10-CM

## 2020-07-02 DIAGNOSIS — Z98.61 S/P PTCA (PERCUTANEOUS TRANSLUMINAL CORONARY ANGIOPLASTY): Chronic | ICD-10-CM

## 2020-07-02 DIAGNOSIS — Z12.11 SPECIAL SCREENING FOR MALIGNANT NEOPLASMS, COLON: ICD-10-CM

## 2020-07-02 DIAGNOSIS — E34.9 TESTOSTERONE DEFICIENCY: ICD-10-CM

## 2020-07-02 DIAGNOSIS — I25.10 CORONARY ARTERY DISEASE INVOLVING NATIVE CORONARY ARTERY OF NATIVE HEART WITHOUT ANGINA PECTORIS: Chronic | ICD-10-CM

## 2020-07-02 DIAGNOSIS — E78.00 HYPERCHOLESTEREMIA: Chronic | ICD-10-CM

## 2020-07-02 DIAGNOSIS — E11.65 TYPE 2 DIABETES MELLITUS WITH HYPERGLYCEMIA, WITHOUT LONG-TERM CURRENT USE OF INSULIN: ICD-10-CM

## 2020-07-02 DIAGNOSIS — E66.9 OBESITY (BMI 30-39.9): Chronic | ICD-10-CM

## 2020-07-02 LAB
ALBUMIN SERPL BCP-MCNC: 3.6 G/DL (ref 3.5–5.2)
ALP SERPL-CCNC: 55 U/L (ref 55–135)
ALT SERPL W/O P-5'-P-CCNC: 24 U/L (ref 10–44)
ANION GAP SERPL CALC-SCNC: 8 MMOL/L (ref 8–16)
AST SERPL-CCNC: 22 U/L (ref 10–40)
BASOPHILS # BLD AUTO: 0.02 K/UL (ref 0–0.2)
BASOPHILS NFR BLD: 0.5 % (ref 0–1.9)
BILIRUB SERPL-MCNC: 0.9 MG/DL (ref 0.1–1)
BUN SERPL-MCNC: 23 MG/DL (ref 8–23)
CALCIUM SERPL-MCNC: 8.8 MG/DL (ref 8.7–10.5)
CHLORIDE SERPL-SCNC: 109 MMOL/L (ref 95–110)
CHOLEST SERPL-MCNC: 67 MG/DL (ref 120–199)
CHOLEST/HDLC SERPL: 2.2 {RATIO} (ref 2–5)
CO2 SERPL-SCNC: 21 MMOL/L (ref 23–29)
COMPLEXED PSA SERPL-MCNC: 0.23 NG/ML (ref 0–4)
CREAT SERPL-MCNC: 0.9 MG/DL (ref 0.5–1.4)
DIFFERENTIAL METHOD: ABNORMAL
EOSINOPHIL # BLD AUTO: 0.1 K/UL (ref 0–0.5)
EOSINOPHIL NFR BLD: 2.3 % (ref 0–8)
ERYTHROCYTE [DISTWIDTH] IN BLOOD BY AUTOMATED COUNT: 21 % (ref 11.5–14.5)
EST. GFR  (AFRICAN AMERICAN): >60 ML/MIN/1.73 M^2
EST. GFR  (NON AFRICAN AMERICAN): >60 ML/MIN/1.73 M^2
ESTIMATED AVG GLUCOSE: 148 MG/DL (ref 68–131)
GLUCOSE SERPL-MCNC: 111 MG/DL (ref 70–110)
HBA1C MFR BLD HPLC: 6.8 % (ref 4–5.6)
HCT VFR BLD AUTO: 48 % (ref 40–54)
HDLC SERPL-MCNC: 30 MG/DL (ref 40–75)
HDLC SERPL: 44.8 % (ref 20–50)
HGB BLD-MCNC: 13.6 G/DL (ref 14–18)
IMM GRANULOCYTES # BLD AUTO: 0.01 K/UL (ref 0–0.04)
IMM GRANULOCYTES NFR BLD AUTO: 0.3 % (ref 0–0.5)
LDLC SERPL CALC-MCNC: 22.6 MG/DL (ref 63–159)
LYMPHOCYTES # BLD AUTO: 0.7 K/UL (ref 1–4.8)
LYMPHOCYTES NFR BLD: 17.6 % (ref 18–48)
MCH RBC QN AUTO: 22.2 PG (ref 27–31)
MCHC RBC AUTO-ENTMCNC: 28.3 G/DL (ref 32–36)
MCV RBC AUTO: 78 FL (ref 82–98)
MONOCYTES # BLD AUTO: 0.6 K/UL (ref 0.3–1)
MONOCYTES NFR BLD: 16.3 % (ref 4–15)
NEUTROPHILS # BLD AUTO: 2.5 K/UL (ref 1.8–7.7)
NEUTROPHILS NFR BLD: 63 % (ref 38–73)
NONHDLC SERPL-MCNC: 37 MG/DL
NRBC BLD-RTO: 0 /100 WBC
PLATELET # BLD AUTO: 249 K/UL (ref 150–350)
PMV BLD AUTO: 9.6 FL (ref 9.2–12.9)
POTASSIUM SERPL-SCNC: 4.5 MMOL/L (ref 3.5–5.1)
PROT SERPL-MCNC: 5.8 G/DL (ref 6–8.4)
RBC # BLD AUTO: 6.12 M/UL (ref 4.6–6.2)
SODIUM SERPL-SCNC: 138 MMOL/L (ref 136–145)
T4 FREE SERPL-MCNC: 0.82 NG/DL (ref 0.71–1.51)
TRIGL SERPL-MCNC: 72 MG/DL (ref 30–150)
TSH SERPL DL<=0.005 MIU/L-ACNC: 2.85 UIU/ML (ref 0.4–4)
WBC # BLD AUTO: 3.92 K/UL (ref 3.9–12.7)

## 2020-07-02 PROCEDURE — 85025 COMPLETE CBC W/AUTO DIFF WBC: CPT

## 2020-07-02 PROCEDURE — 80061 LIPID PANEL: CPT

## 2020-07-02 PROCEDURE — 84153 ASSAY OF PSA TOTAL: CPT

## 2020-07-02 PROCEDURE — 80053 COMPREHEN METABOLIC PANEL: CPT

## 2020-07-02 PROCEDURE — 84439 ASSAY OF FREE THYROXINE: CPT

## 2020-07-02 PROCEDURE — 36415 COLL VENOUS BLD VENIPUNCTURE: CPT | Mod: PO

## 2020-07-02 PROCEDURE — 84443 ASSAY THYROID STIM HORMONE: CPT

## 2020-07-02 PROCEDURE — 83036 HEMOGLOBIN GLYCOSYLATED A1C: CPT

## 2020-07-09 ENCOUNTER — OFFICE VISIT (OUTPATIENT)
Dept: INTERNAL MEDICINE | Facility: CLINIC | Age: 70
End: 2020-07-09
Payer: COMMERCIAL

## 2020-07-09 DIAGNOSIS — D75.1 POLYCYTHEMIA: ICD-10-CM

## 2020-07-09 DIAGNOSIS — E11.65 TYPE 2 DIABETES MELLITUS WITH HYPERGLYCEMIA, WITHOUT LONG-TERM CURRENT USE OF INSULIN: ICD-10-CM

## 2020-07-09 DIAGNOSIS — E34.9 TESTOSTERONE DEFICIENCY: ICD-10-CM

## 2020-07-09 DIAGNOSIS — M79.2 NEUROPATHIC PAIN OF THIGH, UNSPECIFIED LATERALITY: ICD-10-CM

## 2020-07-09 DIAGNOSIS — E66.9 OBESITY (BMI 30-39.9): ICD-10-CM

## 2020-07-09 DIAGNOSIS — I10 ESSENTIAL HYPERTENSION: ICD-10-CM

## 2020-07-09 DIAGNOSIS — Z00.00 ANNUAL PHYSICAL EXAM: Primary | ICD-10-CM

## 2020-07-09 DIAGNOSIS — Q21.10 ASD (ATRIAL SEPTAL DEFECT): ICD-10-CM

## 2020-07-09 DIAGNOSIS — E78.00 HYPERCHOLESTEREMIA: ICD-10-CM

## 2020-07-09 DIAGNOSIS — Z98.61 S/P PTCA (PERCUTANEOUS TRANSLUMINAL CORONARY ANGIOPLASTY): ICD-10-CM

## 2020-07-09 DIAGNOSIS — I25.10 CORONARY ARTERY DISEASE INVOLVING NATIVE CORONARY ARTERY OF NATIVE HEART WITHOUT ANGINA PECTORIS: ICD-10-CM

## 2020-07-09 PROCEDURE — 99214 PR OFFICE/OUTPT VISIT, EST, LEVL IV, 30-39 MIN: ICD-10-PCS | Mod: 95,,, | Performed by: INTERNAL MEDICINE

## 2020-07-09 PROCEDURE — 1159F PR MEDICATION LIST DOCUMENTED IN MEDICAL RECORD: ICD-10-PCS | Mod: ,,, | Performed by: INTERNAL MEDICINE

## 2020-07-09 PROCEDURE — 3044F HG A1C LEVEL LT 7.0%: CPT | Mod: CPTII,,, | Performed by: INTERNAL MEDICINE

## 2020-07-09 PROCEDURE — 3044F PR MOST RECENT HEMOGLOBIN A1C LEVEL <7.0%: ICD-10-PCS | Mod: CPTII,,, | Performed by: INTERNAL MEDICINE

## 2020-07-09 PROCEDURE — 1159F MED LIST DOCD IN RCRD: CPT | Mod: ,,, | Performed by: INTERNAL MEDICINE

## 2020-07-09 PROCEDURE — 1101F PR PT FALLS ASSESS DOC 0-1 FALLS W/OUT INJ PAST YR: ICD-10-PCS | Mod: CPTII,,, | Performed by: INTERNAL MEDICINE

## 2020-07-09 PROCEDURE — 99214 OFFICE O/P EST MOD 30 MIN: CPT | Mod: 95,,, | Performed by: INTERNAL MEDICINE

## 2020-07-09 PROCEDURE — 1101F PT FALLS ASSESS-DOCD LE1/YR: CPT | Mod: CPTII,,, | Performed by: INTERNAL MEDICINE

## 2020-07-20 NOTE — PROGRESS NOTES
Subjective:       Patient ID: Den Rick is a 70 y.o. male.    Chief Complaint: No chief complaint on file.    HPI  Review of Systems      Objective:      Physical Exam    Assessment:       1. Annual physical exam    2. Coronary artery disease involving native coronary artery of native heart without angina pectoris    3. Testosterone deficiency    4. S/P PTCA     5. Essential hypertension    6. Hypercholesteremia    7. Obesity (BMI 30-39.9)    8. Type 2 diabetes mellitus with hyperglycemia, without long-term current use of insulin    9. Polycythemia    10. ASD (atrial septal defect) - small vs PFO    11. Neuropathic pain of thigh, unspecified laterality        Plan:         Established Patient - Audio Only Telehealth Visit     The patient location is: home  The chief complaint leading to consultation is: annual review  Visit type: Virtual visit with audio only (telephone)  Total time spent with patient: 20 min       The reason for the audio only service rather than synchronous audio and video virtual visit was related to technical difficulties or patient preference/necessity.     Each patient to whom I provide medical services by telemedicine is:  (1) informed of the relationship between the physician and patient and the respective role of any other health care provider with respect to management of the patient; and (2) notified that they may decline to receive medical services by telemedicine and may withdraw from such care at any time. Patient verbally consented to receive this service via voice-only telephone call.       HPI:  70-year-old white male patient mine calling in for annual review which was done virtual.  Patient landed annual lab done prior to this which was reviewed with him and was remarkably normal.  If hemoglobin A1c was 6.8 and his glucose was 111.  Patient has been followed by endocrine but they have told him they can follow with me instead.  He would prefer to do that.  I have accepted  that.    Patient had of shoulder injury which resulted in 3 torn tendons but did not require surgery and has gotten better with therapy directed by sports medicine.  Patient also has Plan rhinoplasty with ENT Dr. ADI hong.  Patient is followed by Hematology for polycythemia and has been doing good in that regard.  Patient also is followed by cardiology in on multiple medicines for that in his diabetes.      Patient is living at home  and is very careful about the COVID guidelines.  He does not leave the house except for his medical appointments.        Physical exam:  Not done since this is a virtual visit.    Impression:  1.  Diabetes-well controlled and I will overseas care in that regard  2.  Coronary artery disease-followed by Dr. Barlow  3.  Polycythemia followed by oncology/hematology and doing well in that regard  4.  Recurrent shoulder injury with good recovery with physical therapy  5.  Planned rhino +plasty with ENT Dr. ADI hong.    Plan:  1.  He needed no refills at the current time 2.  I will see him again in 6 months.                            This service was not originating from a related E/M service provided within the previous 7 days nor will  to an E/M service or procedure within the next 24 hours or my soonest available appointment.  Prevailing standard of care was able to be met in this audio-only visit.

## 2020-10-16 ENCOUNTER — TELEPHONE (OUTPATIENT)
Dept: INTERNAL MEDICINE | Facility: CLINIC | Age: 70
End: 2020-10-16

## 2020-10-16 PROBLEM — J96.01 ACUTE HYPOXEMIC RESPIRATORY FAILURE: Status: ACTIVE | Noted: 2020-10-16

## 2020-10-16 PROBLEM — U07.1 COVID-19 VIRUS INFECTION: Status: ACTIVE | Noted: 2020-10-16

## 2020-10-16 PROBLEM — Z86.16 HISTORY OF 2019 NOVEL CORONAVIRUS DISEASE (COVID-19): Status: ACTIVE | Noted: 2020-10-16

## 2020-10-16 PROBLEM — J80 ACUTE RESPIRATORY DISTRESS SYNDROME (ARDS) DUE TO COVID-19 VIRUS: Status: ACTIVE | Noted: 2020-10-16

## 2020-10-16 PROBLEM — U07.1 ACUTE RESPIRATORY DISTRESS SYNDROME (ARDS) DUE TO COVID-19 VIRUS: Status: ACTIVE | Noted: 2020-10-16

## 2020-10-16 NOTE — TELEPHONE ENCOUNTER
Patient is on [facility] report for HTN.  Attempted to contact patient to verify if she is able to assess BP at home.  LVM.

## 2020-10-19 ENCOUNTER — TELEPHONE (OUTPATIENT)
Dept: INTERNAL MEDICINE | Facility: CLINIC | Age: 70
End: 2020-10-19

## 2020-10-19 PROBLEM — E43 SEVERE MALNUTRITION: Status: ACTIVE | Noted: 2020-10-19

## 2020-11-12 PROBLEM — U07.1 COVID-19 VIRUS INFECTION: Status: RESOLVED | Noted: 2020-10-16 | Resolved: 2020-11-12

## 2020-11-12 PROBLEM — E66.9 OBESITY (BMI 30-39.9): Chronic | Status: RESOLVED | Noted: 2018-01-31 | Resolved: 2020-11-12

## 2020-11-13 ENCOUNTER — TELEPHONE (OUTPATIENT)
Dept: INTERNAL MEDICINE | Facility: CLINIC | Age: 70
End: 2020-11-13

## 2020-11-13 ENCOUNTER — PATIENT MESSAGE (OUTPATIENT)
Dept: CARDIOLOGY | Facility: CLINIC | Age: 70
End: 2020-11-13

## 2020-11-13 PROCEDURE — G0180 MD CERTIFICATION HHA PATIENT: HCPCS | Mod: ,,, | Performed by: HOSPITALIST

## 2020-11-13 PROCEDURE — G0180 PR HOME HEALTH MD CERTIFICATION: ICD-10-PCS | Mod: ,,, | Performed by: HOSPITALIST

## 2020-11-13 NOTE — TELEPHONE ENCOUNTER
He was in hospital for COVID and will need f/u likely with specialty services.  If he needs f/u with us best done in the clinic, but I am happy to see him for virtual.

## 2020-11-16 ENCOUNTER — PATIENT MESSAGE (OUTPATIENT)
Dept: INTERNAL MEDICINE | Facility: CLINIC | Age: 70
End: 2020-11-16

## 2020-11-17 ENCOUNTER — OFFICE VISIT (OUTPATIENT)
Dept: INTERNAL MEDICINE | Facility: CLINIC | Age: 70
End: 2020-11-17
Payer: COMMERCIAL

## 2020-11-17 DIAGNOSIS — I25.10 CORONARY ARTERY DISEASE INVOLVING NATIVE CORONARY ARTERY OF NATIVE HEART WITHOUT ANGINA PECTORIS: ICD-10-CM

## 2020-11-17 DIAGNOSIS — D75.1 POLYCYTHEMIA: ICD-10-CM

## 2020-11-17 DIAGNOSIS — J80 ACUTE RESPIRATORY DISTRESS SYNDROME (ARDS) DUE TO COVID-19 VIRUS: Primary | ICD-10-CM

## 2020-11-17 DIAGNOSIS — I10 ESSENTIAL HYPERTENSION: ICD-10-CM

## 2020-11-17 DIAGNOSIS — Q21.10 ASD (ATRIAL SEPTAL DEFECT): ICD-10-CM

## 2020-11-17 DIAGNOSIS — E11.65 POORLY CONTROLLED DIABETES MELLITUS: ICD-10-CM

## 2020-11-17 DIAGNOSIS — Z98.61 S/P PTCA (PERCUTANEOUS TRANSLUMINAL CORONARY ANGIOPLASTY): ICD-10-CM

## 2020-11-17 DIAGNOSIS — E34.9 TESTOSTERONE DEFICIENCY: ICD-10-CM

## 2020-11-17 DIAGNOSIS — U07.1 ACUTE RESPIRATORY DISTRESS SYNDROME (ARDS) DUE TO COVID-19 VIRUS: Primary | ICD-10-CM

## 2020-11-17 PROCEDURE — 3051F HG A1C>EQUAL 7.0%<8.0%: CPT | Mod: CPTII,95,, | Performed by: INTERNAL MEDICINE

## 2020-11-17 PROCEDURE — 3051F PR MOST RECENT HEMOGLOBIN A1C LEVEL 7.0 - < 8.0%: ICD-10-PCS | Mod: CPTII,95,, | Performed by: INTERNAL MEDICINE

## 2020-11-17 PROCEDURE — 99215 OFFICE O/P EST HI 40 MIN: CPT | Mod: 95,,, | Performed by: INTERNAL MEDICINE

## 2020-11-17 PROCEDURE — 1159F MED LIST DOCD IN RCRD: CPT | Mod: ,,, | Performed by: INTERNAL MEDICINE

## 2020-11-17 PROCEDURE — 99215 PR OFFICE/OUTPT VISIT, EST, LEVL V, 40-54 MIN: ICD-10-PCS | Mod: 95,,, | Performed by: INTERNAL MEDICINE

## 2020-11-17 PROCEDURE — 1159F PR MEDICATION LIST DOCUMENTED IN MEDICAL RECORD: ICD-10-PCS | Mod: ,,, | Performed by: INTERNAL MEDICINE

## 2020-11-17 NOTE — PATIENT INSTRUCTIONS
Told him to reduce his novulin to 10U after breakfast and lunch and 5U after dinner.    He is to reduce his CHO intake since he is gaining wt and above his normal wt

## 2020-11-17 NOTE — TELEPHONE ENCOUNTER
See 2 other emails.  I messaged patient back to set up hosp fol up and call if needs help/ virtual ok if he is ok with that.

## 2020-11-17 NOTE — TELEPHONE ENCOUNTER
I tried to reach him this am and left voicemail he needs sooner appt with dr healy to discuss all.  I said either move to today online or call phone staff of assistance.

## 2020-11-17 NOTE — PROGRESS NOTES
Established Patient - Audio Only Telehealth Visit     The patient location is: home at 0 [address] MrEpifanio is out is not Friedman I can hear you yes I can I could hear you yes yes I know he has had all okay okay sounds like he got somebody in the background talking to you okay that you have some this is your cell phone right you have face time on herself home you have an iPhone hot tea what limited to see if I can activated and you might just have to answer looks like I can not do a from my phones all he there you could actually do a right now just pushed the face time but Um it is not working right there while typing what your it sounds like your on speaker phone cell that will get is so that all 3 of us can be talking together so often the last thing we are talking about was year arm diabetes treatment an year on none of the oral medication to just on the injectables right and you have been out you been out of the hospital for how long now since when I okay in how view sugars been running high yes right hilum so okay so so 1st of all 1 dose of you have 2 different stones correct  The chief complaint leading to consultation is: diabetes management  Visit type: Virtual visit with audio only (telephone)  Total time spent with patient: 35 min       The reason for the audio only service rather than synchronous audio and video virtual visit was related to technical difficulties or patient preference/necessity.     Each patient to whom I provide medical services by telemedicine is:  (1) informed of the relationship between the physician and patient and the respective role of any other health care provider with respect to management of the patient; and (2) notified that they may decline to receive medical services by telemedicine and may withdraw from such care at any time. Patient verbally consented to receive this service via voice-only telephone call.       HPI:  70-year-old white male patient mine who had just recently got  another hospital for COVID pneumonia.  He initially was in [facility] then transferred [facility] and was in the hospital total of approximately 2 months.  He was on the ventilator and high levels of oxygen and supportive therapy including steroids through most of that interval.  He had preexistent diabetes which was some aggravated by his being on steroids.  He is now home for approximately 1 week and is on a tapering dose of steroids and a insulin management program.  He called wanting to get back on his oral medication which was metformin and Jardiance.    Patient was admitted to [facility] on 09/18 in respiratory failure.  He was transferred over to [facility] for the protracted respiratory care and withdrawal.  Patient remained on a ventilator and eventually was withdrawn but during that time he developed apparently profound weight loss and malnutrition.  Glucoses were difficult to control because of the high dose steroids.  He was sent home 5 days ago on a program of Bactrim, tapering dose of steroids, long and short-acting insulin, home oxygen with both a oxygen concentrator and oxygen tanks.    Problem he is currently having is that his blood sugars in the morning sometimes were in the 70-80 range with some symptoms.  His current program of insulin is 32 units of Levemir twice a day after meals and novel in 15 units after through each meal and then additional sliding scale.  His prednisone dose when he went home was 40 mg and he was some tapered to 20 mg 3 days prior to this visit.  He will continue the 20 mg until the end of this week when he goes on 10 mg.    His oxygenation is also a problem in that he finds that if he stays on the suggested airflow using the concentrator a he will awaken with acceptable pulse ox but it quickly falls into the 80s and sometimes even low 80s during the day with activity or even at rest.  As a result he has been increasing his flow of oxygen up to 10 L of flow and slowly  tapering it as able based on his morning and evening pulse ox.  With that he is able to maintain his oxygen level in the range of 95-98 at rest and between 85 and 90 when he is walking.  He has be he has been able to reduce his oxygen flow with a concentrator to 8 L at night with no difficulty.    Patient weight 192 before going in the hospital and has had a marked increase in his appetite at home and has gained weight to the point where he is now 01/01/1997.  He believes his ideal weight should be about 20-30 lb less than that.  He has gained 7 lb since he went home.    Physical exam:  Not done since this is a virtual visit    Impression:  1.  COVID pneumonia with respiratory failure  2.  Prolonged course in the hospital with respiratory support and continue on home oxygen on a chronic and constant basis  3.  Diabetes poorly controlled  4.  Immunosuppression with prednisone on a tapering dose  5.  Episodes of hypoglycemia related to the above  6.  Hypertension-controlled  7.  Low testosterone level by history  8.  Polycythemia by history-that had been followed by Hematology  9.  ASD-deemed functionally insignificant  10.  Status post PTCA for coronary disease    Plan:  1.  I have told him it will be a while before he can go on oral medication because of all the changes he is going through with his medication and is illness  2.  Gave him a change in his insulin which will be novel in 10 mg b.i.d. after breakfast and lunch and 5 mg after dinner.  He will continue the same sliding scale.  He will also continue the same doses of Levemir b.i.d..  3.  Continue the taper prednisone as ordered  4.  I will see him again in 1 week  5.  I encouraged him to reduce the amount of carbohydrates in his diet and to reduce the amount of intake to try and stabilize is weight.                        This service was not originating from a related E/M service provided within the previous 7 days nor will  to an E/M service or  procedure within the next 24 hours or my soonest available appointment.  Prevailing standard of care was able to be met in this audio-only visit.

## 2020-11-24 ENCOUNTER — OFFICE VISIT (OUTPATIENT)
Dept: INTERNAL MEDICINE | Facility: CLINIC | Age: 70
End: 2020-11-24
Payer: COMMERCIAL

## 2020-11-24 ENCOUNTER — PATIENT MESSAGE (OUTPATIENT)
Dept: INTERNAL MEDICINE | Facility: CLINIC | Age: 70
End: 2020-11-24

## 2020-11-24 ENCOUNTER — PATIENT MESSAGE (OUTPATIENT)
Dept: CARDIOLOGY | Facility: CLINIC | Age: 70
End: 2020-11-24

## 2020-11-24 DIAGNOSIS — I25.10 CORONARY ARTERY DISEASE INVOLVING NATIVE CORONARY ARTERY OF NATIVE HEART WITHOUT ANGINA PECTORIS: ICD-10-CM

## 2020-11-24 DIAGNOSIS — E11.65 POORLY CONTROLLED DIABETES MELLITUS: ICD-10-CM

## 2020-11-24 DIAGNOSIS — D75.1 POLYCYTHEMIA: ICD-10-CM

## 2020-11-24 DIAGNOSIS — J80 ACUTE RESPIRATORY DISTRESS SYNDROME (ARDS) DUE TO COVID-19 VIRUS: Primary | ICD-10-CM

## 2020-11-24 DIAGNOSIS — U07.1 ACUTE RESPIRATORY DISTRESS SYNDROME (ARDS) DUE TO COVID-19 VIRUS: Primary | ICD-10-CM

## 2020-11-24 DIAGNOSIS — E34.9 TESTOSTERONE DEFICIENCY: ICD-10-CM

## 2020-11-24 PROCEDURE — 3051F PR MOST RECENT HEMOGLOBIN A1C LEVEL 7.0 - < 8.0%: ICD-10-PCS | Mod: CPTII,95,, | Performed by: INTERNAL MEDICINE

## 2020-11-24 PROCEDURE — 3051F HG A1C>EQUAL 7.0%<8.0%: CPT | Mod: CPTII,95,, | Performed by: INTERNAL MEDICINE

## 2020-11-24 PROCEDURE — 1159F MED LIST DOCD IN RCRD: CPT | Mod: ,,, | Performed by: INTERNAL MEDICINE

## 2020-11-24 PROCEDURE — 99214 OFFICE O/P EST MOD 30 MIN: CPT | Mod: 95,,, | Performed by: INTERNAL MEDICINE

## 2020-11-24 PROCEDURE — 99214 PR OFFICE/OUTPT VISIT, EST, LEVL IV, 30-39 MIN: ICD-10-PCS | Mod: 95,,, | Performed by: INTERNAL MEDICINE

## 2020-11-24 PROCEDURE — 1159F PR MEDICATION LIST DOCUMENTED IN MEDICAL RECORD: ICD-10-PCS | Mod: ,,, | Performed by: INTERNAL MEDICINE

## 2020-11-24 RX ORDER — PEN NEEDLE, DIABETIC 31 GX5/16"
NEEDLE, DISPOSABLE MISCELLANEOUS
Qty: 100 EACH | Refills: 3 | Status: SHIPPED | OUTPATIENT
Start: 2020-11-24 | End: 2020-12-17 | Stop reason: SDUPTHER

## 2020-11-24 RX ORDER — ALBUTEROL SULFATE 90 UG/1
2 AEROSOL, METERED RESPIRATORY (INHALATION) EVERY 6 HOURS PRN
Qty: 18 G | Refills: 1 | Status: SHIPPED | OUTPATIENT
Start: 2020-11-24 | End: 2021-03-09

## 2020-11-24 RX ORDER — PREDNISONE 5 MG/1
TABLET ORAL
Qty: 90 TABLET | Refills: 1 | Status: SHIPPED | OUTPATIENT
Start: 2020-11-24 | End: 2020-12-01 | Stop reason: DRUGHIGH

## 2020-11-24 RX ORDER — INSULIN DETEMIR 100 [IU]/ML
INJECTION, SOLUTION SUBCUTANEOUS
Qty: 9 ML | Refills: 2
Start: 2020-11-24 | End: 2020-12-17 | Stop reason: SDUPTHER

## 2020-11-24 RX ORDER — PREDNISONE 10 MG/1
10 TABLET ORAL DAILY
Qty: 7 TABLET | Refills: 0 | Status: SHIPPED | OUTPATIENT
Start: 2020-11-24 | End: 2020-12-01

## 2020-11-25 NOTE — PROGRESS NOTES
Established Patient - Audio Only Telehealth Visit     The patient location is: home  The chief complaint leading to consultation is: COVID  Visit type: Virtual visit with audio only (telephone)  Total time spent with patient: 25 min       The reason for the audio only service rather than synchronous audio and video virtual visit was related to technical difficulties or patient preference/necessity.     Each patient to whom I provide medical services by telemedicine is:  (1) informed of the relationship between the physician and patient and the respective role of any other health care provider with respect to management of the patient; and (2) notified that they may decline to receive medical services by telemedicine and may withdraw from such care at any time. Patient verbally consented to receive this service via voice-only telephone call.       HPI:  70-year-old white male patient mine who is calling to follow-up on his home program for COVID recovery period  Patient is slowly doing better but still on an oxygen concentrator 24 hr a day.  He is now down to 7 L a minute for airflow.  Patient still gets short of breath with relatively little in the way of physical activity but is ready to start an exercise program on a elliptical machine that he has in his home.    Patient is currently on 10 mg of prednisone through the end of this week.  He has not found that his breathing has declined, in fact it has improved though his dose of prednisone has been reduced from 40 mg when he went home.  The way his program was arranged in the hospital he was to abruptly and his prednisone when her ran out at the end of this week.  We had a discussion about extending that and arrangements were made.    Patient is also a diabetic and was having problems with hypoglycemia the last time I saw him.  Though spells would occur in the morning after he awaken.  We reduced his Levemir to 10 units before breakfast and lunch and 5 units in the  evening before sleep.  With this program he has had no low glucose is and the lowest he has had in the morning was 110.  He has had no hypoglycemics wounds.  Blood sugar however in the evening sometimes is in the 150-200 range.  He is still wanting to switch to an oral program of treatment, but I have told him that it is too early to do that at this point with all the changes going on.    Physical exam:  Not done since this is a virtual visit    Impression:  1.  Severe COVID pneumonia-the patient was in the hospital for 2 months on ventilatory support and high levels of oxygen and steroids.  He is recovering nicely but slowly.  2.  Diabetes-better control  3.  Shortness of breath-improving in spite of him reducing his prednisone dose on the program he was given at discharge.  4.  Body weight-he has lost 3 lb since last time I talked him 1 week ago and now weighs 196.  He is going to continue restricting his carbohydrate intake and continue low lose weight.    Plan:  1.  I have advised him to continue his Levemir and the sliding scale that was arranged on his hospitalization.  He has not needed to use any sliding scale insulin since the last visit 1 week ago.  I have told him we will need to get him off of steroids and a lower dose of Levemir before switching him to oral program.  2.  I have told him we should get him on a program of prednisone reduction that is not his abrupt as the 1 that he is currently on.  I have refilled his 10 mg prednisone per told him that 20 runs out of the 10 mg tablets he has currently we were going to switch him to 7.5 mg daily.  That will continue until I see him in 1 week.  For that I have additionally filled a prescription for 5 mg tablets and told him to take 1 and half tablets a day for to accomplish that.  3.  For the exercise part of this I have told him to begin with short intervals on the elliptical based on how his breathing does and expect that he can only be on for a few  minutes at a time.  He should also be on a low intensity level.  He is able to monitor his pulse ox I told to continue to do that.  4.  In addition to the above I have given him a trial on ProAir to use up to 3 times a day.  The initiation of that will depend on his breathing and coughing.  He is doing very little coughing at this point.  Told him to try taking a puff twice a day to see if that improves his breathing and lowers his need for oxygen supplement.  5.  I will see him again in 1 week                        This service was not originating from a related E/M service provided within the previous 7 days nor will  to an E/M service or procedure within the next 24 hours or my soonest available appointment.  Prevailing standard of care was able to be met in this audio-only visit.

## 2020-11-30 ENCOUNTER — EXTERNAL HOME HEALTH (OUTPATIENT)
Dept: HOME HEALTH SERVICES | Facility: HOSPITAL | Age: 70
End: 2020-11-30
Payer: COMMERCIAL

## 2020-11-30 ENCOUNTER — DOCUMENT SCAN (OUTPATIENT)
Dept: HOME HEALTH SERVICES | Facility: HOSPITAL | Age: 70
End: 2020-11-30
Payer: COMMERCIAL

## 2020-12-01 ENCOUNTER — OFFICE VISIT (OUTPATIENT)
Dept: INTERNAL MEDICINE | Facility: CLINIC | Age: 70
End: 2020-12-01
Payer: COMMERCIAL

## 2020-12-01 ENCOUNTER — PATIENT MESSAGE (OUTPATIENT)
Dept: INTERNAL MEDICINE | Facility: CLINIC | Age: 70
End: 2020-12-01

## 2020-12-01 ENCOUNTER — PATIENT MESSAGE (OUTPATIENT)
Dept: CARDIOLOGY | Facility: CLINIC | Age: 70
End: 2020-12-01

## 2020-12-01 DIAGNOSIS — U07.1 ACUTE RESPIRATORY DISTRESS SYNDROME (ARDS) DUE TO COVID-19 VIRUS: Primary | ICD-10-CM

## 2020-12-01 DIAGNOSIS — I25.10 CORONARY ARTERY DISEASE INVOLVING NATIVE CORONARY ARTERY OF NATIVE HEART WITHOUT ANGINA PECTORIS: ICD-10-CM

## 2020-12-01 DIAGNOSIS — I10 ESSENTIAL HYPERTENSION: ICD-10-CM

## 2020-12-01 DIAGNOSIS — E11.65 POORLY CONTROLLED DIABETES MELLITUS: ICD-10-CM

## 2020-12-01 DIAGNOSIS — E34.9 TESTOSTERONE DEFICIENCY: ICD-10-CM

## 2020-12-01 DIAGNOSIS — J80 ACUTE RESPIRATORY DISTRESS SYNDROME (ARDS) DUE TO COVID-19 VIRUS: Primary | ICD-10-CM

## 2020-12-01 DIAGNOSIS — Z79.52 LONG TERM SYSTEMIC STEROID USER: ICD-10-CM

## 2020-12-01 DIAGNOSIS — D75.1 POLYCYTHEMIA: ICD-10-CM

## 2020-12-01 DIAGNOSIS — Q21.10 ASD (ATRIAL SEPTAL DEFECT): ICD-10-CM

## 2020-12-01 DIAGNOSIS — Z98.61 S/P PTCA (PERCUTANEOUS TRANSLUMINAL CORONARY ANGIOPLASTY): ICD-10-CM

## 2020-12-01 PROCEDURE — 1159F MED LIST DOCD IN RCRD: CPT | Mod: ,,, | Performed by: INTERNAL MEDICINE

## 2020-12-01 PROCEDURE — 99214 PR OFFICE/OUTPT VISIT, EST, LEVL IV, 30-39 MIN: ICD-10-PCS | Mod: 95,,, | Performed by: INTERNAL MEDICINE

## 2020-12-01 PROCEDURE — 99214 OFFICE O/P EST MOD 30 MIN: CPT | Mod: 95,,, | Performed by: INTERNAL MEDICINE

## 2020-12-01 PROCEDURE — 3051F PR MOST RECENT HEMOGLOBIN A1C LEVEL 7.0 - < 8.0%: ICD-10-PCS | Mod: CPTII,,, | Performed by: INTERNAL MEDICINE

## 2020-12-01 PROCEDURE — 1159F PR MEDICATION LIST DOCUMENTED IN MEDICAL RECORD: ICD-10-PCS | Mod: ,,, | Performed by: INTERNAL MEDICINE

## 2020-12-01 PROCEDURE — 3051F HG A1C>EQUAL 7.0%<8.0%: CPT | Mod: CPTII,,, | Performed by: INTERNAL MEDICINE

## 2020-12-01 RX ORDER — METOPROLOL SUCCINATE 50 MG/1
100 TABLET, EXTENDED RELEASE ORAL DAILY
Qty: 60 TABLET | Refills: 3 | Status: SHIPPED | OUTPATIENT
Start: 2020-12-01 | End: 2021-03-01

## 2020-12-01 RX ORDER — PREDNISONE 5 MG/1
TABLET ORAL
Qty: 90 TABLET | Refills: 1
Start: 2020-12-01 | End: 2021-03-09

## 2020-12-01 RX ORDER — TRAZODONE HYDROCHLORIDE 50 MG/1
TABLET ORAL
Qty: 30 TABLET | Refills: 2 | Status: SHIPPED | OUTPATIENT
Start: 2020-12-01 | End: 2021-02-24

## 2020-12-01 NOTE — TELEPHONE ENCOUNTER
"email says " I need a refill on Trazodone 50 mg.    Dosage I/2  50 mg tablet twice a day.     Can you refill it for 90 days? "    He says you increased rx to this last week.    [facility]?  rx pended with new directions.    Thanks eric"

## 2020-12-01 NOTE — PROGRESS NOTES
Subjective:       Patient ID: Den Rick is a 70 y.o. male.    Chief Complaint: No chief complaint on file.  Dictation #1  MRN:[id number]  CSN:[id number]  Dict   HPI  Review of Systems   Constitutional: Positive for fatigue. Negative for activity change, appetite change and unexpected weight change.   HENT: Negative for dental problem, hearing loss, mouth sores, postnasal drip and sinus pressure/congestion.    Eyes: Negative for discharge and visual disturbance.   Respiratory: Positive for shortness of breath. Negative for cough.    Cardiovascular: Negative for chest pain and palpitations.   Gastrointestinal: Negative for abdominal pain, blood in stool, constipation, diarrhea and nausea.   Genitourinary: Negative for dysuria, hematuria and testicular pain.   Musculoskeletal: Negative for arthralgias, back pain, joint swelling and neck pain.   Integumentary:  Negative for rash.   Neurological: Negative for weakness and headaches.   Psychiatric/Behavioral: Negative for agitation and sleep disturbance.       The patient location is: home  The chief complaint leading to consultation is: COVID    Visit type: audiovisual    Face to Face time with patient: 30 min  35 minutes of total time spent on the encounter, which includes face to face time and non-face to face time preparing to see the patient (eg, review of tests), Obtaining and/or reviewing separately obtained history, Documenting clinical information in the electronic or other health record, Independently interpreting results (not separately reported) and communicating results to the patient/family/caregiver, or Care coordination (not separately reported).         Each patient to whom he or she provides medical services by telemedicine is:  (1) informed of the relationship between the physician and patient and the respective role of any other health care provider with respect to management of the patient; and (2) notified that he or she may decline to receive  medical services by telemedicine and may withdraw from such care at any time.    Notes:  70-year-old white male patient mine who was in the hospital for 2 months with COVID pneumonia on ventilatory support, high levels of oxygen, high levels of corticosteroids.  He has been home now for several weeks.  He has been on a prednisone dose of 7.5 q.day for 4 days.  Patient feels good on that dose of medication.  He is able to do some physical activity now though minimal and occasionally take some time off of the oxygen though he can stay off for very long period.  He is currently on a flow rate of 8 liters/minute with his oxygen concentrator.  He is not changing the rate throughout the day.    Patient's diabetes has been reasonably well controlled with blood sugars running between 120 and 200.  He is on a does currently of Levemir 10 units b.i.d. and a sliding scale of Humulin with a additional dose of 15 units of Humulin following each meal.  He is very anxious to get back on his oral medications with the hope of getting off the insulin support.  I explained to him previously that this has to be done in stages and the getting off the insulin is on likely with him still being on a on going dose of prednisone.  Patient finds it was heart rate is resting approximately 100 and with minimal activity goes to 130.  The heart rate is regular.  He has been unable to get a hold of his cardiologist and is currently on Toprol 100 q.day and previously had been on a b.i.d. dose.    Finally he saw chiropractor for problem with his toe since the last visit at [place].  He was advised by the chiropractor to get a BMD because of the long period of time on high-dose steroids.    Physical exam:  Not done since this is a virtual visit    Impression:  1.  COVID pneumonia with respiratory failure-now at home and on a tapering dose of prednisone and constant oxygen.  I have told him to continue the prednisone dose of 7.5 until Saturday of  this week and then reduce it at that time to 5 mg.  We will meet again in 1 week.  2.  Diabetes-reasonably well controlled on his same program of insulin use.  I have added metformin 500 b.i.d., which was the dose he had been on previously, and told him to continue to monitor his glucoses.  If his glucoses dropped to around 100 he is to begin reducing his regular dose of Humulin 15 units with meals.  I told him to do that 5 units each time he needs to do it with each meal.  3.  Tachycardia-I have advised him increases Toprol to twice a day and discuss it with his cardiologist and preferably see the cardiologist.  4.  Recent visit with chiropractor-I have added a BMD to be done    Plan:  1.  As above 2.  I will see him again in 1 week.    Objective:        Assessment:       1. Acute respiratory distress syndrome (ARDS) due to COVID-19 virus    2. Poorly controlled diabetes mellitus    3. Coronary artery disease involving native coronary artery of native heart without angina pectoris    4. Testosterone deficiency    5. Polycythemia    6. S/P PTCA     7. Essential hypertension    8. ASD (atrial septal defect) - small vs PFO    9. Long term systemic steroid user        Plan:

## 2020-12-02 ENCOUNTER — PATIENT MESSAGE (OUTPATIENT)
Dept: INTERNAL MEDICINE | Facility: CLINIC | Age: 70
End: 2020-12-02

## 2020-12-02 ENCOUNTER — DOCUMENTATION ONLY (OUTPATIENT)
Dept: CARDIOLOGY | Facility: CLINIC | Age: 70
End: 2020-12-02

## 2020-12-02 ENCOUNTER — TELEPHONE (OUTPATIENT)
Dept: INTERNAL MEDICINE | Facility: CLINIC | Age: 70
End: 2020-12-02

## 2020-12-02 ENCOUNTER — PATIENT OUTREACH (OUTPATIENT)
Dept: ADMINISTRATIVE | Facility: OTHER | Age: 70
End: 2020-12-02

## 2020-12-02 DIAGNOSIS — I25.10 CORONARY ARTERY DISEASE INVOLVING NATIVE CORONARY ARTERY OF NATIVE HEART WITHOUT ANGINA PECTORIS: Primary | ICD-10-CM

## 2020-12-02 NOTE — TELEPHONE ENCOUNTER
----- Message from Kieran Friedman MD sent at 12/1/2020 11:47 AM CST -----  I will see him again in one week and the BMD he would like at [place]

## 2020-12-02 NOTE — PROGRESS NOTES
Care Everywhere: updated   Immunization:   Health Maintenance: updated  Media Review:review for outside colon cancer report    Legacy Review:   Order placed:   Upcoming appts

## 2020-12-02 NOTE — TELEPHONE ENCOUNTER
Pt states he is currently taking Metoprolol 75mg daily at this time. Pt asking if he should increase to 100mg daily. notified, advised OK to increase to metoprolol succinate 100mg daily. Pt states he will come to our clinic tomorrow for an EKG.

## 2020-12-03 ENCOUNTER — HOSPITAL ENCOUNTER (OUTPATIENT)
Dept: RADIOLOGY | Facility: HOSPITAL | Age: 70
Discharge: HOME | End: 2020-12-03
Attending: INTERNAL MEDICINE
Payer: COMMERCIAL

## 2020-12-03 ENCOUNTER — CLINICAL SUPPORT (OUTPATIENT)
Dept: CARDIOLOGY | Facility: CLINIC | Age: 70
End: 2020-12-03
Payer: COMMERCIAL

## 2020-12-03 ENCOUNTER — TELEPHONE (OUTPATIENT)
Dept: INTERNAL MEDICINE | Facility: CLINIC | Age: 70
End: 2020-12-03

## 2020-12-03 DIAGNOSIS — I25.10 CORONARY ARTERY DISEASE INVOLVING NATIVE CORONARY ARTERY OF NATIVE HEART WITHOUT ANGINA PECTORIS: ICD-10-CM

## 2020-12-03 DIAGNOSIS — E34.9 TESTOSTERONE DEFICIENCY: ICD-10-CM

## 2020-12-03 DIAGNOSIS — U07.1 ACUTE RESPIRATORY DISTRESS SYNDROME (ARDS) DUE TO COVID-19 VIRUS: ICD-10-CM

## 2020-12-03 DIAGNOSIS — Z79.52 LONG TERM SYSTEMIC STEROID USER: ICD-10-CM

## 2020-12-03 DIAGNOSIS — J80 ACUTE RESPIRATORY DISTRESS SYNDROME (ARDS) DUE TO COVID-19 VIRUS: ICD-10-CM

## 2020-12-03 PROCEDURE — 77080 DXA BONE DENSITY AXIAL: CPT | Mod: 26,,, | Performed by: RADIOLOGY

## 2020-12-03 PROCEDURE — 93010 EKG 12-LEAD: ICD-10-PCS | Mod: S$GLB,,, | Performed by: INTERNAL MEDICINE

## 2020-12-03 PROCEDURE — 77080 DEXA BONE DENSITY SPINE HIP: ICD-10-PCS | Mod: 26,,, | Performed by: RADIOLOGY

## 2020-12-03 PROCEDURE — 93010 ELECTROCARDIOGRAM REPORT: CPT | Mod: S$GLB,,, | Performed by: INTERNAL MEDICINE

## 2020-12-03 PROCEDURE — 77080 DXA BONE DENSITY AXIAL: CPT | Mod: TC

## 2020-12-03 PROCEDURE — 93005 EKG 12-LEAD: ICD-10-PCS | Mod: S$GLB,,, | Performed by: INTERNAL MEDICINE

## 2020-12-03 PROCEDURE — 93005 ELECTROCARDIOGRAM TRACING: CPT | Mod: S$GLB,,, | Performed by: INTERNAL MEDICINE

## 2020-12-03 NOTE — TELEPHONE ENCOUNTER
bmd results came to my box to review like I was the ordering dr and won't let me forward to you.    He also had labs same day.    Please review bmd and labs and advise what we can release to patient on results.    Thanks eric

## 2020-12-04 ENCOUNTER — PATIENT MESSAGE (OUTPATIENT)
Dept: INTERNAL MEDICINE | Facility: CLINIC | Age: 70
End: 2020-12-04

## 2020-12-08 ENCOUNTER — OFFICE VISIT (OUTPATIENT)
Dept: INTERNAL MEDICINE | Facility: CLINIC | Age: 70
End: 2020-12-08
Payer: COMMERCIAL

## 2020-12-08 DIAGNOSIS — Z98.61 S/P PTCA (PERCUTANEOUS TRANSLUMINAL CORONARY ANGIOPLASTY): ICD-10-CM

## 2020-12-08 DIAGNOSIS — J80 ACUTE RESPIRATORY DISTRESS SYNDROME (ARDS) DUE TO COVID-19 VIRUS: Primary | ICD-10-CM

## 2020-12-08 DIAGNOSIS — Q21.10 ASD (ATRIAL SEPTAL DEFECT): ICD-10-CM

## 2020-12-08 DIAGNOSIS — D75.1 POLYCYTHEMIA: ICD-10-CM

## 2020-12-08 DIAGNOSIS — E11.65 POORLY CONTROLLED DIABETES MELLITUS: ICD-10-CM

## 2020-12-08 DIAGNOSIS — E34.9 TESTOSTERONE DEFICIENCY: ICD-10-CM

## 2020-12-08 DIAGNOSIS — I25.10 CORONARY ARTERY DISEASE INVOLVING NATIVE CORONARY ARTERY OF NATIVE HEART WITHOUT ANGINA PECTORIS: ICD-10-CM

## 2020-12-08 DIAGNOSIS — U07.1 ACUTE RESPIRATORY DISTRESS SYNDROME (ARDS) DUE TO COVID-19 VIRUS: Primary | ICD-10-CM

## 2020-12-08 PROCEDURE — 99214 OFFICE O/P EST MOD 30 MIN: CPT | Mod: 95,,, | Performed by: INTERNAL MEDICINE

## 2020-12-08 PROCEDURE — 3051F PR MOST RECENT HEMOGLOBIN A1C LEVEL 7.0 - < 8.0%: ICD-10-PCS | Mod: CPTII,,, | Performed by: INTERNAL MEDICINE

## 2020-12-08 PROCEDURE — 1159F PR MEDICATION LIST DOCUMENTED IN MEDICAL RECORD: ICD-10-PCS | Mod: ,,, | Performed by: INTERNAL MEDICINE

## 2020-12-08 PROCEDURE — 99214 PR OFFICE/OUTPT VISIT, EST, LEVL IV, 30-39 MIN: ICD-10-PCS | Mod: 95,,, | Performed by: INTERNAL MEDICINE

## 2020-12-08 PROCEDURE — 3051F HG A1C>EQUAL 7.0%<8.0%: CPT | Mod: CPTII,,, | Performed by: INTERNAL MEDICINE

## 2020-12-08 PROCEDURE — 1159F MED LIST DOCD IN RCRD: CPT | Mod: ,,, | Performed by: INTERNAL MEDICINE

## 2020-12-09 ENCOUNTER — PATIENT MESSAGE (OUTPATIENT)
Dept: INTERNAL MEDICINE | Facility: CLINIC | Age: 70
End: 2020-12-09

## 2020-12-09 DIAGNOSIS — F32.9 REACTIVE DEPRESSION: ICD-10-CM

## 2020-12-09 DIAGNOSIS — R45.1 AGITATION: Primary | ICD-10-CM

## 2020-12-09 NOTE — PROGRESS NOTES
Subjective:       Patient ID: Den Rick is a 70 y.o. male.    Chief Complaint: No chief complaint on file.  Dictation #1  MRN:[id number]  CSN:[id number]  Dict   HPI  Review of Systems   Constitutional: Positive for fatigue. Negative for activity change, appetite change and unexpected weight change.   HENT: Negative for dental problem, hearing loss, mouth sores, postnasal drip and sinus pressure/congestion.    Eyes: Negative for discharge and visual disturbance.   Respiratory: Positive for shortness of breath. Negative for cough.    Cardiovascular: Negative for chest pain and palpitations.   Gastrointestinal: Negative for abdominal pain, blood in stool, constipation, diarrhea and nausea.   Genitourinary: Negative for dysuria, hematuria and testicular pain.   Musculoskeletal: Negative for arthralgias, back pain, joint swelling and neck pain.   Integumentary:  Negative for rash.   Neurological: Negative for weakness and headaches.   Psychiatric/Behavioral: Positive for agitation and sleep disturbance.       The patient location is: home  The chief complaint leading to consultation is: Covid    Visit type: audiovisual    Face to Face time with patient: 30 min   minutes of total time spent on the encounter, which includes face to face time and non-face to face time preparing to see the patient (eg, review of tests), Obtaining and/or reviewing separately obtained history, Documenting clinical information in the electronic or other health record, Independently interpreting results (not separately reported) and communicating results to the patient/family/caregiver, or Care coordination (not separately reported).         Each patient to whom he or she provides medical services by telemedicine is:  (1) informed of the relationship between the physician and patient and the respective role of any other health care provider with respect to management of the patient; and (2) notified that he or she may decline to receive  medical services by telemedicine and may withdraw from such care at any time.    Notes:  70-year-old white male patient mine with a severe bout of COVID pneumonia and respiratory failure.  He was in hospital for 2 months on ventilatory support, high level of oxygen, steroid support.  He has been weaning off of prednisone and last week reduce his dose to 7.5 and then to 5 mg drip on that dose he the following day got oxygen desaturation down to 70 and shortness of breath even on oxygen.  Patient having to developed less dependent on oxygen by getting off of it for.  The 3 4 hr which he has not been able to since that time.  Patient increase his dose to 7.5 mg again.  Blood sugars have been running between 580122 and have been fairly consistent.  He has not changed his insulin dosing from last visit.  I have begun him on metformin 500 b.i.d. since last visit.  He feels better today on the dose of 7.5 mg.    His wife was with him on the call I spoke with her for appear to time.  She is concerned how agitated he has in prone to anger.   He is on Topamax to help him sleep and also help with the agitation.  He also developed during jitteriness.  He has been on high-dose steroids for many weeks as still a moderate dose of steroids.  I explained this is a common side effect of him being on medication, prednisone.  Both of them would like him to go on something to more effectively control his anger and frustration and depression.  He has begun using Topamax twice a day without much of an impact on this personally change.    Since last visit with him he saw his cardiologist and EKG was not his old EKG look fine.  I would increase his Toprol to twice a day because of his rapid heart rate and he was lifting a.    Physical exam:  Not done since of a virtual visit    Impression:  1.  Respiratory failure-patient seems to be having problems reducing prednisone below 10 mg.  The last visit I had but on a reducing dose after the 10  mg dose because of concerns about this developing.  The hospital program was having off prednisone appetite 10 mg which I thought would be difficult for him-has started otherwise.  2.  Diabetes acceptable control  3.  Agitation, depression, physical during this period  4.  Cardiac disease with coronary artery disease and ASD  5.  Polycythemia-appears to be okay  6.  Does not know deficiency    Plan:  1.  Fullness stay on the prednisone 7.5 mg dose due this week likely next week.  If he continues to have problems with shortness Sykes to increase the dose of 2.5-5 mg until he is comfortable.  2.  I have consulted Dr. Gonzalez to see him help with the Mari assessment is current blood problems and his rehabilitation  3.  It depression and agitation-I am adding Celexa 20 mg q.day does current program  4.  Sinus tachycardia-control with increase his Toprol 5.  I will see me in 1 week.    Objective:        Assessment:       1. Acute respiratory distress syndrome (ARDS) due to COVID-19 virus    2. Poorly controlled diabetes mellitus    3. Coronary artery disease involving native coronary artery of native heart without angina pectoris    4. Testosterone deficiency    5. Polycythemia    6. S/P PTCA     7. ASD (atrial septal defect) - small vs PFO        Plan:

## 2020-12-11 RX ORDER — CITALOPRAM 20 MG/1
20 TABLET, FILM COATED ORAL DAILY
Qty: 30 TABLET | Refills: 11 | Status: SHIPPED | OUTPATIENT
Start: 2020-12-11 | End: 2021-01-12

## 2020-12-14 ENCOUNTER — TELEPHONE (OUTPATIENT)
Dept: PULMONOLOGY | Facility: CLINIC | Age: 70
End: 2020-12-14

## 2020-12-14 ENCOUNTER — PATIENT MESSAGE (OUTPATIENT)
Dept: INTERNAL MEDICINE | Facility: CLINIC | Age: 70
End: 2020-12-14

## 2020-12-14 RX ORDER — INSULIN LISPRO 100 [IU]/ML
INJECTION, SOLUTION INTRAVENOUS; SUBCUTANEOUS
Status: CANCELLED | OUTPATIENT
Start: 2020-12-14 | End: 2021-12-14

## 2020-12-14 RX ORDER — INSULIN ASPART 100 [IU]/ML
1-10 INJECTION, SOLUTION INTRAVENOUS; SUBCUTANEOUS
Qty: 3 ML | Refills: 0 | Status: CANCELLED | OUTPATIENT
Start: 2020-12-14 | End: 2021-12-14

## 2020-12-14 NOTE — TELEPHONE ENCOUNTER
Dr Friedman's office sent a message saying Mr Rick needs a quick appointment but does not tell us why. Dr Gonzalez last saw this patient in 2014 for pleural placques. I will speak to Dr Gonzalez tomorrow and call Mr Rick tomorrow. Carli Basilio LPN.

## 2020-12-14 NOTE — TELEPHONE ENCOUNTER
----- Message from Cyndi Batista sent at 12/14/2020  1:35 PM CST -----  Regarding: Urgent Pulmonary Referral  Hello,     Pt has a referral to see pulmonology by orders of Dr. Kieran Friedman. HE wanted pt to be seen as soon as possible with Dr. Gonzalez but I get no appts for Dr. Gonzalez. If at all possible for pt to be seen soon, please call pt to schedule.     Thanks.

## 2020-12-15 ENCOUNTER — OFFICE VISIT (OUTPATIENT)
Dept: INTERNAL MEDICINE | Facility: CLINIC | Age: 70
End: 2020-12-15
Payer: COMMERCIAL

## 2020-12-15 DIAGNOSIS — I25.10 CORONARY ARTERY DISEASE INVOLVING NATIVE CORONARY ARTERY OF NATIVE HEART WITHOUT ANGINA PECTORIS: ICD-10-CM

## 2020-12-15 DIAGNOSIS — E11.65 POORLY CONTROLLED DIABETES MELLITUS: ICD-10-CM

## 2020-12-15 DIAGNOSIS — J80 ACUTE RESPIRATORY DISTRESS SYNDROME (ARDS) DUE TO COVID-19 VIRUS: Primary | ICD-10-CM

## 2020-12-15 DIAGNOSIS — F32.9 REACTIVE DEPRESSION: ICD-10-CM

## 2020-12-15 DIAGNOSIS — D75.1 POLYCYTHEMIA: ICD-10-CM

## 2020-12-15 DIAGNOSIS — Q21.10 ASD (ATRIAL SEPTAL DEFECT): ICD-10-CM

## 2020-12-15 DIAGNOSIS — E34.9 TESTOSTERONE DEFICIENCY: ICD-10-CM

## 2020-12-15 DIAGNOSIS — R45.1 AGITATION: ICD-10-CM

## 2020-12-15 DIAGNOSIS — U07.1 ACUTE RESPIRATORY DISTRESS SYNDROME (ARDS) DUE TO COVID-19 VIRUS: Primary | ICD-10-CM

## 2020-12-15 PROCEDURE — 3051F HG A1C>EQUAL 7.0%<8.0%: CPT | Mod: CPTII,,, | Performed by: INTERNAL MEDICINE

## 2020-12-15 PROCEDURE — 99214 PR OFFICE/OUTPT VISIT, EST, LEVL IV, 30-39 MIN: ICD-10-PCS | Mod: 95,,, | Performed by: INTERNAL MEDICINE

## 2020-12-15 PROCEDURE — 99214 OFFICE O/P EST MOD 30 MIN: CPT | Mod: 95,,, | Performed by: INTERNAL MEDICINE

## 2020-12-15 PROCEDURE — 1159F PR MEDICATION LIST DOCUMENTED IN MEDICAL RECORD: ICD-10-PCS | Mod: ,,, | Performed by: INTERNAL MEDICINE

## 2020-12-15 PROCEDURE — 1159F MED LIST DOCD IN RCRD: CPT | Mod: ,,, | Performed by: INTERNAL MEDICINE

## 2020-12-15 PROCEDURE — 3051F PR MOST RECENT HEMOGLOBIN A1C LEVEL 7.0 - < 8.0%: ICD-10-PCS | Mod: CPTII,,, | Performed by: INTERNAL MEDICINE

## 2020-12-15 RX ORDER — INSULIN DETEMIR 100 [IU]/ML
INJECTION, SOLUTION SUBCUTANEOUS
Qty: 9 ML | Refills: 2 | OUTPATIENT
Start: 2020-12-15

## 2020-12-15 RX ORDER — INSULIN LISPRO 100 [IU]/ML
INJECTION, SOLUTION INTRAVENOUS; SUBCUTANEOUS
Qty: 1 BOX | Refills: 11 | OUTPATIENT
Start: 2020-12-15

## 2020-12-15 RX ORDER — PEN NEEDLE, DIABETIC 31 GX5/16"
NEEDLE, DISPOSABLE MISCELLANEOUS
Qty: 200 EACH | Refills: 11 | OUTPATIENT
Start: 2020-12-15

## 2020-12-16 NOTE — TELEPHONE ENCOUNTER
Can you please asst in refill pt medication. Pt states that you have been following him for the last couple of years & not endocrinology.  Please advise

## 2020-12-17 RX ORDER — ROSUVASTATIN CALCIUM 20 MG/1
20 TABLET, COATED ORAL NIGHTLY
Qty: 90 TABLET | Refills: 3 | Status: SHIPPED | OUTPATIENT
Start: 2020-12-17 | End: 2021-11-02 | Stop reason: SDUPTHER

## 2020-12-17 RX ORDER — PEN NEEDLE, DIABETIC 31 GX5/16"
NEEDLE, DISPOSABLE MISCELLANEOUS
Qty: 100 EACH | Refills: 3 | Status: SHIPPED | OUTPATIENT
Start: 2020-12-17 | End: 2020-12-18 | Stop reason: SDUPTHER

## 2020-12-17 RX ORDER — INSULIN DETEMIR 100 [IU]/ML
INJECTION, SOLUTION SUBCUTANEOUS
Qty: 9 ML | Refills: 2 | Status: SHIPPED | OUTPATIENT
Start: 2020-12-17 | End: 2021-01-07

## 2020-12-17 RX ORDER — INSULIN ASPART 100 [IU]/ML
15 INJECTION, SOLUTION INTRAVENOUS; SUBCUTANEOUS 3 TIMES DAILY
Qty: 1 SYRINGE | Refills: 11 | Status: SHIPPED | OUTPATIENT
Start: 2020-12-17 | End: 2021-01-07

## 2020-12-17 RX ORDER — INSULIN ASPART 100 [IU]/ML
1-10 INJECTION, SOLUTION INTRAVENOUS; SUBCUTANEOUS
Qty: 3 ML | Refills: 11 | Status: SHIPPED | OUTPATIENT
Start: 2020-12-17 | End: 2021-01-07

## 2020-12-17 NOTE — TELEPHONE ENCOUNTER
I have sent off refills on insulin and the needles.  I thought that was done on 12/15 but it says it was refused, and I do not recall doing that.  I think what happened is that I changed the doses over the last visits in the med list and entered each time with no print order, so that multiple prescriptions would not be accumulating at the pharmacy.

## 2020-12-17 NOTE — PROGRESS NOTES
Subjective:       Patient ID: Den Rick is a 70 y.o. male.    Chief Complaint: No chief complaint on file.  Dictation #1  MRN:[id number]  CSN:[id number]  Dict   HPI  Review of Systems   Constitutional: Negative for activity change, appetite change, fatigue and unexpected weight change.   HENT: Negative for dental problem, hearing loss, mouth sores, postnasal drip and sinus pressure/congestion.    Eyes: Negative for discharge and visual disturbance.   Respiratory: Positive for shortness of breath. Negative for cough.    Cardiovascular: Positive for palpitations. Negative for chest pain.   Gastrointestinal: Negative for abdominal pain, blood in stool, constipation, diarrhea and nausea.   Genitourinary: Negative for dysuria, hematuria and testicular pain.   Musculoskeletal: Negative for arthralgias, back pain, joint swelling and neck pain.   Integumentary:  Negative for rash.   Neurological: Negative for weakness and headaches.   Psychiatric/Behavioral: Positive for agitation, dysphoric mood and sleep disturbance. The patient is nervous/anxious.        The patient location is: home  The chief complaint leading to consultation is: COVID    Visit type: audiovisual    Face to Face time with patient: 25 min  30 minutes of total time spent on the encounter, which includes face to face time and non-face to face time preparing to see the patient (eg, review of tests), Obtaining and/or reviewing separately obtained history, Documenting clinical information in the electronic or other health record, Independently interpreting results (not separately reported) and communicating results to the patient/family/caregiver, or Care coordination (not separately reported).         Each patient to whom he or she provides medical services by telemedicine is:  (1) informed of the relationship between the physician and patient and the respective role of any other health care provider with respect to management of the patient; and (2)  notified that he or she may decline to receive medical services by telemedicine and may withdraw from such care at any time.    Notes:  70-year-old white male patient mine who is still recovering from a prolonged bout of respiratory failure associated with the COVID infection.  I have been seeing him on weekly virtual visits to oversee the withdrawal from steroids, management of diabetes, assessment of his tachycardia, reacting to changing needs for help with sleep and personality changes associated with his state of health and medication.  The patient is doing quite well this week and is currently been off oxygen for 4 hr and on a pulse ox of 92.  He is still wearing the oxygen most the time and using oxygen concentrator for his source.      Last time I had seen him we increased his prednisone to 10 mg which he is continued in till 3 days prior to this visit and then reduce the dose to 7.5 mg.  He has tolerated that much better than last time he tried that.  He is doing very little coughing and no wheezing.    His diabetes is moderately well control with blood sugars between 140-160.  He remains on his sliding scale and Levemir dosing.  Last visit I had started him on metformin 500 b.i.d..  With that we had reduced his dose of Levemir.  He has had no spells of hypoglycemia, and is watching his diet closely.    He is currently sleeping well just using the trazodone as needed, and has not been using it most nights.  He had not picked up the Celexa yet so has not been taking that yet.  His wife says that he has easier to live with since the last time I had talked to him.    Final consideration is his heart rate which is at rest about 50 and when he does any kind of physical activity sometimes even sitting up will rise as high as 130.  He has no chest pain with that and he does feel his heart rate increased but it appears to be regular.  I had increased his Toprol to 100 mg daily, and then he saw Dr. Barlow and was  told to continue that.  He is taking that as a single dose in the morning.    Physical exam:  Not done since this is a virtual visit    Impression:  1.  Respiratory failure associated with COVID infection for which he was in the hospital for 2 months.  2.  Diabetes moderately well controlled  3.  Tachycardia with any activity  4.  Personality change on steroids apparently improved with the improvement of his respiratory status and not yet on Celexa.  5.  Sleeping well on oxygen without much need for sedation.    5.  Prednisone dependence for his oxygenation-in the process of withdrawing from prednisone.    Plan:  1.  Prednisone dosing we discussed at some length and I suggest he continue the 7.5 until the next visit with me.  He has been contacted by Dr. Gonzalez his office and will be seen on new year's.  If he is tolerating and not declining on the dose of 7.5 mg then we will probably withdraw him at a lower rate like getting down to 6-1/2 for 6 mg before making the jump to 5 mg which she failed on the last time.  I have encouraged a low level exercise program and told him that likely is going to be part of his interaction with respiratory oversight by Dr. Gonzalez.  2.  Diabetes-I have told increase his metformin to a 1000 mg b.i.d., which is the dose he was on prior to his illness.  If he finds that his glucoses are dropping down to 100 or below he is to begin reducing his you meal in 15 unit doses with each meal.  That does reduction will look her at the time of his glucose check on each occasion.  He will stay on the same Levemir dose an sliding scale.  3.  Personality change-I have told him that the use of Celexa is up to he and his wife.  If he feels he does not need to take it and she agrees that I have told to hold off taking it in less he finds that he is getting irritable again.  Personality changes East having are due to his respiratory insufficiency, steroid use, episodes of hypoxia.  His wife is picking up  the Celexa as we are meeting.  4.  Tachycardia-toned 2 changes Toprol to 50 mg b.i.d. to see if that will give him better control of his heart rates and keep them closer to a normal range.  5.  I will see him again in 1 week.  I have told him with consulting the pulmonary department that they me either take over his respiratory to treatment program or make changes in it.  That may mean that I do not need to meet with him is frequently.  I made it clear to him that this is not a certainty but the 2 service involvements should be integrated.    Objective:        Assessment:       1. Acute respiratory distress syndrome (ARDS) due to COVID-19 virus    2. Poorly controlled diabetes mellitus    3. Coronary artery disease involving native coronary artery of native heart without angina pectoris    4. Reactive depression    5. Agitation    6. Testosterone deficiency    7. Polycythemia    8. ASD (atrial septal defect) - small vs PFO        Plan:

## 2020-12-18 RX ORDER — PEN NEEDLE, DIABETIC 31 GX5/16"
NEEDLE, DISPOSABLE MISCELLANEOUS
Qty: 200 EACH | Refills: 11 | Status: SHIPPED | OUTPATIENT
Start: 2020-12-18 | End: 2021-11-02

## 2020-12-22 ENCOUNTER — TELEPHONE (OUTPATIENT)
Dept: PULMONOLOGY | Facility: CLINIC | Age: 70
End: 2020-12-22

## 2020-12-22 ENCOUNTER — OFFICE VISIT (OUTPATIENT)
Dept: INTERNAL MEDICINE | Facility: CLINIC | Age: 70
End: 2020-12-22
Payer: COMMERCIAL

## 2020-12-22 DIAGNOSIS — E34.9 TESTOSTERONE DEFICIENCY: ICD-10-CM

## 2020-12-22 DIAGNOSIS — D75.1 POLYCYTHEMIA: ICD-10-CM

## 2020-12-22 DIAGNOSIS — I25.10 CORONARY ARTERY DISEASE INVOLVING NATIVE CORONARY ARTERY OF NATIVE HEART WITHOUT ANGINA PECTORIS: ICD-10-CM

## 2020-12-22 DIAGNOSIS — J80 ACUTE RESPIRATORY DISTRESS SYNDROME (ARDS) DUE TO COVID-19 VIRUS: Primary | ICD-10-CM

## 2020-12-22 DIAGNOSIS — R45.1 AGITATION: ICD-10-CM

## 2020-12-22 DIAGNOSIS — E11.65 POORLY CONTROLLED DIABETES MELLITUS: ICD-10-CM

## 2020-12-22 DIAGNOSIS — F32.9 REACTIVE DEPRESSION: ICD-10-CM

## 2020-12-22 DIAGNOSIS — U07.1 ACUTE RESPIRATORY DISTRESS SYNDROME (ARDS) DUE TO COVID-19 VIRUS: Primary | ICD-10-CM

## 2020-12-22 DIAGNOSIS — Q21.10 ASD (ATRIAL SEPTAL DEFECT): ICD-10-CM

## 2020-12-22 PROCEDURE — 3051F HG A1C>EQUAL 7.0%<8.0%: CPT | Mod: CPTII,,, | Performed by: INTERNAL MEDICINE

## 2020-12-22 PROCEDURE — 1159F PR MEDICATION LIST DOCUMENTED IN MEDICAL RECORD: ICD-10-PCS | Mod: ,,, | Performed by: INTERNAL MEDICINE

## 2020-12-22 PROCEDURE — 99214 PR OFFICE/OUTPT VISIT, EST, LEVL IV, 30-39 MIN: ICD-10-PCS | Mod: 95,,, | Performed by: INTERNAL MEDICINE

## 2020-12-22 PROCEDURE — 99214 OFFICE O/P EST MOD 30 MIN: CPT | Mod: 95,,, | Performed by: INTERNAL MEDICINE

## 2020-12-22 PROCEDURE — 3051F PR MOST RECENT HEMOGLOBIN A1C LEVEL 7.0 - < 8.0%: ICD-10-PCS | Mod: CPTII,,, | Performed by: INTERNAL MEDICINE

## 2020-12-22 PROCEDURE — 1159F MED LIST DOCD IN RCRD: CPT | Mod: ,,, | Performed by: INTERNAL MEDICINE

## 2020-12-22 RX ORDER — PREDNISONE 1 MG/1
1 TABLET ORAL DAILY
Qty: 90 TABLET | Refills: 1 | Status: SHIPPED | OUTPATIENT
Start: 2020-12-22 | End: 2021-01-07 | Stop reason: DRUGHIGH

## 2020-12-22 NOTE — PROGRESS NOTES
Subjective:       Patient ID: Den Rick is a 70 y.o. male.    Chief Complaint: No chief complaint on file.  Dictation #1  MRN:[id number]  [facility]:[id number]  Dict   HPI  Review of Systems   Constitutional: Negative for activity change, appetite change, fatigue and unexpected weight change.   HENT: Negative for dental problem, hearing loss, mouth sores, postnasal drip and sinus pressure/congestion.    Eyes: Negative for discharge and visual disturbance.   Respiratory: Positive for shortness of breath. Negative for cough.    Cardiovascular: Positive for palpitations. Negative for chest pain.   Gastrointestinal: Negative for abdominal pain, blood in stool, constipation, diarrhea and nausea.   Genitourinary: Negative for dysuria, hematuria and testicular pain.   Musculoskeletal: Negative for arthralgias, back pain, joint swelling and neck pain.   Integumentary:  Negative for rash.   Neurological: Negative for weakness and headaches.   Psychiatric/Behavioral: Negative for agitation and sleep disturbance.       The patient location is: home  The chief complaint leading to consultation is: COVID    Visit type: audiovisual    Face to Face time with patient: 25 min   minutes of total time spent on the encounter, which includes face to face time and non-face to face time preparing to see the patient (eg, review of tests), Obtaining and/or reviewing separately obtained history, Documenting clinical information in the electronic or other health record, Independently interpreting results (not separately reported) and communicating results to the patient/family/caregiver, or Care coordination (not separately reported).         Each patient to whom he or she provides medical services by telemedicine is:  (1) informed of the relationship between the physician and patient and the respective role of any other health care provider with respect to management of the patient; and (2) notified that he or she may decline to receive medical  services by telemedicine and may withdraw from such care at any time.    Notes:  70-year-old white male patient mine who Um following weekly for severe COVID respiratory associated failure.  Patient is also diabetic and has had problems with diabetic control.  Details of this her in his chart.  Since the last visit he has been walking around the block daily with oxygen and is able to maintain a pulse ox patient of over 90 with a heart rate that rises only to approximately 90.  His resting heart rate varies between 60 and 80.  He otherwise is in the house for the most part off of oxygen with a pulse ox between 90 and 91.  He is currently taking prednisone 7.5 mg daily which he has been doing for about the last 10 days.  His diabetes is been moderately well controlled with sugars between 93 in 190.  He is now on metformin 1000 mg b.i.d..  He has to use sliding scale insulin about 4 units a day he is on a regular dose of Humulin short-acting 15 units with each meal and is on Levemir dosing now twice a day.    There was a period of time when he was some very agitated, tending to be angry, and depressed.  I had written for in to begin live Celexa but before he started his mood was improving.  At the time of the last visit with him his wife is off picking up Celexa and he has not started it since and he feels even better spirited without the Celexa.    Physical exam:  Not done since this is a virtual visit    Imp impression:  1.  Severe respiratory failure related to COVID-now on a dose of 7.5 mg prednisone with a reducing need for oxygen support.  2.  Diabetes moderately well controlled in this setting on metformin 1000 units b.i.d. plus insulin support.  3.  Tachycardia-controlled since the switch in his dose of Toprol to 50 mg b.i.d.  5.  Inactivity-improving with now walking a block a day.  I have encouraged him to slowly increase the amount of activity and the variety of activity.  I had recommended doing some light  weight work with for his arms with 1 lb or slightly more for short intervals several times a day.  I recommend to squeeze ball for his forearm and hands the.  I have recommended he increase his ambulation to several times a day and figure out how long that he walks based on how he feels    Plan:  1.  A reducing his prednisone to 6.5 mg a day.  To do that him calling in 1 mg tablets so that he can dosed properly  2.  The next visit I am going to probably reduce his dose of prednisone to 5 mg a day  3.  The next visit I am going to check his testosterone level, Chem 20, CBC, hemoglobin A1c  4.  The physical activity program I have discussed above  5.  I will see him again in 1 week.  His appointment with Dr. Gonzalez is been normal back 2 weeks so that he can actually see Dr. Gonzalez on that visit.    Objective:        Assessment:       1. Acute respiratory distress syndrome (ARDS) due to COVID-19 virus    2. Poorly controlled diabetes mellitus    3. Coronary artery disease involving native coronary artery of native heart without angina pectoris    4. Reactive depression    5. Agitation    6. ASD (atrial septal defect) - small vs PFO    7. Polycythemia    8. Testosterone deficiency        Plan:

## 2020-12-22 NOTE — TELEPHONE ENCOUNTER
Left message on patient voicemail, informing him that I'm contacting him in regards to his appointment that has been rescheduled. I advised patient that his appointment has been rescheduled with Dr Gonzalez on 1/12/21 for 1:00 pm. I advise patient that if he has any questions or concerns, he may contact the office. Office number has been provided.

## 2020-12-29 ENCOUNTER — OFFICE VISIT (OUTPATIENT)
Dept: INTERNAL MEDICINE | Facility: CLINIC | Age: 70
End: 2020-12-29
Payer: COMMERCIAL

## 2020-12-29 DIAGNOSIS — D75.1 POLYCYTHEMIA: ICD-10-CM

## 2020-12-29 DIAGNOSIS — E11.65 POORLY CONTROLLED DIABETES MELLITUS: ICD-10-CM

## 2020-12-29 DIAGNOSIS — F32.9 REACTIVE DEPRESSION: ICD-10-CM

## 2020-12-29 DIAGNOSIS — J80 ACUTE RESPIRATORY DISTRESS SYNDROME (ARDS) DUE TO COVID-19 VIRUS: Primary | ICD-10-CM

## 2020-12-29 DIAGNOSIS — I10 ESSENTIAL HYPERTENSION: ICD-10-CM

## 2020-12-29 DIAGNOSIS — Z79.52 LONG TERM SYSTEMIC STEROID USER: ICD-10-CM

## 2020-12-29 DIAGNOSIS — R45.1 AGITATION: ICD-10-CM

## 2020-12-29 DIAGNOSIS — I25.10 CORONARY ARTERY DISEASE INVOLVING NATIVE CORONARY ARTERY OF NATIVE HEART WITHOUT ANGINA PECTORIS: ICD-10-CM

## 2020-12-29 DIAGNOSIS — U07.1 ACUTE RESPIRATORY DISTRESS SYNDROME (ARDS) DUE TO COVID-19 VIRUS: Primary | ICD-10-CM

## 2020-12-29 PROCEDURE — 3051F PR MOST RECENT HEMOGLOBIN A1C LEVEL 7.0 - < 8.0%: ICD-10-PCS | Mod: CPTII,,, | Performed by: INTERNAL MEDICINE

## 2020-12-29 PROCEDURE — 1159F MED LIST DOCD IN RCRD: CPT | Mod: ,,, | Performed by: INTERNAL MEDICINE

## 2020-12-29 PROCEDURE — 1159F PR MEDICATION LIST DOCUMENTED IN MEDICAL RECORD: ICD-10-PCS | Mod: ,,, | Performed by: INTERNAL MEDICINE

## 2020-12-29 PROCEDURE — 3051F HG A1C>EQUAL 7.0%<8.0%: CPT | Mod: CPTII,,, | Performed by: INTERNAL MEDICINE

## 2020-12-29 PROCEDURE — 99214 OFFICE O/P EST MOD 30 MIN: CPT | Mod: 95,,, | Performed by: INTERNAL MEDICINE

## 2020-12-29 PROCEDURE — 99214 PR OFFICE/OUTPT VISIT, EST, LEVL IV, 30-39 MIN: ICD-10-PCS | Mod: 95,,, | Performed by: INTERNAL MEDICINE

## 2020-12-30 ENCOUNTER — LAB VISIT (OUTPATIENT)
Dept: LAB | Facility: HOSPITAL | Age: 70
End: 2020-12-30
Attending: INTERNAL MEDICINE
Payer: COMMERCIAL

## 2020-12-30 DIAGNOSIS — E78.00 HYPERCHOLESTEREMIA: Chronic | ICD-10-CM

## 2020-12-30 DIAGNOSIS — Z98.61 S/P PTCA (PERCUTANEOUS TRANSLUMINAL CORONARY ANGIOPLASTY): Chronic | ICD-10-CM

## 2020-12-30 DIAGNOSIS — E11.59 HYPERTENSION ASSOCIATED WITH DIABETES: ICD-10-CM

## 2020-12-30 DIAGNOSIS — I25.2 OLD MYOCARDIAL INFARCTION: ICD-10-CM

## 2020-12-30 DIAGNOSIS — I25.10 CORONARY ARTERY DISEASE INVOLVING NATIVE CORONARY ARTERY OF NATIVE HEART WITHOUT ANGINA PECTORIS: ICD-10-CM

## 2020-12-30 DIAGNOSIS — I15.2 HYPERTENSION ASSOCIATED WITH DIABETES: ICD-10-CM

## 2020-12-30 LAB
ALBUMIN SERPL BCP-MCNC: 3.7 G/DL (ref 3.5–5.2)
ALP SERPL-CCNC: 71 U/L (ref 55–135)
ALT SERPL W/O P-5'-P-CCNC: 19 U/L (ref 10–44)
ANION GAP SERPL CALC-SCNC: 11 MMOL/L (ref 8–16)
AST SERPL-CCNC: 15 U/L (ref 10–40)
BILIRUB SERPL-MCNC: 0.9 MG/DL (ref 0.1–1)
BUN SERPL-MCNC: 23 MG/DL (ref 8–23)
CALCIUM SERPL-MCNC: 8.9 MG/DL (ref 8.7–10.5)
CHLORIDE SERPL-SCNC: 104 MMOL/L (ref 95–110)
CHOLEST SERPL-MCNC: 76 MG/DL (ref 120–199)
CHOLEST/HDLC SERPL: 2.3 {RATIO} (ref 2–5)
CO2 SERPL-SCNC: 22 MMOL/L (ref 23–29)
CREAT SERPL-MCNC: 1 MG/DL (ref 0.5–1.4)
EST. GFR  (AFRICAN AMERICAN): >60 ML/MIN/1.73 M^2
EST. GFR  (NON AFRICAN AMERICAN): >60 ML/MIN/1.73 M^2
GLUCOSE SERPL-MCNC: 163 MG/DL (ref 70–110)
HDLC SERPL-MCNC: 33 MG/DL (ref 40–75)
HDLC SERPL: 43.4 % (ref 20–50)
LDLC SERPL CALC-MCNC: 25.6 MG/DL (ref 63–159)
NONHDLC SERPL-MCNC: 43 MG/DL
POTASSIUM SERPL-SCNC: 4.4 MMOL/L (ref 3.5–5.1)
PROT SERPL-MCNC: 6.2 G/DL (ref 6–8.4)
SODIUM SERPL-SCNC: 137 MMOL/L (ref 136–145)
TRIGL SERPL-MCNC: 87 MG/DL (ref 30–150)

## 2020-12-30 PROCEDURE — 80053 COMPREHEN METABOLIC PANEL: CPT

## 2020-12-30 PROCEDURE — 36415 COLL VENOUS BLD VENIPUNCTURE: CPT | Mod: PO

## 2020-12-30 PROCEDURE — 80061 LIPID PANEL: CPT

## 2021-01-05 ENCOUNTER — PATIENT MESSAGE (OUTPATIENT)
Dept: ADMINISTRATIVE | Facility: OTHER | Age: 71
End: 2021-01-05

## 2021-01-05 ENCOUNTER — OFFICE VISIT (OUTPATIENT)
Dept: INTERNAL MEDICINE | Facility: CLINIC | Age: 71
End: 2021-01-05
Payer: COMMERCIAL

## 2021-01-05 DIAGNOSIS — R45.1 AGITATION: ICD-10-CM

## 2021-01-05 DIAGNOSIS — I25.10 CORONARY ARTERY DISEASE INVOLVING NATIVE CORONARY ARTERY OF NATIVE HEART WITHOUT ANGINA PECTORIS: ICD-10-CM

## 2021-01-05 DIAGNOSIS — U07.1 ACUTE RESPIRATORY DISTRESS SYNDROME (ARDS) DUE TO COVID-19 VIRUS: Primary | ICD-10-CM

## 2021-01-05 DIAGNOSIS — F32.9 REACTIVE DEPRESSION: ICD-10-CM

## 2021-01-05 DIAGNOSIS — J80 ACUTE RESPIRATORY DISTRESS SYNDROME (ARDS) DUE TO COVID-19 VIRUS: Primary | ICD-10-CM

## 2021-01-05 DIAGNOSIS — I10 ESSENTIAL HYPERTENSION: ICD-10-CM

## 2021-01-05 DIAGNOSIS — Z79.52 LONG TERM SYSTEMIC STEROID USER: ICD-10-CM

## 2021-01-05 PROCEDURE — 1159F PR MEDICATION LIST DOCUMENTED IN MEDICAL RECORD: ICD-10-PCS | Mod: ,,, | Performed by: INTERNAL MEDICINE

## 2021-01-05 PROCEDURE — 1159F MED LIST DOCD IN RCRD: CPT | Mod: ,,, | Performed by: INTERNAL MEDICINE

## 2021-01-05 PROCEDURE — 99214 PR OFFICE/OUTPT VISIT, EST, LEVL IV, 30-39 MIN: ICD-10-PCS | Mod: 95,,, | Performed by: INTERNAL MEDICINE

## 2021-01-05 PROCEDURE — 99214 OFFICE O/P EST MOD 30 MIN: CPT | Mod: 95,,, | Performed by: INTERNAL MEDICINE

## 2021-01-06 ENCOUNTER — PATIENT OUTREACH (OUTPATIENT)
Dept: ADMINISTRATIVE | Facility: OTHER | Age: 71
End: 2021-01-06

## 2021-01-07 ENCOUNTER — OFFICE VISIT (OUTPATIENT)
Dept: CARDIOLOGY | Facility: CLINIC | Age: 71
End: 2021-01-07
Payer: COMMERCIAL

## 2021-01-07 VITALS
SYSTOLIC BLOOD PRESSURE: 132 MMHG | OXYGEN SATURATION: 87 % | HEIGHT: 70 IN | BODY MASS INDEX: 29.9 KG/M2 | WEIGHT: 208.88 LBS | HEART RATE: 90 BPM | DIASTOLIC BLOOD PRESSURE: 69 MMHG

## 2021-01-07 DIAGNOSIS — Z98.61 S/P PTCA (PERCUTANEOUS TRANSLUMINAL CORONARY ANGIOPLASTY): ICD-10-CM

## 2021-01-07 DIAGNOSIS — I25.2 OLD MYOCARDIAL INFARCTION: ICD-10-CM

## 2021-01-07 DIAGNOSIS — R09.02 HYPOXIA: ICD-10-CM

## 2021-01-07 DIAGNOSIS — U07.1 DYSPNEA DUE TO COVID-19: ICD-10-CM

## 2021-01-07 DIAGNOSIS — E11.59 HYPERTENSION ASSOCIATED WITH DIABETES: ICD-10-CM

## 2021-01-07 DIAGNOSIS — R06.00 DYSPNEA DUE TO COVID-19: ICD-10-CM

## 2021-01-07 DIAGNOSIS — E78.00 HYPERCHOLESTEREMIA: ICD-10-CM

## 2021-01-07 DIAGNOSIS — I15.2 HYPERTENSION ASSOCIATED WITH DIABETES: ICD-10-CM

## 2021-01-07 DIAGNOSIS — I25.10 CORONARY ARTERY DISEASE INVOLVING NATIVE CORONARY ARTERY OF NATIVE HEART WITHOUT ANGINA PECTORIS: Primary | ICD-10-CM

## 2021-01-07 PROCEDURE — 99214 OFFICE O/P EST MOD 30 MIN: CPT | Mod: S$GLB,,, | Performed by: INTERNAL MEDICINE

## 2021-01-07 PROCEDURE — 1125F PR PAIN SEVERITY QUANTIFIED, PAIN PRESENT: ICD-10-PCS | Mod: S$GLB,,, | Performed by: INTERNAL MEDICINE

## 2021-01-07 PROCEDURE — 3075F PR MOST RECENT SYSTOLIC BLOOD PRESS GE 130-139MM HG: ICD-10-PCS | Mod: CPTII,S$GLB,, | Performed by: INTERNAL MEDICINE

## 2021-01-07 PROCEDURE — 3008F PR BODY MASS INDEX (BMI) DOCUMENTED: ICD-10-PCS | Mod: CPTII,S$GLB,, | Performed by: INTERNAL MEDICINE

## 2021-01-07 PROCEDURE — 1159F PR MEDICATION LIST DOCUMENTED IN MEDICAL RECORD: ICD-10-PCS | Mod: S$GLB,,, | Performed by: INTERNAL MEDICINE

## 2021-01-07 PROCEDURE — 99999 PR PBB SHADOW E&M-EST. PATIENT-LVL V: CPT | Mod: PBBFAC,,, | Performed by: INTERNAL MEDICINE

## 2021-01-07 PROCEDURE — 3008F BODY MASS INDEX DOCD: CPT | Mod: CPTII,S$GLB,, | Performed by: INTERNAL MEDICINE

## 2021-01-07 PROCEDURE — 3051F HG A1C>EQUAL 7.0%<8.0%: CPT | Mod: CPTII,S$GLB,, | Performed by: INTERNAL MEDICINE

## 2021-01-07 PROCEDURE — 3078F DIAST BP <80 MM HG: CPT | Mod: CPTII,S$GLB,, | Performed by: INTERNAL MEDICINE

## 2021-01-07 PROCEDURE — 3078F PR MOST RECENT DIASTOLIC BLOOD PRESSURE < 80 MM HG: ICD-10-PCS | Mod: CPTII,S$GLB,, | Performed by: INTERNAL MEDICINE

## 2021-01-07 PROCEDURE — 1125F AMNT PAIN NOTED PAIN PRSNT: CPT | Mod: S$GLB,,, | Performed by: INTERNAL MEDICINE

## 2021-01-07 PROCEDURE — 1159F MED LIST DOCD IN RCRD: CPT | Mod: S$GLB,,, | Performed by: INTERNAL MEDICINE

## 2021-01-07 PROCEDURE — 3075F SYST BP GE 130 - 139MM HG: CPT | Mod: CPTII,S$GLB,, | Performed by: INTERNAL MEDICINE

## 2021-01-07 PROCEDURE — 3051F PR MOST RECENT HEMOGLOBIN A1C LEVEL 7.0 - < 8.0%: ICD-10-PCS | Mod: CPTII,S$GLB,, | Performed by: INTERNAL MEDICINE

## 2021-01-07 PROCEDURE — 99999 PR PBB SHADOW E&M-EST. PATIENT-LVL V: ICD-10-PCS | Mod: PBBFAC,,, | Performed by: INTERNAL MEDICINE

## 2021-01-07 PROCEDURE — 99214 PR OFFICE/OUTPT VISIT, EST, LEVL IV, 30-39 MIN: ICD-10-PCS | Mod: S$GLB,,, | Performed by: INTERNAL MEDICINE

## 2021-01-07 RX ORDER — INSULIN LISPRO 100 [IU]/ML
15 INJECTION, SOLUTION INTRAVENOUS; SUBCUTANEOUS 3 TIMES DAILY
COMMUNITY
Start: 2020-12-17 | End: 2021-03-09

## 2021-01-08 ENCOUNTER — PATIENT MESSAGE (OUTPATIENT)
Dept: PULMONOLOGY | Facility: CLINIC | Age: 71
End: 2021-01-08

## 2021-01-08 DIAGNOSIS — R06.02 SOB (SHORTNESS OF BREATH): Primary | ICD-10-CM

## 2021-01-09 ENCOUNTER — LAB VISIT (OUTPATIENT)
Dept: FAMILY MEDICINE | Facility: CLINIC | Age: 71
End: 2021-01-09
Payer: COMMERCIAL

## 2021-01-09 ENCOUNTER — IMMUNIZATION (OUTPATIENT)
Dept: FAMILY MEDICINE | Facility: CLINIC | Age: 71
End: 2021-01-09
Payer: COMMERCIAL

## 2021-01-09 DIAGNOSIS — R06.02 SOB (SHORTNESS OF BREATH): ICD-10-CM

## 2021-01-09 DIAGNOSIS — Z23 NEED FOR VACCINATION: ICD-10-CM

## 2021-01-09 PROCEDURE — 0001A COVID-19, MRNA, LNP-S, PF, 30 MCG/0.3 ML DOSE VACCINE: CPT | Mod: CV19,,, | Performed by: FAMILY MEDICINE

## 2021-01-09 PROCEDURE — 91300 COVID-19, MRNA, LNP-S, PF, 30 MCG/0.3 ML DOSE VACCINE: CPT | Mod: ,,, | Performed by: FAMILY MEDICINE

## 2021-01-09 PROCEDURE — U0003 INFECTIOUS AGENT DETECTION BY NUCLEIC ACID (DNA OR RNA); SEVERE ACUTE RESPIRATORY SYNDROME CORONAVIRUS 2 (SARS-COV-2) (CORONAVIRUS DISEASE [COVID-19]), AMPLIFIED PROBE TECHNIQUE, MAKING USE OF HIGH THROUGHPUT TECHNOLOGIES AS DESCRIBED BY CMS-2020-01-R: HCPCS

## 2021-01-09 PROCEDURE — 91300 COVID-19, MRNA, LNP-S, PF, 30 MCG/0.3 ML DOSE VACCINE: ICD-10-PCS | Mod: ,,, | Performed by: FAMILY MEDICINE

## 2021-01-09 PROCEDURE — 0001A COVID-19, MRNA, LNP-S, PF, 30 MCG/0.3 ML DOSE VACCINE: ICD-10-PCS | Mod: CV19,,, | Performed by: FAMILY MEDICINE

## 2021-01-10 LAB — SARS-COV-2 RNA RESP QL NAA+PROBE: NOT DETECTED

## 2021-01-11 ENCOUNTER — TELEPHONE (OUTPATIENT)
Dept: CARDIOLOGY | Facility: CLINIC | Age: 71
End: 2021-01-11

## 2021-01-12 ENCOUNTER — OFFICE VISIT (OUTPATIENT)
Dept: INTERNAL MEDICINE | Facility: CLINIC | Age: 71
End: 2021-01-12
Payer: COMMERCIAL

## 2021-01-12 ENCOUNTER — HOSPITAL ENCOUNTER (OUTPATIENT)
Dept: PULMONOLOGY | Facility: CLINIC | Age: 71
Discharge: HOME | End: 2021-01-12
Payer: COMMERCIAL

## 2021-01-12 ENCOUNTER — OFFICE VISIT (OUTPATIENT)
Dept: PULMONOLOGY | Facility: CLINIC | Age: 71
End: 2021-01-12
Payer: COMMERCIAL

## 2021-01-12 VITALS
BODY MASS INDEX: 31.07 KG/M2 | OXYGEN SATURATION: 89 % | HEIGHT: 68 IN | DIASTOLIC BLOOD PRESSURE: 82 MMHG | SYSTOLIC BLOOD PRESSURE: 128 MMHG | WEIGHT: 205 LBS | HEART RATE: 129 BPM

## 2021-01-12 DIAGNOSIS — R06.02 SOB (SHORTNESS OF BREATH): ICD-10-CM

## 2021-01-12 DIAGNOSIS — R06.02 SOB (SHORTNESS OF BREATH): Primary | ICD-10-CM

## 2021-01-12 DIAGNOSIS — Z79.52 LONG TERM SYSTEMIC STEROID USER: ICD-10-CM

## 2021-01-12 DIAGNOSIS — R09.02 HYPOXIA: Primary | ICD-10-CM

## 2021-01-12 DIAGNOSIS — J80 ACUTE RESPIRATORY DISTRESS SYNDROME (ARDS) DUE TO COVID-19 VIRUS: Primary | ICD-10-CM

## 2021-01-12 DIAGNOSIS — I25.10 CORONARY ARTERY DISEASE INVOLVING NATIVE CORONARY ARTERY OF NATIVE HEART WITHOUT ANGINA PECTORIS: ICD-10-CM

## 2021-01-12 DIAGNOSIS — U07.1 ACUTE RESPIRATORY DISTRESS SYNDROME (ARDS) DUE TO COVID-19 VIRUS: Primary | ICD-10-CM

## 2021-01-12 DIAGNOSIS — D75.1 POLYCYTHEMIA: ICD-10-CM

## 2021-01-12 DIAGNOSIS — E11.9 TYPE 2 DIABETES MELLITUS WITHOUT COMPLICATION, WITHOUT LONG-TERM CURRENT USE OF INSULIN: ICD-10-CM

## 2021-01-12 DIAGNOSIS — R06.09 PLATYPNEA-ORTHODEOXIA SYNDROME: ICD-10-CM

## 2021-01-12 DIAGNOSIS — Z86.16 HISTORY OF 2019 NOVEL CORONAVIRUS DISEASE (COVID-19): ICD-10-CM

## 2021-01-12 DIAGNOSIS — I10 ESSENTIAL HYPERTENSION: ICD-10-CM

## 2021-01-12 LAB
ALLENS TEST: ABNORMAL
ALLENS TEST: ABNORMAL
DELSYS: ABNORMAL
DELSYS: ABNORMAL
HCO3 UR-SCNC: 16.5 MMOL/L (ref 24–28)
HCO3 UR-SCNC: 18.1 MMOL/L (ref 24–28)
PCO2 BLDA: 24.1 MMHG (ref 35–45)
PCO2 BLDA: 28.3 MMHG (ref 35–45)
PH SMN: 7.42 [PH] (ref 7.35–7.45)
PH SMN: 7.44 [PH] (ref 7.35–7.45)
PO2 BLDA: 46 MMHG (ref 80–100)
PO2 BLDA: 58 MMHG (ref 80–100)
POC BE: -6 MMOL/L
POC BE: -8 MMOL/L
POC SATURATED O2: 85 % (ref 95–100)
POC SATURATED O2: 91 % (ref 95–100)
POC TCO2: 17 MMOL/L (ref 23–27)
POC TCO2: 19 MMOL/L (ref 23–27)
SAMPLE: ABNORMAL
SAMPLE: ABNORMAL
SITE: ABNORMAL
SITE: ABNORMAL

## 2021-01-12 PROCEDURE — 94010 BREATHING CAPACITY TEST: ICD-10-PCS | Mod: S$GLB,,, | Performed by: INTERNAL MEDICINE

## 2021-01-12 PROCEDURE — 94690 O2 UPTK REST INDIRECT: CPT | Mod: S$GLB,,, | Performed by: INTERNAL MEDICINE

## 2021-01-12 PROCEDURE — 94729 DIFFUSING CAPACITY: CPT | Mod: S$GLB,,, | Performed by: INTERNAL MEDICINE

## 2021-01-12 PROCEDURE — 1159F MED LIST DOCD IN RCRD: CPT | Mod: S$GLB,,, | Performed by: INTERNAL MEDICINE

## 2021-01-12 PROCEDURE — 3078F PR MOST RECENT DIASTOLIC BLOOD PRESSURE < 80 MM HG: ICD-10-PCS | Mod: CPTII,,, | Performed by: INTERNAL MEDICINE

## 2021-01-12 PROCEDURE — 3074F SYST BP LT 130 MM HG: CPT | Mod: CPTII,S$GLB,, | Performed by: INTERNAL MEDICINE

## 2021-01-12 PROCEDURE — 99214 PR OFFICE/OUTPT VISIT, EST, LEVL IV, 30-39 MIN: ICD-10-PCS | Mod: 25,S$GLB,, | Performed by: INTERNAL MEDICINE

## 2021-01-12 PROCEDURE — 1159F MED LIST DOCD IN RCRD: CPT | Mod: ,,, | Performed by: INTERNAL MEDICINE

## 2021-01-12 PROCEDURE — 1126F PR PAIN SEVERITY QUANTIFIED, NO PAIN PRESENT: ICD-10-PCS | Mod: S$GLB,,, | Performed by: INTERNAL MEDICINE

## 2021-01-12 PROCEDURE — 99999 PR PBB SHADOW E&M-EST. PATIENT-LVL V: ICD-10-PCS | Mod: PBBFAC,,, | Performed by: INTERNAL MEDICINE

## 2021-01-12 PROCEDURE — 99214 OFFICE O/P EST MOD 30 MIN: CPT | Mod: 95,,, | Performed by: INTERNAL MEDICINE

## 2021-01-12 PROCEDURE — 3079F PR MOST RECENT DIASTOLIC BLOOD PRESSURE 80-89 MM HG: ICD-10-PCS | Mod: CPTII,S$GLB,, | Performed by: INTERNAL MEDICINE

## 2021-01-12 PROCEDURE — 36600 WITHDRAWAL OF ARTERIAL BLOOD: CPT | Mod: S$GLB,,, | Performed by: INTERNAL MEDICINE

## 2021-01-12 PROCEDURE — 3051F HG A1C>EQUAL 7.0%<8.0%: CPT | Mod: CPTII,,, | Performed by: INTERNAL MEDICINE

## 2021-01-12 PROCEDURE — 99214 PR OFFICE/OUTPT VISIT, EST, LEVL IV, 30-39 MIN: ICD-10-PCS | Mod: 95,,, | Performed by: INTERNAL MEDICINE

## 2021-01-12 PROCEDURE — 1159F PR MEDICATION LIST DOCUMENTED IN MEDICAL RECORD: ICD-10-PCS | Mod: S$GLB,,, | Performed by: INTERNAL MEDICINE

## 2021-01-12 PROCEDURE — 36600 PR WITHDRAWAL OF ARTERIAL BLOOD: ICD-10-PCS | Mod: S$GLB,,, | Performed by: INTERNAL MEDICINE

## 2021-01-12 PROCEDURE — 3051F PR MOST RECENT HEMOGLOBIN A1C LEVEL 7.0 - < 8.0%: ICD-10-PCS | Mod: CPTII,,, | Performed by: INTERNAL MEDICINE

## 2021-01-12 PROCEDURE — 1126F AMNT PAIN NOTED NONE PRSNT: CPT | Mod: S$GLB,,, | Performed by: INTERNAL MEDICINE

## 2021-01-12 PROCEDURE — 94010 BREATHING CAPACITY TEST: CPT | Mod: S$GLB,,, | Performed by: INTERNAL MEDICINE

## 2021-01-12 PROCEDURE — 3074F PR MOST RECENT SYSTOLIC BLOOD PRESSURE < 130 MM HG: ICD-10-PCS | Mod: CPTII,S$GLB,, | Performed by: INTERNAL MEDICINE

## 2021-01-12 PROCEDURE — 99214 OFFICE O/P EST MOD 30 MIN: CPT | Mod: 25,S$GLB,, | Performed by: INTERNAL MEDICINE

## 2021-01-12 PROCEDURE — 1159F PR MEDICATION LIST DOCUMENTED IN MEDICAL RECORD: ICD-10-PCS | Mod: ,,, | Performed by: INTERNAL MEDICINE

## 2021-01-12 PROCEDURE — 94690 PR EXHALED AIR ANALYSIS: ICD-10-PCS | Mod: S$GLB,,, | Performed by: INTERNAL MEDICINE

## 2021-01-12 PROCEDURE — 3074F PR MOST RECENT SYSTOLIC BLOOD PRESSURE < 130 MM HG: ICD-10-PCS | Mod: CPTII,,, | Performed by: INTERNAL MEDICINE

## 2021-01-12 PROCEDURE — 3078F DIAST BP <80 MM HG: CPT | Mod: CPTII,,, | Performed by: INTERNAL MEDICINE

## 2021-01-12 PROCEDURE — 3008F PR BODY MASS INDEX (BMI) DOCUMENTED: ICD-10-PCS | Mod: CPTII,S$GLB,, | Performed by: INTERNAL MEDICINE

## 2021-01-12 PROCEDURE — 94729 PR C02/MEMBANE DIFFUSE CAPACITY: ICD-10-PCS | Mod: S$GLB,,, | Performed by: INTERNAL MEDICINE

## 2021-01-12 PROCEDURE — 82803 BLOOD GASES ANY COMBINATION: CPT | Mod: S$GLB,,, | Performed by: INTERNAL MEDICINE

## 2021-01-12 PROCEDURE — 3008F BODY MASS INDEX DOCD: CPT | Mod: CPTII,S$GLB,, | Performed by: INTERNAL MEDICINE

## 2021-01-12 PROCEDURE — 82803 PR  BLOOD GASES: PH, PO2 & PCO2: ICD-10-PCS | Mod: S$GLB,,, | Performed by: INTERNAL MEDICINE

## 2021-01-12 PROCEDURE — 3074F SYST BP LT 130 MM HG: CPT | Mod: CPTII,,, | Performed by: INTERNAL MEDICINE

## 2021-01-12 PROCEDURE — 3079F DIAST BP 80-89 MM HG: CPT | Mod: CPTII,S$GLB,, | Performed by: INTERNAL MEDICINE

## 2021-01-12 PROCEDURE — 99999 PR PBB SHADOW E&M-EST. PATIENT-LVL V: CPT | Mod: PBBFAC,,, | Performed by: INTERNAL MEDICINE

## 2021-01-13 ENCOUNTER — PATIENT MESSAGE (OUTPATIENT)
Dept: CARDIOLOGY | Facility: CLINIC | Age: 71
End: 2021-01-13

## 2021-01-13 ENCOUNTER — PATIENT MESSAGE (OUTPATIENT)
Dept: INTERNAL MEDICINE | Facility: CLINIC | Age: 71
End: 2021-01-13

## 2021-01-14 ENCOUNTER — HOSPITAL ENCOUNTER (OUTPATIENT)
Dept: CARDIOLOGY | Facility: HOSPITAL | Age: 71
Discharge: HOME | End: 2021-01-14
Attending: INTERNAL MEDICINE
Payer: COMMERCIAL

## 2021-01-14 VITALS
BODY MASS INDEX: 31.07 KG/M2 | HEART RATE: 76 BPM | WEIGHT: 205 LBS | HEIGHT: 68 IN | DIASTOLIC BLOOD PRESSURE: 80 MMHG | SYSTOLIC BLOOD PRESSURE: 144 MMHG

## 2021-01-14 DIAGNOSIS — Q21.10 ASD (ATRIAL SEPTAL DEFECT): Primary | ICD-10-CM

## 2021-01-14 DIAGNOSIS — R09.02 HYPOXIA: ICD-10-CM

## 2021-01-14 PROBLEM — R06.09 PLATYPNEA-ORTHODEOXIA SYNDROME: Status: ACTIVE | Noted: 2021-01-14

## 2021-01-14 LAB
ASCENDING AORTA: 4.29 CM
BSA FOR ECHO PROCEDURE: 2.11 M2
CV ECHO LV RWT: 0.4 CM
DOP CALC LVOT AREA: 5.7 CM2
DOP CALC LVOT DIAMETER: 2.69 CM
DOP CALC LVOT PEAK VEL: 0.88 M/S
DOP CALC LVOT STROKE VOLUME: 90.2 CM3
DOP CALCLVOT PEAK VEL VTI: 15.88 CM
E WAVE DECELERATION TIME: 210.11 MSEC
E/A RATIO: 0.54
E/E' RATIO: 9.6 M/S
ECHO LV POSTERIOR WALL: 0.95 CM (ref 0.6–1.1)
FRACTIONAL SHORTENING: 42 % (ref 28–44)
INTERVENTRICULAR SEPTUM: 0.97 CM (ref 0.6–1.1)
IVRT: 122.74 MSEC
LA MAJOR: 4.59 CM
LA MINOR: 4.8 CM
LA WIDTH: 3.84 CM
LEFT ATRIUM SIZE: 3.65 CM
LEFT ATRIUM VOLUME INDEX MOD: 22.3 ML/M2
LEFT ATRIUM VOLUME INDEX: 27.1 ML/M2
LEFT ATRIUM VOLUME MOD: 45.97 CM3
LEFT ATRIUM VOLUME: 55.91 CM3
LEFT INTERNAL DIMENSION IN SYSTOLE: 2.72 CM (ref 2.1–4)
LEFT VENTRICLE DIASTOLIC VOLUME INDEX: 49.53 ML/M2
LEFT VENTRICLE DIASTOLIC VOLUME: 102.31 ML
LEFT VENTRICLE MASS INDEX: 75 G/M2
LEFT VENTRICLE SYSTOLIC VOLUME INDEX: 13.3 ML/M2
LEFT VENTRICLE SYSTOLIC VOLUME: 27.41 ML
LEFT VENTRICULAR INTERNAL DIMENSION IN DIASTOLE: 4.7 CM (ref 3.5–6)
LEFT VENTRICULAR MASS: 155.6 G
LV LATERAL E/E' RATIO: 9.6 M/S
LV SEPTAL E/E' RATIO: 9.6 M/S
MV PEAK A VEL: 0.89 M/S
MV PEAK E VEL: 0.48 M/S
PISA TR MAX VEL: 2.44 M/S
PULM VEIN S/D RATIO: 1.72
PV PEAK D VEL: 0.29 M/S
PV PEAK S VEL: 0.5 M/S
RA MAJOR: 4.97 CM
RA PRESSURE: 3 MMHG
RA WIDTH: 4.67 CM
RIGHT VENTRICULAR END-DIASTOLIC DIMENSION: 2.51 CM
SINUS: 4.34 CM
STJ: 3.52 CM
TDI LATERAL: 0.05 M/S
TDI SEPTAL: 0.05 M/S
TDI: 0.05 M/S
TR MAX PG: 24 MMHG
TRICUSPID ANNULAR PLANE SYSTOLIC EXCURSION: 1.58 CM
TV REST PULMONARY ARTERY PRESSURE: 27 MMHG

## 2021-01-14 PROCEDURE — C8924 2D TTE W OR W/O FOL W/CON,FU: HCPCS

## 2021-01-14 PROCEDURE — 93308 ECHO (CUPID ONLY): ICD-10-PCS | Mod: 26,,, | Performed by: INTERNAL MEDICINE

## 2021-01-14 PROCEDURE — 93308 TTE F-UP OR LMTD: CPT | Mod: 26,,, | Performed by: INTERNAL MEDICINE

## 2021-01-17 LAB
DLCO ADJ PRE: 18.32 ML/(MIN*MMHG) (ref 18.66–32.52)
DLCO SINGLE BREATH LLN: 18.66
DLCO SINGLE BREATH PRE REF: 71.6 %
DLCO SINGLE BREATH REF: 25.59
DLCOC SBVA LLN: 2.58
DLCOC SBVA PRE REF: 78.6 %
DLCOC SBVA REF: 3.8
DLCOC SINGLE BREATH LLN: 18.66
DLCOC SINGLE BREATH PRE REF: 71.6 %
DLCOC SINGLE BREATH REF: 25.59
DLCOCSBVAULN: 5.01
DLCOCSINGLEBREATHULN: 32.52
DLCOSINGLEBREATHULN: 32.52
DLCOVA LLN: 2.58
DLCOVA PRE REF: 78.6 %
DLCOVA PRE: 2.98 ML/(MIN*MMHG*L) (ref 2.58–5.01)
DLCOVA REF: 3.8
DLCOVAULN: 5.01
DLVAADJ PRE: 2.98 ML/(MIN*MMHG*L) (ref 2.58–5.01)
FEF 25 75 LLN: 0.99
FEF 25 75 PRE REF: 164.2 %
FEF 25 75 REF: 2.3
FEV05 LLN: 1.33
FEV05 REF: 2.46
FEV1 FVC LLN: 63
FEV1 FVC PRE REF: 106.1 %
FEV1 FVC REF: 76
FEV1 LLN: 2.16
FEV1 PRE REF: 123.2 %
FEV1 REF: 3
FVC LLN: 2.92
FVC PRE REF: 115.7 %
FVC REF: 3.95
IVC PRE: 4.3 L (ref 2.92–4.97)
IVC SINGLE BREATH LLN: 2.92
IVC SINGLE BREATH PRE REF: 108.9 %
IVC SINGLE BREATH REF: 3.95
IVCSINGLEBREATHULN: 4.97
PEF LLN: 5.75
PEF PRE REF: 115.1 %
PEF REF: 7.94
PHYSICIAN COMMENT: ABNORMAL
PRE DLCO: 18.32 ML/(MIN*MMHG) (ref 18.66–32.52)
PRE FEF 25 75: 3.77 L/S (ref 0.99–3.61)
PRE FET 100: 7.32 SEC
PRE FEV05 REF: 121.3 %
PRE FEV1 FVC: 80.84 % (ref 62.62–89.75)
PRE FEV1: 3.69 L (ref 2.16–3.84)
PRE FEV5: 2.99 L (ref 1.33–3.6)
PRE FVC: 4.57 L (ref 2.92–4.97)
PRE PEF: 9.14 L/S (ref 5.75–10.13)
VA PRE: 6.14 L (ref 6.59–6.59)
VA SINGLE BREATH LLN: 6.59
VA SINGLE BREATH PRE REF: 93.1 %
VA SINGLE BREATH REF: 6.59
VASINGLEBREATHULN: 6.59

## 2021-01-19 ENCOUNTER — PATIENT MESSAGE (OUTPATIENT)
Dept: CARDIOLOGY | Facility: CLINIC | Age: 71
End: 2021-01-19

## 2021-01-21 ENCOUNTER — LAB VISIT (OUTPATIENT)
Dept: LAB | Facility: HOSPITAL | Age: 71
End: 2021-01-21
Attending: INTERNAL MEDICINE
Payer: COMMERCIAL

## 2021-01-21 DIAGNOSIS — U07.1 ACUTE RESPIRATORY DISTRESS SYNDROME (ARDS) DUE TO COVID-19 VIRUS: ICD-10-CM

## 2021-01-21 DIAGNOSIS — I25.10 CORONARY ARTERY DISEASE INVOLVING NATIVE CORONARY ARTERY OF NATIVE HEART WITHOUT ANGINA PECTORIS: ICD-10-CM

## 2021-01-21 DIAGNOSIS — D75.1 POLYCYTHEMIA: ICD-10-CM

## 2021-01-21 DIAGNOSIS — J80 ACUTE RESPIRATORY DISTRESS SYNDROME (ARDS) DUE TO COVID-19 VIRUS: ICD-10-CM

## 2021-01-21 DIAGNOSIS — Z79.52 LONG TERM SYSTEMIC STEROID USER: ICD-10-CM

## 2021-01-21 LAB
BASOPHILS # BLD AUTO: 0.02 K/UL (ref 0–0.2)
BASOPHILS NFR BLD: 0.3 % (ref 0–1.9)
DIFFERENTIAL METHOD: ABNORMAL
EOSINOPHIL # BLD AUTO: 0 K/UL (ref 0–0.5)
EOSINOPHIL NFR BLD: 0.5 % (ref 0–8)
ERYTHROCYTE [DISTWIDTH] IN BLOOD BY AUTOMATED COUNT: 18.2 % (ref 11.5–14.5)
ESTIMATED AVG GLUCOSE: 180 MG/DL (ref 68–131)
HBA1C MFR BLD HPLC: 7.9 % (ref 4–5.6)
HCT VFR BLD AUTO: 53.5 % (ref 40–54)
HGB BLD-MCNC: 15.7 G/DL (ref 14–18)
IMM GRANULOCYTES # BLD AUTO: 0.03 K/UL (ref 0–0.04)
IMM GRANULOCYTES NFR BLD AUTO: 0.5 % (ref 0–0.5)
LYMPHOCYTES # BLD AUTO: 0.8 K/UL (ref 1–4.8)
LYMPHOCYTES NFR BLD: 12.6 % (ref 18–48)
MCH RBC QN AUTO: 23.8 PG (ref 27–31)
MCHC RBC AUTO-ENTMCNC: 29.3 G/DL (ref 32–36)
MCV RBC AUTO: 81 FL (ref 82–98)
MONOCYTES # BLD AUTO: 0.6 K/UL (ref 0.3–1)
MONOCYTES NFR BLD: 8.9 % (ref 4–15)
NEUTROPHILS # BLD AUTO: 5 K/UL (ref 1.8–7.7)
NEUTROPHILS NFR BLD: 77.2 % (ref 38–73)
NRBC BLD-RTO: 0 /100 WBC
PLATELET # BLD AUTO: 318 K/UL (ref 150–350)
PMV BLD AUTO: 10.2 FL (ref 9.2–12.9)
RBC # BLD AUTO: 6.59 M/UL (ref 4.6–6.2)
WBC # BLD AUTO: 6.51 K/UL (ref 3.9–12.7)

## 2021-01-21 PROCEDURE — 85025 COMPLETE CBC W/AUTO DIFF WBC: CPT

## 2021-01-21 PROCEDURE — 36415 COLL VENOUS BLD VENIPUNCTURE: CPT | Mod: PO

## 2021-01-21 PROCEDURE — 85379 FIBRIN DEGRADATION QUANT: CPT

## 2021-01-21 PROCEDURE — 83036 HEMOGLOBIN GLYCOSYLATED A1C: CPT

## 2021-01-21 PROCEDURE — 84443 ASSAY THYROID STIM HORMONE: CPT

## 2021-01-21 PROCEDURE — 84403 ASSAY OF TOTAL TESTOSTERONE: CPT

## 2021-01-22 ENCOUNTER — PATIENT MESSAGE (OUTPATIENT)
Dept: INTERNAL MEDICINE | Facility: CLINIC | Age: 71
End: 2021-01-22

## 2021-01-22 ENCOUNTER — TELEPHONE (OUTPATIENT)
Dept: INTERNAL MEDICINE | Facility: CLINIC | Age: 71
End: 2021-01-22

## 2021-01-22 LAB
D DIMER PPP IA.FEU-MCNC: <0.19 MG/L FEU
TESTOST SERPL-MCNC: 293 NG/DL (ref 304–1227)
TSH SERPL DL<=0.005 MIU/L-ACNC: 2.81 UIU/ML (ref 0.4–4)

## 2021-01-25 ENCOUNTER — OFFICE VISIT (OUTPATIENT)
Dept: INTERNAL MEDICINE | Facility: CLINIC | Age: 71
End: 2021-01-25
Payer: COMMERCIAL

## 2021-01-25 ENCOUNTER — TELEPHONE (OUTPATIENT)
Dept: INTERNAL MEDICINE | Facility: CLINIC | Age: 71
End: 2021-01-25

## 2021-01-25 DIAGNOSIS — Z79.52 LONG TERM SYSTEMIC STEROID USER: ICD-10-CM

## 2021-01-25 DIAGNOSIS — Q21.12 PFO (PATENT FORAMEN OVALE): ICD-10-CM

## 2021-01-25 DIAGNOSIS — D75.1 POLYCYTHEMIA: ICD-10-CM

## 2021-01-25 DIAGNOSIS — R06.02 SOB (SHORTNESS OF BREATH): ICD-10-CM

## 2021-01-25 DIAGNOSIS — U07.1 ACUTE RESPIRATORY DISTRESS SYNDROME (ARDS) DUE TO COVID-19 VIRUS: Primary | ICD-10-CM

## 2021-01-25 DIAGNOSIS — J80 ACUTE RESPIRATORY DISTRESS SYNDROME (ARDS) DUE TO COVID-19 VIRUS: Primary | ICD-10-CM

## 2021-01-25 DIAGNOSIS — I10 ESSENTIAL HYPERTENSION: ICD-10-CM

## 2021-01-25 DIAGNOSIS — I25.10 CORONARY ARTERY DISEASE INVOLVING NATIVE CORONARY ARTERY OF NATIVE HEART WITHOUT ANGINA PECTORIS: ICD-10-CM

## 2021-01-25 DIAGNOSIS — E11.9 TYPE 2 DIABETES MELLITUS WITHOUT COMPLICATION, WITHOUT LONG-TERM CURRENT USE OF INSULIN: ICD-10-CM

## 2021-01-25 PROCEDURE — 3051F HG A1C>EQUAL 7.0%<8.0%: CPT | Mod: CPTII,,, | Performed by: INTERNAL MEDICINE

## 2021-01-25 PROCEDURE — 3051F PR MOST RECENT HEMOGLOBIN A1C LEVEL 7.0 - < 8.0%: ICD-10-PCS | Mod: CPTII,,, | Performed by: INTERNAL MEDICINE

## 2021-01-25 PROCEDURE — 99214 OFFICE O/P EST MOD 30 MIN: CPT | Mod: 95,,, | Performed by: INTERNAL MEDICINE

## 2021-01-25 PROCEDURE — 1159F MED LIST DOCD IN RCRD: CPT | Mod: ,,, | Performed by: INTERNAL MEDICINE

## 2021-01-25 PROCEDURE — 1159F PR MEDICATION LIST DOCUMENTED IN MEDICAL RECORD: ICD-10-PCS | Mod: ,,, | Performed by: INTERNAL MEDICINE

## 2021-01-25 PROCEDURE — 99214 PR OFFICE/OUTPT VISIT, EST, LEVL IV, 30-39 MIN: ICD-10-PCS | Mod: 95,,, | Performed by: INTERNAL MEDICINE

## 2021-01-26 ENCOUNTER — OFFICE VISIT (OUTPATIENT)
Dept: CARDIOLOGY | Facility: CLINIC | Age: 71
End: 2021-01-26
Payer: COMMERCIAL

## 2021-01-26 ENCOUNTER — DOCUMENTATION ONLY (OUTPATIENT)
Dept: CARDIOLOGY | Facility: CLINIC | Age: 71
End: 2021-01-26

## 2021-01-26 VITALS
DIASTOLIC BLOOD PRESSURE: 76 MMHG | HEIGHT: 70 IN | WEIGHT: 203.06 LBS | SYSTOLIC BLOOD PRESSURE: 127 MMHG | HEART RATE: 73 BPM | OXYGEN SATURATION: 84 % | BODY MASS INDEX: 29.07 KG/M2

## 2021-01-26 DIAGNOSIS — I10 ESSENTIAL HYPERTENSION: Chronic | ICD-10-CM

## 2021-01-26 DIAGNOSIS — Z12.11 SPECIAL SCREENING FOR MALIGNANT NEOPLASM OF COLON: Primary | ICD-10-CM

## 2021-01-26 DIAGNOSIS — Q21.10 ASD (ATRIAL SEPTAL DEFECT): ICD-10-CM

## 2021-01-26 DIAGNOSIS — E78.00 HYPERCHOLESTEREMIA: Chronic | ICD-10-CM

## 2021-01-26 DIAGNOSIS — Z01.812 PRE-PROCEDURE LAB EXAM: ICD-10-CM

## 2021-01-26 DIAGNOSIS — Q21.12 PFO (PATENT FORAMEN OVALE): Primary | ICD-10-CM

## 2021-01-26 DIAGNOSIS — I25.10 CORONARY ARTERY DISEASE INVOLVING NATIVE CORONARY ARTERY OF NATIVE HEART WITHOUT ANGINA PECTORIS: Chronic | ICD-10-CM

## 2021-01-26 DIAGNOSIS — E11.9 TYPE 2 DIABETES MELLITUS WITHOUT COMPLICATION, WITHOUT LONG-TERM CURRENT USE OF INSULIN: ICD-10-CM

## 2021-01-26 DIAGNOSIS — R06.02 SOB (SHORTNESS OF BREATH): ICD-10-CM

## 2021-01-26 PROCEDURE — 3074F SYST BP LT 130 MM HG: CPT | Mod: CPTII,S$GLB,, | Performed by: INTERNAL MEDICINE

## 2021-01-26 PROCEDURE — 3008F BODY MASS INDEX DOCD: CPT | Mod: CPTII,S$GLB,, | Performed by: INTERNAL MEDICINE

## 2021-01-26 PROCEDURE — 1125F AMNT PAIN NOTED PAIN PRSNT: CPT | Mod: S$GLB,,, | Performed by: INTERNAL MEDICINE

## 2021-01-26 PROCEDURE — 1125F PR PAIN SEVERITY QUANTIFIED, PAIN PRESENT: ICD-10-PCS | Mod: S$GLB,,, | Performed by: INTERNAL MEDICINE

## 2021-01-26 PROCEDURE — 3078F DIAST BP <80 MM HG: CPT | Mod: CPTII,S$GLB,, | Performed by: INTERNAL MEDICINE

## 2021-01-26 PROCEDURE — 3051F PR MOST RECENT HEMOGLOBIN A1C LEVEL 7.0 - < 8.0%: ICD-10-PCS | Mod: CPTII,S$GLB,, | Performed by: INTERNAL MEDICINE

## 2021-01-26 PROCEDURE — 99205 OFFICE O/P NEW HI 60 MIN: CPT | Mod: S$GLB,,, | Performed by: INTERNAL MEDICINE

## 2021-01-26 PROCEDURE — 3008F PR BODY MASS INDEX (BMI) DOCUMENTED: ICD-10-PCS | Mod: CPTII,S$GLB,, | Performed by: INTERNAL MEDICINE

## 2021-01-26 PROCEDURE — 3078F PR MOST RECENT DIASTOLIC BLOOD PRESSURE < 80 MM HG: ICD-10-PCS | Mod: CPTII,S$GLB,, | Performed by: INTERNAL MEDICINE

## 2021-01-26 PROCEDURE — 3051F HG A1C>EQUAL 7.0%<8.0%: CPT | Mod: CPTII,S$GLB,, | Performed by: INTERNAL MEDICINE

## 2021-01-26 PROCEDURE — 3074F PR MOST RECENT SYSTOLIC BLOOD PRESSURE < 130 MM HG: ICD-10-PCS | Mod: CPTII,S$GLB,, | Performed by: INTERNAL MEDICINE

## 2021-01-26 PROCEDURE — 99205 PR OFFICE/OUTPT VISIT, NEW, LEVL V, 60-74 MIN: ICD-10-PCS | Mod: S$GLB,,, | Performed by: INTERNAL MEDICINE

## 2021-01-26 PROCEDURE — 99999 PR PBB SHADOW E&M-EST. PATIENT-LVL V: ICD-10-PCS | Mod: PBBFAC,,, | Performed by: INTERNAL MEDICINE

## 2021-01-26 PROCEDURE — 99999 PR PBB SHADOW E&M-EST. PATIENT-LVL V: CPT | Mod: PBBFAC,,, | Performed by: INTERNAL MEDICINE

## 2021-01-26 PROCEDURE — 1159F PR MEDICATION LIST DOCUMENTED IN MEDICAL RECORD: ICD-10-PCS | Mod: S$GLB,,, | Performed by: INTERNAL MEDICINE

## 2021-01-26 PROCEDURE — 1159F MED LIST DOCD IN RCRD: CPT | Mod: S$GLB,,, | Performed by: INTERNAL MEDICINE

## 2021-01-26 RX ORDER — SODIUM CHLORIDE 9 MG/ML
INJECTION, SOLUTION INTRAVENOUS CONTINUOUS
Status: CANCELLED | OUTPATIENT
Start: 2021-01-26 | End: 2021-01-26

## 2021-01-26 RX ORDER — METFORMIN HYDROCHLORIDE 500 MG/1
2000 TABLET ORAL DAILY
COMMUNITY
End: 2021-08-20 | Stop reason: CLARIF

## 2021-01-26 RX ORDER — DIPHENHYDRAMINE HCL 50 MG
50 CAPSULE ORAL ONCE
Status: CANCELLED | OUTPATIENT
Start: 2021-01-26 | End: 2021-01-26

## 2021-01-27 ENCOUNTER — PATIENT MESSAGE (OUTPATIENT)
Dept: INTERNAL MEDICINE | Facility: CLINIC | Age: 71
End: 2021-01-27

## 2021-01-30 ENCOUNTER — IMMUNIZATION (OUTPATIENT)
Dept: FAMILY MEDICINE | Facility: CLINIC | Age: 71
End: 2021-01-30
Payer: COMMERCIAL

## 2021-01-30 DIAGNOSIS — Z23 NEED FOR VACCINATION: Primary | ICD-10-CM

## 2021-01-30 PROCEDURE — 91300 COVID-19, MRNA, LNP-S, PF, 30 MCG/0.3 ML DOSE VACCINE: CPT | Mod: PBBFAC | Performed by: FAMILY MEDICINE

## 2021-01-30 PROCEDURE — 0002A COVID-19, MRNA, LNP-S, PF, 30 MCG/0.3 ML DOSE VACCINE: CPT | Mod: PBBFAC | Performed by: FAMILY MEDICINE

## 2021-02-02 ENCOUNTER — TELEPHONE (OUTPATIENT)
Dept: CARDIOLOGY | Facility: CLINIC | Age: 71
End: 2021-02-02

## 2021-02-04 ENCOUNTER — TELEPHONE (OUTPATIENT)
Dept: CARDIOLOGY | Facility: CLINIC | Age: 71
End: 2021-02-04

## 2021-02-11 ENCOUNTER — LAB VISIT (OUTPATIENT)
Dept: LAB | Facility: HOSPITAL | Age: 71
End: 2021-02-11
Attending: INTERNAL MEDICINE
Payer: COMMERCIAL

## 2021-02-11 DIAGNOSIS — Q21.12 PFO (PATENT FORAMEN OVALE): ICD-10-CM

## 2021-02-11 DIAGNOSIS — D75.1 POLYCYTHEMIA: ICD-10-CM

## 2021-02-11 DIAGNOSIS — J80 ACUTE RESPIRATORY DISTRESS SYNDROME (ARDS) DUE TO COVID-19 VIRUS: ICD-10-CM

## 2021-02-11 DIAGNOSIS — E11.9 TYPE 2 DIABETES MELLITUS WITHOUT COMPLICATION, WITHOUT LONG-TERM CURRENT USE OF INSULIN: ICD-10-CM

## 2021-02-11 DIAGNOSIS — U07.1 ACUTE RESPIRATORY DISTRESS SYNDROME (ARDS) DUE TO COVID-19 VIRUS: ICD-10-CM

## 2021-02-11 LAB
ANION GAP SERPL CALC-SCNC: 15 MMOL/L (ref 8–16)
BASOPHILS # BLD AUTO: 0.02 K/UL (ref 0–0.2)
BASOPHILS NFR BLD: 0.4 % (ref 0–1.9)
BUN SERPL-MCNC: 24 MG/DL (ref 8–23)
CALCIUM SERPL-MCNC: 8.7 MG/DL (ref 8.7–10.5)
CHLORIDE SERPL-SCNC: 106 MMOL/L (ref 95–110)
CO2 SERPL-SCNC: 19 MMOL/L (ref 23–29)
CORTIS SERPL-MCNC: 5.6 UG/DL (ref 4.3–22.4)
CREAT SERPL-MCNC: 0.9 MG/DL (ref 0.5–1.4)
DIFFERENTIAL METHOD: ABNORMAL
EOSINOPHIL # BLD AUTO: 0 K/UL (ref 0–0.5)
EOSINOPHIL NFR BLD: 0.8 % (ref 0–8)
ERYTHROCYTE [DISTWIDTH] IN BLOOD BY AUTOMATED COUNT: 18.1 % (ref 11.5–14.5)
EST. GFR  (AFRICAN AMERICAN): >60 ML/MIN/1.73 M^2
EST. GFR  (NON AFRICAN AMERICAN): >60 ML/MIN/1.73 M^2
GLUCOSE SERPL-MCNC: 77 MG/DL (ref 70–110)
HCT VFR BLD AUTO: 45.8 % (ref 40–54)
HGB BLD-MCNC: 13.4 G/DL (ref 14–18)
IMM GRANULOCYTES # BLD AUTO: 0.03 K/UL (ref 0–0.04)
IMM GRANULOCYTES NFR BLD AUTO: 0.6 % (ref 0–0.5)
LYMPHOCYTES # BLD AUTO: 0.8 K/UL (ref 1–4.8)
LYMPHOCYTES NFR BLD: 14.6 % (ref 18–48)
MCH RBC QN AUTO: 23.3 PG (ref 27–31)
MCHC RBC AUTO-ENTMCNC: 29.3 G/DL (ref 32–36)
MCV RBC AUTO: 80 FL (ref 82–98)
MONOCYTES # BLD AUTO: 0.8 K/UL (ref 0.3–1)
MONOCYTES NFR BLD: 15.2 % (ref 4–15)
NEUTROPHILS # BLD AUTO: 3.6 K/UL (ref 1.8–7.7)
NEUTROPHILS NFR BLD: 68.4 % (ref 38–73)
NRBC BLD-RTO: 0 /100 WBC
PLATELET # BLD AUTO: 263 K/UL (ref 150–350)
PMV BLD AUTO: 10 FL (ref 9.2–12.9)
POTASSIUM SERPL-SCNC: 3.4 MMOL/L (ref 3.5–5.1)
RBC # BLD AUTO: 5.76 M/UL (ref 4.6–6.2)
SODIUM SERPL-SCNC: 140 MMOL/L (ref 136–145)
WBC # BLD AUTO: 5.27 K/UL (ref 3.9–12.7)

## 2021-02-11 PROCEDURE — 36415 COLL VENOUS BLD VENIPUNCTURE: CPT | Mod: PO

## 2021-02-11 PROCEDURE — 82533 TOTAL CORTISOL: CPT

## 2021-02-11 PROCEDURE — 80048 BASIC METABOLIC PNL TOTAL CA: CPT

## 2021-02-11 PROCEDURE — 85025 COMPLETE CBC W/AUTO DIFF WBC: CPT

## 2021-02-15 ENCOUNTER — OFFICE VISIT (OUTPATIENT)
Dept: INTERNAL MEDICINE | Facility: CLINIC | Age: 71
End: 2021-02-15
Payer: COMMERCIAL

## 2021-02-15 DIAGNOSIS — E11.9 TYPE 2 DIABETES MELLITUS WITHOUT COMPLICATION, WITHOUT LONG-TERM CURRENT USE OF INSULIN: ICD-10-CM

## 2021-02-15 DIAGNOSIS — I25.10 CORONARY ARTERY DISEASE INVOLVING NATIVE CORONARY ARTERY OF NATIVE HEART WITHOUT ANGINA PECTORIS: ICD-10-CM

## 2021-02-15 DIAGNOSIS — J80 ACUTE RESPIRATORY DISTRESS SYNDROME (ARDS) DUE TO COVID-19 VIRUS: Primary | ICD-10-CM

## 2021-02-15 DIAGNOSIS — I10 ESSENTIAL HYPERTENSION: ICD-10-CM

## 2021-02-15 DIAGNOSIS — U07.1 ACUTE RESPIRATORY DISTRESS SYNDROME (ARDS) DUE TO COVID-19 VIRUS: Primary | ICD-10-CM

## 2021-02-15 DIAGNOSIS — D75.1 POLYCYTHEMIA: ICD-10-CM

## 2021-02-15 DIAGNOSIS — Q21.12 PFO (PATENT FORAMEN OVALE): ICD-10-CM

## 2021-02-15 DIAGNOSIS — Z79.52 LONG TERM SYSTEMIC STEROID USER: ICD-10-CM

## 2021-02-15 PROCEDURE — 3051F HG A1C>EQUAL 7.0%<8.0%: CPT | Mod: CPTII,,, | Performed by: INTERNAL MEDICINE

## 2021-02-15 PROCEDURE — 1159F PR MEDICATION LIST DOCUMENTED IN MEDICAL RECORD: ICD-10-PCS | Mod: ,,, | Performed by: INTERNAL MEDICINE

## 2021-02-15 PROCEDURE — 3051F PR MOST RECENT HEMOGLOBIN A1C LEVEL 7.0 - < 8.0%: ICD-10-PCS | Mod: CPTII,,, | Performed by: INTERNAL MEDICINE

## 2021-02-15 PROCEDURE — 99214 OFFICE O/P EST MOD 30 MIN: CPT | Mod: 95,,, | Performed by: INTERNAL MEDICINE

## 2021-02-15 PROCEDURE — 99214 PR OFFICE/OUTPT VISIT, EST, LEVL IV, 30-39 MIN: ICD-10-PCS | Mod: 95,,, | Performed by: INTERNAL MEDICINE

## 2021-02-15 PROCEDURE — 1159F MED LIST DOCD IN RCRD: CPT | Mod: ,,, | Performed by: INTERNAL MEDICINE

## 2021-02-19 ENCOUNTER — PATIENT MESSAGE (OUTPATIENT)
Dept: CARDIOLOGY | Facility: CLINIC | Age: 71
End: 2021-02-19

## 2021-02-22 ENCOUNTER — PATIENT MESSAGE (OUTPATIENT)
Dept: CARDIOLOGY | Facility: CLINIC | Age: 71
End: 2021-02-22

## 2021-02-24 RX ORDER — TRAZODONE HYDROCHLORIDE 50 MG/1
TABLET ORAL
Qty: 90 TABLET | Refills: 3 | Status: SHIPPED | OUTPATIENT
Start: 2021-02-24 | End: 2022-01-03

## 2021-03-09 ENCOUNTER — OFFICE VISIT (OUTPATIENT)
Dept: INTERNAL MEDICINE | Facility: CLINIC | Age: 71
End: 2021-03-09
Payer: COMMERCIAL

## 2021-03-09 DIAGNOSIS — U07.1 ACUTE RESPIRATORY DISTRESS SYNDROME (ARDS) DUE TO COVID-19 VIRUS: Primary | ICD-10-CM

## 2021-03-09 DIAGNOSIS — I25.10 CORONARY ARTERY DISEASE INVOLVING NATIVE CORONARY ARTERY OF NATIVE HEART WITHOUT ANGINA PECTORIS: ICD-10-CM

## 2021-03-09 DIAGNOSIS — E11.9 TYPE 2 DIABETES MELLITUS WITHOUT COMPLICATION, WITHOUT LONG-TERM CURRENT USE OF INSULIN: ICD-10-CM

## 2021-03-09 DIAGNOSIS — I10 ESSENTIAL HYPERTENSION: ICD-10-CM

## 2021-03-09 DIAGNOSIS — Q21.12 PFO (PATENT FORAMEN OVALE): ICD-10-CM

## 2021-03-09 DIAGNOSIS — D75.1 POLYCYTHEMIA: ICD-10-CM

## 2021-03-09 DIAGNOSIS — R06.02 SOB (SHORTNESS OF BREATH): ICD-10-CM

## 2021-03-09 DIAGNOSIS — J80 ACUTE RESPIRATORY DISTRESS SYNDROME (ARDS) DUE TO COVID-19 VIRUS: Primary | ICD-10-CM

## 2021-03-09 PROCEDURE — 99214 PR OFFICE/OUTPT VISIT, EST, LEVL IV, 30-39 MIN: ICD-10-PCS | Mod: 95,,, | Performed by: INTERNAL MEDICINE

## 2021-03-09 PROCEDURE — 1159F PR MEDICATION LIST DOCUMENTED IN MEDICAL RECORD: ICD-10-PCS | Mod: 95,,, | Performed by: INTERNAL MEDICINE

## 2021-03-09 PROCEDURE — 99214 OFFICE O/P EST MOD 30 MIN: CPT | Mod: 95,,, | Performed by: INTERNAL MEDICINE

## 2021-03-09 PROCEDURE — 1159F MED LIST DOCD IN RCRD: CPT | Mod: 95,,, | Performed by: INTERNAL MEDICINE

## 2021-03-09 PROCEDURE — 3051F PR MOST RECENT HEMOGLOBIN A1C LEVEL 7.0 - < 8.0%: ICD-10-PCS | Mod: CPTII,,, | Performed by: INTERNAL MEDICINE

## 2021-03-09 PROCEDURE — 3051F HG A1C>EQUAL 7.0%<8.0%: CPT | Mod: CPTII,,, | Performed by: INTERNAL MEDICINE

## 2021-03-11 ENCOUNTER — PATIENT MESSAGE (OUTPATIENT)
Dept: CARDIOLOGY | Facility: CLINIC | Age: 71
End: 2021-03-11

## 2021-03-29 ENCOUNTER — PATIENT MESSAGE (OUTPATIENT)
Dept: CARDIOLOGY | Facility: CLINIC | Age: 71
End: 2021-03-29

## 2021-04-14 DIAGNOSIS — Z01.812 PRE-PROCEDURE LAB EXAM: Primary | ICD-10-CM

## 2021-04-18 ENCOUNTER — PATIENT OUTREACH (OUTPATIENT)
Dept: ADMINISTRATIVE | Facility: OTHER | Age: 71
End: 2021-04-18

## 2021-04-20 ENCOUNTER — LAB VISIT (OUTPATIENT)
Dept: INTERNAL MEDICINE | Facility: CLINIC | Age: 71
End: 2021-04-20
Payer: COMMERCIAL

## 2021-04-20 ENCOUNTER — OFFICE VISIT (OUTPATIENT)
Dept: CARDIOLOGY | Facility: CLINIC | Age: 71
End: 2021-04-20
Payer: COMMERCIAL

## 2021-04-20 VITALS
SYSTOLIC BLOOD PRESSURE: 119 MMHG | WEIGHT: 193.81 LBS | HEIGHT: 70 IN | DIASTOLIC BLOOD PRESSURE: 73 MMHG | OXYGEN SATURATION: 84 % | HEART RATE: 85 BPM | BODY MASS INDEX: 27.75 KG/M2

## 2021-04-20 DIAGNOSIS — R06.09 PLATYPNEA-ORTHODEOXIA SYNDROME: ICD-10-CM

## 2021-04-20 DIAGNOSIS — Z86.16 HISTORY OF 2019 NOVEL CORONAVIRUS DISEASE (COVID-19): ICD-10-CM

## 2021-04-20 DIAGNOSIS — E78.00 HYPERCHOLESTEREMIA: Chronic | ICD-10-CM

## 2021-04-20 DIAGNOSIS — E11.65 POORLY CONTROLLED DIABETES MELLITUS: ICD-10-CM

## 2021-04-20 DIAGNOSIS — I25.10 CORONARY ARTERY DISEASE INVOLVING NATIVE CORONARY ARTERY OF NATIVE HEART WITHOUT ANGINA PECTORIS: Chronic | ICD-10-CM

## 2021-04-20 DIAGNOSIS — I10 ESSENTIAL HYPERTENSION: Chronic | ICD-10-CM

## 2021-04-20 DIAGNOSIS — Q21.12 PFO (PATENT FORAMEN OVALE): Primary | ICD-10-CM

## 2021-04-20 DIAGNOSIS — Z01.812 PRE-PROCEDURE LAB EXAM: ICD-10-CM

## 2021-04-20 DIAGNOSIS — Z98.61 S/P PTCA (PERCUTANEOUS TRANSLUMINAL CORONARY ANGIOPLASTY): Chronic | ICD-10-CM

## 2021-04-20 PROCEDURE — 99999 PR PBB SHADOW E&M-EST. PATIENT-LVL IV: CPT | Mod: PBBFAC,,, | Performed by: PHYSICIAN ASSISTANT

## 2021-04-20 PROCEDURE — 1159F PR MEDICATION LIST DOCUMENTED IN MEDICAL RECORD: ICD-10-PCS | Mod: S$GLB,,, | Performed by: PHYSICIAN ASSISTANT

## 2021-04-20 PROCEDURE — 3008F BODY MASS INDEX DOCD: CPT | Mod: CPTII,S$GLB,, | Performed by: PHYSICIAN ASSISTANT

## 2021-04-20 PROCEDURE — 1159F MED LIST DOCD IN RCRD: CPT | Mod: S$GLB,,, | Performed by: PHYSICIAN ASSISTANT

## 2021-04-20 PROCEDURE — U0003 INFECTIOUS AGENT DETECTION BY NUCLEIC ACID (DNA OR RNA); SEVERE ACUTE RESPIRATORY SYNDROME CORONAVIRUS 2 (SARS-COV-2) (CORONAVIRUS DISEASE [COVID-19]), AMPLIFIED PROBE TECHNIQUE, MAKING USE OF HIGH THROUGHPUT TECHNOLOGIES AS DESCRIBED BY CMS-2020-01-R: HCPCS | Performed by: INTERNAL MEDICINE

## 2021-04-20 PROCEDURE — 1125F PR PAIN SEVERITY QUANTIFIED, PAIN PRESENT: ICD-10-PCS | Mod: S$GLB,,, | Performed by: PHYSICIAN ASSISTANT

## 2021-04-20 PROCEDURE — 3008F PR BODY MASS INDEX (BMI) DOCUMENTED: ICD-10-PCS | Mod: CPTII,S$GLB,, | Performed by: PHYSICIAN ASSISTANT

## 2021-04-20 PROCEDURE — 99999 PR PBB SHADOW E&M-EST. PATIENT-LVL IV: ICD-10-PCS | Mod: PBBFAC,,, | Performed by: PHYSICIAN ASSISTANT

## 2021-04-20 PROCEDURE — 99214 OFFICE O/P EST MOD 30 MIN: CPT | Mod: S$GLB,,, | Performed by: PHYSICIAN ASSISTANT

## 2021-04-20 PROCEDURE — 3051F PR MOST RECENT HEMOGLOBIN A1C LEVEL 7.0 - < 8.0%: ICD-10-PCS | Mod: CPTII,S$GLB,, | Performed by: PHYSICIAN ASSISTANT

## 2021-04-20 PROCEDURE — 99214 PR OFFICE/OUTPT VISIT, EST, LEVL IV, 30-39 MIN: ICD-10-PCS | Mod: S$GLB,,, | Performed by: PHYSICIAN ASSISTANT

## 2021-04-20 PROCEDURE — U0005 INFEC AGEN DETEC AMPLI PROBE: HCPCS | Performed by: INTERNAL MEDICINE

## 2021-04-20 PROCEDURE — 1125F AMNT PAIN NOTED PAIN PRSNT: CPT | Mod: S$GLB,,, | Performed by: PHYSICIAN ASSISTANT

## 2021-04-20 PROCEDURE — 3051F HG A1C>EQUAL 7.0%<8.0%: CPT | Mod: CPTII,S$GLB,, | Performed by: PHYSICIAN ASSISTANT

## 2021-04-22 LAB — SARS-COV-2 RNA RESP QL NAA+PROBE: NOT DETECTED

## 2021-04-23 ENCOUNTER — HOSPITAL ENCOUNTER (OUTPATIENT)
Facility: HOSPITAL | Age: 71
Discharge: HOME | End: 2021-04-23
Attending: INTERNAL MEDICINE | Admitting: INTERNAL MEDICINE
Payer: COMMERCIAL

## 2021-04-23 VITALS
HEIGHT: 70 IN | TEMPERATURE: 98 F | BODY MASS INDEX: 26.48 KG/M2 | DIASTOLIC BLOOD PRESSURE: 71 MMHG | HEART RATE: 75 BPM | SYSTOLIC BLOOD PRESSURE: 102 MMHG | WEIGHT: 185 LBS | OXYGEN SATURATION: 95 % | RESPIRATION RATE: 20 BRPM

## 2021-04-23 DIAGNOSIS — Q21.12 PFO (PATENT FORAMEN OVALE): ICD-10-CM

## 2021-04-23 DIAGNOSIS — Q21.10 ASD (ATRIAL SEPTAL DEFECT): ICD-10-CM

## 2021-04-23 DIAGNOSIS — Q21.12 PFO (PATENT FORAMEN OVALE): Primary | ICD-10-CM

## 2021-04-23 LAB
ABO + RH BLD: NORMAL
ANION GAP SERPL CALC-SCNC: 11 MMOL/L (ref 8–16)
BASOPHILS # BLD AUTO: 0.05 K/UL (ref 0–0.2)
BASOPHILS NFR BLD: 1 % (ref 0–1.9)
BLD GP AB SCN CELLS X3 SERPL QL: NORMAL
BUN SERPL-MCNC: 19 MG/DL (ref 8–23)
CALCIUM SERPL-MCNC: 8.8 MG/DL (ref 8.7–10.5)
CHLORIDE SERPL-SCNC: 109 MMOL/L (ref 95–110)
CO2 SERPL-SCNC: 16 MMOL/L (ref 23–29)
CREAT SERPL-MCNC: 0.9 MG/DL (ref 0.5–1.4)
DIFFERENTIAL METHOD: ABNORMAL
EOSINOPHIL # BLD AUTO: 0.1 K/UL (ref 0–0.5)
EOSINOPHIL NFR BLD: 1.8 % (ref 0–8)
ERYTHROCYTE [DISTWIDTH] IN BLOOD BY AUTOMATED COUNT: 20.4 % (ref 11.5–14.5)
EST. GFR  (AFRICAN AMERICAN): >60 ML/MIN/1.73 M^2
EST. GFR  (NON AFRICAN AMERICAN): >60 ML/MIN/1.73 M^2
GLUCOSE SERPL-MCNC: 127 MG/DL (ref 70–110)
HCT VFR BLD AUTO: 51.1 % (ref 40–54)
HGB BLD-MCNC: 15.5 G/DL (ref 14–18)
IMM GRANULOCYTES # BLD AUTO: 0.04 K/UL (ref 0–0.04)
IMM GRANULOCYTES NFR BLD AUTO: 0.8 % (ref 0–0.5)
LYMPHOCYTES # BLD AUTO: 0.6 K/UL (ref 1–4.8)
LYMPHOCYTES NFR BLD: 11.2 % (ref 18–48)
MCH RBC QN AUTO: 21.9 PG (ref 27–31)
MCHC RBC AUTO-ENTMCNC: 30.3 G/DL (ref 32–36)
MCV RBC AUTO: 72 FL (ref 82–98)
MONOCYTES # BLD AUTO: 0.8 K/UL (ref 0.3–1)
MONOCYTES NFR BLD: 15.9 % (ref 4–15)
NEUTROPHILS # BLD AUTO: 3.5 K/UL (ref 1.8–7.7)
NEUTROPHILS NFR BLD: 69.3 % (ref 38–73)
NRBC BLD-RTO: 0 /100 WBC
PLATELET # BLD AUTO: 254 K/UL (ref 150–450)
PMV BLD AUTO: 9 FL (ref 9.2–12.9)
POCT GLUCOSE: 132 MG/DL (ref 70–110)
POTASSIUM SERPL-SCNC: 3.8 MMOL/L (ref 3.5–5.1)
RBC # BLD AUTO: 7.07 M/UL (ref 4.6–6.2)
SODIUM SERPL-SCNC: 136 MMOL/L (ref 136–145)
WBC # BLD AUTO: 5.09 K/UL (ref 3.9–12.7)

## 2021-04-23 PROCEDURE — C1753 CATH, INTRAVAS ULTRASOUND: HCPCS | Performed by: INTERNAL MEDICINE

## 2021-04-23 PROCEDURE — 99153 MOD SED SAME PHYS/QHP EA: CPT | Performed by: INTERNAL MEDICINE

## 2021-04-23 PROCEDURE — 99152 MOD SED SAME PHYS/QHP 5/>YRS: CPT | Mod: ,,, | Performed by: INTERNAL MEDICINE

## 2021-04-23 PROCEDURE — C1817 SEPTAL DEFECT IMP SYS: HCPCS | Performed by: INTERNAL MEDICINE

## 2021-04-23 PROCEDURE — 93662 INTRACARDIAC ECG (ICE): CPT | Performed by: INTERNAL MEDICINE

## 2021-04-23 PROCEDURE — 27201423 OPTIME MED/SURG SUP & DEVICES STERILE SUPPLY: Performed by: INTERNAL MEDICINE

## 2021-04-23 PROCEDURE — 86900 BLOOD TYPING SEROLOGIC ABO: CPT | Performed by: STUDENT IN AN ORGANIZED HEALTH CARE EDUCATION/TRAINING PROGRAM

## 2021-04-23 PROCEDURE — 85025 COMPLETE CBC W/AUTO DIFF WBC: CPT | Performed by: INTERNAL MEDICINE

## 2021-04-23 PROCEDURE — 93580 TRANSCATH CLOSURE OF ASD: CPT | Mod: ,,, | Performed by: INTERNAL MEDICINE

## 2021-04-23 PROCEDURE — 25000003 PHARM REV CODE 250: Performed by: INTERNAL MEDICINE

## 2021-04-23 PROCEDURE — 93010 ELECTROCARDIOGRAM REPORT: CPT | Mod: ,,, | Performed by: STUDENT IN AN ORGANIZED HEALTH CARE EDUCATION/TRAINING PROGRAM

## 2021-04-23 PROCEDURE — 99152 MOD SED SAME PHYS/QHP 5/>YRS: CPT | Performed by: INTERNAL MEDICINE

## 2021-04-23 PROCEDURE — 25000003 PHARM REV CODE 250: Performed by: STUDENT IN AN ORGANIZED HEALTH CARE EDUCATION/TRAINING PROGRAM

## 2021-04-23 PROCEDURE — 93005 ELECTROCARDIOGRAM TRACING: CPT

## 2021-04-23 PROCEDURE — C1769 GUIDE WIRE: HCPCS | Performed by: INTERNAL MEDICINE

## 2021-04-23 PROCEDURE — 63600175 PHARM REV CODE 636 W HCPCS: Performed by: INTERNAL MEDICINE

## 2021-04-23 PROCEDURE — 93580 PR PERC CLOS,CONG INTERATRIAL COMMUN W/IMPL: ICD-10-PCS | Mod: ,,, | Performed by: INTERNAL MEDICINE

## 2021-04-23 PROCEDURE — 93662 INTRACARDIAC ECG (ICE): CPT | Mod: 26,,, | Performed by: INTERNAL MEDICINE

## 2021-04-23 PROCEDURE — 93580 TRANSCATH CLOSURE OF ASD: CPT | Performed by: INTERNAL MEDICINE

## 2021-04-23 PROCEDURE — 93010 EKG 12-LEAD: ICD-10-PCS | Mod: ,,, | Performed by: STUDENT IN AN ORGANIZED HEALTH CARE EDUCATION/TRAINING PROGRAM

## 2021-04-23 PROCEDURE — 82962 GLUCOSE BLOOD TEST: CPT | Performed by: INTERNAL MEDICINE

## 2021-04-23 PROCEDURE — 93662 PR INTRACARD ECHO, THER/DX INTERVENT: ICD-10-PCS | Mod: 26,,, | Performed by: INTERNAL MEDICINE

## 2021-04-23 PROCEDURE — 80048 BASIC METABOLIC PNL TOTAL CA: CPT | Performed by: INTERNAL MEDICINE

## 2021-04-23 PROCEDURE — 99152 PR MOD CONSCIOUS SEDATION, SAME PHYS, 5+ YRS, FIRST 15 MIN: ICD-10-PCS | Mod: ,,, | Performed by: INTERNAL MEDICINE

## 2021-04-23 PROCEDURE — C1894 INTRO/SHEATH, NON-LASER: HCPCS | Performed by: INTERNAL MEDICINE

## 2021-04-23 DEVICE — IMPLANTABLE DEVICE: Type: IMPLANTABLE DEVICE | Site: HEART | Status: FUNCTIONAL

## 2021-04-23 RX ORDER — CLOPIDOGREL BISULFATE 75 MG/1
75 TABLET ORAL ONCE
Status: COMPLETED | OUTPATIENT
Start: 2021-04-23 | End: 2021-04-23

## 2021-04-23 RX ORDER — MIDAZOLAM HYDROCHLORIDE 1 MG/ML
INJECTION, SOLUTION INTRAMUSCULAR; INTRAVENOUS
Status: DISCONTINUED | OUTPATIENT
Start: 2021-04-23 | End: 2021-04-23 | Stop reason: HOSPADM

## 2021-04-23 RX ORDER — FENTANYL CITRATE 50 UG/ML
INJECTION, SOLUTION INTRAMUSCULAR; INTRAVENOUS
Status: DISCONTINUED | OUTPATIENT
Start: 2021-04-23 | End: 2021-04-23 | Stop reason: HOSPADM

## 2021-04-23 RX ORDER — ASPIRIN 81 MG/1
81 TABLET ORAL ONCE
Status: COMPLETED | OUTPATIENT
Start: 2021-04-23 | End: 2021-04-23

## 2021-04-23 RX ORDER — DIPHENHYDRAMINE HCL 50 MG
50 CAPSULE ORAL ONCE
Status: COMPLETED | OUTPATIENT
Start: 2021-04-23 | End: 2021-04-23

## 2021-04-23 RX ORDER — SODIUM CHLORIDE 9 MG/ML
INJECTION, SOLUTION INTRAVENOUS CONTINUOUS
Status: ACTIVE | OUTPATIENT
Start: 2021-04-23 | End: 2021-04-23

## 2021-04-23 RX ORDER — CLOPIDOGREL BISULFATE 75 MG/1
75 TABLET ORAL DAILY
Qty: 30 TABLET | Refills: 11 | Status: SHIPPED | OUTPATIENT
Start: 2021-04-23 | End: 2021-11-02

## 2021-04-23 RX ORDER — HEPARIN SOD,PORCINE/0.9 % NACL 1000/500ML
INTRAVENOUS SOLUTION INTRAVENOUS
Status: DISCONTINUED | OUTPATIENT
Start: 2021-04-23 | End: 2021-04-23 | Stop reason: HOSPADM

## 2021-04-23 RX ORDER — LIDOCAINE HYDROCHLORIDE 20 MG/ML
INJECTION, SOLUTION EPIDURAL; INFILTRATION; INTRACAUDAL; PERINEURAL
Status: DISCONTINUED | OUTPATIENT
Start: 2021-04-23 | End: 2021-04-23 | Stop reason: HOSPADM

## 2021-04-23 RX ORDER — CEFAZOLIN SODIUM 1 G/3ML
INJECTION, POWDER, FOR SOLUTION INTRAMUSCULAR; INTRAVENOUS
Status: DISCONTINUED | OUTPATIENT
Start: 2021-04-23 | End: 2021-04-23 | Stop reason: HOSPADM

## 2021-04-23 RX ORDER — HEPARIN SODIUM 1000 [USP'U]/ML
INJECTION, SOLUTION INTRAVENOUS; SUBCUTANEOUS
Status: DISCONTINUED | OUTPATIENT
Start: 2021-04-23 | End: 2021-04-23 | Stop reason: HOSPADM

## 2021-04-23 RX ADMIN — SODIUM CHLORIDE: 0.9 INJECTION, SOLUTION INTRAVENOUS at 07:04

## 2021-04-23 RX ADMIN — DIPHENHYDRAMINE HYDROCHLORIDE 50 MG: 50 CAPSULE ORAL at 07:04

## 2021-04-23 RX ADMIN — ASPIRIN 81 MG: 81 TABLET, COATED ORAL at 11:04

## 2021-04-23 RX ADMIN — CLOPIDOGREL 75 MG: 75 TABLET, FILM COATED ORAL at 11:04

## 2021-04-26 ENCOUNTER — PATIENT MESSAGE (OUTPATIENT)
Dept: CARDIOLOGY | Facility: CLINIC | Age: 71
End: 2021-04-26

## 2021-04-27 ENCOUNTER — PATIENT MESSAGE (OUTPATIENT)
Dept: INTERNAL MEDICINE | Facility: CLINIC | Age: 71
End: 2021-04-27

## 2021-04-27 ENCOUNTER — PATIENT MESSAGE (OUTPATIENT)
Dept: PULMONOLOGY | Facility: CLINIC | Age: 71
End: 2021-04-27

## 2021-06-01 ENCOUNTER — PATIENT MESSAGE (OUTPATIENT)
Dept: UROLOGY | Facility: CLINIC | Age: 71
End: 2021-06-01

## 2021-06-01 ENCOUNTER — TELEPHONE (OUTPATIENT)
Dept: UROLOGY | Facility: CLINIC | Age: 71
End: 2021-06-01

## 2021-06-04 ENCOUNTER — PATIENT MESSAGE (OUTPATIENT)
Dept: ENDOSCOPY | Facility: HOSPITAL | Age: 71
End: 2021-06-04

## 2021-06-04 ENCOUNTER — PATIENT MESSAGE (OUTPATIENT)
Dept: INTERNAL MEDICINE | Facility: CLINIC | Age: 71
End: 2021-06-04

## 2021-06-04 DIAGNOSIS — N40.1 BENIGN PROSTATIC HYPERPLASIA WITH URINARY OBSTRUCTION: ICD-10-CM

## 2021-06-04 DIAGNOSIS — N13.8 BENIGN PROSTATIC HYPERPLASIA WITH URINARY OBSTRUCTION: ICD-10-CM

## 2021-06-05 RX ORDER — ALFUZOSIN HYDROCHLORIDE 10 MG/1
10 TABLET, EXTENDED RELEASE ORAL NIGHTLY
Qty: 30 TABLET | Refills: 3 | Status: SHIPPED | OUTPATIENT
Start: 2021-06-05 | End: 2021-12-01 | Stop reason: SDUPTHER

## 2021-07-06 ENCOUNTER — PATIENT MESSAGE (OUTPATIENT)
Dept: ADMINISTRATIVE | Facility: HOSPITAL | Age: 71
End: 2021-07-06

## 2021-08-03 ENCOUNTER — PATIENT MESSAGE (OUTPATIENT)
Dept: ADMINISTRATIVE | Facility: HOSPITAL | Age: 71
End: 2021-08-03

## 2021-08-18 ENCOUNTER — PATIENT MESSAGE (OUTPATIENT)
Dept: ENDOCRINOLOGY | Facility: CLINIC | Age: 71
End: 2021-08-18

## 2021-08-18 ENCOUNTER — PATIENT MESSAGE (OUTPATIENT)
Dept: INTERNAL MEDICINE | Facility: CLINIC | Age: 71
End: 2021-08-18

## 2021-08-18 RX ORDER — NITROGLYCERIN 0.4 MG/1
0.4 TABLET SUBLINGUAL EVERY 5 MIN PRN
Qty: 100 TABLET | Refills: 3 | Status: SHIPPED | OUTPATIENT
Start: 2021-08-18 | End: 2023-03-02 | Stop reason: SDUPTHER

## 2021-08-18 RX ORDER — NITROGLYCERIN 0.4 MG/1
0.4 TABLET SUBLINGUAL EVERY 5 MIN PRN
COMMUNITY
End: 2021-08-18 | Stop reason: SDUPTHER

## 2021-08-20 RX ORDER — METFORMIN HYDROCHLORIDE 500 MG/1
500 TABLET, EXTENDED RELEASE ORAL SEE ADMIN INSTRUCTIONS
COMMUNITY
End: 2021-08-20 | Stop reason: SDUPTHER

## 2021-08-22 RX ORDER — METFORMIN HYDROCHLORIDE 500 MG/1
500 TABLET, EXTENDED RELEASE ORAL SEE ADMIN INSTRUCTIONS
Qty: 360 TABLET | Refills: 3 | Status: SHIPPED | OUTPATIENT
Start: 2021-08-22 | End: 2022-08-16 | Stop reason: SDUPTHER

## 2021-08-25 RX ORDER — METOPROLOL SUCCINATE 50 MG/1
TABLET, EXTENDED RELEASE ORAL
Qty: 180 TABLET | Refills: 3 | Status: SHIPPED | OUTPATIENT
Start: 2021-08-25 | End: 2021-11-02

## 2021-09-10 ENCOUNTER — PATIENT MESSAGE (OUTPATIENT)
Dept: CARDIOLOGY | Facility: CLINIC | Age: 71
End: 2021-09-10

## 2021-09-20 NOTE — TELEPHONE ENCOUNTER
Called and spoke with patient in regards to scheduling his phlebotomies. Patient asked to do it on Wednesday at 9am for the next 4 weeks.   I told him I would get that scheduled, he voiced appreciation.  Called Carli in Apheresis, gave her the patient info as well as dates/times. She said she would get him scheduled.   O-Z Plasty Text: The defect edges were debeveled with a #15 scalpel blade.  Given the location of the defect, shape of the defect and the proximity to free margins an O-Z plasty (double transposition flap) was deemed most appropriate.  Using a sterile surgical marker, the appropriate transposition flaps were drawn incorporating the defect and placing the expected incisions within the relaxed skin tension lines where possible.    The area thus outlined was incised deep to adipose tissue with a #15 scalpel blade.  The skin margins were undermined to an appropriate distance in all directions utilizing iris scissors.  Hemostasis was achieved with electrocautery.  The flaps were then transposed into place, one clockwise and the other counterclockwise, and anchored with interrupted buried subcutaneous sutures.

## 2021-10-06 ENCOUNTER — IMMUNIZATION (OUTPATIENT)
Dept: INTERNAL MEDICINE | Facility: CLINIC | Age: 71
End: 2021-10-06
Payer: COMMERCIAL

## 2021-10-06 DIAGNOSIS — Z23 NEED FOR VACCINATION: Primary | ICD-10-CM

## 2021-10-06 PROCEDURE — 91300 COVID-19, MRNA, LNP-S, PF, 30 MCG/0.3 ML DOSE VACCINE: CPT | Mod: PBBFAC | Performed by: INTERNAL MEDICINE

## 2021-10-06 PROCEDURE — 0003A COVID-19, MRNA, LNP-S, PF, 30 MCG/0.3 ML DOSE VACCINE: CPT | Mod: CV19,PBBFAC | Performed by: INTERNAL MEDICINE

## 2021-10-18 ENCOUNTER — PATIENT MESSAGE (OUTPATIENT)
Dept: ADMINISTRATIVE | Facility: HOSPITAL | Age: 71
End: 2021-10-18
Payer: COMMERCIAL

## 2021-10-25 ENCOUNTER — PATIENT MESSAGE (OUTPATIENT)
Dept: CARDIOLOGY | Facility: CLINIC | Age: 71
End: 2021-10-25
Payer: COMMERCIAL

## 2021-10-26 ENCOUNTER — HOSPITAL ENCOUNTER (OUTPATIENT)
Dept: CARDIOLOGY | Facility: HOSPITAL | Age: 71
Discharge: HOME | End: 2021-10-26
Attending: INTERNAL MEDICINE
Payer: MEDICARE

## 2021-10-26 ENCOUNTER — OFFICE VISIT (OUTPATIENT)
Dept: CARDIOLOGY | Facility: CLINIC | Age: 71
End: 2021-10-26
Payer: MEDICARE

## 2021-10-26 VITALS
BODY MASS INDEX: 28.53 KG/M2 | WEIGHT: 199.31 LBS | HEIGHT: 70 IN | SYSTOLIC BLOOD PRESSURE: 132 MMHG | OXYGEN SATURATION: 97 % | HEART RATE: 88 BPM | DIASTOLIC BLOOD PRESSURE: 89 MMHG

## 2021-10-26 VITALS
SYSTOLIC BLOOD PRESSURE: 132 MMHG | HEIGHT: 69 IN | WEIGHT: 185 LBS | BODY MASS INDEX: 27.4 KG/M2 | DIASTOLIC BLOOD PRESSURE: 89 MMHG | HEART RATE: 83 BPM

## 2021-10-26 DIAGNOSIS — I25.10 CORONARY ARTERY DISEASE INVOLVING NATIVE CORONARY ARTERY OF NATIVE HEART WITHOUT ANGINA PECTORIS: ICD-10-CM

## 2021-10-26 DIAGNOSIS — I10 ESSENTIAL HYPERTENSION: ICD-10-CM

## 2021-10-26 DIAGNOSIS — Q21.12 PFO (PATENT FORAMEN OVALE): Primary | ICD-10-CM

## 2021-10-26 DIAGNOSIS — Q21.12 PFO (PATENT FORAMEN OVALE): ICD-10-CM

## 2021-10-26 DIAGNOSIS — E78.00 HYPERCHOLESTEREMIA: ICD-10-CM

## 2021-10-26 LAB
ASCENDING AORTA: 3.9 CM
AV INDEX (PROSTH): 0.72
AV MEAN GRADIENT: 2 MMHG
AV PEAK GRADIENT: 4 MMHG
AV VALVE AREA: 4.11 CM2
AV VELOCITY RATIO: 0.62
BSA FOR ECHO PROCEDURE: 2.02 M2
CV ECHO LV RWT: 0.44 CM
DOP CALC AO PEAK VEL: 1.05 M/S
DOP CALC AO VTI: 19.74 CM
DOP CALC LVOT AREA: 5.7 CM2
DOP CALC LVOT DIAMETER: 2.69 CM
DOP CALC LVOT PEAK VEL: 0.65 M/S
DOP CALC LVOT STROKE VOLUME: 81.23 CM3
DOP CALCLVOT PEAK VEL VTI: 14.3 CM
E WAVE DECELERATION TIME: 152.37 MSEC
E/A RATIO: 2.4
E/E' RATIO: 10.1 M/S
ECHO LV POSTERIOR WALL: 1.03 CM (ref 0.6–1.1)
EJECTION FRACTION: 55 %
FRACTIONAL SHORTENING: 23 % (ref 28–44)
INTERVENTRICULAR SEPTUM: 1.14 CM (ref 0.6–1.1)
LA MAJOR: 5.14 CM
LA MINOR: 5.33 CM
LA WIDTH: 3.9 CM
LEFT ATRIUM SIZE: 3.97 CM
LEFT ATRIUM VOLUME INDEX MOD: 24 ML/M2
LEFT ATRIUM VOLUME INDEX: 34.4 ML/M2
LEFT ATRIUM VOLUME MOD: 48.07 CM3
LEFT ATRIUM VOLUME: 68.87 CM3
LEFT INTERNAL DIMENSION IN SYSTOLE: 3.62 CM (ref 2.1–4)
LEFT VENTRICLE DIASTOLIC VOLUME INDEX: 52.01 ML/M2
LEFT VENTRICLE DIASTOLIC VOLUME: 104.01 ML
LEFT VENTRICLE MASS INDEX: 93 G/M2
LEFT VENTRICLE SYSTOLIC VOLUME INDEX: 27.6 ML/M2
LEFT VENTRICLE SYSTOLIC VOLUME: 55.27 ML
LEFT VENTRICULAR INTERNAL DIMENSION IN DIASTOLE: 4.73 CM (ref 3.5–6)
LEFT VENTRICULAR MASS: 185.87 G
LV LATERAL E/E' RATIO: 7.77 M/S
LV SEPTAL E/E' RATIO: 14.43 M/S
MV PEAK A VEL: 0.42 M/S
MV PEAK E VEL: 1.01 M/S
MV STENOSIS PRESSURE HALF TIME: 44.19 MS
MV VALVE AREA P 1/2 METHOD: 4.98 CM2
PISA TR MAX VEL: 2.61 M/S
PULM VEIN S/D RATIO: 0.59
PV PEAK D VEL: 0.63 M/S
PV PEAK S VEL: 0.37 M/S
RA MAJOR: 5.67 CM
RA PRESSURE: 3 MMHG
RA WIDTH: 3.7 CM
RIGHT VENTRICULAR END-DIASTOLIC DIMENSION: 4.61 CM
RV TISSUE DOPPLER FREE WALL SYSTOLIC VELOCITY 1 (APICAL 4 CHAMBER VIEW): 11.13 CM/S
SINUS: 3.99 CM
STJ: 3.18 CM
TDI LATERAL: 0.13 M/S
TDI SEPTAL: 0.07 M/S
TDI: 0.1 M/S
TR MAX PG: 27 MMHG
TRICUSPID ANNULAR PLANE SYSTOLIC EXCURSION: 1.91 CM
TV REST PULMONARY ARTERY PRESSURE: 30 MMHG

## 2021-10-26 PROCEDURE — 93306 TTE W/DOPPLER COMPLETE: CPT

## 2021-10-26 PROCEDURE — 99214 OFFICE O/P EST MOD 30 MIN: CPT | Mod: PBBFAC,25 | Performed by: PHYSICIAN ASSISTANT

## 2021-10-26 PROCEDURE — 93306 ECHO (CUPID ONLY): ICD-10-PCS | Mod: 26,,, | Performed by: INTERNAL MEDICINE

## 2021-10-26 PROCEDURE — 99214 PR OFFICE/OUTPT VISIT, EST, LEVL IV, 30-39 MIN: ICD-10-PCS | Mod: S$PBB,,, | Performed by: PHYSICIAN ASSISTANT

## 2021-10-26 PROCEDURE — 99999 PR PBB SHADOW E&M-EST. PATIENT-LVL IV: CPT | Mod: PBBFAC,,, | Performed by: PHYSICIAN ASSISTANT

## 2021-10-26 PROCEDURE — 99999 PR PBB SHADOW E&M-EST. PATIENT-LVL IV: ICD-10-PCS | Mod: PBBFAC,,, | Performed by: PHYSICIAN ASSISTANT

## 2021-10-26 PROCEDURE — 99214 OFFICE O/P EST MOD 30 MIN: CPT | Mod: S$PBB,,, | Performed by: PHYSICIAN ASSISTANT

## 2021-10-26 PROCEDURE — 93306 TTE W/DOPPLER COMPLETE: CPT | Mod: 26,,, | Performed by: INTERNAL MEDICINE

## 2021-11-01 ENCOUNTER — PATIENT OUTREACH (OUTPATIENT)
Dept: ADMINISTRATIVE | Facility: OTHER | Age: 71
End: 2021-11-01
Payer: COMMERCIAL

## 2021-11-01 DIAGNOSIS — E11.9 TYPE 2 DIABETES MELLITUS WITHOUT COMPLICATION, UNSPECIFIED WHETHER LONG TERM INSULIN USE: Primary | ICD-10-CM

## 2021-11-01 DIAGNOSIS — E11.9 TYPE 2 DIABETES MELLITUS WITHOUT COMPLICATION, WITHOUT LONG-TERM CURRENT USE OF INSULIN: ICD-10-CM

## 2021-11-02 ENCOUNTER — OFFICE VISIT (OUTPATIENT)
Dept: CARDIOLOGY | Facility: CLINIC | Age: 71
End: 2021-11-02
Payer: MEDICARE

## 2021-11-02 ENCOUNTER — TELEPHONE (OUTPATIENT)
Dept: ELECTROPHYSIOLOGY | Facility: CLINIC | Age: 71
End: 2021-11-02

## 2021-11-02 ENCOUNTER — OFFICE VISIT (OUTPATIENT)
Dept: ELECTROPHYSIOLOGY | Facility: CLINIC | Age: 71
End: 2021-11-02
Payer: MEDICARE

## 2021-11-02 VITALS
WEIGHT: 198.19 LBS | HEART RATE: 68 BPM | DIASTOLIC BLOOD PRESSURE: 80 MMHG | SYSTOLIC BLOOD PRESSURE: 130 MMHG | HEIGHT: 70 IN | BODY MASS INDEX: 28.37 KG/M2

## 2021-11-02 VITALS
BODY MASS INDEX: 28.37 KG/M2 | HEIGHT: 70 IN | WEIGHT: 198.19 LBS | DIASTOLIC BLOOD PRESSURE: 80 MMHG | SYSTOLIC BLOOD PRESSURE: 130 MMHG

## 2021-11-02 DIAGNOSIS — Q21.12 PFO (PATENT FORAMEN OVALE): ICD-10-CM

## 2021-11-02 DIAGNOSIS — E78.00 HYPERCHOLESTEREMIA: Chronic | ICD-10-CM

## 2021-11-02 DIAGNOSIS — I48.3 TYPICAL ATRIAL FLUTTER: ICD-10-CM

## 2021-11-02 DIAGNOSIS — E11.9 TYPE 2 DIABETES MELLITUS WITHOUT COMPLICATION, WITHOUT LONG-TERM CURRENT USE OF INSULIN: ICD-10-CM

## 2021-11-02 DIAGNOSIS — Z98.61 S/P PTCA (PERCUTANEOUS TRANSLUMINAL CORONARY ANGIOPLASTY): Chronic | ICD-10-CM

## 2021-11-02 DIAGNOSIS — I10 ESSENTIAL HYPERTENSION: Chronic | ICD-10-CM

## 2021-11-02 DIAGNOSIS — Z86.16 HISTORY OF 2019 NOVEL CORONAVIRUS DISEASE (COVID-19): ICD-10-CM

## 2021-11-02 DIAGNOSIS — I71.20 THORACIC AORTIC ANEURYSM WITHOUT RUPTURE: ICD-10-CM

## 2021-11-02 DIAGNOSIS — Z87.74 S/P PERCUTANEOUS PATENT FORAMEN OVALE CLOSURE: ICD-10-CM

## 2021-11-02 DIAGNOSIS — I25.10 CORONARY ARTERY DISEASE INVOLVING NATIVE CORONARY ARTERY OF NATIVE HEART WITHOUT ANGINA PECTORIS: Primary | Chronic | ICD-10-CM

## 2021-11-02 PROBLEM — J96.01 ACUTE HYPOXEMIC RESPIRATORY FAILURE: Status: RESOLVED | Noted: 2020-10-16 | Resolved: 2021-11-02

## 2021-11-02 PROCEDURE — 99205 OFFICE O/P NEW HI 60 MIN: CPT | Mod: S$PBB,,, | Performed by: INTERNAL MEDICINE

## 2021-11-02 PROCEDURE — 99205 PR OFFICE/OUTPT VISIT, NEW, LEVL V, 60-74 MIN: ICD-10-PCS | Mod: S$PBB,,, | Performed by: INTERNAL MEDICINE

## 2021-11-02 PROCEDURE — 99999 PR PBB SHADOW E&M-EST. PATIENT-LVL IV: ICD-10-PCS | Mod: PBBFAC,,, | Performed by: INTERNAL MEDICINE

## 2021-11-02 PROCEDURE — 93010 ELECTROCARDIOGRAM REPORT: CPT | Mod: S$PBB,,, | Performed by: INTERNAL MEDICINE

## 2021-11-02 PROCEDURE — 93010 EKG 12-LEAD: ICD-10-PCS | Mod: S$PBB,,, | Performed by: INTERNAL MEDICINE

## 2021-11-02 PROCEDURE — 99999 PR PBB SHADOW E&M-EST. PATIENT-LVL V: CPT | Mod: PBBFAC,,, | Performed by: NURSE PRACTITIONER

## 2021-11-02 PROCEDURE — 93005 ELECTROCARDIOGRAM TRACING: CPT | Mod: PBBFAC,PO | Performed by: INTERNAL MEDICINE

## 2021-11-02 PROCEDURE — 99214 PR OFFICE/OUTPT VISIT, EST, LEVL IV, 30-39 MIN: ICD-10-PCS | Mod: S$PBB,,, | Performed by: NURSE PRACTITIONER

## 2021-11-02 PROCEDURE — 99214 OFFICE O/P EST MOD 30 MIN: CPT | Mod: PBBFAC,27 | Performed by: INTERNAL MEDICINE

## 2021-11-02 PROCEDURE — 99214 OFFICE O/P EST MOD 30 MIN: CPT | Mod: S$PBB,,, | Performed by: NURSE PRACTITIONER

## 2021-11-02 PROCEDURE — 99999 PR PBB SHADOW E&M-EST. PATIENT-LVL IV: CPT | Mod: PBBFAC,,, | Performed by: INTERNAL MEDICINE

## 2021-11-02 PROCEDURE — 99999 PR PBB SHADOW E&M-EST. PATIENT-LVL V: ICD-10-PCS | Mod: PBBFAC,,, | Performed by: NURSE PRACTITIONER

## 2021-11-02 PROCEDURE — 99215 OFFICE O/P EST HI 40 MIN: CPT | Mod: PBBFAC,PO | Performed by: NURSE PRACTITIONER

## 2021-11-02 RX ORDER — METOPROLOL SUCCINATE 100 MG/1
100 TABLET, EXTENDED RELEASE ORAL DAILY
Qty: 90 TABLET | Refills: 3 | Status: ON HOLD | OUTPATIENT
Start: 2021-11-02 | End: 2021-11-05 | Stop reason: SDUPTHER

## 2021-11-02 RX ORDER — ROSUVASTATIN CALCIUM 20 MG/1
20 TABLET, COATED ORAL NIGHTLY
Qty: 90 TABLET | Refills: 3 | Status: SHIPPED | OUTPATIENT
Start: 2021-11-02 | End: 2022-12-02

## 2021-11-03 ENCOUNTER — TELEPHONE (OUTPATIENT)
Dept: ELECTROPHYSIOLOGY | Facility: CLINIC | Age: 71
End: 2021-11-03
Payer: COMMERCIAL

## 2021-11-04 ENCOUNTER — PATIENT MESSAGE (OUTPATIENT)
Dept: ELECTROPHYSIOLOGY | Facility: CLINIC | Age: 71
End: 2021-11-04
Payer: COMMERCIAL

## 2021-11-04 ENCOUNTER — ANESTHESIA EVENT (OUTPATIENT)
Dept: MEDSURG UNIT | Facility: HOSPITAL | Age: 71
End: 2021-11-04
Payer: MEDICARE

## 2021-11-04 DIAGNOSIS — I48.3 TYPICAL ATRIAL FLUTTER: Primary | ICD-10-CM

## 2021-11-05 ENCOUNTER — HOSPITAL ENCOUNTER (OUTPATIENT)
Facility: HOSPITAL | Age: 71
Discharge: HOME | End: 2021-11-05
Attending: INTERNAL MEDICINE | Admitting: INTERNAL MEDICINE
Payer: MEDICARE

## 2021-11-05 ENCOUNTER — HOSPITAL ENCOUNTER (OUTPATIENT)
Dept: CARDIOLOGY | Facility: HOSPITAL | Age: 71
Discharge: HOME | End: 2021-11-05
Attending: INTERNAL MEDICINE
Payer: MEDICARE

## 2021-11-05 ENCOUNTER — ANESTHESIA (OUTPATIENT)
Dept: MEDSURG UNIT | Facility: HOSPITAL | Age: 71
End: 2021-11-05
Payer: MEDICARE

## 2021-11-05 VITALS
WEIGHT: 198 LBS | SYSTOLIC BLOOD PRESSURE: 113 MMHG | HEIGHT: 70 IN | BODY MASS INDEX: 28.35 KG/M2 | TEMPERATURE: 98 F | OXYGEN SATURATION: 93 % | RESPIRATION RATE: 15 BRPM | HEART RATE: 81 BPM | DIASTOLIC BLOOD PRESSURE: 73 MMHG

## 2021-11-05 VITALS
DIASTOLIC BLOOD PRESSURE: 73 MMHG | BODY MASS INDEX: 28.35 KG/M2 | SYSTOLIC BLOOD PRESSURE: 131 MMHG | HEIGHT: 70 IN | WEIGHT: 198 LBS

## 2021-11-05 DIAGNOSIS — Z98.890 S/P ABLATION OF ATRIAL FLUTTER: ICD-10-CM

## 2021-11-05 DIAGNOSIS — Z86.79 S/P ABLATION OF ATRIAL FLUTTER: ICD-10-CM

## 2021-11-05 DIAGNOSIS — I25.10 CORONARY ARTERY DISEASE INVOLVING NATIVE CORONARY ARTERY OF NATIVE HEART WITHOUT ANGINA PECTORIS: Chronic | ICD-10-CM

## 2021-11-05 DIAGNOSIS — I48.92 ATRIAL FLUTTER: ICD-10-CM

## 2021-11-05 DIAGNOSIS — I48.3 TYPICAL ATRIAL FLUTTER: ICD-10-CM

## 2021-11-05 LAB
ANION GAP SERPL CALC-SCNC: 9 MMOL/L (ref 8–16)
APTT BLDCRRT: 32.6 SEC (ref 21–32)
ASCENDING AORTA: 4 CM
BASOPHILS # BLD AUTO: 0.03 K/UL (ref 0–0.2)
BASOPHILS NFR BLD: 0.5 % (ref 0–1.9)
BSA FOR ECHO PROCEDURE: 2.1 M2
BUN SERPL-MCNC: 27 MG/DL (ref 8–23)
CALCIUM SERPL-MCNC: 8.9 MG/DL (ref 8.7–10.5)
CHLORIDE SERPL-SCNC: 108 MMOL/L (ref 95–110)
CO2 SERPL-SCNC: 21 MMOL/L (ref 23–29)
CREAT SERPL-MCNC: 0.8 MG/DL (ref 0.5–1.4)
DIFFERENTIAL METHOD: ABNORMAL
EJECTION FRACTION: 40 %
EOSINOPHIL # BLD AUTO: 0.1 K/UL (ref 0–0.5)
EOSINOPHIL NFR BLD: 2.2 % (ref 0–8)
ERYTHROCYTE [DISTWIDTH] IN BLOOD BY AUTOMATED COUNT: 19.6 % (ref 11.5–14.5)
EST. GFR  (AFRICAN AMERICAN): >60 ML/MIN/1.73 M^2
EST. GFR  (NON AFRICAN AMERICAN): >60 ML/MIN/1.73 M^2
GLUCOSE SERPL-MCNC: 142 MG/DL (ref 70–110)
HCT VFR BLD AUTO: 49.4 % (ref 40–54)
HGB BLD-MCNC: 15.2 G/DL (ref 14–18)
IMM GRANULOCYTES # BLD AUTO: 0.03 K/UL (ref 0–0.04)
IMM GRANULOCYTES NFR BLD AUTO: 0.5 % (ref 0–0.5)
INR PPP: 1.1 (ref 0.8–1.2)
LYMPHOCYTES # BLD AUTO: 0.7 K/UL (ref 1–4.8)
LYMPHOCYTES NFR BLD: 11.5 % (ref 18–48)
MCH RBC QN AUTO: 24.8 PG (ref 27–31)
MCHC RBC AUTO-ENTMCNC: 30.8 G/DL (ref 32–36)
MCV RBC AUTO: 81 FL (ref 82–98)
MONOCYTES # BLD AUTO: 0.7 K/UL (ref 0.3–1)
MONOCYTES NFR BLD: 11.5 % (ref 4–15)
NEUTROPHILS # BLD AUTO: 4.3 K/UL (ref 1.8–7.7)
NEUTROPHILS NFR BLD: 73.8 % (ref 38–73)
NRBC BLD-RTO: 0 /100 WBC
PLATELET # BLD AUTO: 185 K/UL (ref 150–450)
PMV BLD AUTO: 9.2 FL (ref 9.2–12.9)
POCT GLUCOSE: 142 MG/DL (ref 70–110)
POTASSIUM SERPL-SCNC: 4.3 MMOL/L (ref 3.5–5.1)
PROTHROMBIN TIME: 11.7 SEC (ref 9–12.5)
RBC # BLD AUTO: 6.12 M/UL (ref 4.6–6.2)
SINUS: 4.1 CM
SODIUM SERPL-SCNC: 138 MMOL/L (ref 136–145)
STJ: 3.6 CM
WBC # BLD AUTO: 5.89 K/UL (ref 3.9–12.7)

## 2021-11-05 PROCEDURE — 93010 EKG 12-LEAD: ICD-10-PCS | Mod: ,,, | Performed by: INTERNAL MEDICINE

## 2021-11-05 PROCEDURE — 93653 COMPRE EP EVAL TX SVT: CPT | Performed by: INTERNAL MEDICINE

## 2021-11-05 PROCEDURE — 93312 ECHO TRANSESOPHAGEAL: CPT | Mod: 26,,, | Performed by: INTERNAL MEDICINE

## 2021-11-05 PROCEDURE — 85610 PROTHROMBIN TIME: CPT | Performed by: INTERNAL MEDICINE

## 2021-11-05 PROCEDURE — 80048 BASIC METABOLIC PNL TOTAL CA: CPT | Performed by: INTERNAL MEDICINE

## 2021-11-05 PROCEDURE — 93010 ELECTROCARDIOGRAM REPORT: CPT | Mod: ,,, | Performed by: INTERNAL MEDICINE

## 2021-11-05 PROCEDURE — 93312 TRANSESOPHAGEAL ECHO (TEE) (CUPID ONLY): ICD-10-PCS | Mod: 26,,, | Performed by: INTERNAL MEDICINE

## 2021-11-05 PROCEDURE — 93621 COMP EP EVL L PAC&REC C SINS: CPT

## 2021-11-05 PROCEDURE — 93005 ELECTROCARDIOGRAM TRACING: CPT | Mod: 59

## 2021-11-05 PROCEDURE — 93005 ELECTROCARDIOGRAM TRACING: CPT

## 2021-11-05 PROCEDURE — 93621: ICD-10-PCS | Mod: 26,,, | Performed by: INTERNAL MEDICINE

## 2021-11-05 PROCEDURE — 93653 PR ELECTROPHYS EVAL, COMPREHEN, W/SUPRAVENT TACHYCARD TRMT: ICD-10-PCS | Mod: ,,, | Performed by: INTERNAL MEDICINE

## 2021-11-05 PROCEDURE — D9220A PRA ANESTHESIA: ICD-10-PCS | Mod: CRNA,,, | Performed by: NURSE ANESTHETIST, CERTIFIED REGISTERED

## 2021-11-05 PROCEDURE — 25000003 PHARM REV CODE 250: Performed by: NURSE ANESTHETIST, CERTIFIED REGISTERED

## 2021-11-05 PROCEDURE — 63600175 PHARM REV CODE 636 W HCPCS: Performed by: NURSE ANESTHETIST, CERTIFIED REGISTERED

## 2021-11-05 PROCEDURE — 93613 INTRACARDIAC EPHYS 3D MAPG: CPT | Performed by: INTERNAL MEDICINE

## 2021-11-05 PROCEDURE — 85730 THROMBOPLASTIN TIME PARTIAL: CPT | Performed by: INTERNAL MEDICINE

## 2021-11-05 PROCEDURE — C1894 INTRO/SHEATH, NON-LASER: HCPCS | Performed by: INTERNAL MEDICINE

## 2021-11-05 PROCEDURE — 93621 COMP EP EVL L PAC&REC C SINS: CPT | Mod: 26,,, | Performed by: INTERNAL MEDICINE

## 2021-11-05 PROCEDURE — 37000008 HC ANESTHESIA 1ST 15 MINUTES: Performed by: INTERNAL MEDICINE

## 2021-11-05 PROCEDURE — 93325 DOPPLER ECHO COLOR FLOW MAPG: CPT | Mod: 26,,, | Performed by: INTERNAL MEDICINE

## 2021-11-05 PROCEDURE — D9220A PRA ANESTHESIA: Mod: CRNA,,, | Performed by: NURSE ANESTHETIST, CERTIFIED REGISTERED

## 2021-11-05 PROCEDURE — 37000009 HC ANESTHESIA EA ADD 15 MINS: Performed by: INTERNAL MEDICINE

## 2021-11-05 PROCEDURE — D9220A PRA ANESTHESIA: Mod: ANES,,, | Performed by: ANESTHESIOLOGY

## 2021-11-05 PROCEDURE — C1733 CATH, EP, OTHR THAN COOL-TIP: HCPCS | Performed by: INTERNAL MEDICINE

## 2021-11-05 PROCEDURE — 82962 GLUCOSE BLOOD TEST: CPT | Performed by: INTERNAL MEDICINE

## 2021-11-05 PROCEDURE — 93320 DOPPLER ECHO COMPLETE: CPT | Mod: 26,,, | Performed by: INTERNAL MEDICINE

## 2021-11-05 PROCEDURE — 27201037 HC PRESSURE MONITORING SET UP

## 2021-11-05 PROCEDURE — 93325 DOPPLER ECHO COLOR FLOW MAPG: CPT

## 2021-11-05 PROCEDURE — 93325 TRANSESOPHAGEAL ECHO (TEE) (CUPID ONLY): ICD-10-PCS | Mod: 26,,, | Performed by: INTERNAL MEDICINE

## 2021-11-05 PROCEDURE — 93613 INTRACARDIAC EPHYS 3D MAPG: CPT | Mod: ,,, | Performed by: INTERNAL MEDICINE

## 2021-11-05 PROCEDURE — D9220A PRA ANESTHESIA: ICD-10-PCS | Mod: ANES,,, | Performed by: ANESTHESIOLOGY

## 2021-11-05 PROCEDURE — 93613 PR INTRACARD ELECTROPHYS 3-DIMENS MAPPING: ICD-10-PCS | Mod: ,,, | Performed by: INTERNAL MEDICINE

## 2021-11-05 PROCEDURE — 25000003 PHARM REV CODE 250: Performed by: INTERNAL MEDICINE

## 2021-11-05 PROCEDURE — 93653 COMPRE EP EVAL TX SVT: CPT | Mod: ,,, | Performed by: INTERNAL MEDICINE

## 2021-11-05 PROCEDURE — 85025 COMPLETE CBC W/AUTO DIFF WBC: CPT | Performed by: INTERNAL MEDICINE

## 2021-11-05 PROCEDURE — 93320 TRANSESOPHAGEAL ECHO (TEE) (CUPID ONLY): ICD-10-PCS | Mod: 26,,, | Performed by: INTERNAL MEDICINE

## 2021-11-05 PROCEDURE — C1730 CATH, EP, 19 OR FEW ELECT: HCPCS | Performed by: INTERNAL MEDICINE

## 2021-11-05 PROCEDURE — 27201423 OPTIME MED/SURG SUP & DEVICES STERILE SUPPLY: Performed by: INTERNAL MEDICINE

## 2021-11-05 RX ORDER — ROCURONIUM BROMIDE 10 MG/ML
INJECTION, SOLUTION INTRAVENOUS
Status: DISCONTINUED | OUTPATIENT
Start: 2021-11-05 | End: 2021-11-05

## 2021-11-05 RX ORDER — DIPHENHYDRAMINE HYDROCHLORIDE 50 MG/ML
25 INJECTION INTRAMUSCULAR; INTRAVENOUS EVERY 6 HOURS PRN
Status: DISCONTINUED | OUTPATIENT
Start: 2021-11-05 | End: 2021-11-05 | Stop reason: HOSPADM

## 2021-11-05 RX ORDER — LIDOCAINE HYDROCHLORIDE 10 MG/ML
INJECTION, SOLUTION INTRAVENOUS
Status: DISCONTINUED | OUTPATIENT
Start: 2021-11-05 | End: 2021-11-05

## 2021-11-05 RX ORDER — METOPROLOL SUCCINATE 50 MG/1
50 TABLET, EXTENDED RELEASE ORAL DAILY
Qty: 60 TABLET | Refills: 11 | Status: SHIPPED | OUTPATIENT
Start: 2021-11-05 | End: 2021-11-05 | Stop reason: SDUPTHER

## 2021-11-05 RX ORDER — ONDANSETRON 2 MG/ML
INJECTION INTRAMUSCULAR; INTRAVENOUS
Status: DISCONTINUED | OUTPATIENT
Start: 2021-11-05 | End: 2021-11-05

## 2021-11-05 RX ORDER — HYDROMORPHONE HYDROCHLORIDE 1 MG/ML
0.2 INJECTION, SOLUTION INTRAMUSCULAR; INTRAVENOUS; SUBCUTANEOUS EVERY 5 MIN PRN
Status: DISCONTINUED | OUTPATIENT
Start: 2021-11-05 | End: 2021-11-05 | Stop reason: HOSPADM

## 2021-11-05 RX ORDER — PHENYLEPHRINE HYDROCHLORIDE 10 MG/ML
INJECTION INTRAVENOUS
Status: DISCONTINUED | OUTPATIENT
Start: 2021-11-05 | End: 2021-11-05

## 2021-11-05 RX ORDER — ACETAMINOPHEN 325 MG/1
650 TABLET ORAL EVERY 4 HOURS PRN
Status: DISCONTINUED | OUTPATIENT
Start: 2021-11-05 | End: 2021-11-05 | Stop reason: HOSPADM

## 2021-11-05 RX ORDER — CEFAZOLIN SODIUM 1 G/3ML
INJECTION, POWDER, FOR SOLUTION INTRAMUSCULAR; INTRAVENOUS
Status: DISCONTINUED | OUTPATIENT
Start: 2021-11-05 | End: 2021-11-05

## 2021-11-05 RX ORDER — MIDAZOLAM HYDROCHLORIDE 1 MG/ML
INJECTION, SOLUTION INTRAMUSCULAR; INTRAVENOUS
Status: DISCONTINUED | OUTPATIENT
Start: 2021-11-05 | End: 2021-11-05

## 2021-11-05 RX ORDER — METOPROLOL SUCCINATE 50 MG/1
100 TABLET, EXTENDED RELEASE ORAL DAILY
Qty: 60 TABLET | Refills: 11 | Status: SHIPPED | OUTPATIENT
Start: 2021-11-05 | End: 2021-11-05 | Stop reason: SDUPTHER

## 2021-11-05 RX ORDER — FENTANYL CITRATE 50 UG/ML
INJECTION, SOLUTION INTRAMUSCULAR; INTRAVENOUS
Status: DISCONTINUED | OUTPATIENT
Start: 2021-11-05 | End: 2021-11-05

## 2021-11-05 RX ORDER — DEXMEDETOMIDINE HYDROCHLORIDE 100 UG/ML
INJECTION, SOLUTION INTRAVENOUS
Status: DISCONTINUED | OUTPATIENT
Start: 2021-11-05 | End: 2021-11-05

## 2021-11-05 RX ORDER — FENTANYL CITRATE 50 UG/ML
25 INJECTION, SOLUTION INTRAMUSCULAR; INTRAVENOUS EVERY 5 MIN PRN
Status: DISCONTINUED | OUTPATIENT
Start: 2021-11-05 | End: 2021-11-05 | Stop reason: HOSPADM

## 2021-11-05 RX ORDER — PROPOFOL 10 MG/ML
VIAL (ML) INTRAVENOUS
Status: DISCONTINUED | OUTPATIENT
Start: 2021-11-05 | End: 2021-11-05

## 2021-11-05 RX ORDER — ONDANSETRON 2 MG/ML
4 INJECTION INTRAMUSCULAR; INTRAVENOUS ONCE AS NEEDED
Status: DISCONTINUED | OUTPATIENT
Start: 2021-11-05 | End: 2021-11-05 | Stop reason: HOSPADM

## 2021-11-05 RX ORDER — HEPARIN SOD,PORCINE/0.9 % NACL 1000/500ML
INTRAVENOUS SOLUTION INTRAVENOUS
Status: DISCONTINUED | OUTPATIENT
Start: 2021-11-05 | End: 2021-11-05 | Stop reason: HOSPADM

## 2021-11-05 RX ORDER — DEXAMETHASONE SODIUM PHOSPHATE 4 MG/ML
INJECTION, SOLUTION INTRA-ARTICULAR; INTRALESIONAL; INTRAMUSCULAR; INTRAVENOUS; SOFT TISSUE
Status: DISCONTINUED | OUTPATIENT
Start: 2021-11-05 | End: 2021-11-05

## 2021-11-05 RX ORDER — LIDOCAINE HYDROCHLORIDE 20 MG/ML
INJECTION, SOLUTION INFILTRATION; PERINEURAL
Status: DISCONTINUED | OUTPATIENT
Start: 2021-11-05 | End: 2021-11-05 | Stop reason: HOSPADM

## 2021-11-05 RX ORDER — METOPROLOL SUCCINATE 100 MG/1
100 TABLET, EXTENDED RELEASE ORAL DAILY
Qty: 60 TABLET | Refills: 11 | Status: SHIPPED | OUTPATIENT
Start: 2021-11-05 | End: 2022-07-29 | Stop reason: DRUGHIGH

## 2021-11-05 RX ADMIN — PHENYLEPHRINE HYDROCHLORIDE 100 MCG: 10 INJECTION, SOLUTION INTRAMUSCULAR; INTRAVENOUS; SUBCUTANEOUS at 01:11

## 2021-11-05 RX ADMIN — LIDOCAINE HYDROCHLORIDE 100 MG: 10 INJECTION, SOLUTION INTRAVENOUS at 11:11

## 2021-11-05 RX ADMIN — SUGAMMADEX 200 MG: 100 INJECTION, SOLUTION INTRAVENOUS at 02:11

## 2021-11-05 RX ADMIN — DEXMEDETOMIDINE HYDROCHLORIDE 8 MCG: 100 INJECTION, SOLUTION, CONCENTRATE INTRAVENOUS at 01:11

## 2021-11-05 RX ADMIN — PHENYLEPHRINE HYDROCHLORIDE 100 MCG: 10 INJECTION, SOLUTION INTRAMUSCULAR; INTRAVENOUS; SUBCUTANEOUS at 12:11

## 2021-11-05 RX ADMIN — FENTANYL CITRATE 50 MCG: 50 INJECTION INTRAMUSCULAR; INTRAVENOUS at 12:11

## 2021-11-05 RX ADMIN — DEXAMETHASONE SODIUM PHOSPHATE 4 MG: 4 INJECTION, SOLUTION INTRAMUSCULAR; INTRAVENOUS at 12:11

## 2021-11-05 RX ADMIN — DEXMEDETOMIDINE HYDROCHLORIDE 8 MCG: 100 INJECTION, SOLUTION, CONCENTRATE INTRAVENOUS at 02:11

## 2021-11-05 RX ADMIN — ROCURONIUM BROMIDE 50 MG: 10 INJECTION, SOLUTION INTRAVENOUS at 11:11

## 2021-11-05 RX ADMIN — ONDANSETRON 4 MG: 2 INJECTION INTRAMUSCULAR; INTRAVENOUS at 02:11

## 2021-11-05 RX ADMIN — CEFAZOLIN 2 G: 330 INJECTION, POWDER, FOR SOLUTION INTRAMUSCULAR; INTRAVENOUS at 12:11

## 2021-11-05 RX ADMIN — PROPOFOL 50 MG: 10 INJECTION, EMULSION INTRAVENOUS at 12:11

## 2021-11-05 RX ADMIN — FENTANYL CITRATE 50 MCG: 50 INJECTION INTRAMUSCULAR; INTRAVENOUS at 11:11

## 2021-11-05 RX ADMIN — ROCURONIUM BROMIDE 20 MG: 10 INJECTION, SOLUTION INTRAVENOUS at 01:11

## 2021-11-05 RX ADMIN — SODIUM CHLORIDE: 9 INJECTION, SOLUTION INTRAVENOUS at 11:11

## 2021-11-05 RX ADMIN — PROPOFOL 50 MG: 10 INJECTION, EMULSION INTRAVENOUS at 01:11

## 2021-11-05 RX ADMIN — MIDAZOLAM 1 MG: 1 INJECTION INTRAMUSCULAR; INTRAVENOUS at 11:11

## 2021-11-05 RX ADMIN — ROCURONIUM BROMIDE 10 MG: 10 INJECTION, SOLUTION INTRAVENOUS at 02:11

## 2021-11-05 RX ADMIN — PROPOFOL 120 MG: 10 INJECTION, EMULSION INTRAVENOUS at 11:11

## 2021-11-05 RX ADMIN — PHENYLEPHRINE HYDROCHLORIDE 200 MCG: 10 INJECTION, SOLUTION INTRAMUSCULAR; INTRAVENOUS; SUBCUTANEOUS at 01:11

## 2021-11-29 LAB
LEFT EYE DM RETINOPATHY: NEGATIVE
RIGHT EYE DM RETINOPATHY: NEGATIVE

## 2021-12-01 DIAGNOSIS — N13.8 BENIGN PROSTATIC HYPERPLASIA WITH URINARY OBSTRUCTION: ICD-10-CM

## 2021-12-01 DIAGNOSIS — N40.1 BENIGN PROSTATIC HYPERPLASIA WITH URINARY OBSTRUCTION: ICD-10-CM

## 2021-12-02 RX ORDER — ALFUZOSIN HYDROCHLORIDE 10 MG/1
10 TABLET, EXTENDED RELEASE ORAL NIGHTLY
Qty: 30 TABLET | Refills: 3 | Status: SHIPPED | OUTPATIENT
Start: 2021-12-02 | End: 2022-01-03

## 2021-12-27 ENCOUNTER — OFFICE VISIT (OUTPATIENT)
Dept: INTERNAL MEDICINE | Facility: CLINIC | Age: 71
End: 2021-12-27
Payer: MEDICARE

## 2021-12-27 VITALS
OXYGEN SATURATION: 96 % | SYSTOLIC BLOOD PRESSURE: 108 MMHG | RESPIRATION RATE: 16 BRPM | DIASTOLIC BLOOD PRESSURE: 64 MMHG | WEIGHT: 197.56 LBS | HEIGHT: 69 IN | HEART RATE: 72 BPM | BODY MASS INDEX: 29.26 KG/M2 | TEMPERATURE: 98 F

## 2021-12-27 DIAGNOSIS — G47.33 OSA (OBSTRUCTIVE SLEEP APNEA): ICD-10-CM

## 2021-12-27 DIAGNOSIS — Z12.5 ENCOUNTER FOR SCREENING FOR MALIGNANT NEOPLASM OF PROSTATE: ICD-10-CM

## 2021-12-27 DIAGNOSIS — Z00.00 PHYSICAL EXAM, ANNUAL: Primary | ICD-10-CM

## 2021-12-27 DIAGNOSIS — R73.09 ABNORMAL GLUCOSE: ICD-10-CM

## 2021-12-27 DIAGNOSIS — I10 ESSENTIAL HYPERTENSION: ICD-10-CM

## 2021-12-27 DIAGNOSIS — Z12.12 SCREENING FOR COLORECTAL CANCER: ICD-10-CM

## 2021-12-27 DIAGNOSIS — Z12.11 SCREENING FOR COLORECTAL CANCER: ICD-10-CM

## 2021-12-27 PROCEDURE — G0008 ADMIN INFLUENZA VIRUS VAC: HCPCS | Mod: PBBFAC,PO

## 2021-12-27 PROCEDURE — 99999 PR PBB SHADOW E&M-EST. PATIENT-LVL V: ICD-10-PCS | Mod: PBBFAC,,, | Performed by: STUDENT IN AN ORGANIZED HEALTH CARE EDUCATION/TRAINING PROGRAM

## 2021-12-27 PROCEDURE — 99397 PR PREVENTIVE VISIT,EST,65 & OVER: ICD-10-PCS | Mod: S$PBB,,, | Performed by: STUDENT IN AN ORGANIZED HEALTH CARE EDUCATION/TRAINING PROGRAM

## 2021-12-27 PROCEDURE — 99215 OFFICE O/P EST HI 40 MIN: CPT | Mod: PBBFAC,PO | Performed by: STUDENT IN AN ORGANIZED HEALTH CARE EDUCATION/TRAINING PROGRAM

## 2021-12-27 PROCEDURE — 90694 VACC AIIV4 NO PRSRV 0.5ML IM: CPT | Mod: PBBFAC,PO

## 2021-12-27 PROCEDURE — 99397 PER PM REEVAL EST PAT 65+ YR: CPT | Mod: S$PBB,,, | Performed by: STUDENT IN AN ORGANIZED HEALTH CARE EDUCATION/TRAINING PROGRAM

## 2021-12-27 PROCEDURE — 99999 PR PBB SHADOW E&M-EST. PATIENT-LVL V: CPT | Mod: PBBFAC,,, | Performed by: STUDENT IN AN ORGANIZED HEALTH CARE EDUCATION/TRAINING PROGRAM

## 2021-12-29 DIAGNOSIS — N40.1 BENIGN PROSTATIC HYPERPLASIA WITH URINARY OBSTRUCTION: ICD-10-CM

## 2021-12-29 DIAGNOSIS — N13.8 BENIGN PROSTATIC HYPERPLASIA WITH URINARY OBSTRUCTION: ICD-10-CM

## 2021-12-29 NOTE — TELEPHONE ENCOUNTER
No new care gaps identified.  Powered by [employer]. Reference number: [id number].   12/29/2021 2:58:46 PM CST

## 2022-01-03 RX ORDER — ALFUZOSIN HYDROCHLORIDE 10 MG/1
TABLET, EXTENDED RELEASE ORAL
Qty: 90 TABLET | Refills: 1 | Status: SHIPPED | OUTPATIENT
Start: 2022-01-03 | End: 2022-09-04

## 2022-01-03 RX ORDER — TRAZODONE HYDROCHLORIDE 50 MG/1
TABLET ORAL
Qty: 90 TABLET | Refills: 3 | Status: SHIPPED | OUTPATIENT
Start: 2022-01-03 | End: 2022-12-10

## 2022-01-25 ENCOUNTER — PATIENT OUTREACH (OUTPATIENT)
Dept: ADMINISTRATIVE | Facility: OTHER | Age: 72
End: 2022-01-25
Payer: COMMERCIAL

## 2022-01-25 ENCOUNTER — PATIENT MESSAGE (OUTPATIENT)
Dept: ADMINISTRATIVE | Facility: HOSPITAL | Age: 72
End: 2022-01-25
Payer: COMMERCIAL

## 2022-02-16 ENCOUNTER — PATIENT MESSAGE (OUTPATIENT)
Dept: ADMINISTRATIVE | Facility: HOSPITAL | Age: 72
End: 2022-02-16
Payer: COMMERCIAL

## 2022-02-25 ENCOUNTER — TELEPHONE (OUTPATIENT)
Dept: SLEEP MEDICINE | Facility: CLINIC | Age: 72
End: 2022-02-25
Payer: COMMERCIAL

## 2022-02-25 ENCOUNTER — OFFICE VISIT (OUTPATIENT)
Dept: SLEEP MEDICINE | Facility: CLINIC | Age: 72
End: 2022-02-25
Payer: MEDICARE

## 2022-02-25 ENCOUNTER — PATIENT OUTREACH (OUTPATIENT)
Dept: ADMINISTRATIVE | Facility: OTHER | Age: 72
End: 2022-02-25
Payer: COMMERCIAL

## 2022-02-25 VITALS
WEIGHT: 194 LBS | DIASTOLIC BLOOD PRESSURE: 78 MMHG | HEART RATE: 97 BPM | SYSTOLIC BLOOD PRESSURE: 110 MMHG | BODY MASS INDEX: 28.65 KG/M2

## 2022-02-25 DIAGNOSIS — E11.9 TYPE 2 DIABETES MELLITUS WITHOUT COMPLICATION, WITHOUT LONG-TERM CURRENT USE OF INSULIN: Primary | ICD-10-CM

## 2022-02-25 DIAGNOSIS — I25.10 CORONARY ARTERY DISEASE INVOLVING NATIVE CORONARY ARTERY OF NATIVE HEART WITHOUT ANGINA PECTORIS: Chronic | ICD-10-CM

## 2022-02-25 DIAGNOSIS — G47.33 OSA (OBSTRUCTIVE SLEEP APNEA): ICD-10-CM

## 2022-02-25 PROCEDURE — 99999 PR PBB SHADOW E&M-EST. PATIENT-LVL III: ICD-10-PCS | Mod: PBBFAC,,, | Performed by: NURSE PRACTITIONER

## 2022-02-25 PROCEDURE — 99214 OFFICE O/P EST MOD 30 MIN: CPT | Mod: S$PBB,,, | Performed by: NURSE PRACTITIONER

## 2022-02-25 PROCEDURE — 99999 PR PBB SHADOW E&M-EST. PATIENT-LVL III: CPT | Mod: PBBFAC,,, | Performed by: NURSE PRACTITIONER

## 2022-02-25 PROCEDURE — 99213 OFFICE O/P EST LOW 20 MIN: CPT | Mod: PBBFAC,PO | Performed by: NURSE PRACTITIONER

## 2022-02-25 PROCEDURE — 99214 PR OFFICE/OUTPT VISIT, EST, LEVL IV, 30-39 MIN: ICD-10-PCS | Mod: S$PBB,,, | Performed by: NURSE PRACTITIONER

## 2022-02-25 NOTE — PROGRESS NOTES
Referred by Dr Gamino    CHIEF COMPLAINT: EVONNE mgt    HISTORY OF PRESENT ILLNESS: He was diagnosed with sleep apnea by study done at [facility] 2011 revealing severe EVONNE. He used a cpap machine several weeks with FFM but stopped due to mask interface and lacked adherence monitoring. Ongoing snoring. Sleeps separtely but on vacation uses breathe rights strips to help. Denies witnessed apneic pauses. Denies recurrent shortness of breath. RA O2 sat this am 97%. If he had to use cpap now would not be a big deal after using bipap machine in hospital for so long. AM headaches past 7-10d. Denies daytime sleepiness. Nocturia 1x, easily returns to sleep    ARDS r/t CV-19 2020 and PFO closure  Sees Dr. Gonzalez and cards    On todays Clif[place] Sleepiness Scale the patient scores a 5/24.     SOCIAL HISTORY: . Retired    PHYSICAL EXAM:   /78 (BP Location: Left arm, Patient Position: Sitting, BP Method: Medium (Automatic))   Pulse 97   Wt 88 kg (194 lb)   BMI 28.65 kg/m²   GENERAL: W/D, W/N  body habitus, well groomed       PSG 2/24/11 (233#) AHI 34.6/low sat 89% , titrated cpap 12cm    ASSESSMENT:   EVONNE, severe. Dx'd 2011. Intolerable to cpap used several week r/t mask interface and lack of adherence monitoring. Ongoing symptoms.   He has medical comorbidities of CAD hx PCI 2012, recent PFO closure/ARDS due to covid-10 2020, DM, hypertension. Warrants further investigation for ongoing untreated sleep apnea.     PLAN:   1. Polysomnogram (split if AHI>10), discussed plan of care  2. Discussed etiology of EVONNE and potential ramifications of untreated EVONNE, including stroke, heart disease, HTN  3 see cards as advised/continue meds and pcp re: DM/HTN mgt/cotninue meds  Thank you for allowing me the opportunity to participate in the care of your patient

## 2022-02-25 NOTE — PROGRESS NOTES
Health Maintenance Due   Topic Date Due    Colorectal Cancer Screening  10/10/2019    Foot Exam  07/25/2020     Updates were requested from care everywhere.  Chart was reviewed for overdue Proactive [facility] Encounters (ERYN) topics (CRS, Breast Cancer Screening, Eye exam)  Health Maintenance has been updated.  LINKS immunization registry triggered.  Immunizations were reconciled.

## 2022-02-27 NOTE — TELEPHONE ENCOUNTER
Left msg on his voicemail I was sending request to dr healy.    Who does he see for endocrine?  Let me know if need help.       no

## 2022-03-08 ENCOUNTER — TELEPHONE (OUTPATIENT)
Dept: SLEEP MEDICINE | Facility: OTHER | Age: 72
End: 2022-03-08
Payer: COMMERCIAL

## 2022-03-14 ENCOUNTER — TELEPHONE (OUTPATIENT)
Dept: SLEEP MEDICINE | Facility: OTHER | Age: 72
End: 2022-03-14
Payer: COMMERCIAL

## 2022-03-29 ENCOUNTER — TELEPHONE (OUTPATIENT)
Dept: SLEEP MEDICINE | Facility: OTHER | Age: 72
End: 2022-03-29
Payer: COMMERCIAL

## 2022-03-30 ENCOUNTER — TELEPHONE (OUTPATIENT)
Dept: SLEEP MEDICINE | Facility: OTHER | Age: 72
End: 2022-03-30
Payer: COMMERCIAL

## 2022-04-08 ENCOUNTER — TELEPHONE (OUTPATIENT)
Dept: SLEEP MEDICINE | Facility: OTHER | Age: 72
End: 2022-04-08
Payer: COMMERCIAL

## 2022-04-11 ENCOUNTER — HOSPITAL ENCOUNTER (OUTPATIENT)
Dept: SLEEP MEDICINE | Facility: OTHER | Age: 72
Discharge: HOME | End: 2022-04-11
Attending: NURSE PRACTITIONER
Payer: MEDICARE

## 2022-04-11 DIAGNOSIS — G47.33 OSA (OBSTRUCTIVE SLEEP APNEA): ICD-10-CM

## 2022-04-11 PROCEDURE — 95810 POLYSOM 6/> YRS 4/> PARAM: CPT

## 2022-04-11 PROCEDURE — 95810 PR POLYSOMNOGRAPHY, 4 OR MORE: ICD-10-PCS | Mod: 26,,, | Performed by: INTERNAL MEDICINE

## 2022-04-11 PROCEDURE — 95810 POLYSOM 6/> YRS 4/> PARAM: CPT | Mod: 26,,, | Performed by: INTERNAL MEDICINE

## 2022-04-12 NOTE — PROGRESS NOTES
A PSG was preformed [facility] 72 [facility] on the night of 04/11/2022. The procedure was explained to the patient. Education was giving which included the entire sleep study set up process, the possibility of being placed on cpap, the importance of supine sleep and why the tech would need to enter the room. All questions were asked and answered prior to the start of the study. The patient did not meet cpap titration criteria.    Disposable equipment was used where applicable. An end of the night instruction sheet was giving to the patient upon leaving the lab.

## 2022-04-14 ENCOUNTER — IMMUNIZATION (OUTPATIENT)
Dept: INTERNAL MEDICINE | Facility: CLINIC | Age: 72
End: 2022-04-14
Payer: MEDICARE

## 2022-04-14 ENCOUNTER — PATIENT MESSAGE (OUTPATIENT)
Dept: SLEEP MEDICINE | Facility: CLINIC | Age: 72
End: 2022-04-14
Payer: COMMERCIAL

## 2022-04-14 DIAGNOSIS — Z23 NEED FOR VACCINATION: Primary | ICD-10-CM

## 2022-04-14 PROCEDURE — 91305 COVID-19, MRNA, LNP-S, PF, 30 MCG/0.3 ML DOSE VACCINE (PFIZER): CPT | Mod: PBBFAC

## 2022-04-14 NOTE — PROCEDURES
[facility]  Sleep Center  Tel: [phone]    DIAGNOSTIC SLEEP STUDY     Patient Name: LEON [facility] #: [id number]   Sex: Unknown Study Date: 2022   : 1950 Clinic #: [id number]   Age: 72 Referring Physician: YENY ROBLEDO NP   Height: 69.0 in Referring Physician #    Weight: 194.0 lbs Sleep Specialist:    BEpifanioM.I.: 28.6 Sleep Specialist #    Hypopnea rule: AASM 1B Scoring Tech: CONCEPCION Shaffer, RPSGT   Total AHI: 3.8 Recording Tech: HANNAH RUIZ RRT, RPSGT   Lowest O2 sat: 87.0% Recording Location: [facility]     Sleep architecture: This is a baseline polysomnogram. At lights out, the patient fell asleep in 4.1 minutes and slept for 57.3% of the time. Total sleep time (TST) was 251.5 minutes. 18.1% of TST was in Stage N1 sleep, 0.2% TST in slow wave sleep, and 10.9% TST in REM sleep. The REM latency was 105.5 minutes. Sleep architecture was significantly disrupted due to underlying obstructive sleep apnea.    Respiratory: Snoring was present. The overall AHI was 3.8 with an oxygen santy of 87.0%.  The supine AHI was 9.2 and the REM AHI was 24.0.     Motor movement / Parasomnia: The total limb movement index was 4.8 (2.4with arousal).    Cardiac: single lead EKG revealed normal sinus rhythm    Impression:    -The sleep-disordered breathing in this study did not meet Medicare criteria for obstructive sleep apnea.  -limited amount of supine sleep was observed.      Recommendations:    -repeat testing with an attended PSG is recommended if the patient has increasing sleep disturbance other condition associated with obstructive sleep apnea.  -the patient has follow up with Sleep Medicine        Bhupinder Colón MD    (This Sleep Study was interpreted by a Board Certified Sleep Specialist who conducted an epoch-by-epoch review of the entire raw data recording.)  (The indication for this sleep study was reviewed and deemed appropriate by AASM Practice Parameters or other reasons by a  Board Certified Sleep Specialist.)        TABLES

## 2022-05-26 NOTE — TELEPHONE ENCOUNTER
He can increase toprol to 75 mg but he should also come in for an ECG in case he is in atrial fib   8

## 2022-07-12 ENCOUNTER — PATIENT MESSAGE (OUTPATIENT)
Dept: ADMINISTRATIVE | Facility: HOSPITAL | Age: 72
End: 2022-07-12
Payer: COMMERCIAL

## 2022-07-15 DIAGNOSIS — E78.00 HYPERCHOLESTEREMIA: ICD-10-CM

## 2022-07-15 DIAGNOSIS — I25.10 CORONARY ARTERY DISEASE INVOLVING NATIVE CORONARY ARTERY OF NATIVE HEART WITHOUT ANGINA PECTORIS: Primary | ICD-10-CM

## 2022-07-29 ENCOUNTER — OFFICE VISIT (OUTPATIENT)
Dept: CARDIOLOGY | Facility: CLINIC | Age: 72
End: 2022-07-29
Payer: MEDICARE

## 2022-07-29 VITALS
HEART RATE: 60 BPM | HEIGHT: 69 IN | DIASTOLIC BLOOD PRESSURE: 72 MMHG | WEIGHT: 199.5 LBS | BODY MASS INDEX: 29.55 KG/M2 | SYSTOLIC BLOOD PRESSURE: 114 MMHG

## 2022-07-29 DIAGNOSIS — Z98.61 S/P PTCA (PERCUTANEOUS TRANSLUMINAL CORONARY ANGIOPLASTY): Chronic | ICD-10-CM

## 2022-07-29 DIAGNOSIS — I10 ESSENTIAL HYPERTENSION: Chronic | ICD-10-CM

## 2022-07-29 DIAGNOSIS — Z87.74 S/P PERCUTANEOUS PATENT FORAMEN OVALE CLOSURE: Primary | ICD-10-CM

## 2022-07-29 DIAGNOSIS — E11.9 TYPE 2 DIABETES MELLITUS WITHOUT COMPLICATION, WITHOUT LONG-TERM CURRENT USE OF INSULIN: ICD-10-CM

## 2022-07-29 DIAGNOSIS — I25.10 CORONARY ARTERY DISEASE INVOLVING NATIVE CORONARY ARTERY OF NATIVE HEART WITHOUT ANGINA PECTORIS: Chronic | ICD-10-CM

## 2022-07-29 DIAGNOSIS — E78.00 HYPERCHOLESTEREMIA: Chronic | ICD-10-CM

## 2022-07-29 PROCEDURE — 99999 PR PBB SHADOW E&M-EST. PATIENT-LVL IV: CPT | Mod: PBBFAC,,, | Performed by: INTERNAL MEDICINE

## 2022-07-29 PROCEDURE — 99999 PR PBB SHADOW E&M-EST. PATIENT-LVL IV: ICD-10-PCS | Mod: PBBFAC,,, | Performed by: INTERNAL MEDICINE

## 2022-07-29 PROCEDURE — 99214 OFFICE O/P EST MOD 30 MIN: CPT | Mod: S$PBB,,, | Performed by: INTERNAL MEDICINE

## 2022-07-29 PROCEDURE — 99214 PR OFFICE/OUTPT VISIT, EST, LEVL IV, 30-39 MIN: ICD-10-PCS | Mod: S$PBB,,, | Performed by: INTERNAL MEDICINE

## 2022-07-29 PROCEDURE — 99214 OFFICE O/P EST MOD 30 MIN: CPT | Mod: PBBFAC,PO | Performed by: INTERNAL MEDICINE

## 2022-07-29 RX ORDER — TIMOLOL MALEATE 5 MG/ML
1 SOLUTION/ DROPS OPHTHALMIC EVERY MORNING
COMMUNITY
Start: 2022-07-17

## 2022-07-29 RX ORDER — CELECOXIB 200 MG/1
200 CAPSULE ORAL DAILY
COMMUNITY
Start: 2022-06-25

## 2022-07-29 RX ORDER — METOPROLOL SUCCINATE 50 MG/1
100 TABLET, EXTENDED RELEASE ORAL NIGHTLY
COMMUNITY
Start: 2022-04-30 | End: 2022-10-28

## 2022-07-29 NOTE — PROGRESS NOTES
Subjective:     Problem List:  CAD   NSTEMI - OM1 YUE 10/11/12   YUE ostial and prox RCA 10/17/12   YUE mid LAD 1/18/07   YUE prox and mid PDA 1/18/07   PFO closure for platypnea orthodeoxia 1/2021  Atrial flutter ablation 11/2021  Hypercholesterolemia   (arthralgias with Lipitor)   HTN  GI bleed 2014  DM - onset 12/2017    HPI:   Den Rick is a 72 y.o. male who presents for follow-up of Coronary Artery Disease  He feels well. He does not report angina or shortness of breath with exertion.  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  On a routine follow-up visit he was found have atrial flutter and underwent ablation  He walks his dog for 1/2 an hour 7 days a week. He underwent PT for back problems.    FBS was a bit elevated. Last HbA1C was 6.6% better than the 7.9% in 1/2021.      Review of patient's allergies indicates:   Allergen Reactions    Atorvastatin      Other reaction(s):(lipitor) Muscle pain    Niacin      Other reaction(s): Flushing (skin)        Current Outpatient Medications   Medication Sig    alfuzosin (UROXATRAL) 10 mg Tb24 TAKE 1 TABLET BY MOUTH EVERY DAY IN THE EVENING    aspirin (ECOTRIN) 81 MG EC tablet Take 81 mg by mouth once daily.    blood sugar diagnostic Strp To check BG 4 times daily, to use with insurance preferred meter    blood-glucose meter kit To check BG 4 times daily, to use with insurance preferred meter    celecoxib (CELEBREX) 200 MG capsule Take 200 mg by mouth 2 (two) times daily.    desoximetasone (TOPICORT) 0.25 % cream Apply topically 2 (two) times daily.    empagliflozin (JARDIANCE) 10 mg tablet Take 1 tablet (10 mg total) by mouth once daily.    fesoterodine (TOVIAZ) 8 mg Tb24 TAKE 1 TABLET BY MOUTH EVERY DAY    fish oil-omega-3 fatty acids 300-1,000 mg capsule Take 1 g by mouth once daily.    fluticasone propionate (FLONASE) 50 mcg/actuation nasal spray 2 sprays (100 mcg total) by Each Nostril route once  "daily.    FREESTYLE LANCETS 28 gauge lancets TO CHECK BG 4 TIMES DAILY, TO USE WITH INSURANCE PREFERRED METER    metFORMIN (GLUCOPHAGE-XR) 500 MG ER 24hr tablet Take 1 tablet (500 mg total) by mouth As instructed. Metformin er.  Takes 4 tablets with dinner (Patient taking differently: Take 2,000 mg by mouth once daily at 6am. Metformin er.  Takes 4 tablets with dinner)    metoprolol succinate (TOPROL-XL) 50 MG 24 hr tablet Take 100 mg by mouth every evening.    multivitamin (THERAGRAN) per tablet Take 1 tablet by mouth Daily.      nitroGLYCERIN (NITROSTAT) 0.4 MG SL tablet Place 1 tablet (0.4 mg total) under the tongue every 5 (five) minutes as needed for Chest pain.    pantoprazole (PROTONIX) 40 MG tablet Take 1 tablet (40 mg total) by mouth once daily. (Patient taking differently: Take 40 mg by mouth as needed.)    rosuvastatin (CRESTOR) 20 MG tablet Take 1 tablet (20 mg total) by mouth every evening.    timolol maleate 0.5% (TIMOPTIC) 0.5 % Drop Place 1 drop into both eyes every morning.    traZODone (DESYREL) 50 MG tablet TAKE 1/2 TABLET BY MOUTH TWICE DAILY     No current facility-administered medications for this visit.       Social history:  Den Rick  reports that he has never smoked. He has never been exposed to tobacco smoke. He has never used smokeless tobacco. He reports that he does not drink alcohol and does not use drugs.      Objective:       Physical Exam  Constitutional:       Appearance: He is well-developed.      Comments: /72   Pulse 60   Ht 5' 9" (1.753 m)   Wt 90.5 kg (199 lb 8.3 oz)   BMI 29.46 kg/m²      HENT:      Head: Normocephalic and atraumatic.   Neck:      Vascular: No carotid bruit or JVD.   Cardiovascular:      Rate and Rhythm: Normal rate and regular rhythm.      Pulses:           Radial pulses are 2+ on the right side and 2+ on the left side.        Posterior tibial pulses are 2+ on the right side and 2+ on the left side.      Heart sounds: S1 normal and S2 " normal. No murmur heard.    No gallop.   Pulmonary:      Effort: Pulmonary effort is normal.      Breath sounds: No wheezing or rales.   Chest:      Chest wall: There is no dullness to percussion.   Abdominal:      Palpations: Abdomen is soft. There is no hepatomegaly or splenomegaly.      Tenderness: There is no abdominal tenderness.   Musculoskeletal:      Right lower leg: No edema.      Left lower leg: No edema.   Skin:     General: Skin is warm and dry.      Findings: No bruising.      Nails: There is no clubbing.   Neurological:      Mental Status: He is alert and oriented to person, place, and time.      Gait: Gait normal.   Psychiatric:         Speech: Speech normal.         Behavior: Behavior normal.         Thought Content: Thought content normal.         Judgment: Judgment normal.           Lab Results   Component Value Date    CHOL 101 (L) 07/21/2022    HDL 28 (L) 07/21/2022    LDLCALC 54.0 (L) 07/21/2022    TRIG 95 07/21/2022    CHOLHDL 27.7 07/21/2022     Lab Results   Component Value Date     (H) 12/21/2022    CREATININE 0.87 12/21/2022    BUN 27 (H) 12/21/2022     12/21/2022    K 4.4 12/21/2022     12/21/2022    CO2 27 12/21/2022     Lab Results   Component Value Date    ALT 27 12/21/2022    AST 22 12/21/2022    GGT 35 05/28/2014    ALKPHOS 55 12/21/2022    BILITOT 0.5 12/21/2022       Results for orders placed during the hospital encounter of 10/26/21    Echo Color Flow Doppler? Yes; Bubble Contrast? Yes    Interpretation Summary  · The estimated ejection fraction is 55%.  · The left ventricle is normal in size with concentric remodeling and normal systolic function.  · Normal left ventricular diastolic function.  · Mild right ventricular enlargement with normal right ventricular systolic function.  · Presence of 35 mm Amplatzer Occluder in the interatrial septum. The bubble study is negative for intracardiac shunt.  · The estimated PA systolic pressure is 30 mmHg.  · Normal  central venous pressure (3 mmHg).  · The sinuses of Valsalva is mildly dilated.  · The ascending aorta is mildly dilated.       No valid procedures specified.        Assessment and Plan:       ICD-10-CM ICD-9-CM   1. S/P percutaneous patent foramen ovale closure  Z87.74 V13.65   2. Coronary artery disease involving native coronary artery of native heart without angina pectoris  I25.10 414.01   3. S/P PTCA   Z98.61 V45.82   4. Essential hypertension  I10 401.9   5. Hypercholesteremia  E78.00 272.0   6. Type 2 diabetes mellitus without complication, without long-term current use of insulin  E11.9 250.00        Hypoxia resolved following PFO closure.  CAD stable. No angina. Continue same meds.    Hypertension well controlled. Continue same medications for hypertension. Low sodium diet.  LDL(c) at goal. HDL <30. Continue same medications. Cholesterol education. Nutritional counseling.        Orders placed during this encounter:     S/P percutaneous patent foramen ovale closure    Coronary artery disease involving native coronary artery of native heart without angina pectoris  -     Lipid Panel; Future; Expected date: 07/15/2023  -     EKG 12-lead; Future; Expected date: 07/15/2023    S/P PTCA   -     Lipid Panel; Future; Expected date: 07/15/2023  -     EKG 12-lead; Future; Expected date: 07/15/2023    Essential hypertension  -     Comprehensive Metabolic Panel; Future; Expected date: 07/15/2023    Hypercholesteremia  -     Lipid Panel; Future; Expected date: 07/15/2023    Type 2 diabetes mellitus without complication, without long-term current use of insulin  -     Hemoglobin A1C; Future; Expected date: 07/29/2022         No follow-ups on file.

## 2022-08-16 NOTE — TELEPHONE ENCOUNTER
No new care gaps identified.  [facility] Embedded Care Gaps. Reference number: [id number]. 8/16/2022   5:16:22 PM CDT

## 2022-08-16 NOTE — TELEPHONE ENCOUNTER
Refill Routing Note   Medication(s) are not appropriate for processing by [facility] for the following reason(s):      - Pharmacy requesting clarification of directions: patient is taking 4 with dinner?    [facility] action(s):  Defer Medication-related problems identified: Dose adjustment     Medication Therapy Plan: Pharmacy requesting dose clarification  Medication reconciliation completed: No     Appointments  past 12m or future 3m with PCP    Date Provider   Last Visit   12/27/2021 Michael Gamino MD   Next Visit   Visit date not found Michael Gamino MD   ED visits in past 90 days: 0        Note composed:6:13 PM 08/16/2022

## 2022-08-17 RX ORDER — METFORMIN HYDROCHLORIDE 500 MG/1
2000 TABLET, EXTENDED RELEASE ORAL
Qty: 360 TABLET | Refills: 1 | Status: SHIPPED | OUTPATIENT
Start: 2022-08-17 | End: 2022-12-10

## 2022-09-04 DIAGNOSIS — N40.1 BENIGN PROSTATIC HYPERPLASIA WITH URINARY OBSTRUCTION: ICD-10-CM

## 2022-09-04 DIAGNOSIS — N13.8 BENIGN PROSTATIC HYPERPLASIA WITH URINARY OBSTRUCTION: ICD-10-CM

## 2022-09-04 RX ORDER — ALFUZOSIN HYDROCHLORIDE 10 MG/1
TABLET, EXTENDED RELEASE ORAL
Qty: 90 TABLET | Refills: 0 | Status: SHIPPED | OUTPATIENT
Start: 2022-09-04 | End: 2022-12-01

## 2022-09-04 NOTE — TELEPHONE ENCOUNTER
No new care gaps identified.  [facility] Embedded Care Gaps. Reference number: [id number]. 9/04/2022   12:16:55 AM LEONIET

## 2022-09-04 NOTE — TELEPHONE ENCOUNTER
Refill Decision Note   Den Princededraálvaro  is requesting a refill authorization.  Brief Assessment and Rationale for Refill:  Defer     Medication Therapy Plan:       Medication Reconciliation Completed: No   Comments:     No Care Gaps recommended.     Note composed:7:01 AM 09/04/2022

## 2022-09-27 ENCOUNTER — OFFICE VISIT (OUTPATIENT)
Dept: PODIATRY | Facility: CLINIC | Age: 72
End: 2022-09-27
Payer: MEDICARE

## 2022-09-27 VITALS
DIASTOLIC BLOOD PRESSURE: 86 MMHG | BODY MASS INDEX: 28.91 KG/M2 | RESPIRATION RATE: 18 BRPM | SYSTOLIC BLOOD PRESSURE: 138 MMHG | WEIGHT: 195.19 LBS | HEIGHT: 69 IN | HEART RATE: 59 BPM

## 2022-09-27 DIAGNOSIS — E11.9 TYPE 2 DIABETES MELLITUS WITHOUT COMPLICATION, WITHOUT LONG-TERM CURRENT USE OF INSULIN: ICD-10-CM

## 2022-09-27 DIAGNOSIS — L60.0 INGROWN TOENAIL OF RIGHT FOOT: Primary | ICD-10-CM

## 2022-09-27 PROCEDURE — 11730 AVULSION NAIL PLATE SIMPLE 1: CPT | Mod: PBBFAC,PO | Performed by: PODIATRIST

## 2022-09-27 PROCEDURE — 99999 PR PBB SHADOW E&M-EST. PATIENT-LVL V: CPT | Mod: PBBFAC,,, | Performed by: PODIATRIST

## 2022-09-27 PROCEDURE — 99999 PR PBB SHADOW E&M-EST. PATIENT-LVL V: ICD-10-PCS | Mod: PBBFAC,,, | Performed by: PODIATRIST

## 2022-09-27 PROCEDURE — 99215 OFFICE O/P EST HI 40 MIN: CPT | Mod: PBBFAC,PO | Performed by: PODIATRIST

## 2022-09-27 PROCEDURE — 99203 PR OFFICE/OUTPT VISIT, NEW, LEVL III, 30-44 MIN: ICD-10-PCS | Mod: 25,S$PBB,, | Performed by: PODIATRIST

## 2022-09-27 PROCEDURE — 11730 NAIL REMOVAL: ICD-10-PCS | Mod: T5,S$PBB,, | Performed by: PODIATRIST

## 2022-09-27 PROCEDURE — 99203 OFFICE O/P NEW LOW 30 MIN: CPT | Mod: 25,S$PBB,, | Performed by: PODIATRIST

## 2022-09-27 NOTE — PATIENT INSTRUCTIONS
Instructions for Care after Ingrown Nail removal    General Information: Stay off your feet as much as possible today. You may wear a surgical shoe, sandal or any open toed shoe that does not squeeze, constrict or put pressure on your toe(s). Your toe(s) may remain numb for up to 2-24 hours after the procedure. Although most patients can wear a closed loose fitting shoe after the first week, the toe will heal faster the more you use the open toed shoe in the first 2-3  weeks. Please contact our office if you have any questions or concerns.    Bleeding: Slight bleeding, discoloration and drainage are normal. Due to the chemical used there may be some yellow-clear drainage coming from the toe for 2-3 weeks.    Discomfort: You can elevate your foot to help alleviate minor swelling, bleeding and discomfort. You may also take Advil, Tylenol or other over the counter pain medications to help alleviate pain. Call our office if the pain is not well controlled. Most patients have very little discomfort as long as they minimize their walking for the first 24 hours and do not bump the toe.    Removing the Bandage: Starting the day after surgery, carefully remove the dressing and shower or bathe as normal. It is Ok to get the bandage soaking wet in the shower and when you remove it, it should not stick to the surgery site.    Dressing Options- Traditional Method:  1. Soaking two times a day in WARM water with Epsom salts or diluted Povidone Iodine  (Betadine) for 15-20 minutes. You will need to purchase these products from the pharmacy.  2. Dry toe then apply an antibiotic cream or ointment such as Neosporin or Polysporin plus or  Garamycin and cover with a 2 x 2 inch size gauze and then secure with a 1 inch band aid.  3. In the second week, take the dressing off at bedtime to air dry the toe.  4. If the toe is infected take the Antibiotic Pills as directed until finished.      Ingrown Toenail        What Is an Ingrown Toenail?     When a toenail is ingrown, it is curved and grows into the skin, usually at the nail borders (the sides of the nail). This digging in of the nail irritates the skin, often creating pain, redness, swelling and warmth in the toe.    If an ingrown nail causes a break in the skin, bacteria may enter and cause an infection in the area, which is often marked by drainage and a foul odor. However, even if the toe is not painful, red, swollen or warm, a nail that curves downward into the skin can progress to an infection.    Ingrown toenail and normal toenail    Causes  Causes of ingrown toenails include:    Heredity. In many people, the tendency for ingrown toenails is inherited.  Trauma. Sometimes an ingrown toenail is the result of trauma, such as stubbing your toe, having an object fall on your toe or engaging in activities that involve repeated pressure on the toes, such as kicking or running.  Improper trimming. The most common cause of ingrown toenails is cutting your nails too short. This encourages the skin next to the nail to fold over the nail.   Improperly sized footwear. Ingrown toenails can result from wearing socks and shoes that are tight or short.  Nail conditions. Ingrown toenails can be caused by nail problems, such as fungal infections or losing a nail due to trauma.     Treatment  Sometimes initial treatment for ingrown toenails can be safely performed at home. However, home treatment is strongly discouraged if an infection is suspected or for those who have medical conditions that put feet at high risk, such as diabetes, nerve damage in the foot or poor circulation.        Ingrown toenail before and after treatment    Home Care  If you do not have an infection or any of the above medical conditions, you can soak your foot in room-temperature water (adding Epsom salt may be recommended by your doctor) and gently massage the side of the nail fold to help reduce the inflammation.    Avoid attempting  bathroom surgery. Repeated cutting of the nail can cause the condition to worsen over time. If your symptoms fail to improve, it is time to see a foot and ankle surgeon.    Physician Care  After examining the toe, the foot and ankle surgeon will select the treatment best suited for you. If an infection is present, an oral antibiotic may be prescribed.    Sometimes a minor surgical procedure, often performed in the office, will ease the pain and remove the offending nail. After applying a local anesthetic, the doctor removes part of the nails side border. Some nails may become ingrown again, requiring removal of the nail root.    Following the nail procedure, a light bandage will be applied. Most people experience very little pain after surgery and may resume normal activity the next day. If your surgeon has prescribed an oral antibiotic, be sure to take all the medication, even if your symptoms have improved.    Preventing Ingrown Toenails  Many cases of ingrown toenails may be prevented by:    Proper trimming. Cut toenails in a fairly straight line, and do not cut them too short. You should be able to get your fingernail under the sides and end of the nail.  Well-fitting shoes and socks. Do not wear shoes that are short or tight in the toe area. Avoid shoes that are loose because they too cause pressure on the toes, especially when running or walking briskly.               What You Should Know About Home Treatment  Do not cut a notch in the nail. Contrary to what some people believe, this does not reduce the tendency for the nail to curve downward.  Do not repeatedly trim nail borders. Repeated trimming does not change the way the nail grows and can make the condition worse.  Do not place cotton under the nail. Not only does this not relieve the pain, it provides a place for harmful bacteria to grow, resulting in infection.  Over-the-counter medications are ineffective. Topical medications may mask the pain, but  they do not correct the underlying problem.        Understanding Ingrown Toenails    An ingrown nail is the result of a nail growing into the skin that surrounds it. This often occurs at either edge of the big toe. Ingrown nails may be caused by improper trimming, inherited nail deformities, injuries, fungal infections, or pressure.  Symptoms  Ingrown nails may cause pain at the tip of the toe or all the way to the base of the toe. The pain is often worse while walking. An ingrown nail may also lead to infection, inflammation, or a more serious condition. If its infected, you might see pus or redness.  Evaluation  To determine the extent of your problem, your healthcare provider examines and possibly presses the painful area. If other problems are suspected, blood tests, cultures, and X-rays may be done as well.  Treatment  If the nail isnt infected, your healthcare provider may trim the corner of it to help relieve your symptoms. He or she may need to remove one side of your nail back to the cuticle. The base of the nail may then be treated with a chemical to keep the ingrown part from growing back. Severe infections or ingrown nails may require antibiotics and temporary or permanent removal of a portion of the nail. To prevent pain, a local anesthetic may be used in these procedures. This treatment is usually done at your healthcare provider's office.  Prevention  Many nail problems can be prevented by wearing the right shoes and trimming your nails properly. To help avoid infection, keep your feet clean and dry. If you have diabetes, talk with your healthcare provider before doing any foot self-care.  The right shoes: Get your feet measured (your size may change as you age). Wear shoes that are supportive and roomy enough for your toes to wiggle. Look for shoes made of natural materials such as leather, which allow your feet to breathe.  Proper trimming: To avoid problems, trim your toenails straight across  without cutting down into the corners. If you cant trim your own nails, ask your healthcare provider to do so for you.  Date Last Reviewed: 10/1/2016  © 3607-0243 [email or web address]. [address] [place] [address], [place] [address]. All rights reserved. This information is not intended as a substitute for professional medical care. Always follow your healthcare professional's instructions.

## 2022-09-27 NOTE — PROGRESS NOTES
[facility] - PODIATRY  [address] [place] [address], [address]  [facility] [address]  Dept: [phone]  Dept Fax: [phone]    Salvador Buckley Jr., DPM     Assessment:   MDM    Coding  1. Ingrown toenail of right foot  Nail Removal      2. Type 2 diabetes mellitus without complication, without long-term current use of insulin            Plan:     Nail Removal    Date/Time: 9/27/2022 11:15 AM  Performed by: Salvador Buckley Jr., DPM  Authorized by: Salvador Buckley Jr., DPM     Consent Done?:  Yes (Verbal)  Time out: Immediately prior to the procedure a time out was called    Prep: patient was prepped and draped in usual sterile fashion    Location:     Location:  Right foot    Location detail:  Right big toe  Anesthesia:     Anesthesia:  Digital block    Local anesthetic:  Bupivacaine 0.25% without epinephrine  Procedure Details:     Preparation:  Skin prepped with alcohol, skin prepped with Betadine and sterile field established    Amount removed:  Complete    Wedge excision of skin of nail fold: No      Nail bed sutured?: No      Nail matrix removed:  None    Removed nail replaced and anchored: Yes      Dressing applied:  4x4, gauze roll, antibiotic ointment, dressing applied and Xeroform gauze    Patient tolerance:  Patient tolerated the procedure well with no immediate complications    Den was seen today for ingrown toenail.    Diagnoses and all orders for this visit:    Ingrown toenail of right foot  -     Nail Removal    Type 2 diabetes mellitus without complication, without long-term current use of insulin      -pt seen, evaluated, and managed  -dx discussed in detail. All questions/concerns addressed  -all tx options discussed. All alternatives, risks, benefits of all txs discussed  -The patient was educated regarding the above diagnosis.   -discussed ingrowing toenails and all tx options. Pt opts for avulsion  -px as above  -pt to perform epsom salt or betadine soaks once daily x 2wks.  Written instructions dispensed  -keep toe covered with triple abx ointment + bandaid x 2wks      Rx dispensed: none  Referrals: none  -WB: wbat        Follow up in about 2 weeks (around 10/11/2022).    Subjective:      Patient ID: Den Rick is a 72 y.o. male.    Chief Complaint:   Chief Complaint   Patient presents with    Ingrown Toenail     Right Great toe        CC - ingrown nail: Patient presents to the clinic complaining of painful ingrown toenail on the right foot. Patients rates pain 8/10 on pain scale. patient seeking tx options.    HPI    Last Podiatry Enc: Visit date not found  Last Enc w/ Me: Visit date not found    Outside reports reviewed: historical medical records.  Family hx: as below  Past Medical History:   Diagnosis Date    Anticoagulant long-term use     Arthritis     Atrial tachycardia, paroxysmal 1/12    during Cardiac Rehab    Colon polyp     Coronary artery disease     Diverticulitis     Diverticulosis     GERD (gastroesophageal reflux disease)     Glucose intolerance (impaired glucose tolerance) 1/24/2017    Hyperlipidemia     Hypertension     IFG (impaired fasting glucose) 12/21/2015    NSTEMI (non-ST elevated myocardial infarction) 10/11/12    Obesity (BMI 30-39.9) 1/31/2018    Post PTCA     Sleep apnea      Past Surgical History:   Procedure Laterality Date    boewl resection      CATARACT EXTRACTION, BILATERAL  8/2011    COLONOSCOPY  2012    COLONOSCOPY N/A 10/10/2016    Procedure: COLONOSCOPY;  Surgeon: Main Lehman MD;  Location: [address]);  Service: Endoscopy;  Laterality: N/A;    COLONOSCOPY W/ POLYPECTOMY      CORONARY ANGIOPLASTY WITH STENT PLACEMENT      ESOPHAGOGASTRODUODENOSCOPY      HERNIA REPAIR      KNEE SURGERY  2003    needle bx on chest      SHOULDER SURGERY      SIGMOIDECTOMY       Family History   Problem Relation Age of Onset    Cancer Mother         uterine    Asthma Sister     Heart disease Father         valve    Diabetes Maternal Grandmother       Current Outpatient Medications   Medication Sig Dispense Refill    alfuzosin (UROXATRAL) 10 mg Tb24 TAKE 1 TABLET BY MOUTH EVERY DAY IN THE EVENING 90 tablet 0    aspirin (ECOTRIN) 81 MG EC tablet Take 81 mg by mouth once daily.      blood sugar diagnostic Strp To check BG 4 times daily, to use with insurance preferred meter 350 strip 3    celecoxib (CELEBREX) 200 MG capsule Take 200 mg by mouth 2 (two) times daily.      desoximetasone (TOPICORT) 0.25 % cream Apply topically 2 (two) times daily. 60 g 0    empagliflozin (JARDIANCE) 10 mg tablet Take 1 tablet (10 mg total) by mouth once daily. 90 tablet 3    fesoterodine (TOVIAZ) 8 mg Tb24 TAKE 1 TABLET BY MOUTH EVERY DAY 90 tablet 3    fish oil-omega-3 fatty acids 300-1,000 mg capsule Take 1 g by mouth once daily.      fluticasone propionate (FLONASE) 50 mcg/actuation nasal spray 2 sprays (100 mcg total) by Each Nostril route once daily. 9.9 mL 0    FREESTYLE LANCETS 28 gauge lancets TO CHECK BG 4 TIMES DAILY, TO USE WITH INSURANCE PREFERRED METER 200 each 3    metFORMIN (GLUCOPHAGE-XR) 500 MG ER 24hr tablet Take 4 tablets (2,000 mg total) by mouth daily with dinner or evening meal. 360 tablet 1    metoprolol succinate (TOPROL-XL) 50 MG 24 hr tablet Take 100 mg by mouth every evening.      multivitamin (THERAGRAN) per tablet Take 1 tablet by mouth Daily.        nitroGLYCERIN (NITROSTAT) 0.4 MG SL tablet Place 1 tablet (0.4 mg total) under the tongue every 5 (five) minutes as needed for Chest pain. 100 tablet 3    pantoprazole (PROTONIX) 40 MG tablet Take 1 tablet (40 mg total) by mouth once daily. (Patient taking differently: Take 40 mg by mouth as needed.) 30 tablet 1    rosuvastatin (CRESTOR) 20 MG tablet Take 1 tablet (20 mg total) by mouth every evening. 90 tablet 3    timolol maleate 0.5% (TIMOPTIC) 0.5 % Drop Place 1 drop into both eyes every morning.      traZODone (DESYREL) 50 MG tablet TAKE 1/2 TABLET BY MOUTH TWICE DAILY 90 tablet 3    blood-glucose  meter kit To check BG 4 times daily, to use with insurance preferred meter 1 each 0     No current facility-administered medications for this visit.     Review of patient's allergies indicates:   Allergen Reactions    Atorvastatin      Other reaction(s):(lipitor) Muscle pain    Niacin      Other reaction(s): Flushing (skin)     Social History     Socioeconomic History    Marital status:    Occupational History    Occupation: enviromental regulator     Employer: [employer]   Tobacco Use    Smoking status: Never    Smokeless tobacco: Never    Tobacco comments:     smoked weed 27 yrs ago   Substance and Sexual Activity    Alcohol use: No     Alcohol/week: 0.0 standard drinks     Comment: quit at 25 yrs old    Drug use: No    Sexual activity: Yes     Partners: Female     Social Determinants of Health     Financial Resource Strain: Low Risk     Difficulty of Paying Living Expenses: Not hard at all   Food Insecurity: No Food Insecurity    Worried About Running Out of Food in the Last Year: Never true    Ran Out of Food in the Last Year: Never true   Transportation Needs: No Transportation Needs    Lack of Transportation (Medical): No    Lack of Transportation (Non-Medical): No   Physical Activity: Sufficiently Active    Days of Exercise per Week: 7 days    Minutes of Exercise per Session: 30 min   Stress: No Stress Concern Present    Feeling of Stress : Not at all   Social Connections: Unknown    Frequency of Communication with Friends and Family: Once a week    Frequency of Social Gatherings with Friends and Family: Once a week    Active Member of Clubs or Organizations: Yes    Attends Club or Organization Meetings: More than 4 times per year    Marital Status:    Housing Stability: Low Risk     Unable to Pay for Housing in the Last Year: No    Number of Places Lived in the Last Year: 1    Unstable Housing in the Last Year: No       ROS    REVIEW OF SYSTEMS: Negative as documented below as well as  "positive findings in bold.       Constitutional  Respiratory  Gastrointestinal  Skin   - Fever - Cough - Heartburn - Rash   - Chills - Spit blood - Nausea - Itching   - Weight Loss - Shortness of breath - Vomiting - Nail pain   - Malaise/Fatigue - Wheezing - Abdominal Pain  Wound/Ulcer   - Weight Gain   - Blood in Stool  Poor wound healing       - Diarrhea          Cardiovascular  Genitourinary  Neurological  HEENT   - Chest Pain - Dysuria - Burning Sensation of feet - Headache   - Palpitations - Hematuria - Tingling / Paresthesia - Congestion   - Pain at night in legs - Flank Pain - Dizziness - Sore Throat   - Cramping   - Tremor - Blurred Vision   - Leg Swelling   - Sensory Change - Double Vision   - Dizzy when standing   - Speech Change - Eye Redness       - Focal Weakness - Dry Eyes       - Loss of Consciousness          Endocrine  Musculoskeletal  Psychiatric   - Cold intolerance - Muscle Pain - Depression   - Heat intolerance - Neck Pain - Insomnia   - Anemia - Joint Pain - Memory Loss   -  Easy bruising, bleeding - Heel pain - Anxiety      Toe Pain        Leg/Ankle/Foot Pain         Objective:     /86   Pulse (!) 59   Resp 18   Ht 5' 9" (1.753 m)   Wt 88.6 kg (195 lb 3.5 oz)   BMI 28.83 kg/m²   Vitals:    09/27/22 1133   BP: 138/86   Pulse: (!) 59   Resp: 18   Weight: 88.6 kg (195 lb 3.5 oz)   Height: 5' 9" (1.753 m)   PainSc:   2   PainLoc: Toe       Physical Exam    General Appearance:   Patient appears well developed, well nourished  Patient appears stated age    Psychiatric:   Patient is oriented to time, place, and person.  Patient has appropriate mood and affect    Neck:  Trachea Midline  No visible masses    Respiratory/Ears:  No distress or labored breathing.  Able to differentiate between normal talking voice and whisper.  Able to follow commands    Eyes:  Visual Acuity intact  Lids and conjunctivae normal. No discoloration noted.    Foot Exam  Physical Exam  Ortho Exam  Ortho/SPM " Exam  Physical Exam  Neurologic Exam    R LE exam con't:  V:  DP 2/4, PT 2/4   CRT< 3s to all digits tested   Tibial and popliteal lymph nodes are w/o abnormality    edema absent bilaterally, varicosities present bilaterally    N:  Patient displays normal ankle reflexes   SILT in SP/DP/T/Shane/Saph distributions    Ortho: +Motor EHL/FHL/TA/GA   +TTP R great toe  Compartments soft/compressible. No pain on passive stretch of big toe. No calf  Pain.    Derm:  skin intact, skin warm and dry, skin without ulcers or lesions, skin without induration, right, great toe ingrowing and incurvated     Imaging / Labs:      No results found.      Note: This was dictated using a computer transcription program. Although proofread, it may contain computer transcription errors and phonetic errors. Other human proofreading errors may also exist. Corrections may be performed at a later time. Please contact us for any clarification if needed.    Salvador Buckley DPM  [facility]

## 2022-10-04 ENCOUNTER — IMMUNIZATION (OUTPATIENT)
Dept: INTERNAL MEDICINE | Facility: CLINIC | Age: 72
End: 2022-10-04
Payer: MEDICARE

## 2022-10-04 DIAGNOSIS — Z23 NEED FOR VACCINATION: Primary | ICD-10-CM

## 2022-10-04 PROCEDURE — 0124A COVID-19, MRNA, LNP-S, BIVALENT BOOSTER, PF, 30 MCG/0.3 ML DOSE: CPT | Mod: PBBFAC,CV19

## 2022-10-04 PROCEDURE — 91312 COVID-19, MRNA, LNP-S, BIVALENT BOOSTER, PF, 30 MCG/0.3 ML DOSE: CPT | Mod: PBBFAC

## 2022-10-11 ENCOUNTER — OFFICE VISIT (OUTPATIENT)
Dept: PODIATRY | Facility: CLINIC | Age: 72
End: 2022-10-11
Payer: MEDICARE

## 2022-10-11 VITALS
WEIGHT: 197.44 LBS | DIASTOLIC BLOOD PRESSURE: 77 MMHG | RESPIRATION RATE: 18 BRPM | SYSTOLIC BLOOD PRESSURE: 115 MMHG | HEART RATE: 63 BPM | BODY MASS INDEX: 29.24 KG/M2 | HEIGHT: 69 IN

## 2022-10-11 DIAGNOSIS — L60.0 INGROWN TOENAIL OF RIGHT FOOT: Primary | ICD-10-CM

## 2022-10-11 PROCEDURE — 99215 OFFICE O/P EST HI 40 MIN: CPT | Mod: PBBFAC,PO | Performed by: PODIATRIST

## 2022-10-11 PROCEDURE — 99213 OFFICE O/P EST LOW 20 MIN: CPT | Mod: S$PBB,,, | Performed by: PODIATRIST

## 2022-10-11 PROCEDURE — 99999 PR PBB SHADOW E&M-EST. PATIENT-LVL V: ICD-10-PCS | Mod: PBBFAC,,, | Performed by: PODIATRIST

## 2022-10-11 PROCEDURE — 99999 PR PBB SHADOW E&M-EST. PATIENT-LVL V: CPT | Mod: PBBFAC,,, | Performed by: PODIATRIST

## 2022-10-11 PROCEDURE — 99213 PR OFFICE/OUTPT VISIT, EST, LEVL III, 20-29 MIN: ICD-10-PCS | Mod: S$PBB,,, | Performed by: PODIATRIST

## 2022-10-11 NOTE — PATIENT INSTRUCTIONS
Instructions for Care after Ingrown Nail removal    General Information: Stay off your feet as much as possible today. You may wear a surgical shoe, sandal or any open toed shoe that does not squeeze, constrict or put pressure on your toe(s). Your toe(s) may remain numb for up to 2-24 hours after the procedure. Although most patients can wear a closed loose fitting shoe after the first week, the toe will heal faster the more you use the open toed shoe in the first 2-3  weeks. Please contact our office if you have any questions or concerns.    Bleeding: Slight bleeding, discoloration and drainage are normal. Due to the chemical used there may be some yellow-clear drainage coming from the toe for 2-3 weeks.    Discomfort: You can elevate your foot to help alleviate minor swelling, bleeding and discomfort. You may also take Advil, Tylenol or other over the counter pain medications to help alleviate pain. Call our office if the pain is not well controlled. Most patients have very little discomfort as long as they minimize their walking for the first 24 hours and do not bump the toe.    Removing the Bandage: Starting the day after surgery, carefully remove the dressing and shower or bathe as normal. It is Ok to get the bandage soaking wet in the shower and when you remove it, it should not stick to the surgery site.    Dressing Options- Traditional Method:  1. Soaking two times a day in WARM water with Epsom salts or diluted Povidone Iodine  (Betadine) for 15-20 minutes. You will need to purchase these products from the pharmacy.  2. Dry toe then apply an antibiotic cream or ointment such as Neosporin or Polysporin plus or  Garamycin and cover with a 2 x 2 inch size gauze and then secure with a 1 inch band aid.  3. In the second week, take the dressing off at bedtime to air dry the toe.  4. If the toe is infected take the Antibiotic Pills as directed until finished.      Ingrown Toenail        What Is an Ingrown Toenail?     When a toenail is ingrown, it is curved and grows into the skin, usually at the nail borders (the sides of the nail). This digging in of the nail irritates the skin, often creating pain, redness, swelling and warmth in the toe.    If an ingrown nail causes a break in the skin, bacteria may enter and cause an infection in the area, which is often marked by drainage and a foul odor. However, even if the toe is not painful, red, swollen or warm, a nail that curves downward into the skin can progress to an infection.    Ingrown toenail and normal toenail    Causes  Causes of ingrown toenails include:    Heredity. In many people, the tendency for ingrown toenails is inherited.  Trauma. Sometimes an ingrown toenail is the result of trauma, such as stubbing your toe, having an object fall on your toe or engaging in activities that involve repeated pressure on the toes, such as kicking or running.  Improper trimming. The most common cause of ingrown toenails is cutting your nails too short. This encourages the skin next to the nail to fold over the nail.   Improperly sized footwear. Ingrown toenails can result from wearing socks and shoes that are tight or short.  Nail conditions. Ingrown toenails can be caused by nail problems, such as fungal infections or losing a nail due to trauma.     Treatment  Sometimes initial treatment for ingrown toenails can be safely performed at home. However, home treatment is strongly discouraged if an infection is suspected or for those who have medical conditions that put feet at high risk, such as diabetes, nerve damage in the foot or poor circulation.        Ingrown toenail before and after treatment    Home Care  If you do not have an infection or any of the above medical conditions, you can soak your foot in room-temperature water (adding Epsom salt may be recommended by your doctor) and gently massage the side of the nail fold to help reduce the inflammation.    Avoid attempting  bathroom surgery. Repeated cutting of the nail can cause the condition to worsen over time. If your symptoms fail to improve, it is time to see a foot and ankle surgeon.    Physician Care  After examining the toe, the foot and ankle surgeon will select the treatment best suited for you. If an infection is present, an oral antibiotic may be prescribed.    Sometimes a minor surgical procedure, often performed in the office, will ease the pain and remove the offending nail. After applying a local anesthetic, the doctor removes part of the nails side border. Some nails may become ingrown again, requiring removal of the nail root.    Following the nail procedure, a light bandage will be applied. Most people experience very little pain after surgery and may resume normal activity the next day. If your surgeon has prescribed an oral antibiotic, be sure to take all the medication, even if your symptoms have improved.    Preventing Ingrown Toenails  Many cases of ingrown toenails may be prevented by:    Proper trimming. Cut toenails in a fairly straight line, and do not cut them too short. You should be able to get your fingernail under the sides and end of the nail.  Well-fitting shoes and socks. Do not wear shoes that are short or tight in the toe area. Avoid shoes that are loose because they too cause pressure on the toes, especially when running or walking briskly.               What You Should Know About Home Treatment  Do not cut a notch in the nail. Contrary to what some people believe, this does not reduce the tendency for the nail to curve downward.  Do not repeatedly trim nail borders. Repeated trimming does not change the way the nail grows and can make the condition worse.  Do not place cotton under the nail. Not only does this not relieve the pain, it provides a place for harmful bacteria to grow, resulting in infection.  Over-the-counter medications are ineffective. Topical medications may mask the pain, but  they do not correct the underlying problem.        Understanding Ingrown Toenails    An ingrown nail is the result of a nail growing into the skin that surrounds it. This often occurs at either edge of the big toe. Ingrown nails may be caused by improper trimming, inherited nail deformities, injuries, fungal infections, or pressure.  Symptoms  Ingrown nails may cause pain at the tip of the toe or all the way to the base of the toe. The pain is often worse while walking. An ingrown nail may also lead to infection, inflammation, or a more serious condition. If its infected, you might see pus or redness.  Evaluation  To determine the extent of your problem, your healthcare provider examines and possibly presses the painful area. If other problems are suspected, blood tests, cultures, and X-rays may be done as well.  Treatment  If the nail isnt infected, your healthcare provider may trim the corner of it to help relieve your symptoms. He or she may need to remove one side of your nail back to the cuticle. The base of the nail may then be treated with a chemical to keep the ingrown part from growing back. Severe infections or ingrown nails may require antibiotics and temporary or permanent removal of a portion of the nail. To prevent pain, a local anesthetic may be used in these procedures. This treatment is usually done at your healthcare provider's office.  Prevention  Many nail problems can be prevented by wearing the right shoes and trimming your nails properly. To help avoid infection, keep your feet clean and dry. If you have diabetes, talk with your healthcare provider before doing any foot self-care.  The right shoes: Get your feet measured (your size may change as you age). Wear shoes that are supportive and roomy enough for your toes to wiggle. Look for shoes made of natural materials such as leather, which allow your feet to breathe.  Proper trimming: To avoid problems, trim your toenails straight across  without cutting down into the corners. If you cant trim your own nails, ask your healthcare provider to do so for you.  Date Last Reviewed: 10/1/2016  © 3253-8793 [employer]. [address] [place] [address], [place] [address]. All rights reserved. This information is not intended as a substitute for professional medical care. Always follow your healthcare professional's instructions.

## 2022-10-11 NOTE — PROGRESS NOTES
[facility] - PODIATRY  [address] [place] [address], [address]  [facility] [id number]  Dept: [phone]  Dept Fax: [phone]    Salvador Buckley Jr., DPM     Assessment:   MDM    Coding  1. Ingrown toenail of right foot            Plan:     Procedures    eDn was seen today for ingrown toenail.    Diagnoses and all orders for this visit:    Ingrown toenail of right foot      -pt seen, evaluated, and managed  -dx discussed in detail. All questions/concerns addressed  -all tx options discussed. All alternatives, risks, benefits of all txs discussed  -The patient was educated regarding the above diagnosis.   -discussed ingrowing toenails and all tx options. Focused on prevention going fwd  -manually curetted nail px site and covered with trpl abx ointment and bandaid  -pt to perform epsom salt or betadine soaks once daily x 2wks. Written instructions dispensed  -keep toe covered with triple abx ointment + bandaid x 2wks      Rx dispensed: none  Referrals: none  -WB: wbat        Follow up if symptoms worsen or fail to improve.    Subjective:      Patient ID: Den Rick is a 72 y.o. male.    Chief Complaint:   Chief Complaint   Patient presents with    Ingrown Toenail       CC - ingrown nail: Patient presents to the clinic complaining of painful ingrown toenail on the right foot. Patients rates pain 8/10 on pain scale. patient seeking tx options.    10/11/22:  Hx as above. S/p nail avulsion. No complaints    Ingrown Toenail      Last Podiatry Enc: Visit date not found  Last Enc w/ Me: Visit date not found    Outside reports reviewed: historical medical records.  Family hx: as below  Past Medical History:   Diagnosis Date    Anticoagulant long-term use     Arthritis     Atrial tachycardia, paroxysmal 1/12    during Cardiac Rehab    Colon polyp     Coronary artery disease     Diverticulitis     Diverticulosis     GERD (gastroesophageal reflux disease)     Glucose intolerance (impaired glucose tolerance)  1/24/2017    Hyperlipidemia     Hypertension     IFG (impaired fasting glucose) 12/21/2015    NSTEMI (non-ST elevated myocardial infarction) 10/11/12    Obesity (BMI 30-39.9) 1/31/2018    Post PTCA     Sleep apnea      Past Surgical History:   Procedure Laterality Date    boewl resection      CATARACT EXTRACTION, BILATERAL  8/2011    COLONOSCOPY  2012    COLONOSCOPY N/A 10/10/2016    Procedure: COLONOSCOPY;  Surgeon: Main Lehman MD;  Location: [address]);  Service: Endoscopy;  Laterality: N/A;    COLONOSCOPY W/ POLYPECTOMY      CORONARY ANGIOPLASTY WITH STENT PLACEMENT      ESOPHAGOGASTRODUODENOSCOPY      HERNIA REPAIR      KNEE SURGERY  2003    needle bx on chest      SHOULDER SURGERY      SIGMOIDECTOMY       Family History   Problem Relation Age of Onset    Cancer Mother         uterine    Asthma Sister     Heart disease Father         valve    Diabetes Maternal Grandmother      Current Outpatient Medications   Medication Sig Dispense Refill    alfuzosin (UROXATRAL) 10 mg Tb24 TAKE 1 TABLET BY MOUTH EVERY DAY IN THE EVENING 90 tablet 0    aspirin (ECOTRIN) 81 MG EC tablet Take 81 mg by mouth once daily.      blood sugar diagnostic Strp To check BG 4 times daily, to use with insurance preferred meter 350 strip 3    celecoxib (CELEBREX) 200 MG capsule Take 200 mg by mouth 2 (two) times daily.      desoximetasone (TOPICORT) 0.25 % cream Apply topically 2 (two) times daily. 60 g 0    empagliflozin (JARDIANCE) 10 mg tablet Take 1 tablet (10 mg total) by mouth once daily. 90 tablet 3    fesoterodine (TOVIAZ) 8 mg Tb24 TAKE 1 TABLET BY MOUTH EVERY DAY 90 tablet 3    fish oil-omega-3 fatty acids 300-1,000 mg capsule Take 1 g by mouth once daily.      fluticasone propionate (FLONASE) 50 mcg/actuation nasal spray 2 sprays (100 mcg total) by Each Nostril route once daily. 9.9 mL 0    FREESTYLE LANCETS 28 gauge lancets TO CHECK BG 4 TIMES DAILY, TO USE WITH INSURANCE PREFERRED METER 200 each 3    metFORMIN  (GLUCOPHAGE-XR) 500 MG ER 24hr tablet Take 4 tablets (2,000 mg total) by mouth daily with dinner or evening meal. 360 tablet 1    metoprolol succinate (TOPROL-XL) 50 MG 24 hr tablet Take 100 mg by mouth every evening.      multivitamin (THERAGRAN) per tablet Take 1 tablet by mouth Daily.        nitroGLYCERIN (NITROSTAT) 0.4 MG SL tablet Place 1 tablet (0.4 mg total) under the tongue every 5 (five) minutes as needed for Chest pain. 100 tablet 3    pantoprazole (PROTONIX) 40 MG tablet Take 1 tablet (40 mg total) by mouth once daily. (Patient taking differently: Take 40 mg by mouth as needed.) 30 tablet 1    rosuvastatin (CRESTOR) 20 MG tablet Take 1 tablet (20 mg total) by mouth every evening. 90 tablet 3    timolol maleate 0.5% (TIMOPTIC) 0.5 % Drop Place 1 drop into both eyes every morning.      traZODone (DESYREL) 50 MG tablet TAKE 1/2 TABLET BY MOUTH TWICE DAILY 90 tablet 3    blood-glucose meter kit To check BG 4 times daily, to use with insurance preferred meter 1 each 0     No current facility-administered medications for this visit.     Review of patient's allergies indicates:   Allergen Reactions    Atorvastatin      Other reaction(s):(lipitor) Muscle pain    Niacin      Other reaction(s): Flushing (skin)     Social History     Socioeconomic History    Marital status:    Occupational History    Occupation: enviromental regulator     Employer: [employer]   Tobacco Use    Smoking status: Never    Smokeless tobacco: Never    Tobacco comments:     smoked weed 27 yrs ago   Substance and Sexual Activity    Alcohol use: No     Alcohol/week: 0.0 standard drinks     Comment: quit at 25 yrs old    Drug use: No    Sexual activity: Yes     Partners: Female     Social Determinants of Health     Financial Resource Strain: Low Risk     Difficulty of Paying Living Expenses: Not hard at all   Food Insecurity: No Food Insecurity    Worried About Running Out of Food in the Last Year: Never true    Ran Out of Food  in the Last Year: Never true   Transportation Needs: No Transportation Needs    Lack of Transportation (Medical): No    Lack of Transportation (Non-Medical): No   Physical Activity: Sufficiently Active    Days of Exercise per Week: 7 days    Minutes of Exercise per Session: 30 min   Stress: No Stress Concern Present    Feeling of Stress : Not at all   Social Connections: Unknown    Frequency of Communication with Friends and Family: Once a week    Frequency of Social Gatherings with Friends and Family: Once a week    Active Member of Clubs or Organizations: Yes    Attends Club or Organization Meetings: More than 4 times per year    Marital Status:    Housing Stability: Low Risk     Unable to Pay for Housing in the Last Year: No    Number of Places Lived in the Last Year: 1    Unstable Housing in the Last Year: No       ROS    REVIEW OF SYSTEMS: Negative as documented below as well as positive findings in bold.       Constitutional  Respiratory  Gastrointestinal  Skin   - Fever - Cough - Heartburn - Rash   - Chills - Spit blood - Nausea - Itching   - Weight Loss - Shortness of breath - Vomiting - Nail pain   - Malaise/Fatigue - Wheezing - Abdominal Pain  Wound/Ulcer   - Weight Gain   - Blood in Stool  Poor wound healing       - Diarrhea          Cardiovascular  Genitourinary  Neurological  HEENT   - Chest Pain - Dysuria - Burning Sensation of feet - Headache   - Palpitations - Hematuria - Tingling / Paresthesia - Congestion   - Pain at night in legs - Flank Pain - Dizziness - Sore Throat   - Cramping   - Tremor - Blurred Vision   - Leg Swelling   - Sensory Change - Double Vision   - Dizzy when standing   - Speech Change - Eye Redness       - Focal Weakness - Dry Eyes       - Loss of Consciousness          Endocrine  Musculoskeletal  Psychiatric   - Cold intolerance - Muscle Pain - Depression   - Heat intolerance - Neck Pain - Insomnia   - Anemia - Joint Pain - Memory Loss   -  Easy bruising, bleeding - Heel  "pain - Anxiety      Toe Pain        Leg/Ankle/Foot Pain         Objective:     /77 (BP Location: Right arm, Patient Position: Sitting, BP Method: X-Large (Automatic))   Pulse 63   Resp 18   Ht 5' 9" (1.753 m)   Wt 89.5 kg (197 lb 6.8 oz)   BMI 29.15 kg/m²   Vitals:    10/11/22 1425   BP: 115/77   Pulse: 63   Resp: 18   Weight: 89.5 kg (197 lb 6.8 oz)   Height: 5' 9" (1.753 m)   PainSc: 0-No pain       Physical Exam    General Appearance:   Patient appears well developed, well nourished  Patient appears stated age    Psychiatric:   Patient is oriented to time, place, and person.  Patient has appropriate mood and affect    Neck:  Trachea Midline  No visible masses    Respiratory/Ears:  No distress or labored breathing.  Able to differentiate between normal talking voice and whisper.  Able to follow commands    Eyes:  Visual Acuity intact  Lids and conjunctivae normal. No discoloration noted.    Foot Exam  Physical Exam  Ortho Exam  Ortho/SPM Exam  Physical Exam  Neurologic Exam    R LE exam con't:  V:  DP 2/4, PT 2/4   CRT< 3s to all digits tested   Tibial and popliteal lymph nodes are w/o abnormality    edema absent bilaterally, varicosities present bilaterally    N:  Patient displays normal ankle reflexes   SILT in SP/DP/T/Shane/Saph distributions    Ortho: +Motor EHL/FHL/TA/GA   +TTP R great toe  Compartments soft/compressible. No pain on passive stretch of big toe. No calf  Pain.    Derm:  skin intact, skin warm and dry, skin without ulcers or lesions, skin without induration, right, great toe nail px site healing well w/o signs of infection    Imaging / Labs:      No results found.      Note: This was dictated using a computer transcription program. Although proofread, it may contain computer transcription errors and phonetic errors. Other human proofreading errors may also exist. Corrections may be performed at a later time. Please contact us for any clarification if needed.    Salvador Buckley,  " DPM [facility]

## 2022-11-15 ENCOUNTER — PATIENT MESSAGE (OUTPATIENT)
Dept: INTERNAL MEDICINE | Facility: CLINIC | Age: 72
End: 2022-11-15
Payer: COMMERCIAL

## 2022-11-15 DIAGNOSIS — I10 ESSENTIAL HYPERTENSION: ICD-10-CM

## 2022-11-15 DIAGNOSIS — E11.9 TYPE 2 DIABETES MELLITUS WITHOUT COMPLICATION, WITHOUT LONG-TERM CURRENT USE OF INSULIN: ICD-10-CM

## 2022-11-15 DIAGNOSIS — Z00.00 PHYSICAL EXAM, ANNUAL: Primary | ICD-10-CM

## 2022-12-01 DIAGNOSIS — N13.8 BENIGN PROSTATIC HYPERPLASIA WITH URINARY OBSTRUCTION: ICD-10-CM

## 2022-12-01 DIAGNOSIS — N40.1 BENIGN PROSTATIC HYPERPLASIA WITH URINARY OBSTRUCTION: ICD-10-CM

## 2022-12-01 RX ORDER — ALFUZOSIN HYDROCHLORIDE 10 MG/1
TABLET, EXTENDED RELEASE ORAL
Qty: 90 TABLET | Refills: 0 | Status: SHIPPED | OUTPATIENT
Start: 2022-12-01 | End: 2023-03-13 | Stop reason: SDUPTHER

## 2022-12-01 NOTE — TELEPHONE ENCOUNTER
Care Due:                  Date            Visit Type   Department     Provider  --------------------------------------------------------------------------------                                NP -                              PRIMARY      MET INTERNAL  Last Visit: 12-      CARE ([facility])   MARIEL Gamino                              EP -                              PRIMARY      [facility] INTERNAL  Next Visit: 12-      CARE ([facility])   [facility]       Michael Gamino                                                            Last  Test          Frequency    Reason                     Performed    Due Date  --------------------------------------------------------------------------------    HBA1C.......  6 months...  metFORMIN................  08- 01-    [facility] Embedded Care Gaps. Reference number: [id number]. 12/01/2022   12:17:47 AM CST

## 2022-12-01 NOTE — TELEPHONE ENCOUNTER
Refill Decision Note   Den Merline  is requesting a refill authorization.  Brief Assessment and Rationale for Refill:  Approve     Medication Therapy Plan:       Medication Reconciliation Completed: No   Comments:     No Care Gaps recommended.     Note composed:1:28 PM 12/01/2022

## 2022-12-02 DIAGNOSIS — I25.10 CORONARY ARTERY DISEASE INVOLVING NATIVE CORONARY ARTERY OF NATIVE HEART WITHOUT ANGINA PECTORIS: Chronic | ICD-10-CM

## 2022-12-02 RX ORDER — ROSUVASTATIN CALCIUM 20 MG/1
TABLET, COATED ORAL
Qty: 90 TABLET | Refills: 3 | Status: SHIPPED | OUTPATIENT
Start: 2022-12-02 | End: 2023-12-26 | Stop reason: SDUPTHER

## 2022-12-12 LAB
LEFT EYE DM RETINOPATHY: NEGATIVE
RIGHT EYE DM RETINOPATHY: NEGATIVE

## 2022-12-29 ENCOUNTER — PATIENT OUTREACH (OUTPATIENT)
Dept: ADMINISTRATIVE | Facility: HOSPITAL | Age: 72
End: 2022-12-29
Payer: COMMERCIAL

## 2022-12-29 ENCOUNTER — OFFICE VISIT (OUTPATIENT)
Dept: INTERNAL MEDICINE | Facility: CLINIC | Age: 72
End: 2022-12-29
Payer: MEDICARE

## 2022-12-29 VITALS
HEIGHT: 70 IN | SYSTOLIC BLOOD PRESSURE: 120 MMHG | DIASTOLIC BLOOD PRESSURE: 70 MMHG | HEART RATE: 67 BPM | WEIGHT: 203.5 LBS | BODY MASS INDEX: 29.13 KG/M2 | TEMPERATURE: 98 F | OXYGEN SATURATION: 95 %

## 2022-12-29 DIAGNOSIS — Z12.11 SCREENING FOR COLORECTAL CANCER: ICD-10-CM

## 2022-12-29 DIAGNOSIS — E11.9 TYPE 2 DIABETES MELLITUS WITHOUT COMPLICATION, WITHOUT LONG-TERM CURRENT USE OF INSULIN: ICD-10-CM

## 2022-12-29 DIAGNOSIS — Z12.12 SCREENING FOR COLORECTAL CANCER: ICD-10-CM

## 2022-12-29 DIAGNOSIS — Z00.00 PHYSICAL EXAM, ANNUAL: Primary | ICD-10-CM

## 2022-12-29 DIAGNOSIS — R71.8 ELEVATED RED BLOOD CELL COUNT: ICD-10-CM

## 2022-12-29 PROCEDURE — 99397 PER PM REEVAL EST PAT 65+ YR: CPT | Mod: S$PBB,GZ,, | Performed by: STUDENT IN AN ORGANIZED HEALTH CARE EDUCATION/TRAINING PROGRAM

## 2022-12-29 PROCEDURE — 99215 OFFICE O/P EST HI 40 MIN: CPT | Mod: PBBFAC,PO | Performed by: STUDENT IN AN ORGANIZED HEALTH CARE EDUCATION/TRAINING PROGRAM

## 2022-12-29 PROCEDURE — 99999 PR PBB SHADOW E&M-EST. PATIENT-LVL V: ICD-10-PCS | Mod: PBBFAC,,, | Performed by: STUDENT IN AN ORGANIZED HEALTH CARE EDUCATION/TRAINING PROGRAM

## 2022-12-29 PROCEDURE — 99999 PR PBB SHADOW E&M-EST. PATIENT-LVL V: CPT | Mod: PBBFAC,,, | Performed by: STUDENT IN AN ORGANIZED HEALTH CARE EDUCATION/TRAINING PROGRAM

## 2022-12-29 PROCEDURE — 99397 PR PREVENTIVE VISIT,EST,65 & OVER: ICD-10-PCS | Mod: S$PBB,GZ,, | Performed by: STUDENT IN AN ORGANIZED HEALTH CARE EDUCATION/TRAINING PROGRAM

## 2022-12-29 RX ORDER — BETAMETHASONE DIPROPIONATE 0.5 MG/G
CREAM TOPICAL 2 TIMES DAILY
COMMUNITY
Start: 2022-11-21

## 2022-12-29 RX ORDER — MOXIFLOXACIN HYDROCHLORIDE 400 MG/1
400 TABLET ORAL
COMMUNITY
Start: 2022-08-26 | End: 2023-09-29

## 2022-12-29 RX ORDER — LEVOFLOXACIN 500 MG/1
500 TABLET, FILM COATED ORAL
COMMUNITY
Start: 2022-12-15 | End: 2023-09-29

## 2022-12-29 RX ORDER — PREDNISONE 20 MG/1
TABLET ORAL
COMMUNITY
Start: 2022-12-15 | End: 2023-09-29 | Stop reason: ALTCHOICE

## 2022-12-29 NOTE — PROGRESS NOTES
Subjective:      Chief Complaint: Annual Exam    HPI   Mr. Den Rick is a 72-year-old man with small atrial septal defect status post percutaneous foramina ovale closure, A flutter/tachycardia, coronary artery disease, hypertension, hyperlipidemia, status post NSTEMI, thoracic aortic aneurysm (mildly dilated per recent [facility]), BPH, overactive bladder, hypogonadism, poorly controlled type 2 diabetes, diverticulosis, GERD, ventral hernia, diffuse arthritis, gout, and obstructive sleep apnea presenting annual physical:     Annual screening labs gathered in preparation for this appointment:  -TSH/T4 and microalbuminuria screening: Within normal limits   -urinalysis with trace ketones and 4+ glucosuria  -A1c:  7.2 (represents interval worsening from 6.7 four months prior)  -CMP:  Elevated glucose only   -CBC: Negligible abnormalities only    Self complete review of system listed both hearing loss and visual disturbance    Family, social, surgical Hx reviewed     Health Maintenance:  Due for DM eye and foot exams     Past Medical History:   Diagnosis Date    Anticoagulant long-term use     Arthritis     Atrial tachycardia, paroxysmal 1/12    during Cardiac Rehab    Colon polyp     Coronary artery disease     Diverticulitis     Diverticulosis     GERD (gastroesophageal reflux disease)     Glucose intolerance (impaired glucose tolerance) 1/24/2017    Hyperlipidemia     Hypertension     IFG (impaired fasting glucose) 12/21/2015    NSTEMI (non-ST elevated myocardial infarction) 10/11/12    Obesity (BMI 30-39.9) 1/31/2018    Post PTCA     Sleep apnea      Past Surgical History:   Procedure Laterality Date    boewl resection      CATARACT EXTRACTION, BILATERAL  8/2011    COLONOSCOPY  2012    COLONOSCOPY N/A 10/10/2016    Procedure: COLONOSCOPY;  Surgeon: Main Lehman MD;  Location: [address]);  Service: Endoscopy;  Laterality: N/A;    COLONOSCOPY W/ POLYPECTOMY      CORONARY ANGIOPLASTY WITH STENT PLACEMENT       ESOPHAGOGASTRODUODENOSCOPY      HERNIA REPAIR      KNEE SURGERY  2003    needle bx on chest      SHOULDER SURGERY      SIGMOIDECTOMY       Family History   Problem Relation Age of Onset    Cancer Mother         uterine    Asthma Sister     Heart disease Father         valve    Diabetes Maternal Grandmother      Social History     Socioeconomic History    Marital status:    Occupational History    Occupation: enviromental regulator     Employer: [employer]   Tobacco Use    Smoking status: Never     Passive exposure: Never    Smokeless tobacco: Never    Tobacco comments:     smoked weed 27 yrs ago   Substance and Sexual Activity    Alcohol use: No     Alcohol/week: 0.0 standard drinks     Comment: quit at 25 yrs old    Drug use: No    Sexual activity: Yes     Partners: Female     Social Determinants of Health     Financial Resource Strain: Low Risk     Difficulty of Paying Living Expenses: Not hard at all   Food Insecurity: No Food Insecurity    Worried About Running Out of Food in the Last Year: Never true    Ran Out of Food in the Last Year: Never true   Transportation Needs: No Transportation Needs    Lack of Transportation (Medical): No    Lack of Transportation (Non-Medical): No   Physical Activity: Sufficiently Active    Days of Exercise per Week: 7 days    Minutes of Exercise per Session: 30 min   Stress: No Stress Concern Present    Feeling of Stress : Not at all   Social Connections: Unknown    Frequency of Communication with Friends and Family: More than three times a week    Frequency of Social Gatherings with Friends and Family: Once a week    Active Member of Clubs or Organizations: Yes    Attends Club or Organization Meetings: More than 4 times per year    Marital Status:    Housing Stability: Low Risk     Unable to Pay for Housing in the Last Year: No    Number of Places Lived in the Last Year: 1    Unstable Housing in the Last Year: No     Review of patient's allergies  indicates:   Allergen Reactions    Atorvastatin      Other reaction(s):(lipitor) Muscle pain    Niacin      Other reaction(s): Flushing (skin)     Mr. Den Rick had no medications administered during this visit.    Review of Systems   Constitutional:  Negative for activity change and unexpected weight change.   HENT:  Positive for hearing loss. Negative for rhinorrhea and trouble swallowing.    Eyes:  Positive for visual disturbance. Negative for discharge.   Respiratory:  Negative for chest tightness and wheezing.    Cardiovascular:  Negative for chest pain and palpitations.   Gastrointestinal:  Negative for blood in stool, constipation, diarrhea and vomiting.   Endocrine: Negative for polydipsia and polyuria.   Genitourinary:  Negative for difficulty urinating, hematuria and urgency.   Musculoskeletal:  Negative for arthralgias, joint swelling and neck pain.   Neurological:  Negative for weakness and headaches.   Psychiatric/Behavioral:  Negative for confusion and dysphoric mood.        Objective:      Vitals:    12/29/22 1304   BP: 120/70   Pulse: 67   Temp: 98.1 °F (36.7 °C)      Physical Exam  Vitals reviewed.   Constitutional:       General: He is not in acute distress.     Appearance: Normal appearance.   HENT:      Head: Normocephalic and atraumatic.      Comments: Facial features are symmetric      Nose: Nose normal. No congestion or rhinorrhea.      Mouth/Throat:      Mouth: Mucous membranes are moist.      Pharynx: Oropharynx is clear. No oropharyngeal exudate or posterior oropharyngeal erythema.   Eyes:      General: No scleral icterus.     Extraocular Movements: Extraocular movements intact.      Conjunctiva/sclera: Conjunctivae normal.   Cardiovascular:      Rate and Rhythm: Normal rate and regular rhythm.      Pulses: Normal pulses.      Heart sounds: Normal heart sounds.   Pulmonary:      Effort: Pulmonary effort is normal. No respiratory distress.      Breath sounds: Normal breath sounds.    Musculoskeletal:         General: No deformity or signs of injury. Normal range of motion.      Cervical back: Normal range of motion.      Comments: Gait normal    Feet:      Right foot:      Protective Sensation:  1 site tested.  1 site sensed.     Toenail Condition: Right toenails are normal.      Left foot:      Protective Sensation:  1 site tested.  1 site sensed.     Toenail Condition: Left toenails are abnormally thick.   Skin:     General: Skin is warm and dry.      Findings: No rash.   Neurological:      General: No focal deficit present.      Mental Status: He is alert and oriented to person, place, and time. Mental status is at baseline.   Psychiatric:         Mood and Affect: Mood normal.         Behavior: Behavior normal.         Thought Content: Thought content normal.     Current Outpatient Medications on File Prior to Visit   Medication Sig Dispense Refill    alfuzosin (UROXATRAL) 10 mg Tb24 TAKE 1 TABLET BY MOUTH EVERY DAY IN THE EVENING 90 tablet 0    aspirin (ECOTRIN) 81 MG EC tablet Take 81 mg by mouth once daily.      betamethasone dipropionate 0.05 % cream Apply topically 2 (two) times daily.      blood sugar diagnostic Strp To check BG 4 times daily, to use with insurance preferred meter 350 strip 3    celecoxib (CELEBREX) 200 MG capsule Take 200 mg by mouth 2 (two) times daily.      desoximetasone (TOPICORT) 0.25 % cream Apply topically 2 (two) times daily. 60 g 0    fesoterodine (TOVIAZ) 8 mg Tb24 TAKE 1 TABLET BY MOUTH EVERY DAY 90 tablet 3    fish oil-omega-3 fatty acids 300-1,000 mg capsule Take 1 g by mouth once daily.      fluticasone propionate (FLONASE) 50 mcg/actuation nasal spray 2 sprays (100 mcg total) by Each Nostril route once daily. 9.9 mL 0    FREESTYLE LANCETS 28 gauge lancets TO CHECK BG 4 TIMES DAILY, TO USE WITH INSURANCE PREFERRED METER 200 each 3    JARDIANCE 10 mg tablet TAKE 1 TABLET BY MOUTH EVERY DAY 90 tablet 0    levoFLOXacin (LEVAQUIN) 500 MG tablet Take 500 mg  by mouth.      metFORMIN (GLUCOPHAGE-XR) 500 MG ER 24hr tablet TAKE FOUR TABLETS BY MOUTH DAILY WITH DINNER OR EVENING MEAL. 360 tablet 0    metoprolol succinate (TOPROL-XL) 50 MG 24 hr tablet TAKE 1 TABLET BY MOUTH TWICE A  tablet 3    moxifloxacin (AVELOX) 400 mg tablet Take 400 mg by mouth.      multivitamin (THERAGRAN) per tablet Take 1 tablet by mouth Daily.        nitroGLYCERIN (NITROSTAT) 0.4 MG SL tablet Place 1 tablet (0.4 mg total) under the tongue every 5 (five) minutes as needed for Chest pain. 100 tablet 3    pantoprazole (PROTONIX) 40 MG tablet Take 1 tablet (40 mg total) by mouth once daily. (Patient taking differently: Take 40 mg by mouth as needed.) 30 tablet 1    rosuvastatin (CRESTOR) 20 MG tablet TAKE 1 TABLET BY MOUTH EVERY DAY IN THE EVENING 90 tablet 3    timolol maleate 0.5% (TIMOPTIC) 0.5 % Drop Place 1 drop into both eyes every morning.      traZODone (DESYREL) 50 MG tablet TAKE 1/2 TABLET BY MOUTH TWICE A DAY 90 tablet 0    blood-glucose meter kit To check BG 4 times daily, to use with insurance preferred meter 1 each 0    predniSONE (DELTASONE) 20 MG tablet TAKE 2 TABLETS BY MOUTH ON DAY 1, 1.5 ON DAYS 2-3, 1 ON DAYS 4-5, AND 1/2 ON DAY 6       No current facility-administered medications on file prior to visit.         Assessment:       1. Physical exam, annual    2. Type 2 diabetes mellitus without complication, without long-term current use of insulin    3. Elevated red blood cell count    4. Screening for colorectal cancer        Plan:       Physical exam, annual   - annual screening labs discussed    Type 2 diabetes mellitus without complication, without long-term current use of insulin  -     Hemoglobin A1C; Future; Expected date: 06/29/2023   - while uptrending, A1c is within limit of control and subsequent to several months of recurrent steroid use (chronic nasal polyps).  Will reassess A1c in approximately 6 months and treat accordingly     Elevated red blood cell count  -      CBC Auto Differential; Future; Expected date: 06/29/2023   - minimal red blood cell count elevation is not particularly concerning; however, given history of hemochromatosis necessitating bloodletting, will reassess along with A1c in 6 months     Screening for colorectal cancer  -     Ambulatory referral/consult to Endo Procedure ; Future; Expected date: 12/30/2022

## 2022-12-30 DIAGNOSIS — N13.8 BENIGN PROSTATIC HYPERPLASIA WITH URINARY OBSTRUCTION: ICD-10-CM

## 2022-12-30 DIAGNOSIS — N40.1 BENIGN PROSTATIC HYPERPLASIA WITH URINARY OBSTRUCTION: ICD-10-CM

## 2022-12-30 RX ORDER — ALFUZOSIN HYDROCHLORIDE 10 MG/1
TABLET, EXTENDED RELEASE ORAL
Qty: 90 TABLET | Refills: 0 | OUTPATIENT
Start: 2022-12-30

## 2022-12-30 RX ORDER — EMPAGLIFLOZIN 10 MG/1
TABLET, FILM COATED ORAL
Qty: 90 TABLET | Refills: 0 | OUTPATIENT
Start: 2022-12-30

## 2022-12-30 NOTE — TELEPHONE ENCOUNTER
No new care gaps identified.  [facility] Embedded Care Gaps. Reference number: [id number]. 12/30/2022   2:54:05 PM CST

## 2022-12-31 NOTE — TELEPHONE ENCOUNTER
Alicia DC. Request already responded to by other means (e.g. phone or fax)   Refill Authorization Note   Den Princededraálvaro  is requesting a refill authorization.  Brief Assessment and Rationale for Refill:  Quick Discontinue  Medication Therapy Plan:  Both scripts received by pharmacy [facility] 12/9/22, Alfuzosin 12/1/22    Medication Reconciliation Completed:  No      Comments:     Note composed:6:08 PM 12/30/2022

## 2023-01-04 ENCOUNTER — PATIENT OUTREACH (OUTPATIENT)
Dept: ADMINISTRATIVE | Facility: HOSPITAL | Age: 73
End: 2023-01-04
Payer: COMMERCIAL

## 2023-01-11 ENCOUNTER — PATIENT OUTREACH (OUTPATIENT)
Dept: ADMINISTRATIVE | Facility: HOSPITAL | Age: 73
End: 2023-01-11
Payer: COMMERCIAL

## 2023-01-11 NOTE — LETTER
AUTHORIZATION FOR RELEASE OF   CONFIDENTIAL INFORMATION      We are seeing Den Rick, date of birth 1950, in the clinic at [facility] INTERNAL MEDICINE. Michael Gamino MD is the patient's PCP. Den Rick has an outstanding lab/procedure at the time we reviewed his chart. In order to help keep his health information updated, he has authorized us to request the following medical record(s):        (  )  MAMMOGRAM                                      ( )  COLONOSCOPY      (  )  PAP SMEAR                                          (  )  OUTSIDE LAB RESULTS     (  )  DEXA SCAN                                          ( x )  EYE EXAM            (  )  FOOT EXAM                                          (  )  ENTIRE RECORD     (  )  OUTSIDE IMMUNIZATIONS                 (  )  _______________         Please fax records to [facility], Tarek C Balamane, MD, [phone]     If you have any questions, please contact Chloé at [phone].           Patient Name: Den Rick  : 1950  Patient Phone #: [phone]

## 2023-03-03 RX ORDER — NITROGLYCERIN 0.4 MG/1
0.4 TABLET SUBLINGUAL EVERY 5 MIN PRN
Qty: 100 TABLET | Refills: 3 | Status: SHIPPED | OUTPATIENT
Start: 2023-03-03

## 2023-03-10 ENCOUNTER — CLINICAL SUPPORT (OUTPATIENT)
Dept: ENDOSCOPY | Facility: HOSPITAL | Age: 73
End: 2023-03-10
Attending: STUDENT IN AN ORGANIZED HEALTH CARE EDUCATION/TRAINING PROGRAM
Payer: MEDICARE

## 2023-03-10 VITALS — WEIGHT: 195 LBS | BODY MASS INDEX: 28.88 KG/M2 | HEIGHT: 69 IN

## 2023-03-10 DIAGNOSIS — Z12.12 SCREENING FOR COLORECTAL CANCER: ICD-10-CM

## 2023-03-10 DIAGNOSIS — Z12.11 SCREENING FOR COLORECTAL CANCER: ICD-10-CM

## 2023-03-10 NOTE — PLAN OF CARE
Patient wants to schedule colonoscopy after the first week in May. Will call patient when schedule is available.

## 2023-03-13 DIAGNOSIS — N40.1 BENIGN PROSTATIC HYPERPLASIA WITH URINARY OBSTRUCTION: ICD-10-CM

## 2023-03-13 DIAGNOSIS — N13.8 BENIGN PROSTATIC HYPERPLASIA WITH URINARY OBSTRUCTION: ICD-10-CM

## 2023-03-13 RX ORDER — ALFUZOSIN HYDROCHLORIDE 10 MG/1
10 TABLET, EXTENDED RELEASE ORAL NIGHTLY
Qty: 90 TABLET | Refills: 3 | Status: SHIPPED | OUTPATIENT
Start: 2023-03-13 | End: 2024-02-25

## 2023-03-13 NOTE — TELEPHONE ENCOUNTER
No new care gaps identified.  [facility] Embedded Care Gaps. Reference number: [id number]. 3/13/2023   6:49:58 AM LEONIET

## 2023-03-23 ENCOUNTER — TELEPHONE (OUTPATIENT)
Dept: ENDOSCOPY | Facility: HOSPITAL | Age: 73
End: 2023-03-23
Payer: COMMERCIAL

## 2023-03-23 DIAGNOSIS — Z12.11 COLON CANCER SCREENING: Primary | ICD-10-CM

## 2023-03-23 NOTE — TELEPHONE ENCOUNTER
Patient had PAT appointment on 3/10/23 and requested to schedule his colonoscopy after the first week in May but the schedule was not available. Called patient today to schedule colonoscopy. Left voicemail message with Endoscopy scheduling number to call back.

## 2023-04-14 ENCOUNTER — TELEPHONE (OUTPATIENT)
Dept: ENDOSCOPY | Facility: HOSPITAL | Age: 73
End: 2023-04-14

## 2023-04-14 ENCOUNTER — CLINICAL SUPPORT (OUTPATIENT)
Dept: ENDOSCOPY | Facility: HOSPITAL | Age: 73
End: 2023-04-14
Attending: STUDENT IN AN ORGANIZED HEALTH CARE EDUCATION/TRAINING PROGRAM
Payer: MEDICARE

## 2023-04-14 VITALS — HEIGHT: 69 IN | WEIGHT: 195 LBS | BODY MASS INDEX: 28.88 KG/M2

## 2023-04-14 DIAGNOSIS — Z12.11 SPECIAL SCREENING FOR MALIGNANT NEOPLASMS, COLON: Primary | ICD-10-CM

## 2023-04-14 DIAGNOSIS — Z12.11 COLON CANCER SCREENING: ICD-10-CM

## 2023-04-14 NOTE — PLAN OF CARE
Cardiac clearance received. Called patient back to schedule. No answer. LVM letting him know I will reach back out to him on next week.

## 2023-04-14 NOTE — TELEPHONE ENCOUNTER
Good morning,    Patient has a referral for a colonoscopy.  Is patient cleared from a cardiac standpoint to proceed with procedure?  Please advise.     Thanks,    KAYY Carrera

## 2023-04-14 NOTE — PLAN OF CARE
Contacted patient to schedule colonoscopy. Patient is followed by cardiology. Cardiac clearance requested from Dr. Barlow. Will call patient back once clearance received.

## 2023-04-17 NOTE — TELEPHONE ENCOUNTER
April 14, 2023  Jennifer Barlow MD  to Cain GURROLA Staff  Me         1:29 PM   Yes he is cleared from the cardiac stand point to undergo a colonoscopy.   HKD            11:18 AM  You routed this conversation to Jennifer Barlow MD    Me         10:23 AM  Note  Good morning,     Patient has a referral for a colonoscopy.  Is patient cleared from a cardiac standpoint to proceed with procedure?  Please advise.      Thanks,     KAYY Carrera

## 2023-04-17 NOTE — PLAN OF CARE
Endoscopy procedure scheduled on 5/9/23, prep instructions reviewed, Pt verbalized understanding.

## 2023-05-08 NOTE — TELEPHONE ENCOUNTER
Care Due:                  Date            Visit Type   Department     Provider  --------------------------------------------------------------------------------                                EP -                              PRIMARY      MET INTERNAL  Last Visit: 12-      CARE ([facility])   [facility]       Michael Gamino                              EP -                              PRIMARY      [facility] INTERNAL  Next Visit: 06-      CARE ([facility])   [facility] EDER Gamino                                                            Last  Test          Frequency    Reason                     Performed    Due Date  --------------------------------------------------------------------------------    HBA1C.......  6 months...  JARDIANCE, metFORMIN.....  12- 06-    [facility] Embedded Care Due Messages. Reference number: [id number].   5/08/2023 6:19:15 PM CDT

## 2023-05-09 RX ORDER — EMPAGLIFLOZIN 10 MG/1
TABLET, FILM COATED ORAL
Qty: 90 TABLET | Refills: 0 | Status: SHIPPED | OUTPATIENT
Start: 2023-05-09 | End: 2023-05-31

## 2023-05-09 RX ORDER — METFORMIN HYDROCHLORIDE 500 MG/1
TABLET, EXTENDED RELEASE ORAL
Qty: 360 TABLET | Refills: 0 | Status: SHIPPED | OUTPATIENT
Start: 2023-05-09 | End: 2023-08-07

## 2023-05-09 NOTE — TELEPHONE ENCOUNTER
Refill Decision Note   Den Rick  is requesting a refill authorization.  Brief Assessment and Rationale for Refill:  Approve     Medication Therapy Plan:       Medication Reconciliation Completed: No   Comments:     Provider Staff:     Action is required for this patient.   Please see care gap opportunities below in Care Due Message.     Thanks!  [facility]     Appointments      Date Provider   Last Visit   12/29/2022 Michael Gamino MD   Next Visit   6/26/2023 Michael Gamino MD     Note composed:11:26 AM 05/09/2023           Note composed:11:26 AM 05/09/2023

## 2023-05-09 NOTE — TELEPHONE ENCOUNTER
I tried calling pt to assist with scheduling lab appt prior to OV 6-26-23.  No answer. Left VM for pt to call the clinic

## 2023-05-11 ENCOUNTER — TELEPHONE (OUTPATIENT)
Dept: INTERNAL MEDICINE | Facility: CLINIC | Age: 73
End: 2023-05-11
Payer: COMMERCIAL

## 2023-05-31 RX ORDER — EMPAGLIFLOZIN 10 MG/1
TABLET, FILM COATED ORAL
Qty: 90 TABLET | Refills: 0 | Status: SHIPPED | OUTPATIENT
Start: 2023-05-31 | End: 2023-09-13

## 2023-06-01 ENCOUNTER — TELEPHONE (OUTPATIENT)
Dept: UROLOGY | Facility: CLINIC | Age: 73
End: 2023-06-01
Payer: COMMERCIAL

## 2023-06-01 ENCOUNTER — PATIENT MESSAGE (OUTPATIENT)
Dept: INTERNAL MEDICINE | Facility: CLINIC | Age: 73
End: 2023-06-01
Payer: COMMERCIAL

## 2023-06-01 NOTE — TELEPHONE ENCOUNTER
Pt requesting refill on:    fesoterodineDiscontinued 1/19/2023  Discontinued - fesoterodine (TOVIAZ) 8 mg Tb24  TAKE 1 TABLET BY MOUTH EVERY DAY, Normal   Dispense: 90 tablet   Refills: 3 ordered   Pharmacy: [facility]/pharmacy #[id number] - [place] - [id number] (Ph: [phone])

## 2023-06-01 NOTE — TELEPHONE ENCOUNTER
This message was sent to the pt back on 6/1/21.    You will need to schedule an office visit or ask your PCP as dr. Shahid has  not seen you since 2018 and a yearly visit is required.  A reminder letter  was mailed to you in Feb 2021. Please call [phone] to schedule an marsha't with dr. Shahid or one of our nurse practitioners or you may ask your PCP to refill. Thanks       He never follow up with me or urology MARSHA.  Please have him follow up with me or urology MARSHA in order to get his meds refills.

## 2023-06-26 ENCOUNTER — OFFICE VISIT (OUTPATIENT)
Dept: INTERNAL MEDICINE | Facility: CLINIC | Age: 73
End: 2023-06-26
Payer: MEDICARE

## 2023-06-26 VITALS
TEMPERATURE: 98 F | HEART RATE: 51 BPM | HEIGHT: 69 IN | WEIGHT: 200.63 LBS | DIASTOLIC BLOOD PRESSURE: 60 MMHG | BODY MASS INDEX: 29.71 KG/M2 | SYSTOLIC BLOOD PRESSURE: 102 MMHG | RESPIRATION RATE: 16 BRPM | OXYGEN SATURATION: 96 %

## 2023-06-26 DIAGNOSIS — E11.9 TYPE 2 DIABETES MELLITUS WITHOUT COMPLICATION, WITHOUT LONG-TERM CURRENT USE OF INSULIN: ICD-10-CM

## 2023-06-26 DIAGNOSIS — Z00.00 PHYSICAL EXAM, ANNUAL: Primary | ICD-10-CM

## 2023-06-26 DIAGNOSIS — E55.9 VITAMIN D DEFICIENCY: ICD-10-CM

## 2023-06-26 DIAGNOSIS — Z12.12 SCREENING FOR COLORECTAL CANCER: ICD-10-CM

## 2023-06-26 DIAGNOSIS — I48.92 ATRIAL FLUTTER, UNSPECIFIED TYPE: ICD-10-CM

## 2023-06-26 DIAGNOSIS — I71.20 THORACIC AORTIC ANEURYSM WITHOUT RUPTURE, UNSPECIFIED PART: ICD-10-CM

## 2023-06-26 DIAGNOSIS — Z12.11 SCREENING FOR COLORECTAL CANCER: ICD-10-CM

## 2023-06-26 DIAGNOSIS — I10 ESSENTIAL HYPERTENSION: Chronic | ICD-10-CM

## 2023-06-26 PROBLEM — E11.65 POORLY CONTROLLED DIABETES MELLITUS: Status: RESOLVED | Noted: 2018-02-01 | Resolved: 2023-06-26

## 2023-06-26 PROBLEM — E43 SEVERE MALNUTRITION: Status: RESOLVED | Noted: 2020-10-19 | Resolved: 2023-06-26

## 2023-06-26 PROCEDURE — 99999 PR PBB SHADOW E&M-EST. PATIENT-LVL IV: CPT | Mod: PBBFAC,,, | Performed by: STUDENT IN AN ORGANIZED HEALTH CARE EDUCATION/TRAINING PROGRAM

## 2023-06-26 PROCEDURE — 99999 PR PBB SHADOW E&M-EST. PATIENT-LVL IV: ICD-10-PCS | Mod: PBBFAC,,, | Performed by: STUDENT IN AN ORGANIZED HEALTH CARE EDUCATION/TRAINING PROGRAM

## 2023-06-26 PROCEDURE — 99214 OFFICE O/P EST MOD 30 MIN: CPT | Mod: S$PBB,,, | Performed by: STUDENT IN AN ORGANIZED HEALTH CARE EDUCATION/TRAINING PROGRAM

## 2023-06-26 PROCEDURE — 99214 OFFICE O/P EST MOD 30 MIN: CPT | Mod: PBBFAC,PO | Performed by: STUDENT IN AN ORGANIZED HEALTH CARE EDUCATION/TRAINING PROGRAM

## 2023-06-26 PROCEDURE — 99214 PR OFFICE/OUTPT VISIT, EST, LEVL IV, 30-39 MIN: ICD-10-PCS | Mod: S$PBB,,, | Performed by: STUDENT IN AN ORGANIZED HEALTH CARE EDUCATION/TRAINING PROGRAM

## 2023-06-26 RX ORDER — FESOTERODINE FUMARATE 8 MG/1
8 TABLET, EXTENDED RELEASE ORAL NIGHTLY
Qty: 90 TABLET | Refills: 3 | Status: SHIPPED | OUTPATIENT
Start: 2023-06-26 | End: 2024-06-25

## 2023-06-26 NOTE — PROGRESS NOTES
Subjective:      Chief Complaint: Follow-up and Medication Refill    HPI  Mr. Den Rick is a 73-year-old man with small atrial septal defect (status post percutaneous foramina ovale closure), A flutter/tachycardia, coronary artery disease, hypertension, hyperlipidemia, status post NSTEMI, thoracic aortic aneurysm (mildly dilated per recent [facility]), BPH, overactive bladder, hypogonadism, well controlled type 2 diabetes, diverticulosis, GERD, ventral hernia, diffuse arthritis, gout, and obstructive sleep apnea presenting annual physical:     Family, social, surgical Hx reviewed     Health Maintenance:  Due for completion of annual screening labs and colorectal cancer screening (well overdue for three-month interval repeat colonoscopy)    Past Medical History:   Diagnosis Date    Anticoagulant long-term use     Arthritis     Atrial tachycardia, paroxysmal 1/12    during Cardiac Rehab    Colon polyp     Coronary artery disease     Diverticulitis     Diverticulosis     GERD (gastroesophageal reflux disease)     Glucose intolerance (impaired glucose tolerance) 1/24/2017    Hyperlipidemia     Hypertension     IFG (impaired fasting glucose) 12/21/2015    NSTEMI (non-ST elevated myocardial infarction) 10/11/12    Obesity (BMI 30-39.9) 1/31/2018    Post PTCA     Sleep apnea      Past Surgical History:   Procedure Laterality Date    boewl resection      CATARACT EXTRACTION, BILATERAL  8/2011    COLONOSCOPY  2012    COLONOSCOPY N/A 10/10/2016    Procedure: COLONOSCOPY;  Surgeon: Main Lehman MD;  Location: [facility] ([address] [place] [address]);  Service: Endoscopy;  Laterality: N/A;    COLONOSCOPY W/ POLYPECTOMY      CORONARY ANGIOPLASTY WITH STENT PLACEMENT      ESOPHAGOGASTRODUODENOSCOPY      HERNIA REPAIR      KNEE SURGERY  2003    needle bx on chest      SHOULDER SURGERY      SIGMOIDECTOMY       Family History   Problem Relation Age of Onset    Cancer Mother         uterine    Asthma Sister     Heart disease Father         valve     Diabetes Maternal Grandmother      Social History     Socioeconomic History    Marital status:    Occupational History    Occupation: enviromental regulator     Employer: [employer]   Tobacco Use    Smoking status: Never     Passive exposure: Never    Smokeless tobacco: Never    Tobacco comments:     smoked weed 27 yrs ago   Substance and Sexual Activity    Alcohol use: No     Alcohol/week: 0.0 standard drinks     Comment: quit at 25 yrs old    Drug use: No    Sexual activity: Yes     Partners: Female     Social Determinants of Health     Financial Resource Strain: Low Risk     Difficulty of Paying Living Expenses: Not hard at all   Food Insecurity: No Food Insecurity    Worried About Running Out of Food in the Last Year: Never true    Ran Out of Food in the Last Year: Never true   Transportation Needs: No Transportation Needs    Lack of Transportation (Medical): No    Lack of Transportation (Non-Medical): No   Physical Activity: Sufficiently Active    Days of Exercise per Week: 7 days    Minutes of Exercise per Session: 30 min   Stress: No Stress Concern Present    Feeling of Stress : Not at all   Social Connections: Unknown    Frequency of Communication with Friends and Family: More than three times a week    Frequency of Social Gatherings with Friends and Family: Once a week    Active Member of Clubs or Organizations: Yes    Attends Club or Organization Meetings: More than 4 times per year    Marital Status:    Housing Stability: Low Risk     Unable to Pay for Housing in the Last Year: No    Number of Places Lived in the Last Year: 1    Unstable Housing in the Last Year: No     Review of patient's allergies indicates:   Allergen Reactions    Atorvastatin      Other reaction(s):(lipitor) Muscle pain    Niacin      Other reaction(s): Flushing (skin)     Mr. Den Rick had no medications administered during this visit.      Review of Systems   Constitutional:  Negative for appetite  change, chills and fever.   HENT: Negative.     Respiratory:  Negative for cough, chest tightness and shortness of breath.    Cardiovascular:  Negative for chest pain, palpitations and leg swelling.   Gastrointestinal:  Negative for abdominal distention, abdominal pain, blood in stool, constipation, diarrhea, nausea and vomiting.   Endocrine: Negative.    Genitourinary:  Negative for difficulty urinating, dysuria, frequency and hematuria.   Musculoskeletal: Negative.    Integumentary:  Negative.   Neurological: Negative.    Psychiatric/Behavioral: Negative.         Objective:      Vitals:    06/26/23 1112   BP: 102/60   Pulse: (!) 51   Resp: 16   Temp: 97.9 °F (36.6 °C)      Physical Exam  Vitals reviewed.   Constitutional:       General: He is not in acute distress.     Appearance: Normal appearance.   HENT:      Head: Normocephalic and atraumatic.      Comments: Facial features are symmetric      Nose: Nose normal. No congestion or rhinorrhea.      Mouth/Throat:      Mouth: Mucous membranes are moist.      Pharynx: Oropharynx is clear. No oropharyngeal exudate or posterior oropharyngeal erythema.   Eyes:      General: No scleral icterus.     Extraocular Movements: Extraocular movements intact.      Conjunctiva/sclera: Conjunctivae normal.   Cardiovascular:      Rate and Rhythm: Normal rate and regular rhythm.      Pulses: Normal pulses.      Heart sounds: Normal heart sounds.   Pulmonary:      Effort: Pulmonary effort is normal. No respiratory distress.      Breath sounds: Normal breath sounds.   Musculoskeletal:         General: No deformity or signs of injury. Normal range of motion.      Cervical back: Normal range of motion.      Comments: Gait normal    Skin:     General: Skin is warm and dry.      Findings: No rash.   Neurological:      General: No focal deficit present.      Mental Status: He is alert and oriented to person, place, and time. Mental status is at baseline.   Psychiatric:         Mood and  Affect: Mood normal.         Behavior: Behavior normal.         Thought Content: Thought content normal.     Current Outpatient Medications on File Prior to Visit   Medication Sig Dispense Refill    alfuzosin (UROXATRAL) 10 mg Tb24 Take 1 tablet (10 mg total) by mouth every evening. 90 tablet 3    aspirin (ECOTRIN) 81 MG EC tablet Take 81 mg by mouth once daily.      betamethasone dipropionate 0.05 % cream Apply topically 2 (two) times daily.      blood sugar diagnostic Strp To check BG 4 times daily, to use with insurance preferred meter 350 strip 3    celecoxib (CELEBREX) 200 MG capsule Take 200 mg by mouth 2 (two) times daily.      desoximetasone (TOPICORT) 0.25 % cream Apply topically 2 (two) times daily. 60 g 0    fish oil-omega-3 fatty acids 300-1,000 mg capsule Take 1 g by mouth once daily.      fluticasone propionate (FLONASE) 50 mcg/actuation nasal spray 2 sprays (100 mcg total) by Each Nostril route once daily. 9.9 mL 0    FREESTYLE LANCETS 28 gauge lancets TO CHECK BG 4 TIMES DAILY, TO USE WITH INSURANCE PREFERRED METER 200 each 3    JARDIANCE 10 mg tablet TAKE 1 TABLET BY MOUTH EVERY DAY 90 tablet 0    levoFLOXacin (LEVAQUIN) 500 MG tablet Take 500 mg by mouth.      metFORMIN (GLUCOPHAGE-XR) 500 MG ER 24hr tablet TAKE FOUR TABLETS BY MOUTH DAILY WITH DINNER OR EVENING MEAL. 360 tablet 0    metoprolol succinate (TOPROL-XL) 50 MG 24 hr tablet TAKE 1 TABLET BY MOUTH TWICE A  tablet 3    moxifloxacin (AVELOX) 400 mg tablet Take 400 mg by mouth.      multivitamin (THERAGRAN) per tablet Take 1 tablet by mouth Daily.        nitroGLYCERIN (NITROSTAT) 0.4 MG SL tablet Place 1 tablet (0.4 mg total) under the tongue every 5 (five) minutes as needed for Chest pain. 100 tablet 3    predniSONE (DELTASONE) 20 MG tablet TAKE 2 TABLETS BY MOUTH ON DAY 1, 1.5 ON DAYS 2-3, 1 ON DAYS 4-5, AND 1/2 ON DAY 6      rosuvastatin (CRESTOR) 20 MG tablet TAKE 1 TABLET BY MOUTH EVERY DAY IN THE EVENING 90 tablet 3    timolol  maleate 0.5% (TIMOPTIC) 0.5 % Drop Place 1 drop into both eyes every morning.      traZODone (DESYREL) 50 MG tablet TAKE 1/2 TABLET BY MOUTH TWICE A DAY 90 tablet 0    blood-glucose meter kit To check BG 4 times daily, to use with insurance preferred meter 1 each 0    pantoprazole (PROTONIX) 40 MG tablet Take 1 tablet (40 mg total) by mouth once daily. (Patient not taking: Reported on 6/26/2023) 30 tablet 1     No current facility-administered medications on file prior to visit.         Assessment:       1. Physical exam, annual    2. Type 2 diabetes mellitus without complication, without long-term current use of insulin    3. Essential hypertension    4. Vitamin D deficiency    5. Screening for colorectal cancer    6. Atrial flutter, unspecified type    7. Thoracic aortic aneurysm without rupture, unspecified part        Plan:       Physical exam, annual  -     Cancel: CBC Auto Differential; Future; Expected date: 06/26/2023  -     Comprehensive Metabolic Panel; Future; Expected date: 06/26/2023  -     Cancel: Hemoglobin A1C; Future; Expected date: 06/26/2023  -     Lipid Panel; Future; Expected date: 06/26/2023  -     T4; Future; Expected date: 06/26/2023  -     TSH; Future; Expected date: 06/26/2023  -     Vitamin D; Future; Expected date: 06/26/2023   - completion of annual screening labs ordered    Type 2 diabetes mellitus without complication, without long-term current use of insulin  -     very well controlled; no changes made    Essential hypertension  -     Cancel: CBC Auto Differential; Future; Expected date: 06/26/2023  -     Comprehensive Metabolic Panel; Future; Expected date: 06/26/2023  -     Lipid Panel; Future; Expected date: 06/26/2023  -     T4; Future; Expected date: 06/26/2023  -     TSH; Future; Expected date: 06/26/2023    Vitamin D deficiency  -     Vitamin D; Future; Expected date: 06/26/2023    Screening for colorectal cancer  -     Ambulatory referral/consult to Endo Procedure ;  Future; Expected date: 06/27/2023    Atrial flutter, unspecified type  Thoracic aortic aneurysm without rupture, unspecified part   - historical/ever present diagnoses documented for HCC purposes     Other orders  -     fesoterodine (TOVIAZ) 8 mg Tb24; Take 8 mg by mouth every evening.  Dispense: 90 tablet; Refill: 3

## 2023-07-07 NOTE — TELEPHONE ENCOUNTER
If he needs this then he is 71 y/o and has multiple diseases that put him at increased risk of being able to handle COVID infection.  These include HBP, Diabetes, and vascular and heart disease.  Please get this to him in his my chart format.  If he does not want the information about the diseases that put him at risk he can delete those.   Went to [facility] and had Xray and has suprapatellar effusion  And poss fracture of proximal patella tendon     Poss quadriceps tendon rupture .  Need referral to ortho ASAP .

## 2023-08-05 NOTE — TELEPHONE ENCOUNTER
No care due was identified.  [facility] Embedded Care Due Messages. Reference number: [id number].   8/05/2023 7:30:12 AM CDT

## 2023-08-07 RX ORDER — METFORMIN HYDROCHLORIDE 500 MG/1
TABLET, EXTENDED RELEASE ORAL
Qty: 360 TABLET | Refills: 1 | Status: SHIPPED | OUTPATIENT
Start: 2023-08-07 | End: 2024-01-19 | Stop reason: SDUPTHER

## 2023-08-07 NOTE — TELEPHONE ENCOUNTER
Refill Decision Note   Den Rick  is requesting a refill authorization.  Brief Assessment and Rationale for Refill:  Approve     Medication Therapy Plan:         Comments:     Note composed:9:38 AM 08/07/2023

## 2023-08-09 ENCOUNTER — PATIENT MESSAGE (OUTPATIENT)
Dept: ENDOSCOPY | Facility: HOSPITAL | Age: 73
End: 2023-08-09

## 2023-08-09 ENCOUNTER — CLINICAL SUPPORT (OUTPATIENT)
Dept: ENDOSCOPY | Facility: HOSPITAL | Age: 73
End: 2023-08-09
Attending: STUDENT IN AN ORGANIZED HEALTH CARE EDUCATION/TRAINING PROGRAM
Payer: MEDICARE

## 2023-08-09 ENCOUNTER — TELEPHONE (OUTPATIENT)
Dept: ENDOSCOPY | Facility: HOSPITAL | Age: 73
End: 2023-08-09

## 2023-08-09 DIAGNOSIS — Z12.11 SCREENING FOR COLORECTAL CANCER: ICD-10-CM

## 2023-08-09 DIAGNOSIS — Z12.12 SCREENING FOR COLORECTAL CANCER: ICD-10-CM

## 2023-08-09 NOTE — PLAN OF CARE
PHYSICIAN NEXT STEPS:  Review Only    CHIEF COMPLAINT:  Chief Complaint/Protocol Used: Breathing Difficulty  Onset: yesterday      ASSESSMENT:  ? Onset: yesterday  ? Respiratory Status: shortness of breath, severe coughing  ? Onset: yesterday  ? PATTERN \"Does the difficult breathing come and go, or has it been constant since it started?\" comes and goes  ? Severity: moderate   ? Recurrent Symptom: yes with RSV one month ago  ? Cardiac History: hypertension  ? Lung History: denies  ? Cause: unsure  ? Other Symptoms: cough, abdominal cramps/ pain ,runny nose  ? Pregnancy: denies  ? Travel: denies  -------------------------------------------------------    DISPOSITION:  Disposition Recommendation: See Physician within 4 Hours (or PCP triage)  Questions that led to disposition:  ? [1] MILD difficulty breathing (e.g., minimal/no SOB at rest, SOB with walking, pulse <100) AND [2] NEW-onset or WORSE than normal  Patient Directed To: Unspecified  Patient Intended Action: Unspecified    ? positive screen for covid-19.  being tested today. Tested positive for RSV a month ago.Long bouts of coughing since yesterday with moderate SOB. This morning developed abdominal pain 4/10. No SOB when resting.  No chest pain and no fever        DISPOSITION OVERRIDE/PROVIDER CONSULT:  Disposition Override: N/A  Override Source: Unspecified  Consulted with PCP: No  Consulted with On-Call Physician: No    CALLER CONTACT INFO:  Name: Abdulkadir Chowdhury (Self)  Phone 1: [phone] (Work Phone)  Phone 2: [phone] (Home Phone) - Preferred      ENCOUNTER STARTED:  04/24/20 09:46:40 AM  ENCOUNTER ASSIGNED TO/CLOSED BY:  yogi grigsby @ 04/24/20 10:21:04 AM      -------------------------------------------------------    CARE ADVICE given per Breathing Difficulty guideline.  SEE PHYSICIAN WITHIN 4 HOURS (or PCP triage):   * IF OFFICE WILL BE OPEN: You need to be seen within the next 3 or 4 hours. Call your doctor's office now or as soon as it  Patient has been scheduled for endoscopy procedure 10/31/23.     opens.   * IF OFFICE WILL BE CLOSED AND NO PCP TRIAGE: You need to be seen within the next 3 or 4 hours. A nearby Urgent Care Center is often a good source of care. Another choice is to go to the ER. Go sooner if you become worse.  * IF OFFICE WILL BE CLOSED AND PCP TRIAGE REQUIRED: You may need to be seen. Your doctor will want to talk with you to decide what's best. I'll page the doctor now. If you haven't heard from the on-call doctor within 30 minutes, call again. NOTE: If PCP   can't be reached, send to [facility] or ER. ???; CALL BACK IF:    * You become worse.      UNDERSTANDS CARE ADVICE: Yes    AGREES WITH CARE ADVICE: Yes    WILL FOLLOW CARE ADVICE: Yes    -------------------------------------------------------

## 2023-08-09 NOTE — TELEPHONE ENCOUNTER
Spoke to patient to schedule procedure(s) Colonoscopy       Physician to perform procedure(s) Dr. JAMEL Pickard  Date of Procedure (s) 11/1/23  Arrival Time 8:35 AM  Time of Procedure(s) 9:35 AM   Location of Procedure(s) [facility] [address] Floor  Type of Rx Prep sent to patient: PEG  Instructions provided to patient via [facility]    Patient was informed on the following information and verbalized understanding. Screening questionnaire reviewed with patient and complete. If procedure requires anesthesia, a responsible adult needs to be present to accompany the patient home, patient cannot drive after receiving anesthesia. Appointment details are tentative, especially check-in time. Patient will receive a prep-op call 4 days prior to confirm check-in time for procedure. If applicable the patient should contact their pharmacy to verify Rx for procedure prep is ready for pick-up. Patient was advised to call the scheduling department at [phone] if pharmacy states no Rx is available. Patient was advised to call the endoscopy scheduling department if any questions or concerns arise.      [facility] Scheduling Department

## 2023-08-09 NOTE — TELEPHONE ENCOUNTER
Spoke to patient to schedule procedure(s) Colonoscopy       Physician to perform procedure(s) Dr. JAMEL Pickard  Date of Procedure (s) 10/31/23  Arrival Time 9:20 AM  Time of Procedure(s) 10:20 AM   Location of Procedure(s) [facility] 4th Floor  Type of Rx Prep sent to patient: PEG  Instructions provided to patient via [facility]    Patient was informed on the following information and verbalized understanding. Screening questionnaire reviewed with patient and complete. If procedure requires anesthesia, a responsible adult needs to be present to accompany the patient home, patient cannot drive after receiving anesthesia. Appointment details are tentative, especially check-in time. Patient will receive a prep-op call 4 days prior to confirm check-in time for procedure. If applicable the patient should contact their pharmacy to verify Rx for procedure prep is ready for pick-up. Patient was advised to call the scheduling department at [phone] if pharmacy states no Rx is available. Patient was advised to call the endoscopy scheduling department if any questions or concerns arise.      [facility] Scheduling Department

## 2023-09-13 RX ORDER — METOPROLOL SUCCINATE 50 MG/1
TABLET, EXTENDED RELEASE ORAL
Qty: 180 TABLET | Refills: 2 | Status: SHIPPED | OUTPATIENT
Start: 2023-09-13

## 2023-09-13 NOTE — TELEPHONE ENCOUNTER
Refill Routing Note   Medication(s) are not appropriate for processing by [facility] for the following reason(s):      Drug-disease interaction: JARDIANCE and Benign prostatic hyperplasia with urinary obstruction    [facility] action(s):  Defer  Approve Care Due:  Labs due        Pharmacist review requested: Yes     Appointments  past 12m or future 3m with PCP    Date Provider   Last Visit   6/26/2023 Michael Gamino MD   Next Visit   Visit date not found Michael Gamino MD   ED visits in past 90 days: 0        Note composed:9:57 AM 09/13/2023

## 2023-09-13 NOTE — TELEPHONE ENCOUNTER
Provider Staff:  Action required for this patient     Please see care gap opportunities below in Care Due Message.    Thanks!  [facility]     Appointments      Date Provider   Last Visit   6/26/2023 Michael Gamino MD   Next Visit   Visit date not found Michael Gamino MD     Refill Decision Note   Den Rick  is requesting a refill authorization.  Brief Assessment and Rationale for Refill:  Approve     Medication Therapy Plan:         Pharmacist review requested: Yes   Extended chart review required: Yes   Comments:     Note composed:11:23 AM 09/13/2023             Appointments     Last Visit   6/26/2023 Michael Gamino MD   Next Visit   Visit date not found Michael Gamino MD

## 2023-09-13 NOTE — TELEPHONE ENCOUNTER
Care Due:                  Date            Visit Type   Department     Provider  --------------------------------------------------------------------------------                                EP -                              PRIMARY      MET INTERNAL  Last Visit: 06-      CARE ([facility])   MEDICINE       Michael Gamino  Next Visit: None Scheduled  None         None Found                                                            Last  Test          Frequency    Reason                     Performed    Due Date  --------------------------------------------------------------------------------    HBA1C.......  6 months...  AMILCAR metFORMIN.....  06-   12-    [facility] Embedded Care Due Messages. Reference number: [id number].   9/13/2023 6:09:44 AM CDT

## 2023-09-29 ENCOUNTER — OFFICE VISIT (OUTPATIENT)
Dept: CARDIOLOGY | Facility: CLINIC | Age: 73
End: 2023-09-29
Payer: MEDICARE

## 2023-09-29 VITALS
HEART RATE: 60 BPM | WEIGHT: 195.69 LBS | BODY MASS INDEX: 28.98 KG/M2 | SYSTOLIC BLOOD PRESSURE: 114 MMHG | HEIGHT: 69 IN | DIASTOLIC BLOOD PRESSURE: 70 MMHG

## 2023-09-29 DIAGNOSIS — Z98.61 S/P PTCA (PERCUTANEOUS TRANSLUMINAL CORONARY ANGIOPLASTY): Chronic | ICD-10-CM

## 2023-09-29 DIAGNOSIS — E78.00 HYPERCHOLESTEREMIA: Chronic | ICD-10-CM

## 2023-09-29 DIAGNOSIS — I25.10 CORONARY ARTERY DISEASE INVOLVING NATIVE CORONARY ARTERY OF NATIVE HEART WITHOUT ANGINA PECTORIS: Primary | Chronic | ICD-10-CM

## 2023-09-29 DIAGNOSIS — Z87.74 S/P PERCUTANEOUS PATENT FORAMEN OVALE CLOSURE: ICD-10-CM

## 2023-09-29 DIAGNOSIS — I77.810 ECTATIC THORACIC AORTA: ICD-10-CM

## 2023-09-29 PROCEDURE — 99999 PR PBB SHADOW E&M-EST. PATIENT-LVL IV: CPT | Mod: PBBFAC,,, | Performed by: INTERNAL MEDICINE

## 2023-09-29 PROCEDURE — 99214 OFFICE O/P EST MOD 30 MIN: CPT | Mod: S$PBB,,, | Performed by: INTERNAL MEDICINE

## 2023-09-29 PROCEDURE — 93010 ELECTROCARDIOGRAM REPORT: CPT | Mod: S$PBB,,, | Performed by: INTERNAL MEDICINE

## 2023-09-29 PROCEDURE — 99214 OFFICE O/P EST MOD 30 MIN: CPT | Mod: PBBFAC,PO | Performed by: INTERNAL MEDICINE

## 2023-09-29 PROCEDURE — 93010 EKG 12-LEAD: ICD-10-PCS | Mod: S$PBB,,, | Performed by: INTERNAL MEDICINE

## 2023-09-29 PROCEDURE — 93005 ELECTROCARDIOGRAM TRACING: CPT | Mod: PBBFAC,PO | Performed by: INTERNAL MEDICINE

## 2023-09-29 PROCEDURE — 99214 PR OFFICE/OUTPT VISIT, EST, LEVL IV, 30-39 MIN: ICD-10-PCS | Mod: S$PBB,,, | Performed by: INTERNAL MEDICINE

## 2023-09-29 PROCEDURE — 99999 PR PBB SHADOW E&M-EST. PATIENT-LVL IV: ICD-10-PCS | Mod: PBBFAC,,, | Performed by: INTERNAL MEDICINE

## 2023-09-29 RX ORDER — POLYETHYLENE GLYCOL-3350 AND ELECTROLYTES WITH FLAVOR PACK 240; 5.84; 2.98; 6.72; 22.72 G/278.26G; G/278.26G; G/278.26G; G/278.26G; G/278.26G
4000 POWDER, FOR SOLUTION ORAL ONCE
COMMUNITY
Start: 2023-04-17

## 2023-09-29 RX ORDER — TRETINOIN 1 MG/G
CREAM TOPICAL NIGHTLY PRN
COMMUNITY
Start: 2023-09-26

## 2023-09-29 RX ORDER — SULFAMETHOXAZOLE AND TRIMETHOPRIM 800; 160 MG/1; MG/1
1 TABLET ORAL 2 TIMES DAILY
COMMUNITY
Start: 2023-09-21 | End: 2023-10-02

## 2023-09-29 RX ORDER — SODIUM FLUORIDE 1.1 G/100G
CREAM ORAL DAILY
COMMUNITY
Start: 2023-06-21

## 2023-09-29 NOTE — PROGRESS NOTES
Subjective:   Chief Complaint:  Den Rick is a 73 y.o. male who presents for follow-up of Coronary Artery Disease, Hypertension, and Hyperlipidemia      Problem List and HPI:   CAD   NSTEMI - OM1 YUE 10/11/12   YUE ostial and prox RCA 10/17/12   YUE mid LAD 1/18/07   YUE prox and mid PDA 1/18/07   PFO closure for platypnea orthodeoxia 1/2021  Atrial flutter ablation 11/2021  Hypercholesterolemia   (arthralgias with Lipitor)   HTN  GI bleed 2014  DM - onset 12/2017    He does not report angina or shortness of breath with exertion.  LDL is ~ 55 on rosuvastatin alone. Hepatic transaminases are within normal limits.   He went back to work 2 days a week. He does not have an exercise routine but has been able to loose about 10 lbs. He walks his dog for 45 minutes and planted a vegetable garden. Recvd prednisone and an antibiotic for a sinus infection.        Review of patient's allergies indicates:   Allergen Reactions    Atorvastatin      Other reaction(s):(lipitor) Muscle pain    Niacin      Other reaction(s): Flushing (skin)        Current Outpatient Medications   Medication Sig    metFORMIN (GLUCOPHAGE-XR) 500 MG ER 24hr tablet TAKE FOUR TABLETS BY MOUTH DAILY WITH DINNER OR EVENING MEAL.    alfuzosin (UROXATRAL) 10 mg Tb24 Take 1 tablet (10 mg total) by mouth every evening.    aspirin (ECOTRIN) 81 MG EC tablet Take 81 mg by mouth once daily.    betamethasone dipropionate 0.05 % cream Apply topically 2 (two) times daily.    blood sugar diagnostic Strp To check BG 4 times daily, to use with insurance preferred meter    blood-glucose meter kit To check BG 4 times daily, to use with insurance preferred meter    celecoxib (CELEBREX) 200 MG capsule Take 200 mg by mouth 2 (two) times daily.    desoximetasone (TOPICORT) 0.25 % cream Apply topically 2 (two) times daily.    empagliflozin (JARDIANCE) 10 mg tablet Take 1 tablet (10 mg total) by mouth once daily.    fesoterodine (TOVIAZ) 8 mg Tb24 Take 8 mg by mouth  "every evening.    fish oil-omega-3 fatty acids 300-1,000 mg capsule Take 1 g by mouth once daily.    fluticasone propionate (FLONASE) 50 mcg/actuation nasal spray 2 sprays (100 mcg total) by Each Nostril route once daily.    FREESTYLE LANCETS 28 gauge lancets TO CHECK BG 4 TIMES DAILY, TO USE WITH INSURANCE PREFERRED METER    levoFLOXacin (LEVAQUIN) 500 MG tablet Take 500 mg by mouth.    metoprolol succinate (TOPROL-XL) 50 MG 24 hr tablet TAKE 1 TABLET BY MOUTH TWICE A DAY    moxifloxacin (AVELOX) 400 mg tablet Take 400 mg by mouth.    multivitamin (THERAGRAN) per tablet Take 1 tablet by mouth Daily.      nitroGLYCERIN (NITROSTAT) 0.4 MG SL tablet Place 1 tablet (0.4 mg total) under the tongue every 5 (five) minutes as needed for Chest pain.    pantoprazole (PROTONIX) 40 MG tablet Take 1 tablet (40 mg total) by mouth once daily. (Patient not taking: Reported on 6/26/2023)    predniSONE (DELTASONE) 20 MG tablet TAKE 2 TABLETS BY MOUTH ON DAY 1, 1.5 ON DAYS 2-3, 1 ON DAYS 4-5, AND 1/2 ON DAY 6    rosuvastatin (CRESTOR) 20 MG tablet TAKE 1 TABLET BY MOUTH EVERY DAY IN THE EVENING    timolol maleate 0.5% (TIMOPTIC) 0.5 % Drop Place 1 drop into both eyes every morning.    traZODone (DESYREL) 50 MG tablet TAKE 1/2 TABLET BY MOUTH TWICE A DAY       Social history:  Den J Merline  reports that he has never smoked. He has never been exposed to tobacco smoke. He has never used smokeless tobacco. He reports that he does not drink alcohol and does not use drugs.      Objective:   /70   Pulse 60   Ht 5' 9" (1.753 m)   Wt 88.7 kg (195 lb 10.5 oz)   BMI 28.89 kg/m²    Physical Exam  Constitutional:       Appearance: He is well-developed.      Comments:      HENT:      Head: Normocephalic and atraumatic.   Neck:      Vascular: No carotid bruit or JVD.   Cardiovascular:      Rate and Rhythm: Normal rate and regular rhythm.      Pulses:           Radial pulses are 2+ on the right side and 2+ on the left side.        " Posterior tibial pulses are 2+ on the right side and 2+ on the left side.      Heart sounds: S1 normal and S2 normal. No murmur heard.     No gallop.   Pulmonary:      Effort: Pulmonary effort is normal.      Breath sounds: No wheezing or rales.   Chest:      Chest wall: There is no dullness to percussion.   Abdominal:      Palpations: Abdomen is soft. There is no hepatomegaly or splenomegaly.      Tenderness: There is no abdominal tenderness.   Musculoskeletal:      Right lower leg: No edema.      Left lower leg: No edema.   Skin:     General: Skin is warm and dry.      Findings: No bruising.      Nails: There is no clubbing.   Neurological:      Mental Status: He is alert and oriented to person, place, and time.      Gait: Gait normal.   Psychiatric:         Speech: Speech normal.         Behavior: Behavior normal.         Thought Content: Thought content normal.         Judgment: Judgment normal.         Lipids:  Recent Labs   Lab 09/22/23  0751   LDL Cholesterol 55.4 L   HDL 37 L   Cholesterol 110 L      Renal:  Recent Labs   Lab 09/22/23  0751   Creatinine 0.73   Potassium 4.6   CO2 24   BUN 27 H     Liver:  Recent Labs   Lab 09/22/23  0751   AST 21   ALT 24     CBC:  Lab Results   Component Value Date    WBC 4.83 06/09/2023    HGB 16.2 06/09/2023    HCT 51.5 06/09/2023    MCV 86 06/09/2023     06/09/2023         Results for orders placed during the hospital encounter of 10/26/21    Echo Color Flow Doppler? Yes; Bubble Contrast? Yes    Interpretation Summary  · The estimated ejection fraction is 55%.  · The left ventricle is normal in size with concentric remodeling and normal systolic function.  · Normal left ventricular diastolic function.  · Mild right ventricular enlargement with normal right ventricular systolic function.  · Presence of 35 mm Amplatzer Occluder in the interatrial septum. The bubble study is negative for intracardiac shunt.  · The estimated PA systolic pressure is 30 mmHg.  · Normal  central venous pressure (3 mmHg).  · The sinuses of Valsalva is mildly dilated.  · The ascending aorta is mildly dilated.     ECG today reviewed by me. It reveals sinus bradycardia with first degree AV block and no ST or T abnormality.       Assessment and Plan:       ICD-10-CM ICD-9-CM   1. Coronary artery disease involving native coronary artery of native heart without angina pectoris  I25.10 414.01   2. S/P PTCA   Z98.61 V45.82   3. Hypercholesteremia  E78.00 272.0   4. S/P percutaneous patent foramen ovale closure  Z87.74 V13.65   5. Ectatic thoracic aorta  I77.810 447.71        CAD stable. No angina. Continue same meds.    LDL at goal. Continue same medications. Cholesterol education. Nutritional counseling.   PFO s/p percutaneous closure for platypnea orthodeoxia.      Orders placed during this encounter:     Coronary artery disease involving native coronary artery of native heart without angina pectoris  -     EKG 12-lead  -     Comprehensive Metabolic Panel; Future; Expected date: 09/29/2024  -     EKG 12-lead; Future; Expected date: 09/29/2024    S/P PTCA   -     EKG 12-lead    Hypercholesteremia  -     Lipid Panel; Future; Expected date: 09/29/2024    S/P percutaneous patent foramen ovale closure    Ectatic thoracic aorta  -     Echo; Future; Expected date: 09/29/2024         Follow up in about 1 year (around 9/29/2024).

## 2023-10-08 NOTE — PROGRESS NOTES
END[facility]OLOGY CLINIC  10/09/2023       Subjective:      Chief Complaint: Type 2 diabetes    HPI:   Den Rick is a 73 y.o. male who presents for follow-up of type 2 diabetes.  Patient was seen by Dr. Ennis in 2019.    Diabetes Hx:  Diagnosed w/ DM:  12/2017 with an A1c of 11.9, during preoperative evaluation for rotator cuff surgery.   Patient had A1c of 7.1 in 07/2013, otherwise A1c was in the prediabetes range.  Complications:   Retinopathy: No   Last eye exam: : 12/12/2022  Neuropathy: No. Seen podiatry for ingrown toenail.  Has onychomycosis on the right big toenail.    Last foot exam: : 12/29/2022  Nephropathy: No  Cardiovascular:  CAD status post PCI x 7  Gastroparesis: No  DKA/HHS: No    Severe Hypoglycemia: No, Hypoglycemia unawareness: No  Hypoglycemic episodes: No, carries glucose tablets.    Current meds:   Metformin XR 2000 mg at bedtime- denies any GI symptoms related to metformin use.  Jardiance 10 mg daily    Compliance with meds: Yes     Previous meds:  Insulin when he was initially diagnosed and during his COVID infection and prednisone use.      Home glucose checks:    Was on steroids and antibiotics for sinusitis. Blood sugars were higher on the steroids.   Has not checked his blood sugars recently and has been off steroids for a few weeks.  Compliant: No    Diet/Exercise:   Takes 3 meals and 1-2 snacks a day.   Snacks on fruits.   Active at home - walks the dog 1 mile a day.    Lost 12 lbs in the last in 2 months with diet and lifestyle modifications.     Diabetes education: Few years back.     Last A1c:   Lab Results   Component Value Date    HGBA1C 6.7 (H) 06/09/2023    HGBA1C 7.2 (H) 12/21/2022    HGBA1C 6.7 (H) 08/02/2022       microalbumin:   Lab Results   Component Value Date    LABMICR 20.0 12/21/2022    CREATRANDUR 70.0 12/21/2022    MICALBCREAT 28.6 12/21/2022     Lab Results   Component Value Date    EGFRNORACEVR >60.0 09/22/2023    CREATININE 0.73 09/22/2023     Lipids:   Lab  Results   Component Value Date    CHOL 110 (L) 09/22/2023    TRIG 88 09/22/2023    HDL 37 (L) 09/22/2023    LDLCALC 55.4 (L) 09/22/2023    CHOLHDL 33.6 09/22/2023     TSH:  Lab Results   Component Value Date    TSH 3.800 06/27/2023       Lab Results   Component Value Date    GACGTHVB80 668 07/25/2019       Lab Results   Component Value Date    HGB 16.2 06/09/2023          Aspirin: Yes  Statins: Yes  ACEI/ARB: No  Fatty liver: No  HTN:  Usually controlled on his current medications.    Denies history of pancreatitis or medullary thyroid cancer.  History recurrent UTI: No.  Had an episode of UTI few years back after his abdominal surgery.  History of recurrent fungal infection: No    Polyuria: No  Polydipsia: No    FHx of DM: Yes, maternal grandmother  Heart disease: Yes, Father had valvular heart disease s/p replacement x 2, sudden death at 69 yo.   ROS: see HPI     Objective:     Physical Exam     BP (!) 147/86 (BP Location: Left arm, Patient Position: Sitting, BP Method: Large (Automatic))   Pulse (!) 54   Wt 88.6 kg (195 lb 5.2 oz)   SpO2 (!) 94%   BMI 28.84 kg/m²     Wt Readings from Last 3 Encounters:   10/09/23 88.6 kg (195 lb 5.2 oz)   09/29/23 88.7 kg (195 lb 10.5 oz)   06/26/23 91 kg (200 lb 9.9 oz)       Constitutional:  Pleasant,  in no acute distress.   HENT:   Head:    Normocephalic and atraumatic.   Eyes:    EOMI. No scleral icterus.   Cardiovascular:  Normal rate  Respiratory:   Effort normal   Neurological:  No tremor  Skin:    Skin is warm, dry  Extremity:  No edema    DIABETIC FOOT EXAM: 10/9/2023 - normal sensation to microfilament testing bilaterally.  No fissures, wounds, or ulcers.  Right big toe onychomycosis seen.    LABORATORY REVIEW:  See HPI for other labs reviewed today      Chemistry        Component Value Date/Time     09/22/2023 0751    K 4.6 09/22/2023 0751     09/22/2023 0751    CO2 24 09/22/2023 0751    BUN 27 (H) 09/22/2023 0751    CREATININE 0.73 09/22/2023 0757      (H) 09/22/2023 0751        Component Value Date/Time    CALCIUM 8.9 09/22/2023 0751    ALKPHOS 66 09/22/2023 0751    AST 21 09/22/2023 0751    ALT 24 09/22/2023 0751    BILITOT 0.4 09/22/2023 0751    ESTGFRAFRICA >60.0 07/21/2022 0912    EGFRNONAA >60.0 07/21/2022 0912          Lab Results   Component Value Date    HGBA1C 6.7 (H) 06/09/2023    HGBA1C 7.2 (H) 12/21/2022    HGBA1C 6.7 (H) 08/02/2022     Other labs reviewed today in HPI    Assessment/Plan:     Problem List Items Addressed This Visit          Cardiac/Vascular    Hypercholesteremia (Chronic)       Continue statin therapy.        Lipids:   Lab Results   Component Value Date    CHOL 110 (L) 09/22/2023    TRIG 88 09/22/2023    HDL 37 (L) 09/22/2023    LDLCALC 55.4 (L) 09/22/2023    CHOLHDL 33.6 09/22/2023               Endocrine    Type 2 diabetes mellitus with hyperglycemia, without long-term current use of insulin - Primary       Last A1c was 6.7.  Repeat A1c today.  Continue current diabetes regimen.  Call if any side effects from the medications    Discussed goal A1c of 7%.  Call if blood sugars are persistently less than 70 or greater than 200.   Patient does not want a referral to the diabetes educator.    Discussed importance of diet and lifestyle modifications for diabetes management  Hypoglycemia management discussed. Carry glucose tablets or snacks at all times.     Complications:  Follow up for regular diabetes eye exam  Daily self examination of feet.  Microalbumin: Monitor.     Last A1c:   Lab Results   Component Value Date    HGBA1C 6.7 (H) 06/09/2023       microalbumin:   Lab Results   Component Value Date    LABMICR 20.0 12/21/2022    CREATRANDUR 70.0 12/21/2022    MICALBCREAT 28.6 12/21/2022            Relevant Orders    Hemoglobin A1C        Diamond Sandhu MD

## 2023-10-09 ENCOUNTER — LAB VISIT (OUTPATIENT)
Dept: LAB | Facility: HOSPITAL | Age: 73
End: 2023-10-09
Attending: INTERNAL MEDICINE
Payer: MEDICARE

## 2023-10-09 ENCOUNTER — OFFICE VISIT (OUTPATIENT)
Dept: ENDOCRINOLOGY | Facility: CLINIC | Age: 73
End: 2023-10-09
Payer: MEDICARE

## 2023-10-09 VITALS
SYSTOLIC BLOOD PRESSURE: 147 MMHG | OXYGEN SATURATION: 94 % | WEIGHT: 195.31 LBS | BODY MASS INDEX: 28.84 KG/M2 | HEART RATE: 54 BPM | DIASTOLIC BLOOD PRESSURE: 86 MMHG

## 2023-10-09 DIAGNOSIS — E11.65 TYPE 2 DIABETES MELLITUS WITH HYPERGLYCEMIA, WITHOUT LONG-TERM CURRENT USE OF INSULIN: ICD-10-CM

## 2023-10-09 DIAGNOSIS — E11.65 TYPE 2 DIABETES MELLITUS WITH HYPERGLYCEMIA, WITHOUT LONG-TERM CURRENT USE OF INSULIN: Primary | ICD-10-CM

## 2023-10-09 DIAGNOSIS — E78.00 HYPERCHOLESTEREMIA: Chronic | ICD-10-CM

## 2023-10-09 LAB
ESTIMATED AVG GLUCOSE: 180 MG/DL (ref 68–131)
HBA1C MFR BLD: 7.9 % (ref 4–5.6)

## 2023-10-09 PROCEDURE — 99999 PR PBB SHADOW E&M-EST. PATIENT-LVL IV: CPT | Mod: PBBFAC,,, | Performed by: INTERNAL MEDICINE

## 2023-10-09 PROCEDURE — 99214 OFFICE O/P EST MOD 30 MIN: CPT | Mod: PBBFAC | Performed by: INTERNAL MEDICINE

## 2023-10-09 PROCEDURE — 36415 COLL VENOUS BLD VENIPUNCTURE: CPT | Performed by: INTERNAL MEDICINE

## 2023-10-09 PROCEDURE — 83036 HEMOGLOBIN GLYCOSYLATED A1C: CPT | Performed by: INTERNAL MEDICINE

## 2023-10-09 PROCEDURE — 99204 PR OFFICE/OUTPT VISIT, NEW, LEVL IV, 45-59 MIN: ICD-10-PCS | Mod: S$PBB,,, | Performed by: INTERNAL MEDICINE

## 2023-10-09 PROCEDURE — 99999 PR PBB SHADOW E&M-EST. PATIENT-LVL IV: ICD-10-PCS | Mod: PBBFAC,,, | Performed by: INTERNAL MEDICINE

## 2023-10-09 PROCEDURE — 99204 OFFICE O/P NEW MOD 45 MIN: CPT | Mod: S$PBB,,, | Performed by: INTERNAL MEDICINE

## 2023-10-09 NOTE — ASSESSMENT & PLAN NOTE
Continue statin therapy.        Lipids:   Lab Results   Component Value Date    CHOL 110 (L) 09/22/2023    TRIG 88 09/22/2023    HDL 37 (L) 09/22/2023    LDLCALC 55.4 (L) 09/22/2023    CHOLHDL 33.6 09/22/2023

## 2023-10-09 NOTE — PATIENT INSTRUCTIONS
Continue current diabetes medications    Call if you have any side effects from the medication:   Metformin:  Nausea, diarrhea  Jardiance:  Increased urination, urinary tract infections, yeast infections, diabetic ketoacidosis.  Call if you have any foot infections.     Check blood sugars before meals and bedtime.   Call if blood sugars are frequently less than 70 or above 200.  Brisk walk 30 minutes a day, 5 days a week    Call if you need prescriptions for medications.  Carry glucose tablets or snacks with you at all times.

## 2023-10-09 NOTE — ASSESSMENT & PLAN NOTE
Last A1c was 6.7.  Repeat A1c today.  Continue current diabetes regimen.  Call if any side effects from the medications    Discussed goal A1c of 7%.  Call if blood sugars are persistently less than 70 or greater than 200.   Patient does not want a referral to the diabetes educator.    Discussed importance of diet and lifestyle modifications for diabetes management  Hypoglycemia management discussed. Carry glucose tablets or snacks at all times.     Complications:  Follow up for regular diabetes eye exam  Daily self examination of feet.  Microalbumin: Monitor.     Last A1c:   Lab Results   Component Value Date    HGBA1C 6.7 (H) 06/09/2023       microalbumin:   Lab Results   Component Value Date    LABMICR 20.0 12/21/2022    CREATRANDUR 70.0 12/21/2022    MICALBCREAT 28.6 12/21/2022

## 2023-10-19 ENCOUNTER — PATIENT MESSAGE (OUTPATIENT)
Dept: ADMINISTRATIVE | Facility: HOSPITAL | Age: 73
End: 2023-10-19
Payer: MEDICARE

## 2023-10-19 ENCOUNTER — PATIENT OUTREACH (OUTPATIENT)
Dept: ADMINISTRATIVE | Facility: HOSPITAL | Age: 73
End: 2023-10-19
Payer: MEDICARE

## 2023-10-25 ENCOUNTER — TELEPHONE (OUTPATIENT)
Dept: ENDOSCOPY | Facility: HOSPITAL | Age: 73
End: 2023-10-25
Payer: MEDICARE

## 2023-11-01 ENCOUNTER — HOSPITAL ENCOUNTER (OUTPATIENT)
Facility: HOSPITAL | Age: 73
Discharge: HOME | End: 2023-11-01
Attending: STUDENT IN AN ORGANIZED HEALTH CARE EDUCATION/TRAINING PROGRAM | Admitting: STUDENT IN AN ORGANIZED HEALTH CARE EDUCATION/TRAINING PROGRAM
Payer: MEDICARE

## 2023-11-01 ENCOUNTER — ANESTHESIA EVENT (OUTPATIENT)
Dept: ENDOSCOPY | Facility: HOSPITAL | Age: 73
End: 2023-11-01
Payer: MEDICARE

## 2023-11-01 ENCOUNTER — ANESTHESIA (OUTPATIENT)
Dept: ENDOSCOPY | Facility: HOSPITAL | Age: 73
End: 2023-11-01
Payer: MEDICARE

## 2023-11-01 VITALS
OXYGEN SATURATION: 96 % | HEIGHT: 69 IN | BODY MASS INDEX: 27.7 KG/M2 | RESPIRATION RATE: 18 BRPM | WEIGHT: 187 LBS | SYSTOLIC BLOOD PRESSURE: 122 MMHG | HEART RATE: 54 BPM | DIASTOLIC BLOOD PRESSURE: 74 MMHG | TEMPERATURE: 98 F

## 2023-11-01 DIAGNOSIS — Z12.11 SCREENING FOR COLON CANCER: ICD-10-CM

## 2023-11-01 LAB — POCT GLUCOSE: 101 MG/DL (ref 70–110)

## 2023-11-01 PROCEDURE — 27201089 HC SNARE, DISP (ANY): Performed by: STUDENT IN AN ORGANIZED HEALTH CARE EDUCATION/TRAINING PROGRAM

## 2023-11-01 PROCEDURE — 88305 TISSUE EXAM BY PATHOLOGIST: CPT | Mod: 26,,, | Performed by: PATHOLOGY

## 2023-11-01 PROCEDURE — 88305 TISSUE EXAM BY PATHOLOGIST: ICD-10-PCS | Mod: 26,,, | Performed by: PATHOLOGY

## 2023-11-01 PROCEDURE — 37000009 HC ANESTHESIA EA ADD 15 MINS: Performed by: STUDENT IN AN ORGANIZED HEALTH CARE EDUCATION/TRAINING PROGRAM

## 2023-11-01 PROCEDURE — E9220 PRA ENDO ANESTHESIA: HCPCS | Mod: PT,,, | Performed by: NURSE ANESTHETIST, CERTIFIED REGISTERED

## 2023-11-01 PROCEDURE — 45385 COLONOSCOPY W/LESION REMOVAL: CPT | Mod: PT,,, | Performed by: STUDENT IN AN ORGANIZED HEALTH CARE EDUCATION/TRAINING PROGRAM

## 2023-11-01 PROCEDURE — E9220 PRA ENDO ANESTHESIA: ICD-10-PCS | Mod: PT,,, | Performed by: NURSE ANESTHETIST, CERTIFIED REGISTERED

## 2023-11-01 PROCEDURE — 63600175 PHARM REV CODE 636 W HCPCS: Performed by: NURSE ANESTHETIST, CERTIFIED REGISTERED

## 2023-11-01 PROCEDURE — 25000003 PHARM REV CODE 250: Performed by: NURSE ANESTHETIST, CERTIFIED REGISTERED

## 2023-11-01 PROCEDURE — 45385 PR COLONOSCOPY,REMV LESN,SNARE: ICD-10-PCS | Mod: PT,,, | Performed by: STUDENT IN AN ORGANIZED HEALTH CARE EDUCATION/TRAINING PROGRAM

## 2023-11-01 PROCEDURE — 37000008 HC ANESTHESIA 1ST 15 MINUTES: Performed by: STUDENT IN AN ORGANIZED HEALTH CARE EDUCATION/TRAINING PROGRAM

## 2023-11-01 PROCEDURE — 88305 TISSUE EXAM BY PATHOLOGIST: CPT | Performed by: PATHOLOGY

## 2023-11-01 PROCEDURE — 45385 COLONOSCOPY W/LESION REMOVAL: CPT | Performed by: STUDENT IN AN ORGANIZED HEALTH CARE EDUCATION/TRAINING PROGRAM

## 2023-11-01 RX ORDER — LIDOCAINE HYDROCHLORIDE 20 MG/ML
INJECTION, SOLUTION EPIDURAL; INFILTRATION; INTRACAUDAL; PERINEURAL
Status: DISCONTINUED | OUTPATIENT
Start: 2023-11-01 | End: 2023-11-01

## 2023-11-01 RX ORDER — PROPOFOL 10 MG/ML
VIAL (ML) INTRAVENOUS
Status: DISCONTINUED | OUTPATIENT
Start: 2023-11-01 | End: 2023-11-01

## 2023-11-01 RX ORDER — SODIUM CHLORIDE 9 MG/ML
INJECTION, SOLUTION INTRAVENOUS CONTINUOUS
Status: DISCONTINUED | OUTPATIENT
Start: 2023-11-01 | End: 2023-11-01 | Stop reason: HOSPADM

## 2023-11-01 RX ADMIN — LIDOCAINE HYDROCHLORIDE 100 MG: 20 INJECTION, SOLUTION EPIDURAL; INFILTRATION; INTRACAUDAL; PERINEURAL at 10:11

## 2023-11-01 RX ADMIN — SODIUM CHLORIDE: 9 INJECTION, SOLUTION INTRAVENOUS at 10:11

## 2023-11-01 RX ADMIN — PROPOFOL 60 MG: 10 INJECTION, EMULSION INTRAVENOUS at 10:11

## 2023-11-01 NOTE — H&P
Colonoscopy History and Physical      Procedure : Colonoscopy    Indications:  asymptomatic screening exam  No FHX of CRC. No changes in bowel habits/hematochezia    Last C scope 10/10/2016: Two 6-10 mm polyps found in the T colon and cecum. Path: benign    Underwent sigmoid colectomy 03/2014 for perforated diverticulitis     Review of patient's allergies indicates:   Allergen Reactions    Atorvastatin      Other reaction(s):(lipitor) Muscle pain    Niacin      Other reaction(s): Flushing (skin)       No current facility-administered medications on file prior to encounter.     Current Outpatient Medications on File Prior to Encounter   Medication Sig Dispense Refill    aspirin (ECOTRIN) 81 MG EC tablet Take 81 mg by mouth once daily.      celecoxib (CELEBREX) 200 MG capsule Take 200 mg by mouth once daily. Daily and extra dose as needed      fish oil-omega-3 fatty acids 300-1,000 mg capsule Take 1 g by mouth once daily.      multivitamin (THERAGRAN) per tablet Take 1 tablet by mouth Daily.        rosuvastatin (CRESTOR) 20 MG tablet TAKE 1 TABLET BY MOUTH EVERY DAY IN THE EVENING 90 tablet 3    traZODone (DESYREL) 50 MG tablet TAKE 1/2 TABLET BY MOUTH TWICE A DAY 90 tablet 0    alfuzosin (UROXATRAL) 10 mg Tb24 Take 1 tablet (10 mg total) by mouth every evening. 90 tablet 3    betamethasone dipropionate 0.05 % cream Apply topically 2 (two) times daily.      blood sugar diagnostic Strp To check BG 4 times daily, to use with insurance preferred meter 350 strip 3    blood-glucose meter kit To check BG 4 times daily, to use with insurance preferred meter 1 each 0    fluticasone propionate (FLONASE) 50 mcg/actuation nasal spray 2 sprays (100 mcg total) by Each Nostril route once daily. (Patient taking differently: 2 sprays by Each Nostril route as needed.) 9.9 mL 0    FREESTYLE LANCETS 28 gauge lancets TO CHECK BG 4 TIMES DAILY, TO USE WITH INSURANCE PREFERRED METER 200  each 3    nitroGLYCERIN (NITROSTAT) 0.4 MG SL tablet Place 1 tablet (0.4 mg total) under the tongue every 5 (five) minutes as needed for Chest pain. 100 tablet 3    pantoprazole (PROTONIX) 40 MG tablet Take 1 tablet (40 mg total) by mouth once daily. (Patient taking differently: Take 40 mg by mouth as needed.) 30 tablet 1    timolol maleate 0.5% (TIMOPTIC) 0.5 % Drop Place 1 drop into both eyes every morning.         Past Medical History:   Diagnosis Date    Anticoagulant long-term use     Arthritis     Atrial tachycardia, paroxysmal 1/12    during Cardiac Rehab    Colon polyp     Coronary artery disease     Diverticulitis     Diverticulosis     GERD (gastroesophageal reflux disease)     Glucose intolerance (impaired glucose tolerance) 1/24/2017    Hyperlipidemia     Hypertension     IFG (impaired fasting glucose) 12/21/2015    NSTEMI (non-ST elevated myocardial infarction) 10/11/12    Obesity (BMI 30-39.9) 1/31/2018    Post PTCA     Sleep apnea        Past Surgical History:   Procedure Laterality Date    boewl resection      CATARACT EXTRACTION, BILATERAL  8/2011    COLONOSCOPY  2012    COLONOSCOPY N/A 10/10/2016    Procedure: COLONOSCOPY;  Surgeon: Main Lehman MD;  Location: [facility] ([address] [place] [address]);  Service: Endoscopy;  Laterality: N/A;    COLONOSCOPY W/ POLYPECTOMY      CORONARY ANGIOPLASTY WITH STENT PLACEMENT      ESOPHAGOGASTRODUODENOSCOPY      HERNIA REPAIR      KNEE SURGERY  2003    needle bx on chest      SHOULDER SURGERY      SIGMOIDECTOMY         Family History   Problem Relation Age of Onset    Cancer Mother         uterine    Asthma Sister     Heart disease Father         valve    Diabetes Maternal Grandmother        Social History     Socioeconomic History    Marital status:    Occupational History    Occupation: enviromental regulator     Employer: [employer]   Tobacco Use    Smoking status: Never     Passive exposure: Never    Smokeless tobacco: Never    Tobacco comments:     smoked  weed 27 yrs ago   Substance and Sexual Activity    Alcohol use: No     Alcohol/week: 0.0 standard drinks of alcohol     Comment: quit at 25 yrs old    Drug use: No    Sexual activity: Yes     Partners: Female     Social Determinants of Health     Financial Resource Strain: Low Risk  (9/22/2023)    Overall Financial Resource Strain (CARDIA)     Difficulty of Paying Living Expenses: Not hard at all   Food Insecurity: No Food Insecurity (9/22/2023)    Hunger Vital Sign     Worried About Running Out of Food in the Last Year: Never true     Ran Out of Food in the Last Year: Never true   Transportation Needs: No Transportation Needs (9/22/2023)    PRAPARE - Transportation     Lack of Transportation (Medical): No     Lack of Transportation (Non-Medical): No   Physical Activity: Sufficiently Active (9/22/2023)    Exercise Vital Sign     Days of Exercise per Week: 7 days     Minutes of Exercise per Session: 40 min   Stress: No Stress Concern Present (9/22/2023)    [employer] [place] [employer] - Occupational Stress Questionnaire     Feeling of Stress : Not at all   Social Connections: Unknown (9/22/2023)    Social Connection and Isolation Panel [NHANES]     Frequency of Communication with Friends and Family: More than three times a week     Frequency of Social Gatherings with Friends and Family: More than three times a week     Active Member of Clubs or Organizations: Yes     Attends Club or Organization Meetings: More than 4 times per year     Marital Status:    Housing Stability: Low Risk  (9/22/2023)    Housing Stability Vital Sign     Unable to Pay for Housing in the Last Year: No     Number of Places Lived in the Last Year: 1     Unstable Housing in the Last Year: No       Review of Systems -   Respiratory ROS: no cough, shortness of breath, or wheezing  Cardiovascular ROS: no chest pain or dyspnea on exertion  Gastrointestinal ROS: no abdominal pain, change in bowel habits, or black or bloody  stools  Musculoskeletal ROS: negative  Neurological ROS: no TIA or stroke symptoms    Physical Exam:  General: well developed, well nourished, no distress  Head: normocephalic  Neck: supple, symmetrical, trachea midline  Lungs:  clear to auscultation bilaterally and normal respiratory effort  Heart: regular rate and rhythm, S1, S2 normal, no murmur, rub or gallop  Abdomen: soft, non-tender non-distented; bowel sounds normal; no masses,  no organomegaly  Extremities: no cyanosis or edema, or clubbing     Deep Sedation: Mallampati Score per anesthesia    ASA: III    Lina Talbot MD  General Surgery PGY5

## 2023-11-01 NOTE — PROVATION PATIENT INSTRUCTIONS
Discharge Summary/Instructions after an Endoscopic Procedure  Patient Name: Den Rick  Patient MRN: [id number]  Patient YOB: 1950  Wednesday, November 1, 2023  Rayo Pickard MD  Dear patient,  As a result of recent federal legislation (The Federal Cures Act), you may   receive lab or pathology results from your procedure in your [facility]   account before your physician is able to contact you. Your physician or   their representative will relay the results to you with their   recommendations at their soonest availability.  Thank you,  RESTRICTIONS:  During your procedure today, you received medications for sedation.  These   medications may affect your judgment, balance and coordination.  Therefore,   for 24 hours, you have the following restrictions:   - DO NOT drive a car, operate machinery, make legal/financial decisions,   sign important papers or drink alcohol.    ACTIVITY:  Today: no heavy lifting, straining or running due to procedural   sedation/anesthesia.  The following day: return to full activity including work.  DIET:  Eat and drink normally unless instructed otherwise.     TREATMENT FOR COMMON SIDE EFFECTS:  - Mild abdominal pain, nausea, belching, bloating or excessive gas:  rest,   eat lightly and use a heating pad.  - Sore Throat: treat with throat lozenges and/or gargle with warm salt   water.  - Because air was used during the procedure, expelling large amounts of air   from your rectum or belching is normal.  - If a bowel prep was taken, you may not have a bowel movement for 1-3 days.    This is normal.  SYMPTOMS TO WATCH FOR AND REPORT TO YOUR PHYSICIAN:  1. Abdominal pain or bloating, other than gas cramps.  2. Chest pain.  3. Back pain.  4. Signs of infection such as: chills or fever occurring within 24 hours   after the procedure.  5. Rectal bleeding, which would show as bright red, maroon, or black stools.   (A tablespoon of blood from the rectum is not serious,  especially if   hemorrhoids are present.)  6. Vomiting.  7. Weakness or dizziness.  GO DIRECTLY TO THE NEAREST EMERGENCY ROOM IF YOU HAVE ANY OF THE FOLLOWING:      Difficulty breathing              Chills and/or fever over 101 F   Persistent vomiting and/or vomiting blood   Severe abdominal pain   Severe chest pain   Black, tarry stools   Bleeding- more than one tablespoon   Any other symptom or condition that you feel may need urgent attention  Your doctor recommends these additional instructions:  If any biopsies were taken, your doctors clinic will contact you in 1 to 2   weeks with any results.  - Discharge patient to home.   - Resume previous diet.   - Continue present medications.   - Await pathology results.   - Repeat colonoscopy in 3 years for surveillance based on pathology results.     - Return to referring physician as previously scheduled.  For questions, problems or results please call your physician - Rayo Pickard MD at Work:  [phone].  [facility] EMERGENCY ROOM PHONE NUMBER: [phone]  IF A COMPLICATION OR EMERGENCY SITUATION ARISES AND YOU ARE UNABLE TO REACH   YOUR PHYSICIAN - GO DIRECTLY TO THE EMERGENCY ROOM.  MD Rayo Merida MD  11/1/2023 10:50:55 AM  This report has been verified and signed electronically.  Dear patient,  As a result of recent federal legislation (The Federal Cures Act), you may   receive lab or pathology results from your procedure in your [facility]   account before your physician is able to contact you. Your physician or   their representative will relay the results to you with their   recommendations at their soonest availability.  Thank you,  PROVATION

## 2023-11-01 NOTE — TRANSFER OF CARE
Anesthesia Transfer of Care Note    Patient: Den Rick    Procedure(s) Performed: Procedure(s) (LRB):  COLONOSCOPY (N/A)    Patient location: PACU    Anesthesia Type: general    Transport from OR: Transported from OR on 2-3 L/min O2 by NC with adequate spontaneous ventilation    Post pain: adequate analgesia    Post assessment: no apparent anesthetic complications and tolerated procedure well    Post vital signs: stable    Level of consciousness: sedated    Nausea/Vomiting: no nausea/vomiting    Complications: none    Transfer of care protocol was followed      Last vitals:

## 2023-11-01 NOTE — PLAN OF CARE
ID band verified and applied.  Plan of care reviewed with patient.  Patient verbalized understanding.

## 2023-11-01 NOTE — ANESTHESIA PREPROCEDURE EVALUATION
11/01/2023  Den Rick is a 73 y.o., male.      Pre-op Assessment    I have reviewed the Patient Summary Reports.     I have reviewed the Nursing Notes. I have reviewed the NPO Status.      Review of Systems  Anesthesia Hx:  No problems with previous Anesthesia   History of prior surgery of interest to airway management or planning:  Previous anesthesia: General        Denies Family Hx of Anesthesia complications.    Denies Personal Hx of Anesthesia complications.                    Social:  Non-Smoker, No Alcohol Use       Hematology/Oncology:  Hematology Normal   Oncology Normal                                   EENT/Dental:  EENT/Dental Normal           Cardiovascular:  Exercise tolerance: poor   Hypertension  Past MI CAD   CABG/stent                                     Pulmonary:      Shortness of breath  Sleep Apnea                Renal/:  Renal/ Normal                 Hepatic/GI:     GERD, well controlled             Musculoskeletal:  Musculoskeletal Normal                Neurological:  Neurology Normal                                      Endocrine:  Diabetes, well controlled           Dermatological:  Skin Normal    Psych:  Psychiatric Normal                    Physical Exam  General: Well nourished, Cooperative, Alert and Oriented    Airway:  Mallampati: I / I  Tongue: Normal  Neck ROM: Normal ROM    Dental:  Intact    Chest/Lungs:  Clear to auscultation, Normal Respiratory Rate    Heart:  Rate: Normal  Rhythm: Regular Rhythm  Sounds: Normal    Abdomen:  Normal, Soft, Nontender        Anesthesia Plan  Type of Anesthesia, risks & benefits discussed:    Anesthesia Type: Gen Natural Airway  Intra-op Monitoring Plan: Standard ASA Monitors  Induction:  IV  Informed Consent: Informed consent signed with the Patient and all parties understand the risks and agree with anesthesia plan.  All  questions answered.   ASA Score: 3  Day of Surgery Review of History & Physical: I have interviewed and examined the patient. I have reviewed the patient's H&P dated:     Ready For Surgery From Anesthesia Perspective.     .

## 2023-11-03 NOTE — ANESTHESIA POSTPROCEDURE EVALUATION
Anesthesia Post Evaluation    Patient: Den Rick    Procedure(s) Performed: Procedure(s) (LRB):  COLONOSCOPY (N/A)    Final Anesthesia Type: general      Patient location during evaluation: PACU  Patient participation: Yes- Able to Participate  Level of consciousness: awake and alert  Post-procedure vital signs: reviewed and stable  Pain management: adequate  Airway patency: patent    PONV status at discharge: No PONV  Anesthetic complications: no      Cardiovascular status: blood pressure returned to baseline  Respiratory status: spontaneous ventilation and room air  Hydration status: euvolemic  Follow-up not needed.          Vitals Value Taken Time   /74 11/01/23 1129   Temp 36.6 °C (97.9 °F) 11/01/23 1057   Pulse 54 11/01/23 1129   Resp 18 11/01/23 1129   SpO2 96 % 11/01/23 1129         Event Time   Out of Recovery 11:38:32         Pain/Lizette Score: Lizette Score: 8 (11/1/2023 10:57 AM)

## 2023-11-07 LAB
FINAL PATHOLOGIC DIAGNOSIS: NORMAL
Lab: NORMAL

## 2023-11-29 ENCOUNTER — PATIENT MESSAGE (OUTPATIENT)
Dept: INTERNAL MEDICINE | Facility: CLINIC | Age: 73
End: 2023-11-29
Payer: MEDICARE

## 2023-12-26 DIAGNOSIS — I25.10 CORONARY ARTERY DISEASE INVOLVING NATIVE CORONARY ARTERY OF NATIVE HEART WITHOUT ANGINA PECTORIS: Chronic | ICD-10-CM

## 2023-12-27 ENCOUNTER — TELEPHONE (OUTPATIENT)
Dept: ENDOCRINOLOGY | Facility: CLINIC | Age: 73
End: 2023-12-27
Payer: MEDICARE

## 2023-12-27 RX ORDER — ROSUVASTATIN CALCIUM 20 MG/1
20 TABLET, COATED ORAL NIGHTLY
Qty: 90 TABLET | Refills: 3 | Status: SHIPPED | OUTPATIENT
Start: 2023-12-27 | End: 2024-02-16 | Stop reason: DRUGHIGH

## 2023-12-27 NOTE — TELEPHONE ENCOUNTER
Called pt per message regarding several medication questions. Advised pt provider is currently out of the office, but I can schedule an appointment for him via virtual or in person when she returns. Pt requested in person appointment and is currently scheduled for January 3rd at 10:30 AM. Advised pt of 15 minute hi period, date, time, and location of appointment.

## 2023-12-27 NOTE — PROGRESS NOTES
Subjective:      Chief Complaint: Depression and Medication Refill    HPI  History Den Rick is a 73-year-old man with small atrial septal defect (status post percutaneous foramina ovale closure), A flutter/tachycardia, coronary artery disease, hypertension, hyperlipidemia, status post NSTEMI, thoracic aortic aneurysm (mildly dilated per recent [facility]), BPH, overactive bladder, hypogonadism, well controlled type 2 diabetes, diverticulosis, GERD, ventral hernia, diffuse arthritis, gout, and obstructive sleep apnea presenting order evaluation/treatment of depression:    MDD:  - seemingly moderately appreciated by the patient and strongly by his wife (accompanied him to this appointment and is a )  SIGECAPS:  -Sleep: OK; BPH with nigh time awakenings    -Interest: moderate    -Guilt: questionable    -Energy: OK   -Concentration: Varies    -Appetite: fine    -Psychomotor retardation: Absent   -Suicidality/homicidally: None    - some degree of social isolation (2 close friends have recently passed away)    - also appreciate modest short-term memory issues      Review of Systems   Constitutional:  Negative for activity change and unexpected weight change.   HENT:  Negative for trouble swallowing.    Eyes:  Negative for discharge and visual disturbance.   Respiratory:  Negative for chest tightness and wheezing.    Cardiovascular:  Negative for chest pain and palpitations.   Gastrointestinal:  Negative for blood in stool, constipation, diarrhea and vomiting.   Endocrine: Negative for polydipsia and polyuria.   Genitourinary:  Negative for difficulty urinating, hematuria and urgency.   Musculoskeletal:  Negative for arthralgias, joint swelling and neck pain.   Neurological:  Negative for weakness and headaches.   Psychiatric/Behavioral:  Negative for confusion and dysphoric mood.          Objective:      Vitals:    12/28/23 1103   BP: 106/70   Pulse: 73   Resp: 18   Temp: 97.7 °F (36.5 °C)      Physical  Exam  Vitals reviewed.   Constitutional:       General: He is not in acute distress.     Appearance: Normal appearance.   HENT:      Head: Normocephalic and atraumatic.      Comments: Facial features are symmetric      Nose: Nose normal. No congestion or rhinorrhea.      Mouth/Throat:      Mouth: Mucous membranes are moist.      Pharynx: Oropharynx is clear. No oropharyngeal exudate or posterior oropharyngeal erythema.   Eyes:      General: No scleral icterus.     Extraocular Movements: Extraocular movements intact.      Conjunctiva/sclera: Conjunctivae normal.   Cardiovascular:      Rate and Rhythm: Normal rate and regular rhythm.      Pulses: Normal pulses.      Heart sounds: Normal heart sounds.   Pulmonary:      Effort: Pulmonary effort is normal. No respiratory distress.      Breath sounds: Normal breath sounds.   Musculoskeletal:         General: No deformity or signs of injury. Normal range of motion.      Cervical back: Normal range of motion.      Comments: Gait normal    Skin:     General: Skin is warm and dry.      Findings: No rash.   Neurological:      General: No focal deficit present.      Mental Status: He is alert and oriented to person, place, and time. Mental status is at baseline.   Psychiatric:         Mood and Affect: Mood normal.         Behavior: Behavior normal.         Thought Content: Thought content normal.       Current Outpatient Medications on File Prior to Visit   Medication Sig Dispense Refill    alfuzosin (UROXATRAL) 10 mg Tb24 Take 1 tablet (10 mg total) by mouth every evening. 90 tablet 3    aspirin (ECOTRIN) 81 MG EC tablet Take 81 mg by mouth once daily.      betamethasone dipropionate 0.05 % cream Apply topically 2 (two) times daily.      blood sugar diagnostic Strp To check BG 4 times daily, to use with insurance preferred meter 350 strip 3    brinzolamide (AZOPT) 1 % ophthalmic suspension Place 1 drop into both eyes 3 (three) times daily.      celecoxib (CELEBREX) 200 MG  capsule Take 200 mg by mouth once daily. Daily and extra dose as needed      DENTA 5000 PLUS 1.1 % Crea Take by mouth once daily.      empagliflozin (JARDIANCE) 10 mg tablet Take 1 tablet (10 mg total) by mouth once daily. 90 tablet 0    fesoterodine (TOVIAZ) 8 mg Tb24 Take 8 mg by mouth every evening. 90 tablet 3    fish oil-omega-3 fatty acids 300-1,000 mg capsule Take 1 g by mouth once daily.      fluticasone propionate (FLONASE) 50 mcg/actuation nasal spray 2 sprays (100 mcg total) by Each Nostril route once daily. (Patient taking differently: 2 sprays by Each Nostril route as needed.) 9.9 mL 0    FREESTYLE LANCETS 28 gauge lancets TO CHECK BG 4 TIMES DAILY, TO USE WITH INSURANCE PREFERRED METER 200 each 3    metFORMIN (GLUCOPHAGE-XR) 500 MG ER 24hr tablet TAKE FOUR TABLETS BY MOUTH DAILY WITH DINNER OR EVENING MEAL. 360 tablet 1    metoprolol succinate (TOPROL-XL) 50 MG 24 hr tablet TAKE 1 TABLET BY MOUTH TWICE A  tablet 2    multivitamin (THERAGRAN) per tablet Take 1 tablet by mouth Daily.        nitroGLYCERIN (NITROSTAT) 0.4 MG SL tablet Place 1 tablet (0.4 mg total) under the tongue every 5 (five) minutes as needed for Chest pain. 100 tablet 3    pantoprazole (PROTONIX) 40 MG tablet Take 1 tablet (40 mg total) by mouth once daily. (Patient taking differently: Take 40 mg by mouth as needed.) 30 tablet 1    rosuvastatin (CRESTOR) 20 MG tablet Take 1 tablet (20 mg total) by mouth every evening. 90 tablet 3    timolol maleate 0.5% (TIMOPTIC) 0.5 % Drop Place 1 drop into both eyes every morning.      traZODone (DESYREL) 50 MG tablet TAKE 1/2 TABLET BY MOUTH TWICE A DAY 90 tablet 0    tretinoin (RETIN-A) 0.1 % cream Apply topically nightly as needed.      blood-glucose meter kit To check BG 4 times daily, to use with insurance preferred meter 1 each 0    GAVILYTE-C 240-22.72-6.72 -5.84 gram SolR Take 4,000 mLs by mouth once.       No current facility-administered medications on file prior to visit.          Assessment:       1. Moderate episode of recurrent major depressive disorder    2. Memory changes        Plan:       Moderate episode of recurrent major depressive disorder   - will initiate lowest dose once daily version of Wellbutrin and follow-up in 6 weeks    Memory changes  -     Ambulatory referral/consult to Adult Neuropsychology; Future; Expected date: 01/04/2024   - referral generated for formal evaluation/baseline cognitive testing; recommendations and interventions appreciated     Other orders  -     buPROPion (WELLBUTRIN XL) 150 MG TB24 tablet; Take 1 tablet (150 mg total) by mouth once daily.  Dispense: 90 tablet; Refill: 3

## 2023-12-28 ENCOUNTER — OFFICE VISIT (OUTPATIENT)
Dept: INTERNAL MEDICINE | Facility: CLINIC | Age: 73
End: 2023-12-28
Payer: MEDICARE

## 2023-12-28 VITALS
HEART RATE: 73 BPM | RESPIRATION RATE: 18 BRPM | DIASTOLIC BLOOD PRESSURE: 70 MMHG | SYSTOLIC BLOOD PRESSURE: 106 MMHG | HEIGHT: 69 IN | BODY MASS INDEX: 28.87 KG/M2 | WEIGHT: 194.88 LBS | TEMPERATURE: 98 F | OXYGEN SATURATION: 95 %

## 2023-12-28 DIAGNOSIS — R41.3 MEMORY CHANGES: ICD-10-CM

## 2023-12-28 DIAGNOSIS — F33.1 MODERATE EPISODE OF RECURRENT MAJOR DEPRESSIVE DISORDER: Primary | ICD-10-CM

## 2023-12-28 PROCEDURE — 99214 OFFICE O/P EST MOD 30 MIN: CPT | Mod: S$PBB,,, | Performed by: STUDENT IN AN ORGANIZED HEALTH CARE EDUCATION/TRAINING PROGRAM

## 2023-12-28 PROCEDURE — 99215 OFFICE O/P EST HI 40 MIN: CPT | Mod: PBBFAC,PO | Performed by: STUDENT IN AN ORGANIZED HEALTH CARE EDUCATION/TRAINING PROGRAM

## 2023-12-28 PROCEDURE — 99214 PR OFFICE/OUTPT VISIT, EST, LEVL IV, 30-39 MIN: ICD-10-PCS | Mod: S$PBB,,, | Performed by: STUDENT IN AN ORGANIZED HEALTH CARE EDUCATION/TRAINING PROGRAM

## 2023-12-28 PROCEDURE — 99999 PR PBB SHADOW E&M-EST. PATIENT-LVL V: CPT | Mod: PBBFAC,,, | Performed by: STUDENT IN AN ORGANIZED HEALTH CARE EDUCATION/TRAINING PROGRAM

## 2023-12-28 PROCEDURE — 99999 PR PBB SHADOW E&M-EST. PATIENT-LVL V: ICD-10-PCS | Mod: PBBFAC,,, | Performed by: STUDENT IN AN ORGANIZED HEALTH CARE EDUCATION/TRAINING PROGRAM

## 2023-12-28 RX ORDER — BRINZOLAMIDE 10 MG/ML
1 SUSPENSION/ DROPS OPHTHALMIC 3 TIMES DAILY
COMMUNITY
Start: 2023-11-28

## 2023-12-28 RX ORDER — BUPROPION HYDROCHLORIDE 150 MG/1
150 TABLET ORAL DAILY
Qty: 90 TABLET | Refills: 3 | Status: SHIPPED | OUTPATIENT
Start: 2023-12-28 | End: 2024-12-27

## 2024-01-18 ENCOUNTER — LAB VISIT (OUTPATIENT)
Dept: LAB | Facility: HOSPITAL | Age: 74
End: 2024-01-18
Attending: INTERNAL MEDICINE
Payer: MEDICARE

## 2024-01-18 ENCOUNTER — OFFICE VISIT (OUTPATIENT)
Dept: ENDOCRINOLOGY | Facility: CLINIC | Age: 74
End: 2024-01-18
Payer: MEDICARE

## 2024-01-18 VITALS
HEART RATE: 67 BPM | WEIGHT: 199.31 LBS | BODY MASS INDEX: 29.43 KG/M2 | SYSTOLIC BLOOD PRESSURE: 131 MMHG | DIASTOLIC BLOOD PRESSURE: 76 MMHG | OXYGEN SATURATION: 96 %

## 2024-01-18 DIAGNOSIS — E11.65 TYPE 2 DIABETES MELLITUS WITH HYPERGLYCEMIA, WITHOUT LONG-TERM CURRENT USE OF INSULIN: Primary | ICD-10-CM

## 2024-01-18 DIAGNOSIS — E11.65 TYPE 2 DIABETES MELLITUS WITH HYPERGLYCEMIA, WITHOUT LONG-TERM CURRENT USE OF INSULIN: ICD-10-CM

## 2024-01-18 DIAGNOSIS — E78.00 HYPERCHOLESTEREMIA: Chronic | ICD-10-CM

## 2024-01-18 LAB
ALBUMIN/CREAT UR: NORMAL UG/MG (ref 0–30)
CREAT UR-MCNC: 53 MG/DL (ref 23–375)
ESTIMATED AVG GLUCOSE: 166 MG/DL (ref 68–131)
HBA1C MFR BLD: 7.4 % (ref 4–5.6)
MICROALBUMIN UR DL<=1MG/L-MCNC: <5 UG/ML

## 2024-01-18 PROCEDURE — 99999 PR PBB SHADOW E&M-EST. PATIENT-LVL IV: CPT | Mod: PBBFAC,,, | Performed by: INTERNAL MEDICINE

## 2024-01-18 PROCEDURE — 99214 OFFICE O/P EST MOD 30 MIN: CPT | Mod: S$PBB,,, | Performed by: INTERNAL MEDICINE

## 2024-01-18 PROCEDURE — 36415 COLL VENOUS BLD VENIPUNCTURE: CPT | Performed by: INTERNAL MEDICINE

## 2024-01-18 PROCEDURE — 99214 OFFICE O/P EST MOD 30 MIN: CPT | Mod: PBBFAC | Performed by: INTERNAL MEDICINE

## 2024-01-18 PROCEDURE — 83036 HEMOGLOBIN GLYCOSYLATED A1C: CPT | Performed by: INTERNAL MEDICINE

## 2024-01-18 PROCEDURE — 82043 UR ALBUMIN QUANTITATIVE: CPT | Performed by: INTERNAL MEDICINE

## 2024-01-18 NOTE — PATIENT INSTRUCTIONS
Change in your diabetes medications:   Start Mounjaro 2.5 mg once a week. If tolerating for 4 weeks, increase dose to 5 mg once a week.   Continue Jardiance and Metformin.     Call if you have any side effects from the medication:   Mounjaro:  Nausea, vomiting, diarrhea, constipation, decreased appetite, abdominal pain  Metformin:  Nausea, diarrhea  Jardiance:  Dehydration, increased urination, urinary tract infections, yeast infections, diabetic ketoacidosis.  Call if you have any foot infections.     Check blood sugars before meals and bedtime.   Call if blood sugars are frequently less than 70 or above 200.  Follow consistent carbohydrate diet  Brisk walk 30 minutes a day, 5 days a week    Call if you need prescriptions for medications.  Carry glucose tablets or snacks with you at all times.     Follow-up in 3-4 months.

## 2024-01-18 NOTE — PROGRESS NOTES
ENDOCRINOLOGY CLINIC    Subjective:      Chief Complaint: Type 2 diabetes    HPI:   Den Rick is a 73 y.o. male who presents for follow-up of type 2 diabetes.  Patient was last seen in the clinic on 10/9/2023.     Diabetes Hx:  Diagnosed w/ DM:  12/2017 with an A1c of 11.9, during preoperative evaluation for rotator cuff surgery.   Patient had A1c of 7.1 in 07/2013, otherwise A1c was in the prediabetes range.    Complications:   Retinopathy: No   Last eye exam: : 12/12/2022, Dr. Noyola in [place].   Neuropathy: No. Seen podiatry for ingrown toenail.  Has onychomycosis on the right big toenail.    Last foot exam: : 12/29/2022  Nephropathy: No  Cardiovascular:  CAD status post PCI x 7  Gastroparesis: No  DKA/HHS: No    Severe Hypoglycemia: No, Hypoglycemia unawareness: No  Hypoglycemic episodes: No, carries glucose tablets.    Current meds:   Metformin XR 2000 mg at bedtime - denies any GI symptoms related to metformin use.  Jardiance 10 mg daily    Compliance with meds: Yes     Previous meds:  Insulin when he was initially diagnosed and during his COVID infection and prednisone use.      Home glucose checks:    Has not checked his blood sugars recently.  Compliant: No    Diet/Exercise:   Takes 3 meals and 1-2 snacks a day.   Snacks on fruits.   Active at home - walks the dog 1 mile a day.    Had lost 12 lbs in the last visit with diet and lifestyle modifications.   Has gained 5 lbs recently.     Diabetes education: Few years back.     Last A1c:   Lab Results   Component Value Date    HGBA1C 7.9 (H) 10/09/2023    HGBA1C 6.7 (H) 06/09/2023    HGBA1C 7.2 (H) 12/21/2022     Microalbumin:   Lab Results   Component Value Date    LABMICR 20.0 12/21/2022    CREATRANDUR 70.0 12/21/2022    MICALBCREAT 28.6 12/21/2022     Lab Results   Component Value Date    EGFRNORACEVR >60.0 09/22/2023    CREATININE 0.73 09/22/2023     Lipids:   Lab Results   Component Value Date    CHOL 110 (L) 09/22/2023    TRIG 88 09/22/2023     HDL 37 (L) 09/22/2023    LDLCALC 55.4 (L) 09/22/2023    CHOLHDL 33.6 09/22/2023     TSH:  Lab Results   Component Value Date    TSH 3.800 06/27/2023       Lab Results   Component Value Date    ALYGNCKY77 668 07/25/2019       Lab Results   Component Value Date    HGB 16.2 06/09/2023        Aspirin: Yes  Statins: Yes, intolerance to atorvastatin.   ACEI/ARB: No  Fatty liver: Denies  HTN:  Usually controlled on his current medications.    Denies history of pancreatitis or medullary thyroid cancer.  History recurrent UTI: Denies  Had an episode of UTI few years back after his abdominal surgery.  History of recurrent fungal infection: Denies    Polyuria: Denies  Polydipsia: Denies    FHx of DM: Yes, maternal grandmother  Heart disease: Yes, Father had valvular heart disease s/p replacement x 2, sudden death at 69 yo.     ROS: see HPI       Objective:     Physical Exam     There were no vitals taken for this visit.    Wt Readings from Last 3 Encounters:   12/28/23 88.4 kg (194 lb 14.2 oz)   11/01/23 84.8 kg (187 lb)   10/09/23 88.6 kg (195 lb 5.2 oz)       Constitutional:  Pleasant,  in no acute distress.   HENT:   Head:    Normocephalic and atraumatic.   Eyes:    EOMI. No scleral icterus.   Cardiovascular:  Normal rate  Respiratory:   Effort normal   Neurological:  No tremor  Skin:    Skin is warm, dry  Extremity:  No edema    DIABETIC FOOT EXAM: 1/18/2024 - normal sensation to microfilament testing bilaterally.  No fissures, wounds, or ulcers.  Right big toe onychomycosis seen.    LABORATORY REVIEW:  See HPI for other labs reviewed today      Chemistry        Component Value Date/Time     09/22/2023 0751    K 4.6 09/22/2023 0751     09/22/2023 0751    CO2 24 09/22/2023 0751    BUN 27 (H) 09/22/2023 0751    CREATININE 0.73 09/22/2023 0751     (H) 09/22/2023 0751        Component Value Date/Time    CALCIUM 8.9 09/22/2023 0751    ALKPHOS 66 09/22/2023 0751    AST 21 09/22/2023 0751    ALT 24 09/22/2023  0751    BILITOT 0.4 09/22/2023 0751    ESTGFRAFRICA >60.0 07/21/2022 0912    EGFRNONAA >60.0 07/21/2022 0912          Lab Results   Component Value Date    HGBA1C 7.9 (H) 10/09/2023    HGBA1C 6.7 (H) 06/09/2023    HGBA1C 7.2 (H) 12/21/2022     Other labs reviewed today in HPI    Assessment/Plan:     Problem List Items Addressed This Visit          Cardiac/Vascular    Hypercholesteremia (Chronic)       Continue statin therapy.  Monitor lipids.                 Endocrine    Type 2 diabetes mellitus with hyperglycemia, without long-term current use of insulin - Primary       Last A1c was 7.9.  Repeat A1c today.  Patient interested in GLP-1 RA.     Change in diabetes medications:  Start Mounjaro 2.5 mg once a week. If tolerating for 4 weeks, increase dose to 5 mg once a week.   Continue Jardiance and Metformin.   Call if any side effects from the medications    Discussed goal A1c of 7%.  Call if blood sugars are persistently less than 70 or greater than 200.   Patient does not want a referral to the diabetes educator.    Discussed importance of diet and lifestyle modifications for diabetes management  Hypoglycemia management discussed. Carry glucose tablets or snacks at all times.     Complications:  Follow up for regular diabetes eye exam  Daily self examination of feet.  Microalbumin: Monitor.     Last A1c:   Lab Results   Component Value Date    HGBA1C 7.4 (H) 01/18/2024          Relevant Orders    Hemoglobin A1C (Completed)    Microalbumin/Creatinine Ratio, Urine (Completed)      Follow up in about 4 months (around 5/18/2024).          Diamond Sandhu MD

## 2024-01-19 DIAGNOSIS — E11.65 TYPE 2 DIABETES MELLITUS WITH HYPERGLYCEMIA, WITHOUT LONG-TERM CURRENT USE OF INSULIN: Primary | ICD-10-CM

## 2024-01-19 RX ORDER — TIRZEPATIDE 2.5 MG/.5ML
INJECTION, SOLUTION SUBCUTANEOUS
Qty: 4 PEN | Refills: 0 | Status: SHIPPED | OUTPATIENT
Start: 2024-01-19 | End: 2024-04-22 | Stop reason: DRUGHIGH

## 2024-01-19 RX ORDER — METFORMIN HYDROCHLORIDE 500 MG/1
2000 TABLET, EXTENDED RELEASE ORAL DAILY
Qty: 360 TABLET | Refills: 3 | Status: SHIPPED | OUTPATIENT
Start: 2024-01-19 | End: 2025-01-18

## 2024-01-19 RX ORDER — TIRZEPATIDE 5 MG/.5ML
INJECTION, SOLUTION SUBCUTANEOUS
Qty: 4 PEN | Refills: 3 | Status: SHIPPED | OUTPATIENT
Start: 2024-01-19 | End: 2024-04-22 | Stop reason: SDUPTHER

## 2024-01-24 RX ORDER — TRAZODONE HYDROCHLORIDE 50 MG/1
TABLET ORAL
Qty: 90 TABLET | Refills: 3 | Status: SHIPPED | OUTPATIENT
Start: 2024-01-24 | End: 2024-05-30

## 2024-01-24 NOTE — TELEPHONE ENCOUNTER
No care due was identified.  [facility] Embedded Care Due Messages. Reference number: [id number].   1/24/2024 4:01:39 PM CST

## 2024-01-25 ENCOUNTER — PATIENT MESSAGE (OUTPATIENT)
Dept: CARDIOLOGY | Facility: CLINIC | Age: 74
End: 2024-01-25
Payer: MEDICARE

## 2024-01-28 NOTE — ASSESSMENT & PLAN NOTE
Last A1c was 7.9.  Repeat A1c today.  Patient interested in GLP-1 RA.     Change in diabetes medications:  Start Mounjaro 2.5 mg once a week. If tolerating for 4 weeks, increase dose to 5 mg once a week.   Continue Jardiance and Metformin.   Call if any side effects from the medications    Discussed goal A1c of 7%.  Call if blood sugars are persistently less than 70 or greater than 200.   Patient does not want a referral to the diabetes educator.    Discussed importance of diet and lifestyle modifications for diabetes management  Hypoglycemia management discussed. Carry glucose tablets or snacks at all times.     Complications:  Follow up for regular diabetes eye exam  Daily self examination of feet.  Microalbumin: Monitor.     Last A1c:   Lab Results   Component Value Date    HGBA1C 7.4 (H) 01/18/2024

## 2024-02-15 NOTE — PROGRESS NOTES
Subjective:      Chief Complaint: Follow-up (6 week)    HPI  History Den Rick is a 73-year-old man with small atrial septal defect (status post percutaneous foramina ovale closure), A flutter/tachycardia, coronary artery disease, hypertension, hyperlipidemia, status post NSTEMI, thoracic aortic aneurysm (mildly dilated per recent [facility]NOR), BPH, overactive bladder, hypogonadism, well controlled type 2 diabetes, diverticulosis, GERD, ventral hernia, diffuse arthritis, gout, and obstructive sleep apnea presenting for continued evaluation/treatment of depression:     MDD:  - seemingly moderately appreciated by the patient and strongly by his wife at last appointment (accompanied him to that appointment and is a )  - Wellbutrin 150 XR initiated 6 weeks ago with subjectively strong effect  -  is also discontinue statin lieu of alternative agent prescribed by Cardiology due to subjective concern for statin induced mental clarity issues; apparently doing very well.Reporting Lipids to be reassessed by Cardiology versus endocrinology     Review of Systems   Constitutional:  Negative for appetite change, chills and fever.   HENT: Negative.     Respiratory:  Negative for cough, chest tightness and shortness of breath.    Cardiovascular:  Negative for chest pain, palpitations and leg swelling.   Gastrointestinal:  Negative for abdominal distention, abdominal pain, blood in stool, constipation, diarrhea, nausea and vomiting.   Endocrine: Negative.    Genitourinary:  Negative for difficulty urinating, dysuria, frequency and hematuria.   Musculoskeletal: Negative.    Integumentary:  Negative.   Neurological: Negative.    Psychiatric/Behavioral: Negative.           Objective:      Vitals:    02/16/24 0935   BP: 108/72   Pulse: 62   Resp: 16   Temp: 97.5 °F (36.4 °C)      Physical Exam  Vitals reviewed.   Constitutional:       General: He is not in acute distress.     Appearance: Normal appearance.   HENT:      Head:  Normocephalic and atraumatic.      Comments: Facial features are symmetric      Nose: Nose normal. No congestion or rhinorrhea.      Mouth/Throat:      Mouth: Mucous membranes are moist.      Pharynx: Oropharynx is clear. No oropharyngeal exudate or posterior oropharyngeal erythema.   Eyes:      General: No scleral icterus.     Extraocular Movements: Extraocular movements intact.      Conjunctiva/sclera: Conjunctivae normal.   Cardiovascular:      Rate and Rhythm: Normal rate and regular rhythm.      Pulses: Normal pulses.      Heart sounds: Normal heart sounds.   Pulmonary:      Effort: Pulmonary effort is normal. No respiratory distress.      Breath sounds: Normal breath sounds.   Musculoskeletal:         General: No deformity or signs of injury. Normal range of motion.      Cervical back: Normal range of motion.      Comments: Gait normal    Skin:     General: Skin is warm and dry.      Findings: No rash.   Neurological:      General: No focal deficit present.      Mental Status: He is alert and oriented to person, place, and time. Mental status is at baseline.   Psychiatric:         Mood and Affect: Mood normal.         Behavior: Behavior normal.         Thought Content: Thought content normal.       Current Outpatient Medications on File Prior to Visit   Medication Sig Dispense Refill    alfuzosin (UROXATRAL) 10 mg Tb24 Take 1 tablet (10 mg total) by mouth every evening. 90 tablet 3    aspirin (ECOTRIN) 81 MG EC tablet Take 81 mg by mouth once daily.      bempedoic acid-ezetimibe 180-10 mg Tab Take 1 tablet by mouth once daily. 90 tablet 3    betamethasone dipropionate 0.05 % cream Apply topically 2 (two) times daily.      blood sugar diagnostic Strp To check BG 4 times daily, to use with insurance preferred meter 350 strip 3    brinzolamide (AZOPT) 1 % ophthalmic suspension Place 1 drop into both eyes 3 (three) times daily.      buPROPion (WELLBUTRIN XL) 150 MG TB24 tablet Take 1 tablet (150 mg total) by mouth  once daily. 90 tablet 3    celecoxib (CELEBREX) 200 MG capsule Take 200 mg by mouth once daily. Daily and extra dose as needed      DENTA 5000 PLUS 1.1 % Crea Take by mouth once daily.      empagliflozin (JARDIANCE) 10 mg tablet Take 1 tablet (10 mg total) by mouth once daily. 90 tablet 1    fesoterodine (TOVIAZ) 8 mg Tb24 Take 8 mg by mouth every evening. 90 tablet 3    fish oil-omega-3 fatty acids 300-1,000 mg capsule Take 1 g by mouth once daily.      fluticasone propionate (FLONASE) 50 mcg/actuation nasal spray 2 sprays (100 mcg total) by Each Nostril route once daily. (Patient taking differently: 2 sprays by Each Nostril route as needed.) 9.9 mL 0    FREESTYLE LANCETS 28 gauge lancets TO CHECK BG 4 TIMES DAILY, TO USE WITH INSURANCE PREFERRED METER 200 each 3    metFORMIN (GLUCOPHAGE-XR) 500 MG ER 24hr tablet Take 4 tablets (2,000 mg total) by mouth Daily. 360 tablet 3    metoprolol succinate (TOPROL-XL) 50 MG 24 hr tablet TAKE 1 TABLET BY MOUTH TWICE A  tablet 2    multivitamin (THERAGRAN) per tablet Take 1 tablet by mouth Daily.        nitroGLYCERIN (NITROSTAT) 0.4 MG SL tablet Place 1 tablet (0.4 mg total) under the tongue every 5 (five) minutes as needed for Chest pain. 100 tablet 3    pantoprazole (PROTONIX) 40 MG tablet Take 1 tablet (40 mg total) by mouth once daily. (Patient taking differently: Take 40 mg by mouth as needed.) 30 tablet 1    tretinoin (RETIN-A) 0.1 % cream Apply topically nightly as needed.      blood-glucose meter kit To check BG 4 times daily, to use with insurance preferred meter 1 each 0    GAVILYTE-C 240-22.72-6.72 -5.84 gram SolR Take 4,000 mLs by mouth once.      timolol maleate 0.5% (TIMOPTIC) 0.5 % Drop Place 1 drop into both eyes every morning.      tirzepatide (MOUNJARO) 2.5 mg/0.5 mL PnIj Inject 2.5 mg under the skin once a week for 4 weeks then if you are tolerating the medication increase to the next dose of 5 mg weekly. 4 Pen 0    tirzepatide (MOUNJARO) 5 mg/0.5 mL  PnIj Inject 5 mg under the skin once a week (Patient not taking: Reported on 2/16/2024) 4 Pen 3    traZODone (DESYREL) 50 MG tablet TAKE 1/2 TABLET BY MOUTH TWICE A DAY (Patient not taking: Reported on 2/16/2024) 90 tablet 3    [DISCONTINUED] rosuvastatin (CRESTOR) 20 MG tablet Take 1 tablet (20 mg total) by mouth every evening. (Patient not taking: Reported on 2/16/2024) 90 tablet 3     No current facility-administered medications on file prior to visit.         Assessment:       1. Memory changes    2. Moderate episode of recurrent major depressive disorder        Plan:       Memory changes  Moderate episode of recurrent major depressive disorder   -  subjectively doing very well office statin and on Wellbutrin; no changes made   -  return to clinic p.r.n. or in 4 months for next routine annual.

## 2024-02-16 ENCOUNTER — OFFICE VISIT (OUTPATIENT)
Dept: INTERNAL MEDICINE | Facility: CLINIC | Age: 74
End: 2024-02-16
Payer: MEDICARE

## 2024-02-16 VITALS
OXYGEN SATURATION: 95 % | RESPIRATION RATE: 16 BRPM | WEIGHT: 192.69 LBS | HEIGHT: 69 IN | DIASTOLIC BLOOD PRESSURE: 72 MMHG | SYSTOLIC BLOOD PRESSURE: 108 MMHG | HEART RATE: 62 BPM | BODY MASS INDEX: 28.54 KG/M2 | TEMPERATURE: 98 F

## 2024-02-16 DIAGNOSIS — F33.1 MODERATE EPISODE OF RECURRENT MAJOR DEPRESSIVE DISORDER: ICD-10-CM

## 2024-02-16 DIAGNOSIS — R41.3 MEMORY CHANGES: Primary | ICD-10-CM

## 2024-02-16 PROCEDURE — 99214 OFFICE O/P EST MOD 30 MIN: CPT | Mod: S$PBB,,, | Performed by: STUDENT IN AN ORGANIZED HEALTH CARE EDUCATION/TRAINING PROGRAM

## 2024-02-16 PROCEDURE — 99215 OFFICE O/P EST HI 40 MIN: CPT | Mod: PBBFAC,PO | Performed by: STUDENT IN AN ORGANIZED HEALTH CARE EDUCATION/TRAINING PROGRAM

## 2024-02-16 PROCEDURE — 99999 PR PBB SHADOW E&M-EST. PATIENT-LVL V: CPT | Mod: PBBFAC,,, | Performed by: STUDENT IN AN ORGANIZED HEALTH CARE EDUCATION/TRAINING PROGRAM

## 2024-02-25 DIAGNOSIS — N13.8 BENIGN PROSTATIC HYPERPLASIA WITH URINARY OBSTRUCTION: ICD-10-CM

## 2024-02-25 DIAGNOSIS — N40.1 BENIGN PROSTATIC HYPERPLASIA WITH URINARY OBSTRUCTION: ICD-10-CM

## 2024-02-25 RX ORDER — ALFUZOSIN HYDROCHLORIDE 10 MG/1
10 TABLET, EXTENDED RELEASE ORAL NIGHTLY
Qty: 90 TABLET | Refills: 3 | Status: SHIPPED | OUTPATIENT
Start: 2024-02-25

## 2024-02-25 NOTE — TELEPHONE ENCOUNTER
Refill Decision Note   Den Rick  is requesting a refill authorization.  Brief Assessment and Rationale for Refill:  Approve     Medication Therapy Plan:         Comments:     Note composed:10:32 AM 02/25/2024             Appointments     Last Visit   2/16/2024 Michael Gamino MD   Next Visit   Visit date not found Michael Gamino MD

## 2024-02-25 NOTE — TELEPHONE ENCOUNTER
No care due was identified.  [facility] Embedded Care Due Messages. Reference number: [id number].   2/25/2024 7:01:08 AM CST

## 2024-02-27 DIAGNOSIS — Z00.00 ENCOUNTER FOR MEDICARE ANNUAL WELLNESS EXAM: ICD-10-CM

## 2024-02-29 ENCOUNTER — PATIENT MESSAGE (OUTPATIENT)
Dept: INTERNAL MEDICINE | Facility: CLINIC | Age: 74
End: 2024-02-29
Payer: MEDICARE

## 2024-03-01 RX ORDER — FLUTICASONE PROPIONATE 50 MCG
2 SPRAY, SUSPENSION (ML) NASAL DAILY
Qty: 9.9 ML | Refills: 11 | Status: SHIPPED | OUTPATIENT
Start: 2024-03-01

## 2024-03-01 NOTE — TELEPHONE ENCOUNTER
No care due was identified.  [facility] Embedded Care Due Messages. Reference number: [id number].   3/01/2024 8:08:54 AM CST

## 2024-04-21 ENCOUNTER — PATIENT MESSAGE (OUTPATIENT)
Dept: ENDOCRINOLOGY | Facility: CLINIC | Age: 74
End: 2024-04-21
Payer: MEDICARE

## 2024-04-22 RX ORDER — TIRZEPATIDE 5 MG/.5ML
INJECTION, SOLUTION SUBCUTANEOUS
Qty: 4 PEN | Refills: 3 | Status: SHIPPED | OUTPATIENT
Start: 2024-04-22

## 2024-04-29 ENCOUNTER — TELEPHONE (OUTPATIENT)
Dept: ADMINISTRATIVE | Facility: CLINIC | Age: 74
End: 2024-04-29
Payer: MEDICARE

## 2024-05-29 PROBLEM — E11.69 HYPERLIPIDEMIA ASSOCIATED WITH TYPE 2 DIABETES MELLITUS: Status: ACTIVE | Noted: 2024-05-29

## 2024-05-29 PROBLEM — E11.59 HYPERTENSION ASSOCIATED WITH TYPE 2 DIABETES MELLITUS: Status: ACTIVE | Noted: 2024-05-29

## 2024-05-29 PROBLEM — I15.2 HYPERTENSION ASSOCIATED WITH TYPE 2 DIABETES MELLITUS: Status: ACTIVE | Noted: 2024-05-29

## 2024-05-29 PROBLEM — E78.5 HYPERLIPIDEMIA ASSOCIATED WITH TYPE 2 DIABETES MELLITUS: Status: ACTIVE | Noted: 2024-05-29

## 2024-05-29 NOTE — PROGRESS NOTES
Den Rick presented for a  Medicare AWV and comprehensive Health Risk Assessment today. The following components were reviewed and updated:    Medical history  Family History  Social history  Allergies and Current Medications  Health Risk Assessment  Health Maintenance  Care Team         ** See Completed Assessments for Annual Wellness Visit within the encounter summary.**         The following assessments were completed:  Living Situation  CAGE  Depression Screening  Timed Get Up and Go  Whisper Test  Cognitive Function Screening    Nutrition Screening  ADL Screening  PAQ Screening      Opioid documentation for eAWV      Patient does not have a current opioid prescription.        Review for Substance Use Disorders: Patient does not use substance        Current Outpatient Medications:     alfuzosin (UROXATRAL) 10 mg Tb24, TAKE 1 TABLET BY MOUTH EVERY DAY IN THE EVENING, Disp: 90 tablet, Rfl: 3    aspirin (ECOTRIN) 81 MG EC tablet, Take 81 mg by mouth once daily., Disp: , Rfl:     bempedoic acid-ezetimibe 180-10 mg Tab, Take 1 tablet by mouth once daily., Disp: 90 tablet, Rfl: 3    betamethasone dipropionate 0.05 % cream, Apply topically 2 (two) times daily., Disp: , Rfl:     blood sugar diagnostic Strp, To check BG 4 times daily, to use with insurance preferred meter, Disp: 350 strip, Rfl: 3    blood-glucose meter kit, To check BG 4 times daily, to use with insurance preferred meter, Disp: 1 each, Rfl: 0    brinzolamide (AZOPT) 1 % ophthalmic suspension, Place 1 drop into both eyes 3 (three) times daily., Disp: , Rfl:     buPROPion (WELLBUTRIN XL) 150 MG TB24 tablet, Take 1 tablet (150 mg total) by mouth once daily., Disp: 90 tablet, Rfl: 3    celecoxib (CELEBREX) 200 MG capsule, Take 200 mg by mouth once daily. Daily and extra dose as needed, Disp: , Rfl:     DENTA 5000 PLUS 1.1 % Crea, Take by mouth once daily., Disp: , Rfl:     empagliflozin (JARDIANCE) 10 mg tablet, Take 1 tablet (10 mg total) by mouth  "once daily., Disp: 90 tablet, Rfl: 1    fesoterodine (TOVIAZ) 8 mg Tb24, Take 8 mg by mouth every evening., Disp: 90 tablet, Rfl: 3    fish oil-omega-3 fatty acids 300-1,000 mg capsule, Take 1 g by mouth once daily., Disp: , Rfl:     fluticasone propionate (FLONASE) 50 mcg/actuation nasal spray, 2 sprays (100 mcg total) by Each Nostril route once daily., Disp: 9.9 mL, Rfl: 11    FREESTYLE LANCETS 28 gauge lancets, TO CHECK BG 4 TIMES DAILY, TO USE WITH INSURANCE PREFERRED METER, Disp: 200 each, Rfl: 3    metFORMIN (GLUCOPHAGE-XR) 500 MG ER 24hr tablet, Take 4 tablets (2,000 mg total) by mouth Daily., Disp: 360 tablet, Rfl: 3    metoprolol succinate (TOPROL-XL) 50 MG 24 hr tablet, TAKE 1 TABLET BY MOUTH TWICE A DAY, Disp: 180 tablet, Rfl: 2    multivitamin (THERAGRAN) per tablet, Take 1 tablet by mouth Daily.  , Disp: , Rfl:     nitroGLYCERIN (NITROSTAT) 0.4 MG SL tablet, Place 1 tablet (0.4 mg total) under the tongue every 5 (five) minutes as needed for Chest pain., Disp: 100 tablet, Rfl: 3    pantoprazole (PROTONIX) 40 MG tablet, Take 1 tablet (40 mg total) by mouth once daily. (Patient taking differently: Take 40 mg by mouth as needed.), Disp: 30 tablet, Rfl: 1    tirzepatide (MOUNJARO) 5 mg/0.5 mL PnIj, Inject 5 mg under the skin once a week, Disp: 4 Pen, Rfl: 3    tretinoin (RETIN-A) 0.1 % cream, Apply topically nightly as needed., Disp: , Rfl:        Vitals:    05/30/24 1008   BP: 116/76   Pulse: 65   SpO2: 95%   Weight: 84 kg (185 lb 3 oz)   Height: 5' 9" (1.753 m)   PainSc: 0-No pain      Physical Exam  Vitals and nursing note reviewed.   Constitutional:       General: He is not in acute distress.     Appearance: Normal appearance. He is not ill-appearing.   HENT:      Head: Normocephalic and atraumatic.      Mouth/Throat:      Mouth: Mucous membranes are moist.   Eyes:      General: No scleral icterus.        Right eye: No discharge.         Left eye: No discharge.      Extraocular Movements: Extraocular " movements intact.      Conjunctiva/sclera: Conjunctivae normal.   Cardiovascular:      Rate and Rhythm: Normal rate.   Pulmonary:      Effort: Pulmonary effort is normal. No respiratory distress.   Musculoskeletal:      Cervical back: Normal range of motion.   Skin:     General: Skin is warm and dry.      Findings: No rash.   Neurological:      Mental Status: He is alert and oriented to person, place, and time.   Psychiatric:         Mood and Affect: Mood normal.         Behavior: Behavior normal. Behavior is cooperative.         Cognition and Memory: Cognition and memory normal.               Diagnoses and health risks identified today and associated recommendations/orders:    1. Encounter for preventive health examination  - Chart reviewed. Problem list updated. Discussed current medical diagnosis, current medications, medical/surgical/family/social history; updated provider list; documented vital signs; identified any cognitive impairment; and updated risk factor list. Addressed any outstanding health maintenance. Provided patient with personalized health advice. Continue to follow up with PCP and any specialists.     2. Atrial flutter, unspecified type  Chronic; stable on current treatment plan; follow up with PCP  - follow up with cardiology and electrophysiology     3. Ectatic thoracic aorta  Chronic; stable on current treatment plan; follow up with PCP  =- follow up with cardiology     4. Type 2 diabetes mellitus with hyperglycemia, without long-term current use of insulin  Chronic; stable on current treatment plan; follow up with PCP  - taking monjouro, metformin, jardiance    5. Hyperlipidemia associated with type 2 diabetes mellitus  Chronic; stable on current treatment plan; follow up with PCP  - taking bempedoic acid-ezetimibe    6. Hypertension associated with type 2 diabetes mellitus  Chronic; stable on current treatment plan; follow up with PCP  - taking metoprolol    7. ASD (atrial septal defect) -  small vs PFO  Chronic; stable on current treatment plan; follow up with PCP  - follow up with cardiology     8. Benign prostatic hyperplasia with urinary obstruction  Chronic; stable on current treatment plan; follow up with PCP    9. EVONNE (obstructive sleep apnea)  Chronic; stable on current treatment plan; follow up with PCP  - follow up with sleep medicine     10. Coronary artery disease involving native coronary artery of native heart without angina pectoris  Chronic; stable on current treatment plan; follow up with PCP  - follow up with cardiology  - taking bempedoic acid-ezetimibe    11. BMI 27.0-27.9,adult  - Recommendation for healthy diet and increasing exercise as tolerated with goal of 150min/week . Recommend weight loss        Provided Den with a 5-10 year written screening schedule and personal prevention plan. Recommendations were developed using the USPSTF age appropriate recommendations. Education, counseling, and referrals were provided as needed. After Visit Summary printed and given to patient which includes a list of additional screenings\tests needed.    Follow up in about 1 year (around 5/30/2025) for your next annual wellness visit.    Sandra Garzon, STEPHANIEP-C    Advance Care Planning     I offered to discuss advanced care planning, including how to pick a person who would make decisions for you if you were unable to make them for yourself, called a health care power of , and what kind of decisions you might make such as use of life sustaining treatments such as ventilators and tube feeding when faced with a life limiting illness recorded on a living will that they will need to know. (How you want to be cared for as you near the end of your natural life)     X  Patient has advanced directives written.

## 2024-05-30 ENCOUNTER — OFFICE VISIT (OUTPATIENT)
Dept: FAMILY MEDICINE | Facility: CLINIC | Age: 74
End: 2024-05-30
Payer: MEDICARE

## 2024-05-30 VITALS
HEART RATE: 65 BPM | SYSTOLIC BLOOD PRESSURE: 116 MMHG | HEIGHT: 69 IN | DIASTOLIC BLOOD PRESSURE: 76 MMHG | OXYGEN SATURATION: 95 % | BODY MASS INDEX: 27.43 KG/M2 | WEIGHT: 185.19 LBS

## 2024-05-30 DIAGNOSIS — Q21.10 ASD (ATRIAL SEPTAL DEFECT): ICD-10-CM

## 2024-05-30 DIAGNOSIS — Z00.00 ENCOUNTER FOR PREVENTIVE HEALTH EXAMINATION: Primary | ICD-10-CM

## 2024-05-30 DIAGNOSIS — E11.69 HYPERLIPIDEMIA ASSOCIATED WITH TYPE 2 DIABETES MELLITUS: ICD-10-CM

## 2024-05-30 DIAGNOSIS — I48.92 ATRIAL FLUTTER, UNSPECIFIED TYPE: ICD-10-CM

## 2024-05-30 DIAGNOSIS — N13.8 BENIGN PROSTATIC HYPERPLASIA WITH URINARY OBSTRUCTION: ICD-10-CM

## 2024-05-30 DIAGNOSIS — I15.2 HYPERTENSION ASSOCIATED WITH TYPE 2 DIABETES MELLITUS: ICD-10-CM

## 2024-05-30 DIAGNOSIS — N40.1 BENIGN PROSTATIC HYPERPLASIA WITH URINARY OBSTRUCTION: ICD-10-CM

## 2024-05-30 DIAGNOSIS — I77.810 ECTATIC THORACIC AORTA: ICD-10-CM

## 2024-05-30 DIAGNOSIS — E11.65 TYPE 2 DIABETES MELLITUS WITH HYPERGLYCEMIA, WITHOUT LONG-TERM CURRENT USE OF INSULIN: ICD-10-CM

## 2024-05-30 DIAGNOSIS — E78.5 HYPERLIPIDEMIA ASSOCIATED WITH TYPE 2 DIABETES MELLITUS: ICD-10-CM

## 2024-05-30 DIAGNOSIS — I25.10 CORONARY ARTERY DISEASE INVOLVING NATIVE CORONARY ARTERY OF NATIVE HEART WITHOUT ANGINA PECTORIS: Chronic | ICD-10-CM

## 2024-05-30 DIAGNOSIS — E11.59 HYPERTENSION ASSOCIATED WITH TYPE 2 DIABETES MELLITUS: ICD-10-CM

## 2024-05-30 DIAGNOSIS — G47.33 OSA (OBSTRUCTIVE SLEEP APNEA): ICD-10-CM

## 2024-05-30 PROCEDURE — 99999 PR PBB SHADOW E&M-EST. PATIENT-LVL IV: CPT | Mod: PBBFAC,,, | Performed by: NURSE PRACTITIONER

## 2024-05-30 PROCEDURE — G0439 PPPS, SUBSEQ VISIT: HCPCS | Mod: ,,, | Performed by: NURSE PRACTITIONER

## 2024-05-30 PROCEDURE — 99214 OFFICE O/P EST MOD 30 MIN: CPT | Mod: PBBFAC,PN | Performed by: NURSE PRACTITIONER

## 2024-05-30 NOTE — PATIENT INSTRUCTIONS
Counseling and Referral of Other Preventative  (Italic type indicates deductible and co-insurance are waived)    Patient Name: Den Rick  Today's Date: 5/30/2024    Health Maintenance       Date Due Completion Date    Eye Exam 08/04/2024 8/4/2023    High Dose Statin 06/07/2024 (Originally 3/2/1971) ---    COVID-19 Vaccine (6 - 2023-24 season) 06/07/2024 (Originally 9/1/2023) 10/4/2022    Foot Exam 06/07/2024 (Originally 12/29/2023) 12/29/2022 (Done)    Override on 12/29/2022: Done    TETANUS VACCINE 06/14/2024 (Originally 5/28/2024) 5/28/2014    RSV Vaccine (Age 60+ and Pregnant patients) (1 - 1-dose 60+ series) 06/14/2024 (Originally 3/2/2010) ---    Hemoglobin A1c 07/18/2024 1/18/2024    Lipid Panel 09/22/2024 9/22/2023    Diabetes Urine Screening 01/18/2025 1/18/2024    Aspirin/Antiplatelet Therapy 02/16/2025 2/16/2024    Colorectal Cancer Screening 11/01/2026 11/1/2023        No orders of the defined types were placed in this encounter.      The following information is provided to all patients.  This information is to help you find resources for any of the problems found today that may be affecting your health:                  Living healthy guide: www.[facility].[place].gov      Understanding Diabetes: www.diabetes.org      Eating healthy: www.cdc.gov/healthyweight      CDC home safety checklist: www.cdc.gov/steadi/patient.html      Agency on Aging: www.goea.[place].[facility]      Alcoholics anonymous (AA): www.aa.org      Physical Activity: www.yuki.nih.gov/mv5ivvk      Tobacco use: www.quitwithusla.org

## 2024-06-20 ENCOUNTER — OFFICE VISIT (OUTPATIENT)
Dept: ENDOCRINOLOGY | Facility: CLINIC | Age: 74
End: 2024-06-20
Payer: MEDICARE

## 2024-06-20 VITALS
WEIGHT: 183 LBS | SYSTOLIC BLOOD PRESSURE: 113 MMHG | HEART RATE: 70 BPM | BODY MASS INDEX: 27.02 KG/M2 | DIASTOLIC BLOOD PRESSURE: 76 MMHG

## 2024-06-20 DIAGNOSIS — E11.65 TYPE 2 DIABETES MELLITUS WITH HYPERGLYCEMIA, WITHOUT LONG-TERM CURRENT USE OF INSULIN: Primary | ICD-10-CM

## 2024-06-20 DIAGNOSIS — E78.00 HYPERCHOLESTEREMIA: Primary | Chronic | ICD-10-CM

## 2024-06-20 DIAGNOSIS — E11.65 TYPE 2 DIABETES MELLITUS WITH HYPERGLYCEMIA, WITHOUT LONG-TERM CURRENT USE OF INSULIN: ICD-10-CM

## 2024-06-20 PROCEDURE — 99999 PR PBB SHADOW E&M-EST. PATIENT-LVL IV: CPT | Mod: PBBFAC,,, | Performed by: INTERNAL MEDICINE

## 2024-06-20 PROCEDURE — 99214 OFFICE O/P EST MOD 30 MIN: CPT | Mod: S$PBB,,, | Performed by: INTERNAL MEDICINE

## 2024-06-20 PROCEDURE — 99214 OFFICE O/P EST MOD 30 MIN: CPT | Mod: PBBFAC | Performed by: INTERNAL MEDICINE

## 2024-06-20 RX ORDER — TIRZEPATIDE 5 MG/.5ML
INJECTION, SOLUTION SUBCUTANEOUS
Qty: 4 PEN | Refills: 6 | Status: SHIPPED | OUTPATIENT
Start: 2024-06-20

## 2024-06-20 NOTE — PATIENT INSTRUCTIONS
Continue current diabetes medications.    Call if you have any side effects from the medication:   Mounjaro:  Nausea, vomiting, diarrhea, constipation, decreased appetite, abdominal pain, pancreatitis, gastroparesis, dehydration and acute kidney injury.   Metformin:  Nausea, diarrhea  Jardiance:  Dehydration, increased urination, urinary tract infections, yeast infections, diabetic ketoacidosis.  Call if you have any foot infections.     Check blood sugars before meals and bedtime.   Call if blood sugars are frequently less than 70 or above 200.    Call if you need prescriptions for medications.  Carry glucose tablets or snacks with you at all times.     Follow-up in 6 months.

## 2024-06-20 NOTE — PROGRESS NOTES
ENDOCRINOLOGY CLINIC    Subjective:      Chief Complaint: Type 2 diabetes    HPI:   Den Rick is a 74 y.o. male who presents for follow-up of type 2 diabetes.  Patient was last seen in the clinic on 1/18/2024.    Diabetes Hx:  Diagnosed w/ DM:  12/2017 with an A1c of 11.9, during preoperative evaluation for rotator cuff surgery.   Patient had A1c of 7.1 in 07/2013, otherwise A1c was in the prediabetes range.    Complications:   Retinopathy: Denies   Last eye exam: : 08/04/2023, Dr. Noyola in [place].   Neuropathy: No. Seen podiatry for ingrown toenail.  Has onychomycosis on the right big toenail.    Last foot exam: : 12/29/2022  Nephropathy: Denies  Cardiovascular:  CAD status post PCI x 7  Gastroparesis: Denies  DKA/HHS: Denies    Severe Hypoglycemia: Denies  Hypoglycemia unawareness: Denies  Hypoglycemic episodes: None recently. Lowest was 83, carries glucose tablets.      Current meds:   Metformin XR 2000 mg at bedtime - denies any GI symptoms related to metformin use.  Jardiance 10 mg daily  Mounjaro 5 mg once a week, Had been off for a few months, restarted about 3.5 weeks back.  Had belching initially, symptoms better now.     Compliance with meds: Yes     Previous meds:  Insulin when he was initially diagnosed and during his COVID infection and prednisone use.      Home glucose checks:  Once a day in the morning - usually around .   Compliant: Yes    Diet/Exercise:   Takes 3 meals and 1-2 snacks a day.   Snacks on fruits.   Active at home - walks the dog about 1 mile a day.    Had lost 12 lbs in the last visit with diet and lifestyle modifications.   Has lost another 17 lbs since the last visit.     Diabetes education:   Few years back.     Last A1c:   Lab Results   Component Value Date    HGBA1C 7.4 (H) 01/18/2024    HGBA1C 7.9 (H) 10/09/2023    HGBA1C 6.7 (H) 06/09/2023     Microalbumin:   Lab Results   Component Value Date    LABMICR <5.0 01/18/2024    CREATRANDUR 53.0 01/18/2024     MICALBCREAT Unable to calculate 01/18/2024     Lab Results   Component Value Date    EGFRNORACEVR >60.0 09/22/2023    CREATININE 0.73 09/22/2023     Lipids:   Lab Results   Component Value Date    CHOL 110 (L) 09/22/2023    TRIG 88 09/22/2023    HDL 37 (L) 09/22/2023    LDLCALC 55.4 (L) 09/22/2023    CHOLHDL 33.6 09/22/2023     TSH:  Lab Results   Component Value Date    TSH 3.800 06/27/2023       Lab Results   Component Value Date    HWSVPJOL57 668 07/25/2019       Lab Results   Component Value Date    HGB 16.2 06/09/2023        Aspirin: Yes  Statins: , intolerance to atorvastatin/rosuvastatin. Follows up with cardiology.   ACEI/ARB: No    Fatty liver: Denies    HTN: Controlled on his current medications.    Denies history of pancreatitis or personal/FH of medullary thyroid cancer.  History recurrent UTI: Denies  Had an episode of UTI few years back after his abdominal surgery.  History of recurrent fungal infection: Denies    Polyuria: Denies  Polydipsia: Denies    FHx of DM: Yes, maternal grandmother  Heart disease: Yes, Father had valvular heart disease s/p replacement x 2, sudden death at 71 yo.     ROS: see HPI       Objective:     Physical Exam     /76 (BP Location: Left arm, Patient Position: Sitting, BP Method: Large (Automatic))   Pulse 70   Wt 83 kg (182 lb 15.7 oz)   BMI 27.02 kg/m²     Wt Readings from Last 3 Encounters:   06/20/24 83 kg (182 lb 15.7 oz)   05/30/24 84 kg (185 lb 3 oz)   02/16/24 87.4 kg (192 lb 10.9 oz)       Constitutional:  Pleasant,  in no acute distress.   HENT:   Head:    Normocephalic and atraumatic.   Eyes:    EOMI. No scleral icterus.   Cardiovascular:  Normal rate  Respiratory:   Effort normal   Neurological:  No tremor  Skin:    Skin is warm, dry  Extremity:  No edema    DIABETIC FOOT EXAM: 6/20/2024 - normal sensation to microfilament testing bilaterally.  No fissures, wounds, or ulcers.  Right big toe onychomycosis seen.    LABORATORY REVIEW:  See HPI for other labs  reviewed today      Chemistry        Component Value Date/Time     09/22/2023 0751    K 4.6 09/22/2023 0751     09/22/2023 0751    CO2 24 09/22/2023 0751    BUN 27 (H) 09/22/2023 0751    CREATININE 0.73 09/22/2023 0751     (H) 09/22/2023 0751        Component Value Date/Time    CALCIUM 8.9 09/22/2023 0751    ALKPHOS 66 09/22/2023 0751    AST 21 09/22/2023 0751    ALT 24 09/22/2023 0751    BILITOT 0.4 09/22/2023 0751    ESTGFRAFRICA >60.0 07/21/2022 0912    EGFRNONAA >60.0 07/21/2022 0912          Lab Results   Component Value Date    HGBA1C 7.4 (H) 01/18/2024    HGBA1C 7.9 (H) 10/09/2023    HGBA1C 6.7 (H) 06/09/2023     Other labs reviewed today in HPI    Assessment/Plan:     Problem List Items Addressed This Visit          Cardiac/Vascular    Hypercholesteremia - Primary (Chronic)       Continue statin therapy.  Monitor lipids.               Endocrine    Type 2 diabetes mellitus with hyperglycemia, without long-term current use of insulin       Last A1c was 74.  Repeat A1c.  Continue currently diabetes medications, tolerating his medications.  Call if he has any side effects.    Discussed goal A1c of 7%.  Call if blood sugars are persistently less than 70 or greater than 200.   Patient does not want a referral to the diabetes educator.    Discussed importance of diet and lifestyle modifications for diabetes management.   Has had recent weight loss.   Hypoglycemia management discussed. Carry glucose tablets or snacks at all times.     Complications:  Follow up for regular diabetes eye exam  Daily self examination of feet.  Microalbumin: Monitor.     Last A1c:   Lab Results   Component Value Date    HGBA1C 5.6 06/25/2024            Relevant Medications    tirzepatide (MOUNJARO) 5 mg/0.5 mL PnIj        Follow up in about 6 months (around 12/20/2024).       Diamond Sandhu MD

## 2024-06-24 ENCOUNTER — TELEPHONE (OUTPATIENT)
Dept: INTERNAL MEDICINE | Facility: CLINIC | Age: 74
End: 2024-06-24
Payer: MEDICARE

## 2024-06-24 DIAGNOSIS — E11.9 TYPE 2 DIABETES MELLITUS WITHOUT COMPLICATION, WITHOUT LONG-TERM CURRENT USE OF INSULIN: Primary | ICD-10-CM

## 2024-06-24 DIAGNOSIS — E55.9 VITAMIN D DEFICIENCY: ICD-10-CM

## 2024-06-24 DIAGNOSIS — I10 ESSENTIAL HYPERTENSION: ICD-10-CM

## 2024-06-24 NOTE — TELEPHONE ENCOUNTER
I put in yearly lab orders.    He had a protein drink at 3am.  I told him should wait till tomorrow.    Lab appt made for tomorrow.    I told him to give us a days notice next time.

## 2024-06-24 NOTE — TELEPHONE ENCOUNTER
----- Message from Evita Bell sent at 6/24/2024  9:29 AM CDT -----  Contact: [phone] Patient  type: Lab    Caller is requesting to schedule their Lab appointment prior to an appointment.    Order is not listed in EPIC.  Please enter order and contact patient to schedule.    Preferred Date and Time of Labs:06/24/2024  Please would like to do today at 11 AM    Date of Appointment:06/26/2024    Where would they like the lab performed?[facility]    Would the patient rather a call back or a response via [facility]? Call Back Please. Thank you

## 2024-06-25 NOTE — PROGRESS NOTES
Subjective:         Chief Complaint: Annual Exam    HPI  Den Rick is a 74-year-old man with small atrial septal defect (status post percutaneous foramina ovale closure), A flutter/tachycardia, coronary artery disease, hypertension, hyperlipidemia, status post NSTEMI, thoracic aortic aneurysm (mildly dilated per recent MAYNOR), BPH, overactive bladder, hypogonadism, well controlled type 2 diabetes, diverticulosis, GERD, ventral hernia, diffuse arthritis, gout, and obstructive sleep apnea presenting for annual physical:    Annual screening labs gathered in preparation for this appointment:  -vitamin-D, A1c, and TSH/T4 limits   -CBC: Trivial abnormalities   -lipid panel:  Extremely well controlled  -CMP:  Chronic/low-grade total bilirubin elevation only.        Family, social, surgical Hx reviewed     Health Maintenance:  Due to schedule diabetic eye exam, for diabetic foot exam, and for routine vaccinations    Past Medical History:   Diagnosis Date    Anticoagulant long-term use     Arthritis     Atrial tachycardia, paroxysmal 01/2012    during Cardiac Rehab    Colon polyp     Coronary artery disease     Diverticulitis     Diverticulosis     GERD (gastroesophageal reflux disease)     Glucose intolerance (impaired glucose tolerance) 01/24/2017    Hyperlipidemia     Hypertension     IFG (impaired fasting glucose) 12/21/2015    Myocardial infarction 11/05/2012    NSTEMI (non-ST elevated myocardial infarction) 10/11/2012    Obesity (BMI 30-39.9) 01/31/2018    Post PTCA     Sleep apnea      Past Surgical History:   Procedure Laterality Date    boewl resection      CATARACT EXTRACTION, BILATERAL  8/2011    COLONOSCOPY  2012    COLONOSCOPY N/A 10/10/2016    Procedure: COLONOSCOPY;  Surgeon: Main Lehman MD;  Location: [facility] ([address] [place] [address]);  Service: Endoscopy;  Laterality: N/A;    COLONOSCOPY N/A 11/1/2023    Procedure: COLONOSCOPY;  Surgeon: Rayo Pickard MD;  Location: [facility] ([address] [place] [address]);  Service: Colon  and Rectal;  Laterality: N/A;  poylps noted on procedure report 10/10/16    8/9 r/s, PEG, instr. to [id number] receipt of peg prep  10/25-lvm for precall-MS  10/26-precall complete-MS    COLONOSCOPY W/ POLYPECTOMY      CORONARY ANGIOPLASTY WITH STENT PLACEMENT      ESOPHAGOGASTRODUODENOSCOPY      HERNIA REPAIR      KNEE SURGERY  2003    needle bx on chest      SHOULDER SURGERY      SIGMOIDECTOMY       Family History   Problem Relation Name Age of Onset    Cancer Mother          uterine    Uterine cancer Mother      Alcohol abuse Father      Valvular heart disease Father      Hyperlipidemia Sister Radha     Hypertension Sister Radha     Diabetes Sister Radha     Asthma Sister Radha     Obesity Sister Radha     No Known Problems Sister Patrica     Hypertension Brother Amos     Stroke Brother Amos     No Known Problems Brother López     Diabetes Maternal Grandmother      Dementia Maternal Grandmother       Social History     Socioeconomic History    Marital status:    Occupational History    Occupation: enviromental regulator     Employer: [employer]   Tobacco Use    Smoking status: Never     Passive exposure: Never    Smokeless tobacco: Never    Tobacco comments:     smoked weed 27 yrs ago   Substance and Sexual Activity    Alcohol use: No     Alcohol/week: 0.0 standard drinks of alcohol     Comment: quit at 25 yrs old    Drug use: Not Currently     Types: Marijuana, Cocaine, Amphetamines     Comment: as younger 20s, quit since 1986    Sexual activity: Yes     Partners: Female     Social Determinants of Health     Financial Resource Strain: Low Risk  (5/30/2024)    Overall Financial Resource Strain (CARDIA)     Difficulty of Paying Living Expenses: Not hard at all   Food Insecurity: No Food Insecurity (5/30/2024)    Hunger Vital Sign     Worried About Running Out of Food in the Last Year: Never true     Ran Out of Food in the Last Year: Never true   Transportation Needs: No Transportation  Needs (5/30/2024)    PRAPARE - Transportation     Lack of Transportation (Medical): No     Lack of Transportation (Non-Medical): No   Physical Activity: Sufficiently Active (5/30/2024)    Exercise Vital Sign     Days of Exercise per Week: 6 days     Minutes of Exercise per Session: 30 min   Stress: No Stress Concern Present (5/30/2024)    [place] [employer] - Occupational Stress Questionnaire     Feeling of Stress : Not at all   Housing Stability: Low Risk  (5/30/2024)    Housing Stability Vital Sign     Unable to Pay for Housing in the Last Year: No     Homeless in the Last Year: No     Review of patient's allergies indicates:   Allergen Reactions    Atorvastatin      Other reaction(s):(lipitor) Muscle pain    Niacin      Other reaction(s): Flushing (skin)    Rosuvastatin Other (See Comments)     Brain fog     Mr. Den Rick had no medications administered during this visit.   Review of Systems   Constitutional:  Negative for appetite change, chills and fever.   HENT: Negative.     Respiratory:  Negative for cough, chest tightness and shortness of breath.    Cardiovascular:  Negative for chest pain, palpitations and leg swelling.   Gastrointestinal:  Negative for abdominal distention, abdominal pain, blood in stool, constipation, diarrhea, nausea and vomiting.   Endocrine: Negative.    Genitourinary:  Negative for difficulty urinating, dysuria, frequency and hematuria.   Musculoskeletal: Negative.    Integumentary:  Negative.   Neurological: Negative.    Psychiatric/Behavioral: Negative.           Objective:      Vitals:    06/26/24 1111   BP: 102/76   Pulse: 66   Resp: 18   Temp: 97.6 °F (36.4 °C)      Physical Exam  Vitals reviewed.   Constitutional:       General: He is not in acute distress.     Appearance: Normal appearance.   HENT:      Head: Normocephalic and atraumatic.      Comments: Facial features are symmetric      Nose: Nose normal. No congestion or rhinorrhea.       Mouth/Throat:      Mouth: Mucous membranes are moist.      Pharynx: Oropharynx is clear. No oropharyngeal exudate or posterior oropharyngeal erythema.   Eyes:      General: No scleral icterus.     Extraocular Movements: Extraocular movements intact.      Conjunctiva/sclera: Conjunctivae normal.   Cardiovascular:      Rate and Rhythm: Normal rate and regular rhythm.      Pulses: Normal pulses.      Heart sounds: Normal heart sounds.   Pulmonary:      Effort: Pulmonary effort is normal. No respiratory distress.      Breath sounds: Normal breath sounds.   Musculoskeletal:         General: No deformity or signs of injury. Normal range of motion.      Cervical back: Normal range of motion.      Comments: Gait normal    Skin:     General: Skin is warm and dry.      Findings: No rash.   Neurological:      General: No focal deficit present.      Mental Status: He is alert and oriented to person, place, and time. Mental status is at baseline.   Psychiatric:         Mood and Affect: Mood normal.         Behavior: Behavior normal.         Thought Content: Thought content normal.       Current Outpatient Medications on File Prior to Visit   Medication Sig Dispense Refill    alfuzosin (UROXATRAL) 10 mg Tb24 TAKE 1 TABLET BY MOUTH EVERY DAY IN THE EVENING 90 tablet 3    aspirin (ECOTRIN) 81 MG EC tablet Take 81 mg by mouth once daily.      bempedoic acid-ezetimibe 180-10 mg Tab Take 1 tablet by mouth once daily. 90 tablet 3    betamethasone dipropionate 0.05 % cream Apply topically 2 (two) times daily.      blood sugar diagnostic Strp To check BG 4 times daily, to use with insurance preferred meter 350 strip 3    brinzolamide (AZOPT) 1 % ophthalmic suspension Place 1 drop into both eyes 3 (three) times daily.      buPROPion (WELLBUTRIN XL) 150 MG TB24 tablet Take 1 tablet (150 mg total) by mouth once daily. 90 tablet 3    celecoxib (CELEBREX) 200 MG capsule Take 200 mg by mouth once daily. Daily and extra dose as needed       DENTA 5000 PLUS 1.1 % Crea Take by mouth once daily.      empagliflozin (JARDIANCE) 10 mg tablet Take 1 tablet (10 mg total) by mouth once daily. 90 tablet 1    fish oil-omega-3 fatty acids 300-1,000 mg capsule Take 1 g by mouth once daily.      fluticasone propionate (FLONASE) 50 mcg/actuation nasal spray 2 sprays (100 mcg total) by Each Nostril route once daily. 9.9 mL 11    FREESTYLE LANCETS 28 gauge lancets TO CHECK BG 4 TIMES DAILY, TO USE WITH INSURANCE PREFERRED METER 200 each 3    metFORMIN (GLUCOPHAGE-XR) 500 MG ER 24hr tablet Take 4 tablets (2,000 mg total) by mouth Daily. 360 tablet 3    metoprolol succinate (TOPROL-XL) 50 MG 24 hr tablet TAKE 1 TABLET BY MOUTH TWICE A  tablet 2    multivitamin (THERAGRAN) per tablet Take 1 tablet by mouth Daily.        nitroGLYCERIN (NITROSTAT) 0.4 MG SL tablet Place 1 tablet (0.4 mg total) under the tongue every 5 (five) minutes as needed for Chest pain. 100 tablet 3    pantoprazole (PROTONIX) 40 MG tablet Take 1 tablet (40 mg total) by mouth once daily. (Patient taking differently: Take 40 mg by mouth as needed.) 30 tablet 1    tirzepatide (MOUNJARO) 5 mg/0.5 mL PnIj Inject 5 mg under the skin once a week 4 Pen 6    tretinoin (RETIN-A) 0.1 % cream Apply topically nightly as needed.      blood-glucose meter kit To check BG 4 times daily, to use with insurance preferred meter 1 each 0    fesoterodine (TOVIAZ) 8 mg Tb24 Take 8 mg by mouth every evening. 90 tablet 3     No current facility-administered medications on file prior to visit.         Assessment:       1. Physical exam, annual        Plan:       Physical exam, annual   - annual screening labs discussed   - diabetic eye exam will be updated within the next 2 weeks; will set a reminder to ensure his ophthalmologist's office has sent us the appropriate records (my business card with fax number provided to assist in this)   - he will also be scheduling a follow-up with has been interest for combination  diabetic foot exam and to talk about onychomycosis shortly.     - vaccinations to be updated via pharmacy    Return to clinic in one year for annual exam or sooner if dictated by labs or illness.

## 2024-06-26 ENCOUNTER — OFFICE VISIT (OUTPATIENT)
Dept: INTERNAL MEDICINE | Facility: CLINIC | Age: 74
End: 2024-06-26
Payer: MEDICARE

## 2024-06-26 VITALS
TEMPERATURE: 98 F | HEART RATE: 66 BPM | DIASTOLIC BLOOD PRESSURE: 76 MMHG | OXYGEN SATURATION: 96 % | SYSTOLIC BLOOD PRESSURE: 102 MMHG | BODY MASS INDEX: 26.81 KG/M2 | HEIGHT: 69 IN | WEIGHT: 181 LBS | RESPIRATION RATE: 18 BRPM

## 2024-06-26 DIAGNOSIS — Z00.00 PHYSICAL EXAM, ANNUAL: Primary | ICD-10-CM

## 2024-06-26 PROCEDURE — 99999 PR PBB SHADOW E&M-EST. PATIENT-LVL V: CPT | Mod: PBBFAC,,, | Performed by: STUDENT IN AN ORGANIZED HEALTH CARE EDUCATION/TRAINING PROGRAM

## 2024-06-26 PROCEDURE — 99215 OFFICE O/P EST HI 40 MIN: CPT | Mod: PBBFAC,PO | Performed by: STUDENT IN AN ORGANIZED HEALTH CARE EDUCATION/TRAINING PROGRAM

## 2024-07-01 NOTE — ASSESSMENT & PLAN NOTE
Last A1c was 74.  Repeat A1c.  Continue currently diabetes medications, tolerating his medications.  Call if he has any side effects.    Discussed goal A1c of 7%.  Call if blood sugars are persistently less than 70 or greater than 200.   Patient does not want a referral to the diabetes educator.    Discussed importance of diet and lifestyle modifications for diabetes management.   Has had recent weight loss.   Hypoglycemia management discussed. Carry glucose tablets or snacks at all times.     Complications:  Follow up for regular diabetes eye exam  Daily self examination of feet.  Microalbumin: Monitor.     Last A1c:   Lab Results   Component Value Date    HGBA1C 5.6 06/25/2024

## 2024-07-09 DIAGNOSIS — E11.65 TYPE 2 DIABETES MELLITUS WITH HYPERGLYCEMIA, WITHOUT LONG-TERM CURRENT USE OF INSULIN: ICD-10-CM

## 2024-07-09 RX ORDER — FESOTERODINE FUMARATE 8 MG/1
8 TABLET, FILM COATED, EXTENDED RELEASE ORAL NIGHTLY
Qty: 90 TABLET | Refills: 3 | Status: SHIPPED | OUTPATIENT
Start: 2024-07-09 | End: 2025-07-09

## 2024-07-09 NOTE — TELEPHONE ENCOUNTER
No care due was identified.  [facility] Embedded Care Due Messages. Reference number: [id number].   7/09/2024 8:32:46 AM CDT

## 2024-07-21 RX ORDER — METOPROLOL SUCCINATE 50 MG/1
TABLET, EXTENDED RELEASE ORAL
Qty: 180 TABLET | Refills: 3 | Status: SHIPPED | OUTPATIENT
Start: 2024-07-21

## 2024-07-21 NOTE — TELEPHONE ENCOUNTER
No care due was identified.  [facility] Embedded Care Due Messages. Reference number: [id number].   7/21/2024 8:16:29 AM CDT

## 2024-07-21 NOTE — TELEPHONE ENCOUNTER
Refill Decision Note   Den Rick  is requesting a refill authorization.  Brief Assessment and Rationale for Refill:  Approve     Medication Therapy Plan:         Comments:     Note composed:1:01 PM 07/21/2024

## 2024-07-22 NOTE — TELEPHONE ENCOUNTER
Refill Decision Note   Den Rick  is requesting a refill authorization.  Brief Assessment and Rationale for Refill:  Approve     Medication Therapy Plan:         Alert overridden per protocol: Yes   Comments:     Note composed:9:57 AM 05/31/2023            
No care due was identified.  Health [facility] Embedded Care Due Messages. Reference number: [id number].   5/31/2023 12:37:50 AM CDT  
Male

## 2024-10-03 ENCOUNTER — HOSPITAL ENCOUNTER (OUTPATIENT)
Dept: CARDIOLOGY | Facility: HOSPITAL | Age: 74
Discharge: HOME | End: 2024-10-03
Attending: INTERNAL MEDICINE
Payer: MEDICARE

## 2024-10-03 VITALS
BODY MASS INDEX: 26.81 KG/M2 | DIASTOLIC BLOOD PRESSURE: 80 MMHG | HEIGHT: 69 IN | HEART RATE: 54 BPM | WEIGHT: 181 LBS | SYSTOLIC BLOOD PRESSURE: 115 MMHG

## 2024-10-03 DIAGNOSIS — I77.810 ECTATIC THORACIC AORTA: ICD-10-CM

## 2024-10-03 LAB
ASCENDING AORTA: 4.32 CM
AV INDEX (PROSTH): 0.63
AV MEAN GRADIENT: 2.8 MMHG
AV PEAK GRADIENT: 5.8 MMHG
AV VALVE AREA BY VELOCITY RATIO: 1.8 CM²
AV VALVE AREA: 2 CM²
AV VELOCITY RATIO: 0.58
BSA FOR ECHO PROCEDURE: 2 M2
CV ECHO LV RWT: 0.35 CM
DOP CALC AO PEAK VEL: 1.2 M/S
DOP CALC AO VTI: 24.6 CM
DOP CALC LVOT AREA: 3.1 CM2
DOP CALC LVOT DIAMETER: 2 CM
DOP CALC LVOT PEAK VEL: 0.7 M/S
DOP CALC LVOT STROKE VOLUME: 48.4 CM3
DOP CALCLVOT PEAK VEL VTI: 15.4 CM
E WAVE DECELERATION TIME: 357.63 MSEC
E/A RATIO: 0.55
E/E' RATIO: 10.57 M/S
ECHO LV POSTERIOR WALL: 0.8 CM (ref 0.6–1.1)
EJECTION FRACTION: 58 %
FRACTIONAL SHORTENING: 26.1 % (ref 28–44)
INTERVENTRICULAR SEPTUM: 0.8 CM (ref 0.6–1.1)
LA MAJOR: 6.03 CM
LA MINOR: 5.98 CM
LA WIDTH: 3.41 CM
LEFT ATRIUM SIZE: 4.14 CM
LEFT ATRIUM VOLUME INDEX MOD: 30.7 ML/M2
LEFT ATRIUM VOLUME INDEX: 36.4 ML/M2
LEFT ATRIUM VOLUME MOD: 60.71 ML
LEFT ATRIUM VOLUME: 72.06 CM3
LEFT INTERNAL DIMENSION IN SYSTOLE: 3.4 CM (ref 2.1–4)
LEFT VENTRICLE DIASTOLIC VOLUME INDEX: 49.95 ML/M2
LEFT VENTRICLE DIASTOLIC VOLUME: 98.91 ML
LEFT VENTRICLE MASS INDEX: 59.5 G/M2
LEFT VENTRICLE SYSTOLIC VOLUME INDEX: 23.8 ML/M2
LEFT VENTRICLE SYSTOLIC VOLUME: 47.03 ML
LEFT VENTRICULAR INTERNAL DIMENSION IN DIASTOLE: 4.6 CM (ref 3.5–6)
LEFT VENTRICULAR MASS: 117.9 G
LV LATERAL E/E' RATIO: 9.25 M/S
LV SEPTAL E/E' RATIO: 12.33 M/S
MV A" WAVE DURATION": 7.99 MSEC
MV PEAK A VEL: 0.67 M/S
MV PEAK E VEL: 0.37 M/S
MV STENOSIS PRESSURE HALF TIME: 103.71 MS
MV VALVE AREA P 1/2 METHOD: 2.12 CM2
OHS CV RV/LV RATIO: 0.63 CM
PISA TR MAX VEL: 2.38 M/S
PULM VEIN S/D RATIO: 1.4
PV PEAK D VEL: 0.25 M/S
PV PEAK S VEL: 0.35 M/S
RA MAJOR: 6.11 CM
RA PRESSURE ESTIMATED: 3 MMHG
RA WIDTH: 3.4 CM
RIGHT VENTRICLE DIASTOLIC BASEL DIMENSION: 2.9 CM
RV TB RVSP: 5 MMHG
SINUS: 4.02 CM
STJ: 3.42 CM
TDI LATERAL: 0.04 M/S
TDI SEPTAL: 0.03 M/S
TDI: 0.04 M/S
TR MAX PG: 23 MMHG
TRICUSPID ANNULAR PLANE SYSTOLIC EXCURSION: 2.82 CM
TV REST PULMONARY ARTERY PRESSURE: 26 MMHG
Z-SCORE OF LEFT VENTRICULAR DIMENSION IN END DIASTOLE: -2.17
Z-SCORE OF LEFT VENTRICULAR DIMENSION IN END SYSTOLE: -0.26

## 2024-10-03 PROCEDURE — 93306 TTE W/DOPPLER COMPLETE: CPT | Mod: PO

## 2024-10-03 PROCEDURE — 93306 TTE W/DOPPLER COMPLETE: CPT | Mod: 26,,, | Performed by: INTERNAL MEDICINE

## 2024-10-03 RX ORDER — BUPROPION HYDROCHLORIDE 150 MG/1
150 TABLET ORAL DAILY
Qty: 90 TABLET | Refills: 2 | Status: SHIPPED | OUTPATIENT
Start: 2024-10-03

## 2024-10-03 NOTE — TELEPHONE ENCOUNTER
No care due was identified.  [facility] Embedded Care Due Messages. Reference number: [id number].   10/03/2024 12:50:47 PM CDT

## 2024-10-04 NOTE — TELEPHONE ENCOUNTER
Refill Decision Note   Den Rick  is requesting a refill authorization.  Brief Assessment and Rationale for Refill:  Approve     Medication Therapy Plan:         Comments:     Note composed:11:10 PM 10/03/2024

## 2024-10-10 ENCOUNTER — OFFICE VISIT (OUTPATIENT)
Dept: CARDIOLOGY | Facility: CLINIC | Age: 74
End: 2024-10-10
Payer: MEDICARE

## 2024-10-10 VITALS
WEIGHT: 194.25 LBS | BODY MASS INDEX: 28.77 KG/M2 | HEIGHT: 69 IN | DIASTOLIC BLOOD PRESSURE: 61 MMHG | SYSTOLIC BLOOD PRESSURE: 122 MMHG | HEART RATE: 67 BPM

## 2024-10-10 DIAGNOSIS — Z87.74 S/P PERCUTANEOUS PATENT FORAMEN OVALE CLOSURE: ICD-10-CM

## 2024-10-10 DIAGNOSIS — I10 PRIMARY HYPERTENSION: ICD-10-CM

## 2024-10-10 DIAGNOSIS — Q21.12 PFO (PATENT FORAMEN OVALE): ICD-10-CM

## 2024-10-10 DIAGNOSIS — I77.89 ASCENDING AORTA ENLARGEMENT: ICD-10-CM

## 2024-10-10 DIAGNOSIS — Z98.61 S/P PTCA (PERCUTANEOUS TRANSLUMINAL CORONARY ANGIOPLASTY): Chronic | ICD-10-CM

## 2024-10-10 DIAGNOSIS — I25.10 CORONARY ARTERY DISEASE INVOLVING NATIVE CORONARY ARTERY OF NATIVE HEART WITHOUT ANGINA PECTORIS: Primary | Chronic | ICD-10-CM

## 2024-10-10 DIAGNOSIS — E78.00 HYPERCHOLESTEREMIA: Chronic | ICD-10-CM

## 2024-10-10 LAB
OHS QRS DURATION: 152 MS
OHS QTC CALCULATION: 444 MS

## 2024-10-10 PROCEDURE — 99214 OFFICE O/P EST MOD 30 MIN: CPT | Mod: PBBFAC,25,PO | Performed by: PHYSICIAN ASSISTANT

## 2024-10-10 PROCEDURE — 93005 ELECTROCARDIOGRAM TRACING: CPT | Mod: PBBFAC,PO | Performed by: INTERNAL MEDICINE

## 2024-10-10 PROCEDURE — 99999 PR PBB SHADOW E&M-EST. PATIENT-LVL IV: CPT | Mod: PBBFAC,,, | Performed by: PHYSICIAN ASSISTANT

## 2024-10-10 PROCEDURE — 93010 ELECTROCARDIOGRAM REPORT: CPT | Mod: S$PBB,,, | Performed by: INTERNAL MEDICINE

## 2024-10-10 PROCEDURE — 99214 OFFICE O/P EST MOD 30 MIN: CPT | Mod: S$PBB,,, | Performed by: PHYSICIAN ASSISTANT

## 2024-10-10 RX ORDER — PREDNISONE 20 MG/1
20 TABLET ORAL
COMMUNITY
Start: 2024-09-23

## 2024-10-10 RX ORDER — NITROGLYCERIN 0.4 MG/1
0.4 TABLET SUBLINGUAL EVERY 5 MIN PRN
Qty: 100 TABLET | Refills: 3 | Status: SHIPPED | OUTPATIENT
Start: 2024-10-10

## 2024-10-10 NOTE — PROGRESS NOTES
Cardiology Clinic Note  Reason for Visit: Annual visit, CAD  General Cardiologist: Dr. Barlow     HPI:     Problem List and HPI:   CAD   NSTEMI - OM1 YUE 10/11/12   YUE ostial and prox RCA 10/17/12   YUE mid LAD 1/18/07   YUE prox and mid PDA 1/18/07   PFO closure for platypnea orthodeoxia 1/2021  Atrial flutter ablation 11/2021  Hypercholesterolemia   (arthralgias with Lipitor)   HTN  GI bleed 2014  DM - onset 12/2017      Den Rick is a 74 y.o. M, who presents for routine follow up. He is doing well and has no specific cardiac complaints. He walks his dog for 45 min daily for exercise and feels well. An echo prior to his visit today's shows some progression of enlarged ascending aorta. His BP is well controlled. He has been intolerant to both rosuvastatin and atorvastatin. He is now on nexlizet and his LDL is 71.2.     Ascending aorta measurements:  10/2024: Sinus= 4.02 cm, STJ= 3.42 cm and AA= 4.32 cm  10/2021: Sinus= 3.99 cm, STJ= 3.18 cm and AA= 3.90 cm  10/2020: Sinus= 4.10 cm, STJ= 3.03 cm and AA= 3.80 cm      ROS:    Pertinent ROS included in HPI and below.  PMH:     Past Medical History:   Diagnosis Date    Anticoagulant long-term use     Arthritis     Atrial tachycardia, paroxysmal 01/2012    during Cardiac Rehab    Colon polyp     Coronary artery disease     Diverticulitis     Diverticulosis     GERD (gastroesophageal reflux disease)     Glucose intolerance (impaired glucose tolerance) 01/24/2017    Hyperlipidemia     Hypertension     IFG (impaired fasting glucose) 12/21/2015    Myocardial infarction 11/05/2012    NSTEMI (non-ST elevated myocardial infarction) 10/11/2012    Obesity (BMI 30-39.9) 01/31/2018    Post PTCA     Sleep apnea      Past Surgical History:   Procedure Laterality Date    boewl resection      CATARACT EXTRACTION, BILATERAL  8/2011    COLONOSCOPY  2012    COLONOSCOPY N/A 10/10/2016    Procedure: COLONOSCOPY;  Surgeon: Main Lehman MD;  Location: [facility] ([address] [place] [address]);   Service: Endoscopy;  Laterality: N/A;    COLONOSCOPY N/A 11/1/2023    Procedure: COLONOSCOPY;  Surgeon: Rayo Pickard MD;  Location: [facility] ([address] [place] [address]);  Service: Colon and Rectal;  Laterality: N/A;  poylps noted on procedure report 10/10/16    8/9 r/s, PEG, instr. to [id number] receipt of peg prep  10/25-lvm for precall-MS  10/26-precall complete-MS    COLONOSCOPY W/ POLYPECTOMY      CORONARY ANGIOPLASTY WITH STENT PLACEMENT      ESOPHAGOGASTRODUODENOSCOPY      HERNIA REPAIR      KNEE SURGERY  2003    needle bx on chest      SHOULDER SURGERY      SIGMOIDECTOMY       Allergies:     Review of patient's allergies indicates:   Allergen Reactions    Atorvastatin      Other reaction(s):(lipitor) Muscle pain    Niacin      Other reaction(s): Flushing (skin)    Rosuvastatin Other (See Comments)     Brain fog     Medications:     Current Outpatient Medications on File Prior to Visit   Medication Sig Dispense Refill    alfuzosin (UROXATRAL) 10 mg Tb24 TAKE 1 TABLET BY MOUTH EVERY DAY IN THE EVENING 90 tablet 3    aspirin (ECOTRIN) 81 MG EC tablet Take 81 mg by mouth once daily.      bempedoic acid-ezetimibe 180-10 mg Tab Take 1 tablet by mouth once daily.      betamethasone dipropionate 0.05 % cream Apply topically 2 (two) times daily.      blood sugar diagnostic Strp To check BG 4 times daily, to use with insurance preferred meter 350 strip 3    blood-glucose meter kit To check BG 4 times daily, to use with insurance preferred meter 1 each 0    brinzolamide (AZOPT) 1 % ophthalmic suspension Place 1 drop into both eyes 3 (three) times daily.      buPROPion (WELLBUTRIN XL) 150 MG TB24 tablet Take 1 tablet (150 mg total) by mouth once daily. 90 tablet 2    celecoxib (CELEBREX) 200 MG capsule Take 200 mg by mouth once daily. Daily and extra dose as needed      DENTA 5000 PLUS 1.1 % Crea Take by mouth as needed.      empagliflozin (JARDIANCE) 10 mg tablet Take 1 tablet (10 mg total) by mouth once daily. 90  tablet 2    fesoterodine (TOVIAZ) 8 mg Tb24 Take 8 mg by mouth every evening. 90 tablet 3    fish oil-omega-3 fatty acids 300-1,000 mg capsule Take 1 g by mouth once daily.      fluticasone propionate (FLONASE) 50 mcg/actuation nasal spray 2 sprays (100 mcg total) by Each Nostril route once daily. (Patient taking differently: 2 sprays by Each Nostril route as needed.) 9.9 mL 11    FREESTYLE LANCETS 28 gauge lancets TO CHECK BG 4 TIMES DAILY, TO USE WITH INSURANCE PREFERRED METER 200 each 3    metFORMIN (GLUCOPHAGE-XR) 500 MG ER 24hr tablet Take 4 tablets (2,000 mg total) by mouth Daily. 360 tablet 3    metoprolol succinate (TOPROL-XL) 50 MG 24 hr tablet TAKE 1 TABLET BY MOUTH TWICE A  tablet 3    multivitamin (THERAGRAN) per tablet Take 1 tablet by mouth Daily.        pantoprazole (PROTONIX) 40 MG tablet Take 1 tablet (40 mg total) by mouth once daily. 30 tablet 1    predniSONE (DELTASONE) 20 MG tablet Take 20 mg by mouth as needed.      tirzepatide (MOUNJARO) 5 mg/0.5 mL PnIj Inject 5 mg under the skin once a week 4 Pen 6    [DISCONTINUED] bempedoic acid-ezetimibe 180-10 mg Tab Take 1 tablet by mouth once daily. 90 tablet 3    [DISCONTINUED] nitroGLYCERIN (NITROSTAT) 0.4 MG SL tablet Place 1 tablet (0.4 mg total) under the tongue every 5 (five) minutes as needed for Chest pain. 100 tablet 3    [DISCONTINUED] tretinoin (RETIN-A) 0.1 % cream Apply topically nightly as needed.       No current facility-administered medications on file prior to visit.     Social History:     Social History     Tobacco Use    Smoking status: Never     Passive exposure: Never    Smokeless tobacco: Never    Tobacco comments:     smoked weed 27 yrs ago   Substance Use Topics    Alcohol use: No     Alcohol/week: 0.0 standard drinks of alcohol     Comment: quit at 25 yrs old     Family History:     Family History   Problem Relation Name Age of Onset    Cancer Mother          uterine    Uterine cancer Mother      Alcohol abuse Father    "   Valvular heart disease Father      Hyperlipidemia Sister Radha     Hypertension Sister Radha     Diabetes Sister Radha     Asthma Sister Radha     Obesity Sister Radha     No Known Problems Sister Patrica     Hypertension Brother Amos     Stroke Brother Amos     No Known Problems Brother López     Diabetes Maternal Grandmother      Dementia Maternal Grandmother       Physical Exam:   /61   Pulse 67   Ht 5' 9" (1.753 m)   Wt 88.1 kg (194 lb 3.6 oz)   BMI 28.68 kg/m²      Physical Exam  Vitals and nursing note reviewed.   Constitutional:       Appearance: Normal appearance.   HENT:      Head: Normocephalic and atraumatic.   Neck:      Vascular: No carotid bruit or JVD.   Cardiovascular:      Rate and Rhythm: Normal rate and regular rhythm.      Chest Wall: PMI is not displaced.      Pulses:           Radial pulses are 2+ on the right side and 2+ on the left side.        Dorsalis pedis pulses are 2+ on the right side and 2+ on the left side.        Posterior tibial pulses are 2+ on the right side and 2+ on the left side.      Heart sounds: No murmur heard.  Pulmonary:      Effort: Pulmonary effort is normal.      Breath sounds: Normal breath sounds. No wheezing, rhonchi or rales.   Abdominal:      General: Bowel sounds are normal. There is no abdominal bruit.      Palpations: Abdomen is soft. There is no pulsatile mass.      Tenderness: There is no abdominal tenderness.   Musculoskeletal:      Right lower leg: No edema.      Left lower leg: No edema.   Feet:      Right foot:      Skin integrity: Skin integrity normal.      Left foot:      Skin integrity: Skin integrity normal.   Skin:     Capillary Refill: Capillary refill takes less than 2 seconds.   Neurological:      General: No focal deficit present.      Mental Status: He is alert.   Psychiatric:         Mood and Affect: Mood and affect normal.         Speech: Speech normal.         Behavior: Behavior is cooperative.         Thought Content: " Thought content normal.          Labs:     Blood Tests:  Lab Results   Component Value Date    BNP 13 10/19/2020     (L) 10/03/2024    K 4.5 10/03/2024     10/03/2024    CO2 22 (L) 10/03/2024    BUN 28 (H) 10/03/2024    CREATININE 1.1 10/03/2024     (H) 10/03/2024    HGBA1C 5.6 06/25/2024    MG 2.0 11/12/2020    AST 17 10/03/2024    ALT 22 10/03/2024    ALBUMIN 3.6 10/03/2024    ALBUMIN 4.2 11/30/2016    PROT 6.2 10/03/2024    BILITOT 0.6 10/03/2024    WBC 4.82 06/25/2024    HGB 16.5 06/25/2024    HCT 51.9 06/25/2024    HCT 39 03/19/2014    MCV 85 06/25/2024     06/25/2024    INR 1.1 11/05/2021    TSH 3.082 06/25/2024       Lab Results   Component Value Date    CHOL 118 (L) 10/03/2024    HDL 33 (L) 10/03/2024    TRIG 69 10/03/2024       Lab Results   Component Value Date    LDLCALC 71.2 10/03/2024         Imaging:     Echocardiogram  TTE 10/3/2024    Left Ventricle: The left ventricle is normal in size. Ventricular mass is normal. Normal wall thickness. There is normal systolic function with a visually estimated ejection fraction of 55 - 60%. Ejection fraction is approximately 58%. There is normal diastolic function.    Right Ventricle: Normal right ventricular cavity size. Wall thickness is normal. Systolic function is normal.    Left Atrium: Left atrium is mildly dilated. Patent foramen ovale visualized with an occluder.    Right Atrium: Right atrium is dilated.    Aortic Valve: There is moderate aortic valve sclerosis. There is mild aortic regurgitation.    Mitral Valve: There is mild regurgitation.    Tricuspid Valve: There is mild regurgitation.    Aorta: Aortic root is mildly dilated measuring 4.02 cm. Ascending aorta is normal measuring 4.32 cm. Descending aorta is mildly to moderately dilated.    Pulmonary Artery: The estimated pulmonary artery systolic pressure is 26 mmHg.    IVC/SVC: Normal venous pressure at 3 mmHg.    Stress testing  None    Cath Lab  None    Other  None    EKG:    10/10/2024  Sinus rhythm with 1st degree A-V block   Right bundle branch block     Assessment:     1. Coronary artery disease involving native coronary artery of native heart without angina pectoris    2. S/P PTCA     3. Ascending aorta enlargement    4. Hypercholesteremia    5. Primary hypertension    6. PFO (patent foramen ovale)    7. S/P percutaneous patent foramen ovale closure        Plan:     Coronary artery disease involving native coronary artery of native heart without angina pectoris  S/P PTCA   Intolerant to statins, LDL is reasonably well-controlled on nexlizet alone  Continue aspirin    Ascending aorta enlargement  Recommend chest CT prior to follow up in one year  Continue BP control     Hypercholesteremia  Continue nexlizet and fish oil  Last fasting lipid panel drawn October 3, 2024, LDL 71.2    Primary hypertension  BP is well-controlled, continue current medications    PFO (patent foramen ovale)  S/P percutaneous patent foramen ovale closure      Signed:  Rupa Allen PA-C  Cardiology     10/10/2024 2:22 PM    Follow-up:     Future Appointments   Date Time Provider Department Center   12/19/2024 11:00 AM Diamond Sandhu MD [facility] [place]

## 2024-10-16 ENCOUNTER — OFFICE VISIT (OUTPATIENT)
Dept: INTERNAL MEDICINE | Facility: CLINIC | Age: 74
End: 2024-10-16
Payer: MEDICARE

## 2024-10-16 VITALS
HEART RATE: 74 BPM | SYSTOLIC BLOOD PRESSURE: 118 MMHG | OXYGEN SATURATION: 99 % | TEMPERATURE: 98 F | DIASTOLIC BLOOD PRESSURE: 66 MMHG

## 2024-10-16 DIAGNOSIS — M70.21 OLECRANON BURSITIS OF RIGHT ELBOW: ICD-10-CM

## 2024-10-16 DIAGNOSIS — M79.671 ACUTE PAIN OF RIGHT FOOT: ICD-10-CM

## 2024-10-16 DIAGNOSIS — M79.661 RIGHT CALF PAIN: Primary | ICD-10-CM

## 2024-10-16 PROCEDURE — 99214 OFFICE O/P EST MOD 30 MIN: CPT | Mod: PBBFAC,PO | Performed by: NURSE PRACTITIONER

## 2024-10-16 PROCEDURE — 99214 OFFICE O/P EST MOD 30 MIN: CPT | Mod: S$PBB,,, | Performed by: NURSE PRACTITIONER

## 2024-10-16 PROCEDURE — 99999 PR PBB SHADOW E&M-EST. PATIENT-LVL IV: CPT | Mod: PBBFAC,,, | Performed by: NURSE PRACTITIONER

## 2024-10-16 NOTE — PROGRESS NOTES
"Subjective:       Patient ID: Den Rick is a 74 y.o. male.    Chief Complaint: Leg Pain and Foot Pain    Leg Pain     Foot Pain    History of Present Illness    CHIEF COMPLAINT:  Patient presents today with right leg pain.    RIGHT LEG PAIN:  He reports right leg pain that began two days ago after getting up from the toilet. The pain is localized to the calf, ball of foot, and middle toe. Initially, the pain was described as stabbing and rated 2-3/10. Today, the pain is less noticeable but feels tight, particularly in the calf. The discomfort is more pronounced when lying in bed. He denies recent long periods of inactivity such as plane or car rides. He reports no exacerbation of pain while walking and currently rates the pain as 0/10 when seated. He expresses concern about the possibility of a blood clot.    RIGHT ELBOW SWELLING:  He presents with right elbow swelling for three days. The affected area is tender to touch. He denies any previous occurrence of this issue or history of trauma to the elbow.    MEDICAL HISTORY:  He has a history of six bulging discs in the low back, sciatica, arthritis, plantar fasciitis, diabetes, and hypertension. He experiences chronic low back pain, managed with lidocaine patches when bothersome. Sciatica can extend up to his neck if not properly managed through chiropractic care. He reports having arthritis "everywhere". He denies a history of DVTs.    FOOT PAIN:  He has a history of foot pain, particularly in the morning. Twenty years ago, he experienced significant difficulty walking upon waking, describing it as feeling like "an 80-year-old man" for the first 10-12 steps. This issue has improved with massage therapy and the use of orthopedic shoes, specifically Skechers with built-in supports. He currently experiences minimal foot pain, rating it as 2-3/10 when present. He notes some tightness and occasional cramping sensation in the foot, particularly noticeable when " "trying to fall asleep.    MEDICATIONS:  Current medications include Mounjaro (which tends to cause constipation), Metformin, Metoprolol, and 184 mg aspirin daily. He took four aspirin tablets yesterday due to limited supply and acknowledges the need to replenish his medication.    LIFESTYLE:  He maintains a large vegetable garden. Gardening activities, such as planting and moving around on hands and knees, usually help alleviate his back pain.    SOCIAL HISTORY:  He is a 74-year-old male who maintains a positive outlook, stating that he "can't complain at 74" and acknowledges that his situation "could be worse."      ROS:  Musculoskeletal: +joint pain, +joint swelling, +back pain, +muscle pain, +muscle cramps  Neurological: +difficulty walking       Objective:      Physical Exam  Vitals reviewed.   Constitutional:       Appearance: Normal appearance.   HENT:      Head: Normocephalic and atraumatic.      Right Ear: Tympanic membrane normal.      Left Ear: Tympanic membrane normal.      Nose: Nose normal.      Mouth/Throat:      Mouth: Mucous membranes are moist.   Eyes:      Pupils: Pupils are equal, round, and reactive to light.   Cardiovascular:      Rate and Rhythm: Normal rate and regular rhythm.      Heart sounds: Normal heart sounds.   Pulmonary:      Effort: Pulmonary effort is normal.      Breath sounds: Normal breath sounds.   Abdominal:      General: Bowel sounds are normal.      Palpations: Abdomen is soft.   Musculoskeletal:         General: Normal range of motion.        Arms:       Cervical back: Normal range of motion.        Feet:    Skin:     General: Skin is warm and dry.   Neurological:      General: No focal deficit present.      Mental Status: He is alert and oriented to person, place, and time.   Psychiatric:         Mood and Affect: Mood normal.         Behavior: Behavior normal.         Assessment:       1. Right calf pain  - CV Ultrasound doppler venous DVT leg right; Future    2. Acute pain " of right foot    3. Olecranon bursitis of right elbow      Plan:         Assessment & Plan    IMPRESSION:  Considered DVT as potential cause of right leg pain, but deemed unlikely given symptom improvement and lack of risk factors  Assessed right elbow swelling as probable early-stage bursitis  Recommend conservative management for elbow bursitis before considering more invasive interventions    BURSITIS:  - Explained bursitis as inflammation of the bursa in the elbow.  - Discussed potential progression of bursitis symptoms and when to seek emergency care.  - Patient to apply compression wrap to right elbow.  - Patient to use heat therapy on elbow with a barrier to protect tender skin.  - Started ibuprofen 400-600 mg before bed as needed for elbow pain.Take with food in order to avoid GI side effects.   - Follow up if elbow symptoms worsen or do not improve with conservative management.    RIGHT CALF PAIN:  - Ultrasound of right leg ordered to rule out DVT.  - Results of ultrasound to be sent via patient portal.    FOLLOW UP:  - Contact the office if ultrasound is negative to determine if follow-up visit is needed.     RTC PRN    This note was generated with the assistance of ambient listening technology. Verbal consent was obtained by the patient and accompanying visitor(s) for the recording of patient appointment to facilitate this note. I attest to having reviewed and edited the generated note for accuracy, though some syntax or spelling errors may persist. Please contact the author of this note for any clarification.

## 2024-12-19 ENCOUNTER — LAB VISIT (OUTPATIENT)
Dept: LAB | Facility: HOSPITAL | Age: 74
End: 2024-12-19
Attending: INTERNAL MEDICINE
Payer: MEDICARE

## 2024-12-19 ENCOUNTER — OFFICE VISIT (OUTPATIENT)
Dept: ENDOCRINOLOGY | Facility: CLINIC | Age: 74
End: 2024-12-19
Payer: MEDICARE

## 2024-12-19 VITALS
HEART RATE: 64 BPM | WEIGHT: 187.81 LBS | SYSTOLIC BLOOD PRESSURE: 127 MMHG | BODY MASS INDEX: 27.74 KG/M2 | DIASTOLIC BLOOD PRESSURE: 83 MMHG | OXYGEN SATURATION: 96 %

## 2024-12-19 DIAGNOSIS — E11.65 TYPE 2 DIABETES MELLITUS WITH HYPERGLYCEMIA, WITHOUT LONG-TERM CURRENT USE OF INSULIN: Primary | ICD-10-CM

## 2024-12-19 DIAGNOSIS — E11.65 TYPE 2 DIABETES MELLITUS WITH HYPERGLYCEMIA, WITHOUT LONG-TERM CURRENT USE OF INSULIN: ICD-10-CM

## 2024-12-19 DIAGNOSIS — E78.00 HYPERCHOLESTEREMIA: Chronic | ICD-10-CM

## 2024-12-19 LAB
ALBUMIN/CREAT UR: 25.9 UG/MG (ref 0–30)
CREAT UR-MCNC: 85 MG/DL (ref 23–375)
ESTIMATED AVG GLUCOSE: 126 MG/DL (ref 68–131)
HBA1C MFR BLD: 6 % (ref 4–5.6)
MICROALBUMIN UR DL<=1MG/L-MCNC: 22 UG/ML

## 2024-12-19 PROCEDURE — 99214 OFFICE O/P EST MOD 30 MIN: CPT | Mod: PBBFAC | Performed by: INTERNAL MEDICINE

## 2024-12-19 PROCEDURE — 36415 COLL VENOUS BLD VENIPUNCTURE: CPT | Performed by: INTERNAL MEDICINE

## 2024-12-19 PROCEDURE — 99214 OFFICE O/P EST MOD 30 MIN: CPT | Mod: S$PBB,,, | Performed by: INTERNAL MEDICINE

## 2024-12-19 PROCEDURE — 82570 ASSAY OF URINE CREATININE: CPT | Performed by: INTERNAL MEDICINE

## 2024-12-19 PROCEDURE — G2211 COMPLEX E/M VISIT ADD ON: HCPCS | Mod: S$PBB,,, | Performed by: INTERNAL MEDICINE

## 2024-12-19 PROCEDURE — 83036 HEMOGLOBIN GLYCOSYLATED A1C: CPT | Performed by: INTERNAL MEDICINE

## 2024-12-19 PROCEDURE — 99999 PR PBB SHADOW E&M-EST. PATIENT-LVL IV: CPT | Mod: PBBFAC,,, | Performed by: INTERNAL MEDICINE

## 2024-12-19 RX ORDER — TIRZEPATIDE 5 MG/.5ML
INJECTION, SOLUTION SUBCUTANEOUS
Qty: 4 PEN | Refills: 6 | Status: SHIPPED | OUTPATIENT
Start: 2024-12-19

## 2024-12-19 RX ORDER — INSULIN PUMP SYRINGE, 3 ML
EACH MISCELLANEOUS
Qty: 1 EACH | Refills: 0 | Status: SHIPPED | OUTPATIENT
Start: 2024-12-19 | End: 2028-11-18

## 2024-12-19 RX ORDER — LANCETS
EACH MISCELLANEOUS
Qty: 200 EACH | Refills: 4 | Status: SHIPPED | OUTPATIENT
Start: 2024-12-19

## 2024-12-19 RX ORDER — METFORMIN HYDROCHLORIDE 500 MG/1
2000 TABLET, EXTENDED RELEASE ORAL DAILY
Qty: 360 TABLET | Refills: 3 | Status: SHIPPED | OUTPATIENT
Start: 2024-12-19 | End: 2025-12-19

## 2024-12-19 NOTE — PATIENT INSTRUCTIONS
Continue current diabetes medications.    Keep well hydrated.     Call if you have any side effects from the medication:   Mounjaro:  Nausea, vomiting, diarrhea, constipation, decreased appetite, abdominal pain, pancreatitis, gastroparesis, worsening of retinopathy, dehydration and acute kidney injury. Avoid this medication if you have any history of pancreatitis or have personal or family history of medullary thyroid cancer.     Jardiance:  Dehydration, increased urination, urinary tract infections, yeast infections, diabetic ketoacidosis.  Call if you have any foot infections.     Check blood sugars before meals and bedtime.   Call if blood sugars are frequently less than 70 or above 200.    Call if you need prescriptions for medications.  Carry glucose tablets or snacks with you at all times.     Follow-up in 6  months.

## 2024-12-19 NOTE — PROGRESS NOTES
ENDOCRINOLOGY CLINIC    Subjective:      Chief Complaint: Type 2 diabetes    HPI:   Den Rick is a 74 y.o. male who presents for follow-up of type 2 diabetes.  Patient was last seen in the clinic on 06/20/2024.    Diabetes Hx:  Diagnosed w/ DM:  12/2017 with an A1c of 11.9, during preoperative evaluation for rotator cuff surgery.   Patient had A1c of 7.1 in 07/2013, otherwise A1c had been in the prediabetes range.    Complications:   Retinopathy: Denies   Last eye exam: : 09/03/2024, Dr. Noyola in [place].   Neuropathy: No. Seen podiatry for ingrown toenail.  Has onychomycosis on the right big toenail.    Last foot exam: : 12/29/2022  Nephropathy: Denies  Cardiovascular:  CAD status post PCI x 7  Gastroparesis: Denies  DKA/HHS: Denies    Severe Hypoglycemia: Denies  Hypoglycemia unawareness: Denies  Hypoglycemic episodes: None recently. Lowest was in the 80s, carries glucose tablets.      Current meds:   Metformin XR 2000 mg at bedtime - denies any GI symptoms related to metformin use.  Jardiance 10 mg daily  Mounjaro 5 mg once a week. Still has belching on and off, takes OTC medications.     Compliance with meds: Yes     Previous meds:  Insulin when he was initially diagnosed and during his COVID infection and prednisone use.      Home glucose checks:  Once a day in the morning - usually around .   Compliant: Yes    Diet/Exercise:   Takes 3 meals and 1-2 snacks a day.   Snacks on fruits.   Active at home - walks the dog about 1 mile a day.    Had lost 12 lbs with diet and lifestyle modifications.   Lost 15 lbs since starting the Mounjaro.      Diabetes education:   Few years back.     Last A1c:   Lab Results   Component Value Date    HGBA1C 5.6 06/25/2024    HGBA1C 7.4 (H) 01/18/2024    HGBA1C 7.9 (H) 10/09/2023     Microalbumin:   Lab Results   Component Value Date    LABMICR <5.0 01/18/2024    CREATRANDUR 53.0 01/18/2024    MICALBCREAT Unable to calculate 01/18/2024     Lab Results   Component Value  Date    EGFRNORACEVR >60.0 10/03/2024    CREATININE 1.1 10/03/2024     Lipids:   Lab Results   Component Value Date    CHOL 118 (L) 10/03/2024    TRIG 69 10/03/2024    HDL 33 (L) 10/03/2024    LDLCALC 71.2 10/03/2024    CHOLHDL 28.0 10/03/2024     TSH:  Lab Results   Component Value Date    TSH 3.082 06/25/2024       Lab Results   Component Value Date    GLUNQKQU72 668 07/25/2019       Lab Results   Component Value Date    HGB 16.5 06/25/2024        Aspirin: Yes  Statins: , intolerance to atorvastatin/rosuvastatin. Follows up with cardiology. On nexlizet.   ACEI/ARB: No    Fatty liver: Denies    HTN: Controlled on his current medications.    Denies history of pancreatitis or personal/FH of medullary thyroid cancer.  History recurrent UTI: Denies  Had an episode of UTI few years back after his abdominal surgery.  History of recurrent fungal infection: Denies    Polyuria: Denies  Polydipsia: Denies    FHx of DM: Yes, maternal grandmother  Heart disease: Yes, Father had valvular heart disease s/p replacement x 2, sudden death at 69 yo.     ROS: see HPI       Objective:     Physical Exam     /83 (BP Location: Right arm, Patient Position: Sitting)   Pulse 64   Wt 85.2 kg (187 lb 13.3 oz)   SpO2 96%   BMI 27.74 kg/m²     Wt Readings from Last 3 Encounters:   12/19/24 85.2 kg (187 lb 13.3 oz)   10/10/24 88.1 kg (194 lb 3.6 oz)   10/03/24 82.1 kg (181 lb)       Constitutional:  Pleasant,  in no acute distress.   HENT:   Head:    Normocephalic and atraumatic.   Eyes:    EOMI. No scleral icterus.   Cardiovascular:  Normal rate  Respiratory:   Effort normal   Neurological:  No tremor  Skin:    Skin is warm, dry  Extremity:  No edema    DIABETIC FOOT EXAM: 12/19/2024 - normal sensation to microfilament testing bilaterally.  No fissures, wounds, or ulcers.  Right big toe onychomycosis seen.    LABORATORY REVIEW:  See HPI for other labs reviewed today      Chemistry        Component Value Date/Time     (L)  10/03/2024 1128    K 4.5 10/03/2024 1128     10/03/2024 1128    CO2 22 (L) 10/03/2024 1128    BUN 28 (H) 10/03/2024 1128    CREATININE 1.1 10/03/2024 1128     (H) 10/03/2024 1128        Component Value Date/Time    CALCIUM 9.3 10/03/2024 1128    ALKPHOS 52 (L) 10/03/2024 1128    AST 17 10/03/2024 1128    ALT 22 10/03/2024 1128    BILITOT 0.6 10/03/2024 1128    ESTGFRAFRICA >60.0 07/21/2022 0912    EGFRNONAA >60.0 07/21/2022 0912          Lab Results   Component Value Date    HGBA1C 5.6 06/25/2024    HGBA1C 7.4 (H) 01/18/2024    HGBA1C 7.9 (H) 10/09/2023     Other labs reviewed today in HPI    Assessment/Plan:     1. Type 2 diabetes mellitus with hyperglycemia, without long-term current use of insulin  Assessment & Plan:    Last A1c was 5.6.  Repeat A1c.  Continue currently diabetes medications, call if he has worsening of symptoms on the Mounjaro.   Keep well hydrated.     Call if blood sugars are persistently less than 70 or greater than 200.   Patient does not want a referral to the diabetes educator.    Discussed importance of diet and lifestyle modifications for diabetes management.   Hypoglycemia management discussed. Carry glucose tablets or snacks at all times.     Complications:  Follow up for regular diabetes eye exam  Daily self examination of feet.  Microalbumin: Monitor.     Last A1c:   Lab Results   Component Value Date    HGBA1C 5.6 06/25/2024       Orders:  -     metFORMIN (GLUCOPHAGE-XR) 500 MG ER 24hr tablet; Take 4 tablets (2,000 mg total) by mouth Daily.  Dispense: 360 tablet; Refill: 3  -     tirzepatide (MOUNJARO) 5 mg/0.5 mL PnIj; Inject 5 mg under the skin once a week  Dispense: 4 Pen; Refill: 6  -     blood-glucose meter kit; To check BG 1-2 times daily, to use with insurance preferred meter  Dispense: 1 each; Refill: 0  -     blood sugar diagnostic Strp; To check BG 1-2  times daily, to use with insurance preferred meter  Dispense: 200 strip; Refill: 4  -     lancets Misc; Check  1-2 times a day.  Dispense: 200 each; Refill: 4    2. Hypercholesteremia  Assessment & Plan:    Intolerance to statins.   Follows up with cardiology. On nexlizet.              Follow up in about 6 months (around 6/19/2025).       Diamond Sandhu MD

## 2024-12-19 NOTE — ASSESSMENT & PLAN NOTE
Last A1c was 5.6.  Repeat A1c.  Continue currently diabetes medications, call if he has worsening of symptoms on the Mounjaro.   Keep well hydrated.     Call if blood sugars are persistently less than 70 or greater than 200.   Patient does not want a referral to the diabetes educator.    Discussed importance of diet and lifestyle modifications for diabetes management.   Hypoglycemia management discussed. Carry glucose tablets or snacks at all times.     Complications:  Follow up for regular diabetes eye exam  Daily self examination of feet.  Microalbumin: Monitor.     Last A1c:   Lab Results   Component Value Date    HGBA1C 5.6 06/25/2024

## 2024-12-23 DIAGNOSIS — N40.1 BENIGN PROSTATIC HYPERPLASIA WITH URINARY OBSTRUCTION: ICD-10-CM

## 2024-12-23 DIAGNOSIS — N13.8 BENIGN PROSTATIC HYPERPLASIA WITH URINARY OBSTRUCTION: ICD-10-CM

## 2024-12-23 RX ORDER — ALFUZOSIN HYDROCHLORIDE 10 MG/1
10 TABLET, EXTENDED RELEASE ORAL NIGHTLY
Qty: 90 TABLET | Refills: 1 | Status: SHIPPED | OUTPATIENT
Start: 2024-12-23

## 2024-12-23 RX ORDER — BEMPEDOIC ACID AND EZETIMIBE 180; 10 MG/1; MG/1
1 TABLET, FILM COATED ORAL
Qty: 90 TABLET | Refills: 2 | Status: SHIPPED | OUTPATIENT
Start: 2024-12-23

## 2024-12-23 NOTE — TELEPHONE ENCOUNTER
No care due was identified.  [facility] Embedded Care Due Messages. Reference number: [id number].   12/23/2024 10:43:36 AM CST

## 2024-12-23 NOTE — TELEPHONE ENCOUNTER
Refill Decision Note   Den Rick  is requesting a refill authorization.  Brief Assessment and Rationale for Refill:  Approve     Medication Therapy Plan:        Comments:     Note composed:12:20 PM 12/23/2024

## 2025-02-25 DIAGNOSIS — E11.65 TYPE 2 DIABETES MELLITUS WITH HYPERGLYCEMIA, WITHOUT LONG-TERM CURRENT USE OF INSULIN: ICD-10-CM

## 2025-02-26 RX ORDER — EMPAGLIFLOZIN 10 MG/1
10 TABLET, FILM COATED ORAL
Qty: 90 TABLET | Refills: 2 | Status: SHIPPED | OUTPATIENT
Start: 2025-02-26

## 2025-03-09 DIAGNOSIS — E11.65 TYPE 2 DIABETES MELLITUS WITH HYPERGLYCEMIA, WITHOUT LONG-TERM CURRENT USE OF INSULIN: ICD-10-CM

## 2025-03-10 DIAGNOSIS — E11.65 TYPE 2 DIABETES MELLITUS WITH HYPERGLYCEMIA, WITHOUT LONG-TERM CURRENT USE OF INSULIN: ICD-10-CM

## 2025-03-10 RX ORDER — BLOOD SUGAR DIAGNOSTIC
STRIP MISCELLANEOUS
Qty: 200 STRIP | Refills: 4 | Status: SHIPPED | OUTPATIENT
Start: 2025-03-10 | End: 2025-03-10

## 2025-03-10 RX ORDER — LANCETS
EACH MISCELLANEOUS
Qty: 200 EACH | Refills: 4 | Status: SHIPPED | OUTPATIENT
Start: 2025-03-10

## 2025-03-31 DIAGNOSIS — E11.65 TYPE 2 DIABETES MELLITUS WITH HYPERGLYCEMIA, WITHOUT LONG-TERM CURRENT USE OF INSULIN: ICD-10-CM

## 2025-03-31 RX ORDER — LANCETS
EACH MISCELLANEOUS
Qty: 200 EACH | Refills: 4 | Status: SHIPPED | OUTPATIENT
Start: 2025-03-31

## 2025-04-04 ENCOUNTER — PATIENT MESSAGE (OUTPATIENT)
Dept: ENDOCRINOLOGY | Facility: CLINIC | Age: 75
End: 2025-04-04
Payer: MEDICARE

## 2025-04-07 ENCOUNTER — TELEPHONE (OUTPATIENT)
Dept: ENDOCRINOLOGY | Facility: CLINIC | Age: 75
End: 2025-04-07
Payer: MEDICARE

## 2025-04-07 NOTE — TELEPHONE ENCOUNTER
----- Message from Diamond Sandhu MD sent at 4/7/2025  2:41 PM CDT -----  Regarding: RE: Refill Request/Blood Sugar Meter  Contact: [phone]  Prescription sent to his pharmacy.  ----- Message -----  From: Rodney Leonard MA  Sent: 4/7/2025   2:30 PM CDT  To: Diamond PARHAM Rai, MD  Subject: FW: Refill Request/Blood Sugar Meter             Pt needs a new glucose meter. Thank you!  ----- Message -----  From: Sumaya Lea  Sent: 4/7/2025  11:35 AM CDT  To: Edson PARHAM Staff  Subject: Refill Request/Blood Sugar Meter                 Refill Request/Blood Sugar MeterIs this a new Rx Name and Strength:ACCU-CHEK Guide MeterPreferred Pharmacy with phone number: [id number] - [place] - [address] [facility] [address]Phqhw: [phone] Fax: [phone] Preference: [phone] Information:  Pt needs a new meterPlease Call to Advise,Thank you.

## 2025-04-17 NOTE — PROGRESS NOTES
ENDOCRINOLOGY CLINIC    Subjective:      Chief Complaint: Type 2 diabetes    HPI:   Den Rick is a 75 y.o. male who presents for follow-up of type 2 diabetes.  Patient was last seen in the clinic on 12/19/2024.    Diabetes Hx:  Diagnosed w/ DM:  12/2017 with an A1c of 11.9, during preoperative evaluation for rotator cuff surgery.   Patient had A1c of 7.1 in 07/2013, otherwise A1c had been in the prediabetes range.    Complications:   Retinopathy: Denies   Last eye exam: : 09/03/2024, Dr. Noyola in [place].   Neuropathy: No. Seen podiatry for ingrown toenail.  Has onychomycosis on the right big toenail.    Last foot exam: : 12/29/2022  Nephropathy: Denies  Cardiovascular:  CAD status post PCI x 7  Gastroparesis: Denies  DKA/HHS: Denies    Severe Hypoglycemia: Denies  Hypoglycemia unawareness: Denies  Hypoglycemic episodes: None recently. Carries glucose tablets.      Current meds:   Metformin XR 2000 mg at bedtime - denies any GI symptoms related to metformin use.  Jardiance 10 mg daily  Mounjaro 5 mg once a week.     Patient says he stopped Mounjaro for a month due to belching/bloating and restarted 1 week back. Tolerated the dose.   Taking pepcid and PPI as needed.     Compliance with meds: Yes     Previous meds:  Insulin when he was initially diagnosed and during his COVID infection and prednisone use.      Home glucose checks:  Once a day in the morning - usually around 120-130s.   Compliant: Yes    Diet/Exercise:   Takes 3 meals and 1-2 snacks a day.   Snacks on fruits.   Active at home - walks the dog about 1 mile a day.  Drinks 70 oz water daily.     Had lost 12 lbs with diet and lifestyle modifications.   Lost 15 lbs since starting the Mounjaro.  Gained back a few lb since his last visit.    Diabetes education:   Few years back.     Last A1c:   Lab Results   Component Value Date    HGBA1C 6.2 (H) 04/17/2025    HGBA1C 6.0 (H) 12/19/2024    HGBA1C 5.6 06/25/2024     Microalbumin:   Lab Results    Component Value Date    LABMICR 22.0 12/19/2024    CREATRANDUR 85.0 12/19/2024    MICALBCREAT 25.9 12/19/2024     Lab Results   Component Value Date    EGFRNORACEVR >60 04/17/2025    CREATININE 1.1 04/17/2025     Lipids:   Lab Results   Component Value Date    CHOL 118 (L) 10/03/2024    TRIG 69 10/03/2024    HDL 33 (L) 10/03/2024    LDLCALC 71.2 10/03/2024    CHOLHDL 28.0 10/03/2024     TSH:  Lab Results   Component Value Date    TSH 3.082 06/25/2024       Lab Results   Component Value Date    DRZIXMUI02 668 07/25/2019       Lab Results   Component Value Date    HGB 16.5 06/25/2024        Aspirin: Yes  Statins: , intolerance to atorvastatin/rosuvastatin. Follows up with cardiology. On nexlizet.   ACEI/ARB: No    Fatty liver: Denies    HTN: Controlled on his current medications.    Denies history of pancreatitis or personal/FH of medullary thyroid cancer.  History recurrent UTI: Denies. Had an episode of UTI few years back after his abdominal surgery.    Polyuria/Polydipsia: Denies    FHx of DM: Yes, maternal grandmother  Heart disease: Yes, Father had valvular heart disease s/p replacement x 2, sudden death at 71 yo.     ROS: see HPI       Objective:     Physical Exam     BP (!) 145/88 (BP Location: Left arm, Patient Position: Sitting)   Pulse (!) 53   Wt 87.5 kg (192 lb 14.4 oz)   SpO2 96%   BMI 28.49 kg/m²     Wt Readings from Last 3 Encounters:   04/21/25 87.5 kg (192 lb 14.4 oz)   12/19/24 85.2 kg (187 lb 13.3 oz)   10/10/24 88.1 kg (194 lb 3.6 oz)       Constitutional:  Pleasant,  in no acute distress.   HENT:   Eyes:    No scleral icterus.   Cardiovascular:  Normal rate  Respiratory:   Effort normal   Neurological:  No tremor  Skin:    Skin is warm, dry  Extremity:  No edema    DIABETIC FOOT EXAM: 4/21/2025 - normal sensation to microfilament testing bilaterally.  No fissures, wounds, or ulcers.  Right big toe onychomycosis seen.    LABORATORY REVIEW:  See HPI for other labs reviewed today      Chemistry         Component Value Date/Time     04/17/2025 1014     (L) 10/03/2024 1128    K 4.4 04/17/2025 1014    K 4.5 10/03/2024 1128     04/17/2025 1014     10/03/2024 1128    CO2 24 04/17/2025 1014    CO2 22 (L) 10/03/2024 1128    BUN 32 (H) 04/17/2025 1014    CREATININE 1.1 04/17/2025 1014     (H) 10/03/2024 1128        Component Value Date/Time    CALCIUM 9.5 04/17/2025 1014    CALCIUM 9.3 10/03/2024 1128    ALKPHOS 52 (L) 10/03/2024 1128    AST 17 10/03/2024 1128    ALT 22 10/03/2024 1128    BILITOT 0.6 10/03/2024 1128    ESTGFRAFRICA >60.0 07/21/2022 0912    EGFRNONAA >60.0 07/21/2022 0912          Lab Results   Component Value Date    HGBA1C 6.2 (H) 04/17/2025    HGBA1C 6.0 (H) 12/19/2024    HGBA1C 5.6 06/25/2024     Other labs reviewed today in HPI    Assessment/Plan:     1. Type 2 diabetes mellitus with hyperglycemia, without long-term current use of insulin  Assessment & Plan:    Recent A1c was 6.2.  Fasting blood sugars on recall around 120-130.  Had been off Mounjaro for a month due to belching/bloating.  He has tolerated 1 dose of Mounjaro last week.  Advised to call if he has persistent GI symptoms.    Continue currently diabetes medications.  Keep well hydrated.     Call if blood sugars are persistently less than 70 or greater than 200.   Patient does not want a referral to the diabetes educator.    Discussed importance of diet and lifestyle modifications for diabetes management.   Hypoglycemia management discussed. Carry glucose tablets or snacks at all times.     Complications:  Follow up for regular diabetes eye exam  Daily self examination of feet.  Microalbumin: Monitor.     Last A1c:   Lab Results   Component Value Date    HGBA1C 6.2 (H) 04/17/2025       Orders:  -     lancets Misc; Check 2 times a day.  Dispense: 200 each; Refill: 4  -     blood sugar diagnostic (ACCU-CHEK GUIDE TEST STRIPS) Strp; Check 2 times daily.  Dispense: 200 strip; Refill: 4  -     tirzepatide  (MOUNJARO) 5 mg/0.5 mL PnIj; Inject 5 mg under the skin once a week  Dispense: 4 Pen; Refill: 6  -     Hemoglobin A1C; Future; Expected date: 10/21/2025  -     Renal Function Panel; Future; Expected date: 10/21/2025    2. Hyperlipidemia associated with type 2 diabetes mellitus  Assessment & Plan:    Intolerance to statins.   Follows up with cardiology. On nexlizet.            Follow up in about 6 months (around 10/21/2025).       Diamond Sandhu MD

## 2025-04-21 ENCOUNTER — OFFICE VISIT (OUTPATIENT)
Dept: ENDOCRINOLOGY | Facility: CLINIC | Age: 75
End: 2025-04-21
Payer: MEDICARE

## 2025-04-21 VITALS
BODY MASS INDEX: 28.49 KG/M2 | OXYGEN SATURATION: 96 % | HEART RATE: 53 BPM | WEIGHT: 192.88 LBS | SYSTOLIC BLOOD PRESSURE: 145 MMHG | DIASTOLIC BLOOD PRESSURE: 88 MMHG

## 2025-04-21 DIAGNOSIS — E11.65 TYPE 2 DIABETES MELLITUS WITH HYPERGLYCEMIA, WITHOUT LONG-TERM CURRENT USE OF INSULIN: Primary | ICD-10-CM

## 2025-04-21 DIAGNOSIS — E11.69 HYPERLIPIDEMIA ASSOCIATED WITH TYPE 2 DIABETES MELLITUS: ICD-10-CM

## 2025-04-21 DIAGNOSIS — E78.5 HYPERLIPIDEMIA ASSOCIATED WITH TYPE 2 DIABETES MELLITUS: ICD-10-CM

## 2025-04-21 PROCEDURE — 99214 OFFICE O/P EST MOD 30 MIN: CPT | Mod: PBBFAC | Performed by: INTERNAL MEDICINE

## 2025-04-21 PROCEDURE — G2211 COMPLEX E/M VISIT ADD ON: HCPCS | Mod: S$PBB,,, | Performed by: INTERNAL MEDICINE

## 2025-04-21 PROCEDURE — 99214 OFFICE O/P EST MOD 30 MIN: CPT | Mod: S$PBB,,, | Performed by: INTERNAL MEDICINE

## 2025-04-21 PROCEDURE — 99999 PR PBB SHADOW E&M-EST. PATIENT-LVL IV: CPT | Mod: PBBFAC,,, | Performed by: INTERNAL MEDICINE

## 2025-04-21 RX ORDER — TIRZEPATIDE 5 MG/.5ML
INJECTION, SOLUTION SUBCUTANEOUS
Qty: 4 PEN | Refills: 6 | Status: SHIPPED | OUTPATIENT
Start: 2025-04-21

## 2025-04-21 RX ORDER — LANCETS
EACH MISCELLANEOUS
Qty: 200 EACH | Refills: 4 | Status: SHIPPED | OUTPATIENT
Start: 2025-04-21

## 2025-04-21 NOTE — PATIENT INSTRUCTIONS
Continue current diabetes medications.    Call if you have any side effects from the medication:   Mounjaro:  Nausea, vomiting, diarrhea, constipation, decreased appetite, abdominal pain, pancreatitis, gastroparesis, worsening of retinopathy, dehydration and acute kidney injury. Avoid this medication if you have any history of pancreatitis or have personal or family history of medullary thyroid cancer.     Jardiance:  Dehydration, increased urination, urinary tract infections, yeast infections, diabetic ketoacidosis.  Call if you have any foot infections.     Keep well hydrated.    Check blood sugars before meals and bedtime.   Call if blood sugars are frequently less than 70 or above 200.    Call if you need prescriptions for medications.  Carry glucose tablets or snacks with you at all times.     Follow-up in 6 months with labs.

## 2025-04-21 NOTE — ASSESSMENT & PLAN NOTE
Recent A1c was 6.2.  Fasting blood sugars on recall around 120-130.  Had been off Mounjaro for a month due to belching/bloating.  He has tolerated 1 dose of Mounjaro last week.  Advised to call if he has persistent GI symptoms.    Continue currently diabetes medications.  Keep well hydrated.     Call if blood sugars are persistently less than 70 or greater than 200.   Patient does not want a referral to the diabetes educator.    Discussed importance of diet and lifestyle modifications for diabetes management.   Hypoglycemia management discussed. Carry glucose tablets or snacks at all times.     Complications:  Follow up for regular diabetes eye exam  Daily self examination of feet.  Microalbumin: Monitor.     Last A1c:   Lab Results   Component Value Date    HGBA1C 6.2 (H) 04/17/2025

## 2025-05-26 DIAGNOSIS — Z00.00 ENCOUNTER FOR MEDICARE ANNUAL WELLNESS EXAM: ICD-10-CM

## 2025-06-05 DIAGNOSIS — N13.8 BENIGN PROSTATIC HYPERPLASIA WITH URINARY OBSTRUCTION: ICD-10-CM

## 2025-06-05 DIAGNOSIS — N40.1 BENIGN PROSTATIC HYPERPLASIA WITH URINARY OBSTRUCTION: ICD-10-CM

## 2025-06-05 RX ORDER — ALFUZOSIN HYDROCHLORIDE 10 MG/1
10 TABLET, EXTENDED RELEASE ORAL NIGHTLY
Qty: 90 TABLET | Refills: 0 | Status: SHIPPED | OUTPATIENT
Start: 2025-06-05

## 2025-06-05 RX ORDER — METOPROLOL SUCCINATE 50 MG/1
50 TABLET, EXTENDED RELEASE ORAL 2 TIMES DAILY
Qty: 180 TABLET | Refills: 0 | Status: SHIPPED | OUTPATIENT
Start: 2025-06-05

## 2025-06-13 ENCOUNTER — PATIENT MESSAGE (OUTPATIENT)
Dept: INTERNAL MEDICINE | Facility: CLINIC | Age: 75
End: 2025-06-13
Payer: MEDICARE

## 2025-06-13 DIAGNOSIS — I10 ESSENTIAL HYPERTENSION: ICD-10-CM

## 2025-06-13 DIAGNOSIS — E11.9 TYPE 2 DIABETES MELLITUS WITHOUT COMPLICATION, WITHOUT LONG-TERM CURRENT USE OF INSULIN: ICD-10-CM

## 2025-06-13 DIAGNOSIS — E55.9 VITAMIN D DEFICIENCY: ICD-10-CM

## 2025-06-13 RX ORDER — FESOTERODINE FUMARATE 8 MG/1
8 TABLET, FILM COATED, EXTENDED RELEASE ORAL NIGHTLY
Qty: 90 TABLET | Refills: 1 | Status: SHIPPED | OUTPATIENT
Start: 2025-06-13 | End: 2026-06-13

## 2025-06-13 NOTE — TELEPHONE ENCOUNTER
LOV 10-16-24  Annual 6-26-24  LF 7-9-24    I spoke to pt, and scheduled annual visit on 7-16-25. Fasting labs ordered and scheduled at [facility] [place]     Pt is requesting Rx below  Thanks

## 2025-06-13 NOTE — TELEPHONE ENCOUNTER
No care due was identified.  [facility] Embedded Care Due Messages. Reference number: [id number].   6/13/2025 1:05:55 PM CDT

## 2025-06-13 NOTE — TELEPHONE ENCOUNTER
Copied from CRM #[id number]. Topic: Medications - Pharmacy  >> Jun 12, 2025  4:15 PM Moon wrote:  Pharmacy is calling to clarify an RX.    RX name:  fesoterodine (TOVIAZ) 8 mg Tb24 90 tablet    What do they need to clarify:  need a new prescription for this med.     Pharmacy Contact Name and phone number:     Comments:    [id number] - [place] - [address] [facility]  [address] [place] [address]  Phone: [phone] Fax: [phone]       [facility] [phone]

## 2025-07-16 ENCOUNTER — OFFICE VISIT (OUTPATIENT)
Dept: INTERNAL MEDICINE | Facility: CLINIC | Age: 75
End: 2025-07-16
Payer: MEDICARE

## 2025-07-16 DIAGNOSIS — T46.6X5A MYALGIA DUE TO STATIN: ICD-10-CM

## 2025-07-16 DIAGNOSIS — N17.9 AKI (ACUTE KIDNEY INJURY): ICD-10-CM

## 2025-07-16 DIAGNOSIS — M79.10 MYALGIA DUE TO STATIN: ICD-10-CM

## 2025-07-16 DIAGNOSIS — N40.1 BENIGN PROSTATIC HYPERPLASIA WITH URINARY OBSTRUCTION: ICD-10-CM

## 2025-07-16 DIAGNOSIS — N13.8 BENIGN PROSTATIC HYPERPLASIA WITH URINARY OBSTRUCTION: ICD-10-CM

## 2025-07-16 DIAGNOSIS — Z00.00 PHYSICAL EXAM, ANNUAL: Primary | ICD-10-CM

## 2025-07-16 PROCEDURE — 99214 OFFICE O/P EST MOD 30 MIN: CPT | Mod: PBBFAC,PO | Performed by: STUDENT IN AN ORGANIZED HEALTH CARE EDUCATION/TRAINING PROGRAM

## 2025-07-16 PROCEDURE — 99999 PR PBB SHADOW E&M-EST. PATIENT-LVL IV: CPT | Mod: PBBFAC,,, | Performed by: STUDENT IN AN ORGANIZED HEALTH CARE EDUCATION/TRAINING PROGRAM

## 2025-07-16 RX ORDER — ALFUZOSIN HYDROCHLORIDE 10 MG/1
10 TABLET, EXTENDED RELEASE ORAL NIGHTLY
Qty: 90 TABLET | Refills: 3 | Status: SHIPPED | OUTPATIENT
Start: 2025-07-16

## 2025-07-16 RX ORDER — BUPROPION HYDROCHLORIDE 150 MG/1
150 TABLET ORAL DAILY
Qty: 90 TABLET | Refills: 3 | Status: SHIPPED | OUTPATIENT
Start: 2025-07-16

## 2025-07-16 RX ORDER — METOPROLOL SUCCINATE 50 MG/1
50 TABLET, EXTENDED RELEASE ORAL 2 TIMES DAILY
Qty: 180 TABLET | Refills: 3 | Status: SHIPPED | OUTPATIENT
Start: 2025-07-16

## 2025-07-16 NOTE — PROGRESS NOTES
Subjective:    The following note was written in combination with deep-scribe software and dictation (to which understanding and consent was verbally expressed); please excuse any transcription and/or dictation errors.      Chief Complaint: Annual Exam    HPI  Den Rick is a 75-year-old man with small atrial septal defect (status post percutaneous foramina ovale closure), A flutter/tachycardia, coronary artery disease, hypertension, hyperlipidemia, status post NSTEMI, thoracic aortic aneurysm (mildly dilated per recent [facility]), BPH, overactive bladder, hypogonadism, well controlled type 2 diabetes, diverticulosis, GERD, ventral hernia, diffuse arthritis, gout, and obstructive sleep apnea presenting for annual physical:     Annual screening labs gathered in preparation for this appointment:  -TSH/T4: Within normal limits   -A1c; extremely well controlled and improvement in low-grade prediabetic range  -CBC:  Trivial/chronic lymphocyte percentile insufficiency only  -lipids:  Remain extremely well controlled overall with relatively new (albeit modest) hypertriglyceridemia (176)   -CMP:  Hyperglycemia consistent with A1c as above; most notable for I (GFR of 42; baseline > 60); charted agent with nephrotoxic potential is Celebrex (provided via outside practitioner) he is taking for chronic/diffuse musculoskeletal pain (primarily radicular low back pain)    KARY:   -presumptively secondary to dehydration given lack of nephrotoxic agents and no history of CKD    Family, social, surgical Hx reviewed     Health Maintenance:  Due for scheduling diabetic eye exam (09/03/2025), diabetic foot exam, and COVID booster    Past Medical History:   Diagnosis Date    Anticoagulant long-term use     Arthritis     Atrial tachycardia, paroxysmal 01/2012    during Cardiac Rehab    Colon polyp     Coronary artery disease     Diverticulitis     Diverticulosis     GERD (gastroesophageal reflux disease)     Glucose intolerance (impaired  glucose tolerance) 01/24/2017    Hyperlipidemia     Hypertension     IFG (impaired fasting glucose) 12/21/2015    Myocardial infarction 11/05/2012    NSTEMI (non-ST elevated myocardial infarction) 10/11/2012    Obesity (BMI 30-39.9) 01/31/2018    Post PTCA     Sleep apnea      Past Surgical History:   Procedure Laterality Date    boewl resection      CATARACT EXTRACTION, BILATERAL  8/2011    COLONOSCOPY  2012    COLONOSCOPY N/A 10/10/2016    Procedure: COLONOSCOPY;  Surgeon: Main Lehman MD;  Location: [facility] ENDO (4TH FLR);  Service: Endoscopy;  Laterality: N/A;    COLONOSCOPY N/A 11/1/2023    Procedure: COLONOSCOPY;  Surgeon: Rayo Pickard MD;  Location: [facility] ENDO (4TH FLR);  Service: Colon and Rectal;  Laterality: N/A;  poylps noted on procedure report 10/10/16    8/9 r/s, PEG, instr. to watzhl-[id number]-[id number] receipt of peg prep  10/25-lvm for precall-MS  10/26-precall complete-MS    COLONOSCOPY W/ POLYPECTOMY      CORONARY ANGIOPLASTY WITH STENT PLACEMENT      ESOPHAGOGASTRODUODENOSCOPY      HERNIA REPAIR      KNEE SURGERY  2003    needle bx on chest      SHOULDER SURGERY      SIGMOIDECTOMY       Family History   Problem Relation Name Age of Onset    Cancer Mother          uterine    Uterine cancer Mother      Alcohol abuse Father      Valvular heart disease Father      Hyperlipidemia Sister Radha     Hypertension Sister Radha     Diabetes Sister Radha     Asthma Sister Radha     Obesity Sister Radha     No Known Problems Sister Patrica     Hypertension Brother Amos     Stroke Brother Amos     No Known Problems Brother López     Diabetes Maternal Grandmother      Dementia Maternal Grandmother       Social History[1]  Review of patient's allergies indicates:   Allergen Reactions    Atorvastatin      Other reaction(s):(lipitor) Muscle pain    Niacin      Other reaction(s): Flushing (skin)    Rosuvastatin Other (See Comments)     Brain fog     Mr. Den Rick had no medications administered  during this visit.   Review of Systems   Constitutional:  Negative for appetite change, chills and fever.   HENT: Negative.     Respiratory:  Negative for cough, chest tightness and shortness of breath.    Cardiovascular:  Negative for chest pain, palpitations and leg swelling.   Gastrointestinal:  Negative for abdominal distention, abdominal pain, blood in stool, constipation, diarrhea, nausea and vomiting.   Endocrine: Negative.    Genitourinary:  Negative for difficulty urinating, dysuria, frequency and hematuria.   Musculoskeletal: Negative.    Integumentary:  Negative.   Neurological: Negative.    Psychiatric/Behavioral: Negative.           Objective:      Vitals:    07/16/25 1259   BP: 138/88   Pulse: 61   Resp: 18   Temp: 97.2 °F (36.2 °C)      Physical Exam  Vitals reviewed.   Constitutional:       General: He is not in acute distress.     Appearance: Normal appearance.   HENT:      Head: Normocephalic and atraumatic.      Comments: Facial features are symmetric      Nose: Nose normal. No congestion or rhinorrhea.      Mouth/Throat:      Mouth: Mucous membranes are moist.      Pharynx: Oropharynx is clear. No oropharyngeal exudate or posterior oropharyngeal erythema.   Eyes:      General: No scleral icterus.     Extraocular Movements: Extraocular movements intact.      Conjunctiva/sclera: Conjunctivae normal.   Cardiovascular:      Rate and Rhythm: Normal rate and regular rhythm.      Pulses: Normal pulses.      Heart sounds: Normal heart sounds.   Pulmonary:      Effort: Pulmonary effort is normal. No respiratory distress.      Breath sounds: Normal breath sounds.   Musculoskeletal:         General: No deformity or signs of injury. Normal range of motion.      Cervical back: Normal range of motion.   Feet:      Comments: Protective Sensation (w/ 10 gram monofilament):  Right: Intact  Left: Intact    Visual Inspection:  Normal -  Bilateral    Pedal Pulses:   Right: Present  Left: Present    Posterior  Tibialis Pulses:   Right:Present  Left: Present      Skin:     General: Skin is warm and dry.      Findings: No rash.   Neurological:      General: No focal deficit present.      Mental Status: He is alert and oriented to person, place, and time. Mental status is at baseline.   Psychiatric:         Mood and Affect: Mood normal.         Behavior: Behavior normal.         Thought Content: Thought content normal.       Medications Ordered Prior to Encounter[2]      Assessment:       1. Physical exam, annual    2. Benign prostatic hyperplasia with urinary obstruction    3. Myalgia due to statin    4. KARY (acute kidney injury)        Plan:       Physical exam, annual   - annual screening labs discussed   - has an upcoming dilated eye exam scheduled; requested he insist on records being sent to us.      Benign prostatic hyperplasia with urinary obstruction  -     alfuzosin (UROXATRAL) 10 mg Tb24; Take 1 tablet (10 mg total) by mouth every evening.  Dispense: 90 tablet; Refill: 3    Myalgia due to statin   - history noted     KARY (acute kidney injury)  -     Comprehensive Metabolic Panel; Future; Expected date: 07/16/2025   - patient to trial aggressive oral hydration while trying high strength Tylenol (up to 4000 mg daily; preferably 1000 mg with every meal) pending reassessment of GFR in one-week.  If persistently suppressed, will need to discontinue oral NSAIDs indefinitely.  If pain is refractory to Tylenol, will facilitate formal physical therapy and pain management establishment to consider epidural steroid injections now that his blood sugar is extremely well controlled.      Other orders  -     metoprolol succinate (TOPROL-XL) 50 MG 24 hr tablet; Take 1 tablet (50 mg total) by mouth 2 (two) times daily.  Dispense: 180 tablet; Refill: 3  -     buPROPion (WELLBUTRIN XL) 150 MG TB24 tablet; Take 1 tablet (150 mg total) by mouth once daily.  Dispense: 90 tablet; Refill: 3                  [1]   Social  History  Socioeconomic History    Marital status:    Occupational History    Occupation: enviromental regulator     Employer: [employer]   Tobacco Use    Smoking status: Never     Passive exposure: Never    Smokeless tobacco: Never    Tobacco comments:     smoked weed 27 yrs ago   Substance and Sexual Activity    Alcohol use: No     Alcohol/week: 0.0 standard drinks of alcohol     Comment: quit at 25 yrs old    Drug use: Not Currently     Types: Marijuana, Cocaine, Amphetamines     Comment: as younger 20s, quit since 1986    Sexual activity: Yes     Partners: Female     Social Drivers of Health     Financial Resource Strain: Low Risk  (7/10/2025)    Overall Financial Resource Strain (CARDIA)     Difficulty of Paying Living Expenses: Not hard at all   Food Insecurity: No Food Insecurity (7/10/2025)    Hunger Vital Sign     Worried About Running Out of Food in the Last Year: Never true     Ran Out of Food in the Last Year: Never true   Transportation Needs: No Transportation Needs (7/10/2025)    PRAPARE - Transportation     Lack of Transportation (Medical): No     Lack of Transportation (Non-Medical): No   Physical Activity: Sufficiently Active (7/10/2025)    Exercise Vital Sign     Days of Exercise per Week: 5 days     Minutes of Exercise per Session: 30 min   Stress: No Stress Concern Present (7/10/2025)    [employer] [place] [employer] - Occupational Stress Questionnaire     Feeling of Stress : Not at all   Housing Stability: Low Risk  (7/10/2025)    Housing Stability Vital Sign     Unable to Pay for Housing in the Last Year: No     Number of Times Moved in the Last Year: 0     Homeless in the Last Year: No   [2]   Current Outpatient Medications on File Prior to Visit   Medication Sig Dispense Refill    aspirin (ECOTRIN) 81 MG EC tablet Take 81 mg by mouth once daily.      betamethasone dipropionate 0.05 % cream Apply topically 2 (two) times daily.      blood sugar diagnostic (ACCU-CHEK  GUIDE TEST STRIPS) Strp Check 2 times daily. 200 strip 4    blood-glucose meter kit To check BG 1-2 times daily, to use with insurance preferred meter 1 each 0    celecoxib (CELEBREX) 200 MG capsule Take 200 mg by mouth once daily. Daily and extra dose as needed      fesoterodine (TOVIAZ) 8 mg Tb24 Take 8 mg by mouth every evening. 90 tablet 1    fish oil-omega-3 fatty acids 300-1,000 mg capsule Take 1 g by mouth once daily.      JARDIANCE 10 mg tablet TAKE 1 TABLET BY MOUTH EVERY DAY 90 tablet 2    lancets Misc Check 2 times a day. 200 each 4    metFORMIN (GLUCOPHAGE-XR) 500 MG ER 24hr tablet Take 4 tablets (2,000 mg total) by mouth Daily. 360 tablet 3    multivitamin (THERAGRAN) per tablet Take 1 tablet by mouth Daily.        NEXLIZET 180-10 mg Tab TAKE 1 TABLET BY MOUTH EVERY DAY 90 tablet 2    nitroGLYCERIN (NITROSTAT) 0.4 MG SL tablet Place 1 tablet (0.4 mg total) under the tongue every 5 (five) minutes as needed for Chest pain. 100 tablet 3    pantoprazole (PROTONIX) 40 MG tablet Take 1 tablet (40 mg total) by mouth once daily. 30 tablet 1    predniSONE (DELTASONE) 20 MG tablet Take 20 mg by mouth as needed.      tirzepatide (MOUNJARO) 5 mg/0.5 mL PnIj Inject 5 mg under the skin once a week 4 Pen 6    brinzolamide (AZOPT) 1 % ophthalmic suspension Place 1 drop into both eyes 3 (three) times daily.      DENTA 5000 PLUS 1.1 % Crea Take by mouth as needed.       No current facility-administered medications on file prior to visit.

## 2025-07-18 VITALS
HEART RATE: 61 BPM | WEIGHT: 194 LBS | RESPIRATION RATE: 18 BRPM | DIASTOLIC BLOOD PRESSURE: 88 MMHG | BODY MASS INDEX: 28.73 KG/M2 | OXYGEN SATURATION: 96 % | HEIGHT: 69 IN | SYSTOLIC BLOOD PRESSURE: 138 MMHG | TEMPERATURE: 97 F

## (undated) DEVICE — COVER DRAPE ACUSON STERILE

## (undated) DEVICE — SEE MEDLINE ITEM 156894

## (undated) DEVICE — KIT ENSITE ELECTRODE SURFACE

## (undated) DEVICE — OMNIPAQUE 350 200ML

## (undated) DEVICE — PACK EP DRAPE

## (undated) DEVICE — PROTECTION STATION PLUS

## (undated) DEVICE — INTRO 8.5FR 63CM SRO

## (undated) DEVICE — KIT PROBE COVER WITH GEL

## (undated) DEVICE — CATH TRICUSPID HALO XP 7FRX110

## (undated) DEVICE — SHEATH BRITE TIP 9F 35CM

## (undated) DEVICE — GUIDEWIRE EMERALD 150CM PTFE

## (undated) DEVICE — SHEATH INTRODUCER 6FR 11CM

## (undated) DEVICE — INTRODUCER HEMOSTASIS 7.5F

## (undated) DEVICE — CATH MPA2 INFINITI 4FR 100CM

## (undated) DEVICE — PAD GROUND UNIV STYLE CORD 9IN

## (undated) DEVICE — CATH BIDIRECTIONAL DF CRV 7FR

## (undated) DEVICE — REPROCESSED CATH ACUNAV 8FR

## (undated) DEVICE — KIT CUSTOM MANIFOLD

## (undated) DEVICE — INTRAVASCULAR DELIVERY SYSTEM
Type: IMPLANTABLE DEVICE | Site: HEART | Status: NON-FUNCTIONAL
Brand: AMPLATZER™ TREVISIO™
Removed: 2021-04-23

## (undated) DEVICE — KIT MICROINTRO 4F .018X40X7CM

## (undated) DEVICE — CATH SAFIRE BI-DIR 7FR LRG

## (undated) DEVICE — BLLN SIZING AGA 34MM

## (undated) DEVICE — SPIKE CONTRAST CONTROLLER

## (undated) DEVICE — GUIDEWIRE AMPLATZ .035X260

## (undated) DEVICE — PAD DEFIB CADENCE ADULT R2